# Patient Record
Sex: FEMALE | Race: BLACK OR AFRICAN AMERICAN | NOT HISPANIC OR LATINO | Employment: FULL TIME | URBAN - METROPOLITAN AREA
[De-identification: names, ages, dates, MRNs, and addresses within clinical notes are randomized per-mention and may not be internally consistent; named-entity substitution may affect disease eponyms.]

---

## 2018-10-05 ENCOUNTER — APPOINTMENT (EMERGENCY)
Dept: CT IMAGING | Facility: HOSPITAL | Age: 53
End: 2018-10-05
Payer: COMMERCIAL

## 2018-10-05 ENCOUNTER — HOSPITAL ENCOUNTER (EMERGENCY)
Facility: HOSPITAL | Age: 53
Discharge: HOME | End: 2018-10-05
Attending: EMERGENCY MEDICINE | Admitting: EMERGENCY MEDICINE
Payer: COMMERCIAL

## 2018-10-05 VITALS
RESPIRATION RATE: 16 BRPM | DIASTOLIC BLOOD PRESSURE: 84 MMHG | TEMPERATURE: 98 F | SYSTOLIC BLOOD PRESSURE: 184 MMHG | OXYGEN SATURATION: 99 % | HEART RATE: 64 BPM | WEIGHT: 215 LBS

## 2018-10-05 DIAGNOSIS — R51.9 HEADACHE: Primary | ICD-10-CM

## 2018-10-05 DIAGNOSIS — H60.90 OTITIS EXTERNA: ICD-10-CM

## 2018-10-05 PROCEDURE — 96374 THER/PROPH/DIAG INJ IV PUSH: CPT

## 2018-10-05 PROCEDURE — 99284 EMERGENCY DEPT VISIT MOD MDM: CPT

## 2018-10-05 PROCEDURE — 70450 CT HEAD/BRAIN W/O DYE: CPT

## 2018-10-05 PROCEDURE — 96375 TX/PRO/DX INJ NEW DRUG ADDON: CPT

## 2018-10-05 PROCEDURE — 96361 HYDRATE IV INFUSION ADD-ON: CPT

## 2018-10-05 RX ORDER — NEOMYCIN SULFATE, POLYMYXIN B SULFATE AND HYDROCORTISONE 10; 3.5; 1 MG/ML; MG/ML; [USP'U]/ML
4 SUSPENSION/ DROPS AURICULAR (OTIC) 3 TIMES DAILY
Qty: 10 ML | Refills: 0 | Status: SHIPPED | OUTPATIENT
Start: 2018-10-05

## 2018-10-05 RX ORDER — HYDROXYCHLOROQUINE SULFATE 200 MG/1
200 TABLET, FILM COATED ORAL DAILY
COMMUNITY
Start: 2017-06-22

## 2018-10-05 RX ORDER — BUTALBITAL, ACETAMINOPHEN AND CAFFEINE 50; 325; 40 MG/1; MG/1; MG/1
1 TABLET ORAL EVERY 4 HOURS PRN
Qty: 20 TABLET | Refills: 0 | Status: SHIPPED | OUTPATIENT
Start: 2018-10-05

## 2018-10-05 RX ORDER — DIPHENHYDRAMINE HYDROCHLORIDE 50 MG/ML
25 INJECTION INTRAMUSCULAR; INTRAVENOUS ONCE
Status: COMPLETED | OUTPATIENT
Start: 2018-10-05 | End: 2018-10-05

## 2018-10-05 RX ORDER — KETOROLAC TROMETHAMINE 30 MG/ML
30 INJECTION, SOLUTION INTRAMUSCULAR; INTRAVENOUS ONCE
Status: COMPLETED | OUTPATIENT
Start: 2018-10-05 | End: 2018-10-05

## 2018-10-05 RX ORDER — METOCLOPRAMIDE HYDROCHLORIDE 5 MG/ML
10 INJECTION INTRAMUSCULAR; INTRAVENOUS ONCE
Status: COMPLETED | OUTPATIENT
Start: 2018-10-05 | End: 2018-10-05

## 2018-10-05 RX ORDER — IBUPROFEN 600 MG/1
600 TABLET ORAL EVERY 6 HOURS PRN
Qty: 20 TABLET | Refills: 0 | Status: SHIPPED | OUTPATIENT
Start: 2018-10-05

## 2018-10-05 RX ADMIN — DIPHENHYDRAMINE HYDROCHLORIDE 25 MG: 50 INJECTION, SOLUTION INTRAMUSCULAR; INTRAVENOUS at 15:38

## 2018-10-05 RX ADMIN — SODIUM CHLORIDE 1000 ML: 0.9 INJECTION, SOLUTION INTRAVENOUS at 15:36

## 2018-10-05 RX ADMIN — KETOROLAC TROMETHAMINE 30 MG: 30 INJECTION, SOLUTION INTRAMUSCULAR at 16:43

## 2018-10-05 RX ADMIN — METOCLOPRAMIDE 10 MG: 5 INJECTION, SOLUTION INTRAMUSCULAR; INTRAVENOUS at 15:39

## 2018-10-05 NOTE — ED NOTES
Pt dozing, awakens and states pain remains a "6"  Accompanied by  in room 16        Randolph Mc RN  10/05/18 9729

## 2018-10-05 NOTE — DISCHARGE INSTRUCTIONS
Acute Headache, Ambulatory Care   GENERAL INFORMATION:   An acute headache  is pain or discomfort that starts suddenly and gets worse quickly  The cause of an acute headache may not be known  It may be triggered by stress, fatigue, hormones, food, or trauma  Common related symptoms include the following:   · Fever    · Sinus pressure    · Loss of memory    · Nausea or vomiting    · Problems with your vision, such as watery or red eyes, loss of vision, or pain in bright light    · Stiff neck    · Tenderness of the head and neck area    · Trouble staying awake, or being less alert than usual     · Weakness or less energy  Seek immediate care for the following symptoms:   · Severe pain    · A headache that occurs after a blow to the head, a fall, or other trauma     · Confusion or forgetfulness    · Numbness on one side of your face or body  Treatment for an acute headache  may include medicine to decrease pain  You may also need biofeedback or cognitive behavioral therapy  Ask your healthcare provider about these and other treatments for an acute headache  Manage my symptoms:   · Apply heat  on your head for 20 to 30 minutes every 2 hours for as many days as directed  Heat helps decrease pain and muscle spasms  You may alternate heat and ice  · Apply ice  on your head for 15 to 20 minutes every hour or as directed  Use an ice pack, or put crushed ice in a plastic bag  Cover it with a towel  Ice helps decrease pain  · Relax your muscles  Lie down in a comfortable position and close your eyes  Relax your muscles slowly  Start at your toes and work your way up your body  · Keep a record of your headaches  Write down when your headaches start and stop  Include your symptoms and what you were doing when the headache began  Record what you ate or drank for 24 hours before the headache started  Describe the pain and where it hurts  Keep track of what you did to treat your headache and whether it worked    Follow up with your healthcare provider as directed:  Bring your headache record with you when you see your healthcare provider  Write down your questions so you remember to ask them during your visits  CARE AGREEMENT:   You have the right to help plan your care  Learn about your health condition and how it may be treated  Discuss treatment options with your caregivers to decide what care you want to receive  You always have the right to refuse treatment  The above information is an  only  It is not intended as medical advice for individual conditions or treatments  Talk to your doctor, nurse or pharmacist before following any medical regimen to see if it is safe and effective for you  © 2014 [address] is for End User's use only and may not be sold, redistributed or otherwise used for commercial purposes  All illustrations and images included in CareNotes® are the copyrighted property of A D A M , Inc  or [employer]  Otitis Externa   WHAT YOU NEED TO KNOW:   Otitis externa, or swimmer's ear, is an infection in the outer ear canal  This canal goes from the outside of the ear to the eardrum  DISCHARGE INSTRUCTIONS:   Return to the emergency department if:   · You have severe ear pain  · You are suddenly unable to hear at all  · You have new swelling in your face, behind your ears, or in your neck  · You suddenly cannot move part of your face  · Your face suddenly feels numb  Contact your healthcare provider if:   · You have a fever  · Your signs and symptoms do not get better after 2 days of treatment  · Your signs and symptoms go away for a time, but then come back  · You have questions or concerns about your condition or care  Medicines:   · NSAIDs , such as ibuprofen, help decrease swelling, pain, and fever  This medicine is available with or without a doctor's order   NSAIDs can cause stomach bleeding or kidney problems in certain people  If you take blood thinner medicine, always ask if NSAIDs are safe for you  Always read the medicine label and follow directions  Do not give these medicines to children under 10months of age without direction from your child's healthcare provider  · Acetaminophen  decreases pain and fever  It is available without a doctor's order  Ask how much to take and how often to take it  Follow directions  Acetaminophen can cause liver damage if not taken correctly  · Ear drops  that contain an antibiotic may be given  The antibiotic helps treat a bacterial infection  You may also be given steroid medicine  The steroid helps decrease redness, swelling, and pain  · Take your medicine as directed  Contact your healthcare provider if you think your medicine is not helping or if you have side effects  Tell him or her if you are allergic to any medicine  Keep a list of the medicines, vitamins, and herbs you take  Include the amounts, and when and why you take them  Bring the list or the pill bottles to follow-up visits  Carry your medicine list with you in case of an emergency  Follow up with your healthcare provider as directed:  Write down your questions so you remember to ask them during your visits  How to use eardrops:   · Lie down on your side with your infected ear facing up  · Carefully drip the correct number of eardrops into your ear  Have another person help you if possible  · Gently move the outside part of your ear back and forth to help the medicine reach your ear canal      · Stay lying down in the same position (with your ear facing up) for 3 to 5 minutes  Prevent otitis externa:   · Do not put cotton swabs or foreign objects in your ears  · Wrap a clean moist washcloth around your finger, and use it to clean your outer ear and remove extra ear wax  · Use ear plugs when you swim  Dry your outer ears completely after you swim or bathe    © 2017 [address] Information is for End User's use only and may not be sold, redistributed or otherwise used for commercial purposes  All illustrations and images included in CareNotes® are the copyrighted property of A D A M , Inc  or Harsh Simental  The above information is an  only  It is not intended as medical advice for individual conditions or treatments  Talk to your doctor, nurse or pharmacist before following any medical regimen to see if it is safe and effective for you

## 2018-10-05 NOTE — ED PROVIDER NOTES
History  Chief Complaint   Patient presents with    Headache     Patient arrives via EMS  Per patient began with posterior headache last night which has since worsed  Reports some dizziness and blurred vision  Also c/o left sided neck tightness  C/o posterior headache since this am   No h/o migraines, pt  Doesn't get headaches often  +nausea, no vomiting , no fevers  Headache is a 9/10  No head trauma              Prior to Admission Medications   Prescriptions Last Dose Informant Patient Reported? Taking?   hydroxychloroquine (PLAQUENIL) 200 mg tablet   Yes Yes   Sig: Take 200 mg by mouth daily      Facility-Administered Medications: None       Past Medical History:   Diagnosis Date    Lupus        No past surgical history on file  No family history on file  I have reviewed and agree with the history as documented  Social History   Substance Use Topics    Smoking status: Never Smoker    Smokeless tobacco: Never Used    Alcohol use Yes      Comment: occasional        Review of Systems   Constitutional: Negative for appetite change, fatigue and fever  HENT: Positive for ear pain  Negative for rhinorrhea and sore throat  Respiratory: Negative for cough, shortness of breath and wheezing  Cardiovascular: Negative for chest pain and leg swelling  Gastrointestinal: Positive for nausea  Negative for abdominal pain, diarrhea and vomiting  Genitourinary: Negative for dysuria and flank pain  Musculoskeletal: Negative for back pain and neck pain  Skin: Negative for rash  Neurological: Positive for headaches  Negative for syncope  Psychiatric/Behavioral:        Mood normal       Physical Exam  Physical Exam   Constitutional: She is oriented to person, place, and time  She appears well-developed and well-nourished  HENT:   Head: Normocephalic and atraumatic     Mouth/Throat: Oropharynx is clear and moist    TM's clear b/l, L ear canal is tender - no redness or drainage   Eyes: Pupils are equal, round, and reactive to light  Neck: Normal range of motion  Neck supple  Cardiovascular: Normal rate and regular rhythm  Pulmonary/Chest: Effort normal and breath sounds normal    Abdominal: Soft  There is no tenderness  Musculoskeletal: Normal range of motion  Neurological: She is alert and oriented to person, place, and time  No cranial nerve deficit  Skin: Skin is warm and dry  Nursing note and vitals reviewed  Vital Signs  ED Triage Vitals   Temperature Pulse Respirations Blood Pressure SpO2   10/05/18 1448 10/05/18 1446 10/05/18 1446 10/05/18 1446 10/05/18 1446   98 °F (36 7 °C) 78 18 (!) 187/87 100 %      Temp Source Heart Rate Source Patient Position - Orthostatic VS BP Location FiO2 (%)   10/05/18 1448 10/05/18 1446 10/05/18 1446 10/05/18 1446 --   Oral Monitor Lying Right arm       Pain Score       10/05/18 1446       9           Vitals:    10/05/18 1446 10/05/18 1733   BP: (!) 187/87 (!) 184/84   Pulse: 78 64   Patient Position - Orthostatic VS: Lying Sitting       Visual Acuity  Visual Acuity      Most Recent Value   L Pupil Size (mm)  3   R Pupil Size (mm)  3          ED Medications  Medications   diphenhydrAMINE (BENADRYL) injection 25 mg (25 mg Intravenous Given 10/5/18 1538)   metoclopramide (REGLAN) injection 10 mg (10 mg Intravenous Given 10/5/18 1539)   sodium chloride 0 9 % bolus 1,000 mL (0 mL Intravenous Stopped 10/5/18 1730)   ketorolac (TORADOL) injection 30 mg (30 mg Intravenous Given 10/5/18 1643)       Diagnostic Studies  Results Reviewed     None                 CT head without contrast   Final Result by Benjamin Gaxiola DO (10/05 1628)      No acute intracranial abnormality                    Workstation performed: [id number]                    Procedures  Procedures       Phone Contacts  ED Phone Contact    ED Course                               MDM  Number of Diagnoses or Management Options  Headache:   Otitis externa:   Risk of Complications, Morbidity, and/or Mortality  Presenting problems: moderate  General comments: Pt  Felt better after meds and iv fluids      CritCare Time    Disposition  Final diagnoses:   Headache   Otitis externa     Time reflects when diagnosis was documented in both MDM as applicable and the Disposition within this note     Time User Action Codes Description Comment    10/5/2018  4:40 PM Mike Thompson Add [R51] Headache     10/5/2018  4:41 PM Criselda Landen Devan R Add [H60 90] Otitis externa       ED Disposition     ED Disposition Condition Comment    Discharge  Felipemoody Valiente discharge to home/self care  Condition at discharge: Stable        Follow-up Information     Follow up With Specialties Details Why [address] [facility] [place], MD Internal Medicine   [address]  [address] [address]  [phone]            Discharge Medication List as of 10/5/2018  4:42 PM      START taking these medications    Details   butalbital-acetaminophen-caffeine (FIORICET,ESGIC) -40 mg per tablet Take 1 tablet by mouth every 4 (four) hours as needed for headaches, Starting Fri 10/5/2018, Print      ibuprofen (MOTRIN) 600 mg tablet Take 1 tablet (600 mg total) by mouth every 6 (six) hours as needed for moderate pain, Starting Fri 10/5/2018, Print      neomycin-polymyxin-hydrocortisone (CORTISPORIN) 0 35%-10,000 units/mL-1% otic suspension Administer 4 drops into the left ear 3 (three) times a day, Starting Fri 10/5/2018, Print         CONTINUE these medications which have NOT CHANGED    Details   hydroxychloroquine (PLAQUENIL) 200 mg tablet Take 200 mg by mouth daily, Starting u 6/22/2017, Historical Med           No discharge procedures on file      ED Provider  Electronically Signed by           Nathaly Roman MD  10/05/18 9188

## 2019-11-07 ENCOUNTER — HOSPITAL ENCOUNTER (EMERGENCY)
Age: 54
Discharge: HOME | End: 2019-11-07
Attending: STUDENT IN AN ORGANIZED HEALTH CARE EDUCATION/TRAINING PROGRAM | Admitting: STUDENT IN AN ORGANIZED HEALTH CARE EDUCATION/TRAINING PROGRAM
Payer: OTHER MISCELLANEOUS

## 2019-11-07 ENCOUNTER — APPOINTMENT (OUTPATIENT)
Dept: CT IMAGING | Age: 54
End: 2019-11-07
Attending: PHYSICIAN ASSISTANT
Payer: OTHER MISCELLANEOUS

## 2019-11-07 VITALS
HEART RATE: 75 BPM | OXYGEN SATURATION: 100 % | RESPIRATION RATE: 16 BRPM | TEMPERATURE: 97.9 F | DIASTOLIC BLOOD PRESSURE: 81 MMHG | SYSTOLIC BLOOD PRESSURE: 188 MMHG

## 2019-11-07 DIAGNOSIS — S09.90XA CLOSED HEAD INJURY, INITIAL ENCOUNTER: Primary | ICD-10-CM

## 2019-11-07 PROCEDURE — 96372 THER/PROPH/DIAG INJ SC/IM: CPT

## 2019-11-07 PROCEDURE — 72125 CT NECK SPINE W/O DYE: CPT

## 2019-11-07 PROCEDURE — 70450 CT HEAD/BRAIN W/O DYE: CPT

## 2019-11-07 PROCEDURE — 99282 EMERGENCY DEPT VISIT SF MDM: CPT

## 2019-11-07 PROCEDURE — 74011250636 HC RX REV CODE- 250/636: Performed by: PHYSICIAN ASSISTANT

## 2019-11-07 RX ORDER — KETOROLAC TROMETHAMINE 30 MG/ML
30 INJECTION, SOLUTION INTRAMUSCULAR; INTRAVENOUS
Status: COMPLETED | OUTPATIENT
Start: 2019-11-07 | End: 2019-11-07

## 2019-11-07 RX ADMIN — KETOROLAC TROMETHAMINE 30 MG: 30 INJECTION, SOLUTION INTRAMUSCULAR at 12:17

## 2019-11-07 NOTE — LETTER
[address] 
[address] [facility] [address] [id number] 
[phone] Work/School Note Date: 11/7/2019 To Whom It May concern: 
 
Carmen Garcia was seen and treated today in the emergency room by the following provider(s): 
Physician Assistant: GINA Riddle. Carmen Garcia may return to work on 11/9/19. Sincerely, GINA Redd
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
## 2019-11-07 NOTE — DISCHARGE INSTRUCTIONS
Patient Education        Learning About a Closed Head Injury  What is a closed head injury? A closed head injury happens when your head gets hit hard. The strong force of the blow causes your brain to shake in your skull. This movement can cause the brain to bruise, swell, or tear. Sometimes nerves or blood vessels also get damaged. This can cause bleeding in or around the brain. A concussion is a type of closed head injury. What are the symptoms? If you have a mild concussion, you may have a mild headache or feel \"not quite right. \" These symptoms are common. They usually go away over a few days to 4 weeks. But sometimes after a concussion, you feel like you can't function as well as before the injury. And you have new symptoms. This is called postconcussive syndrome. You may:  · Find it harder to solve problems, think, concentrate, or remember. · Have headaches. · Have changes in your sleep patterns, such as not being able to sleep or sleeping all the time. · Have changes in your personality. · Not be interested in your usual activities. · Feel angry or anxious without a clear reason. · Lose your sense of taste or smell. · Be dizzy, lightheaded, or unsteady. It may be hard to stand or walk. How is a closed head injury treated? Any person who may have a concussion needs to see a doctor. Some people have to stay in the hospital to be watched. Others can go home safely. If you go home, follow your doctor's instructions. He or she will tell you if you need someone to watch you closely for the next 24 hours or longer. Rest is the best treatment. Get plenty of sleep at night. And try to rest during the day. · Avoid activities that are physically or mentally demanding. These include housework, exercise, and schoolwork. And don't play video games, send text messages, or use the computer. You may need to change your school or work schedule to be able to avoid these activities.   · Ask your doctor when it's okay to drive, ride a bike, or operate machinery. · Take an over-the-counter pain medicine, such as acetaminophen (Tylenol), ibuprofen (Advil, Motrin), or naproxen (Aleve). Be safe with medicines. Read and follow all instructions on the label. · Check with your doctor before you use any other medicines for pain. · Do not drink alcohol or use illegal drugs. They can slow recovery. They can also increase your risk of getting a second head injury. Follow-up care is a key part of your treatment and safety. Be sure to make and go to all appointments, and call your doctor if you are having problems. It's also a good idea to know your test results and keep a list of the medicines you take. Where can you learn more? Go to http://manuel-roopa.info/. Enter E235 in the search box to learn more about \"Learning About a Closed Head Injury. \"  Current as of: March 28, 2019  Content Version: 12.2  © 0171-6377 [employer]. Care instructions adapted under license by [employer] (which disclaims liability or warranty for this information). If you have questions about a medical condition or this instruction, always ask your healthcare professional. [address] any warranty or liability for your use of this information.

## 2020-02-19 ENCOUNTER — HOSPITAL ENCOUNTER (EMERGENCY)
Age: 55
Discharge: HOME | End: 2020-02-19
Attending: EMERGENCY MEDICINE | Admitting: EMERGENCY MEDICINE
Payer: OTHER MISCELLANEOUS

## 2020-02-19 VITALS
HEIGHT: 64 IN | HEART RATE: 75 BPM | BODY MASS INDEX: 39.61 KG/M2 | OXYGEN SATURATION: 99 % | TEMPERATURE: 98.2 F | DIASTOLIC BLOOD PRESSURE: 89 MMHG | RESPIRATION RATE: 16 BRPM | SYSTOLIC BLOOD PRESSURE: 116 MMHG | WEIGHT: 232 LBS

## 2020-02-19 DIAGNOSIS — G43.809 OTHER MIGRAINE WITHOUT STATUS MIGRAINOSUS, NOT INTRACTABLE: Primary | ICD-10-CM

## 2020-02-19 LAB
ANION GAP SERPL CALC-SCNC: 4 MMOL/L (ref 5–15)
BASOPHILS # BLD: 0 K/UL (ref 0–0.1)
BASOPHILS NFR BLD: 1 % (ref 0–1)
BUN SERPL-MCNC: 10 MG/DL (ref 6–20)
BUN/CREAT SERPL: 13 (ref 12–20)
CALCIUM SERPL-MCNC: 8.9 MG/DL (ref 8.5–10.1)
CHLORIDE SERPL-SCNC: 109 MMOL/L (ref 97–108)
CO2 SERPL-SCNC: 27 MMOL/L (ref 21–32)
COMMENT, HOLDF: NORMAL
CREAT SERPL-MCNC: 0.78 MG/DL (ref 0.55–1.02)
DIFFERENTIAL METHOD BLD: ABNORMAL
EOSINOPHIL # BLD: 0.1 K/UL (ref 0–0.4)
EOSINOPHIL NFR BLD: 2 % (ref 0–7)
ERYTHROCYTE [DISTWIDTH] IN BLOOD BY AUTOMATED COUNT: 13.5 % (ref 11.5–14.5)
GLUCOSE SERPL-MCNC: 96 MG/DL (ref 65–100)
HCT VFR BLD AUTO: 43.9 % (ref 35–47)
HGB BLD-MCNC: 13.1 G/DL (ref 11.5–16)
IMM GRANULOCYTES # BLD AUTO: 0 K/UL (ref 0–0.04)
IMM GRANULOCYTES NFR BLD AUTO: 0 % (ref 0–0.5)
LYMPHOCYTES # BLD: 2 K/UL (ref 0.8–3.5)
LYMPHOCYTES NFR BLD: 47 % (ref 12–49)
MCH RBC QN AUTO: 25.2 PG (ref 26–34)
MCHC RBC AUTO-ENTMCNC: 29.8 G/DL (ref 30–36.5)
MCV RBC AUTO: 84.6 FL (ref 80–99)
MONOCYTES # BLD: 0.3 K/UL (ref 0–1)
MONOCYTES NFR BLD: 6 % (ref 5–13)
NEUTS SEG # BLD: 2 K/UL (ref 1.8–8)
NEUTS SEG NFR BLD: 44 % (ref 32–75)
NRBC # BLD: 0 K/UL (ref 0–0.01)
NRBC BLD-RTO: 0 PER 100 WBC
PLATELET # BLD AUTO: 279 K/UL (ref 150–400)
PMV BLD AUTO: 10.9 FL (ref 8.9–12.9)
POTASSIUM SERPL-SCNC: 3.7 MMOL/L (ref 3.5–5.1)
RBC # BLD AUTO: 5.19 M/UL (ref 3.8–5.2)
SAMPLES BEING HELD,HOLD: NORMAL
SODIUM SERPL-SCNC: 140 MMOL/L (ref 136–145)
WBC # BLD AUTO: 4.4 K/UL (ref 3.6–11)

## 2020-02-19 PROCEDURE — 99282 EMERGENCY DEPT VISIT SF MDM: CPT

## 2020-02-19 PROCEDURE — 36415 COLL VENOUS BLD VENIPUNCTURE: CPT

## 2020-02-19 PROCEDURE — 85025 COMPLETE CBC W/AUTO DIFF WBC: CPT

## 2020-02-19 PROCEDURE — 96374 THER/PROPH/DIAG INJ IV PUSH: CPT

## 2020-02-19 PROCEDURE — 74011250636 HC RX REV CODE- 250/636: Performed by: NURSE PRACTITIONER

## 2020-02-19 PROCEDURE — 80048 BASIC METABOLIC PNL TOTAL CA: CPT

## 2020-02-19 PROCEDURE — 96375 TX/PRO/DX INJ NEW DRUG ADDON: CPT

## 2020-02-19 RX ORDER — HYDROXYCHLOROQUINE SULFATE 200 MG/1
200 TABLET, FILM COATED ORAL DAILY
COMMUNITY
End: 2020-08-28 | Stop reason: SDUPTHER

## 2020-02-19 RX ORDER — MONTELUKAST SODIUM 10 MG/1
10 TABLET ORAL EVERY EVENING
COMMUNITY
End: 2020-08-28 | Stop reason: SDUPTHER

## 2020-02-19 RX ORDER — PROCHLORPERAZINE MALEATE 10 MG
10 TABLET ORAL
Qty: 12 TAB | Refills: 0 | Status: SHIPPED | OUTPATIENT
Start: 2020-02-19 | End: 2020-02-26

## 2020-02-19 RX ORDER — PROCHLORPERAZINE EDISYLATE 5 MG/ML
10 INJECTION INTRAMUSCULAR; INTRAVENOUS
Status: COMPLETED | OUTPATIENT
Start: 2020-02-19 | End: 2020-02-19

## 2020-02-19 RX ORDER — DIPHENHYDRAMINE HYDROCHLORIDE 50 MG/ML
12.5 INJECTION, SOLUTION INTRAMUSCULAR; INTRAVENOUS
Status: COMPLETED | OUTPATIENT
Start: 2020-02-19 | End: 2020-02-19

## 2020-02-19 RX ORDER — DICLOFENAC SODIUM 10 MG/G
GEL TOPICAL 4 TIMES DAILY
COMMUNITY
End: 2021-07-27 | Stop reason: ALTCHOICE

## 2020-02-19 RX ORDER — KETOROLAC TROMETHAMINE 30 MG/ML
15 INJECTION, SOLUTION INTRAMUSCULAR; INTRAVENOUS
Status: COMPLETED | OUTPATIENT
Start: 2020-02-19 | End: 2020-02-19

## 2020-02-19 RX ADMIN — KETOROLAC TROMETHAMINE 15 MG: 30 INJECTION, SOLUTION INTRAMUSCULAR at 13:00

## 2020-02-19 RX ADMIN — SODIUM CHLORIDE 1000 ML: 900 INJECTION, SOLUTION INTRAVENOUS at 13:00

## 2020-02-19 RX ADMIN — PROCHLORPERAZINE EDISYLATE 10 MG: 5 INJECTION INTRAMUSCULAR; INTRAVENOUS at 13:00

## 2020-02-19 RX ADMIN — DIPHENHYDRAMINE HYDROCHLORIDE 12.5 MG: 50 INJECTION, SOLUTION INTRAMUSCULAR; INTRAVENOUS at 13:00
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
## 2020-02-19 NOTE — LETTER
[address] 
[facility] EMERGENCY DEPT 
[address] [facility] [place] [address] [phone] Work/School Note Date: 2/19/2020 To Whom It May concern: 
 
Yonathan Cook was seen and treated today in the emergency room by the following provider(s): 
Attending Provider: Avani Mario MD 
Nurse Practitioner: Param Persaud NP. Yonathan Cook may return to work on February 20, 2020 if headache has improved. Sincerely, Emil Cormier NP

## 2020-02-19 NOTE — DISCHARGE INSTRUCTIONS
Thank you for allowing us to care for you today. Please follow-up with your Primary Care provider in the next 2-3 days if your symptoms do not improve. Plan for home: You were seen in the ER for a migraine headache. Continue medications prescribed to you by your primary care or neurologist. Come back to the ER if you have any worsening symptoms, headache, nausea, vomiting that prevents you from staying hydrated. Zofran every 6-8 hours as needed for nausea/vomiting    Come back to the ER if you have worsening symptoms, fevers over 100.9, shaking chills, nausea or vomiting. Patient Education        Deciding About Taking Medicine to Prevent Migraines  How can you decide about taking medicine to prevent migraine headaches? What are migraines? Migraines are painful, throbbing headaches. They can last from 4 to 72 hours. They often occur on only one side of your head. But you may feel them on both sides. The pain may keep you from doing your daily activities. You may take a daily medicine if you get bad migraines often. This can help prevent them. What are key points about this decision? · Medicines to prevent migraines may not stop them every time. But if you take them daily, you can reduce how many migraines you get by more than half. They can also reduce how long migraines last. And your symptoms may not be as bad. · Medicines that prevent migraines may cause side effects. You may have sleep and memory problems, upset stomach, dry mouth, or constipation. Some of these side effects may last for as long as you take the medicine. Or they may go away within a few weeks. Why might you choose to take medicine to prevent migraines? · You are willing to take medicine daily if it will help your symptoms. · You don't think the side effects of the medicine could be as bad as your migraine symptoms. · Your migraines get in the way of your work.  Or they harm your relationships with friends and family. · Benefits of medicine include fewer or no migraines. And your migraines may not last as long or feel as bad. Why might you choose not to take medicine to prevent migraines? · You want to avoid the side effects of the medicine. · You don't want to take medicine every day. · Your migraines are not affecting your work and relationships. · If your symptoms don't improve with home treatment and other medicines, you can decide later to take medicine every day to help prevent migraines. Your decision  Thinking about the facts and your feelings can help you make a decision that is right for you. Be sure you understand the benefits and risks of your options, and think about what else you need to do before you make the decision. Where can you learn more? Go to http://manuel-roopa.info/. Enter Y321 in the search box to learn more about \"Deciding About Taking Medicine to Prevent Migraines. \"  Current as of: March 28, 2019  Content Version: 12.2  © 4444-0644 [employer]. Care instructions adapted under license by [employer] (which disclaims liability or warranty for this information). If you have questions about a medical condition or this instruction, always ask your healthcare professional. [place] [address] any warranty or liability for your use of this information.

## 2020-03-02 ENCOUNTER — HOSPITAL ENCOUNTER (EMERGENCY)
Age: 55
Discharge: HOME | End: 2020-03-02
Attending: EMERGENCY MEDICINE | Admitting: EMERGENCY MEDICINE
Payer: OTHER MISCELLANEOUS

## 2020-03-02 VITALS
SYSTOLIC BLOOD PRESSURE: 187 MMHG | RESPIRATION RATE: 16 BRPM | DIASTOLIC BLOOD PRESSURE: 86 MMHG | HEART RATE: 83 BPM | OXYGEN SATURATION: 97 % | TEMPERATURE: 98.5 F

## 2020-03-02 DIAGNOSIS — I10 ESSENTIAL HYPERTENSION: Primary | ICD-10-CM

## 2020-03-02 LAB
ALBUMIN SERPL-MCNC: 3.7 G/DL (ref 3.5–5)
ALBUMIN/GLOB SERPL: 0.8 {RATIO} (ref 1.1–2.2)
ALP SERPL-CCNC: 148 U/L (ref 45–117)
ALT SERPL-CCNC: 32 U/L (ref 12–78)
ANION GAP SERPL CALC-SCNC: 7 MMOL/L (ref 5–15)
AST SERPL-CCNC: 18 U/L (ref 15–37)
BASOPHILS # BLD: 0 K/UL (ref 0–0.1)
BASOPHILS NFR BLD: 0 % (ref 0–1)
BILIRUB SERPL-MCNC: 0.2 MG/DL (ref 0.2–1)
BUN SERPL-MCNC: 10 MG/DL (ref 6–20)
BUN/CREAT SERPL: 12 (ref 12–20)
CALCIUM SERPL-MCNC: 9.4 MG/DL (ref 8.5–10.1)
CHLORIDE SERPL-SCNC: 106 MMOL/L (ref 97–108)
CO2 SERPL-SCNC: 26 MMOL/L (ref 21–32)
CREAT SERPL-MCNC: 0.84 MG/DL (ref 0.55–1.02)
DIFFERENTIAL METHOD BLD: ABNORMAL
EOSINOPHIL # BLD: 0.1 K/UL (ref 0–0.4)
EOSINOPHIL NFR BLD: 2 % (ref 0–7)
ERYTHROCYTE [DISTWIDTH] IN BLOOD BY AUTOMATED COUNT: 13.8 % (ref 11.5–14.5)
GLOBULIN SER CALC-MCNC: 4.7 G/DL (ref 2–4)
GLUCOSE SERPL-MCNC: 89 MG/DL (ref 65–100)
HCT VFR BLD AUTO: 40.1 % (ref 35–47)
HGB BLD-MCNC: 12.2 G/DL (ref 11.5–16)
IMM GRANULOCYTES # BLD AUTO: 0 K/UL (ref 0–0.04)
IMM GRANULOCYTES NFR BLD AUTO: 0 % (ref 0–0.5)
LYMPHOCYTES # BLD: 2.2 K/UL (ref 0.8–3.5)
LYMPHOCYTES NFR BLD: 48 % (ref 12–49)
MCH RBC QN AUTO: 25.1 PG (ref 26–34)
MCHC RBC AUTO-ENTMCNC: 30.4 G/DL (ref 30–36.5)
MCV RBC AUTO: 82.5 FL (ref 80–99)
MONOCYTES # BLD: 0.3 K/UL (ref 0–1)
MONOCYTES NFR BLD: 7 % (ref 5–13)
NEUTS SEG # BLD: 2 K/UL (ref 1.8–8)
NEUTS SEG NFR BLD: 43 % (ref 32–75)
NRBC # BLD: 0 K/UL (ref 0–0.01)
NRBC BLD-RTO: 0 PER 100 WBC
PLATELET # BLD AUTO: 297 K/UL (ref 150–400)
PMV BLD AUTO: 10.9 FL (ref 8.9–12.9)
POTASSIUM SERPL-SCNC: 3.4 MMOL/L (ref 3.5–5.1)
PROT SERPL-MCNC: 8.4 G/DL (ref 6.4–8.2)
RBC # BLD AUTO: 4.86 M/UL (ref 3.8–5.2)
SODIUM SERPL-SCNC: 139 MMOL/L (ref 136–145)
TROPONIN I SERPL-MCNC: <0.05 NG/ML
WBC # BLD AUTO: 4.6 K/UL (ref 3.6–11)

## 2020-03-02 PROCEDURE — 93005 ELECTROCARDIOGRAM TRACING: CPT

## 2020-03-02 PROCEDURE — 85025 COMPLETE CBC W/AUTO DIFF WBC: CPT

## 2020-03-02 PROCEDURE — 99283 EMERGENCY DEPT VISIT LOW MDM: CPT

## 2020-03-02 PROCEDURE — 84484 ASSAY OF TROPONIN QUANT: CPT

## 2020-03-02 PROCEDURE — 80053 COMPREHEN METABOLIC PANEL: CPT

## 2020-03-02 PROCEDURE — 36415 COLL VENOUS BLD VENIPUNCTURE: CPT

## 2020-03-02 RX ORDER — AMLODIPINE BESYLATE 5 MG/1
5 TABLET ORAL DAILY
Qty: 30 TAB | Refills: 0 | Status: SHIPPED | OUTPATIENT
Start: 2020-03-02 | End: 2020-03-10

## 2020-03-02 NOTE — LETTER
Bhav[address] 54 
[address] [facility] [id number] [phone] Work/School Note Date: 3/2/2020 To Whom It May concern: 
 
Jamison Puente was seen and treated today in the emergency room by the following provider(s): 
Attending Provider: Sylvain Suarez MD 
Physician Assistant: GINA Zafar. Jamison Puente may return to work on 3/6/2020.  
 
Sincerely, 
 
 
 
 
GINA Juárez
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
## 2020-03-02 NOTE — ED TRIAGE NOTES
Patient presents from home with complaints of high blood pressure. Patient reports in 2012 she did take medications to decrease her BP.  Patient then had an allergic reaction to the Lisinopril and was not put back on a blood pressure medication

## 2020-03-03 LAB
ATRIAL RATE: 76 BPM
CALCULATED P AXIS, ECG09: 58 DEGREES
CALCULATED R AXIS, ECG10: 46 DEGREES
CALCULATED T AXIS, ECG11: 42 DEGREES
DIAGNOSIS, 93000: NORMAL
P-R INTERVAL, ECG05: 168 MS
Q-T INTERVAL, ECG07: 402 MS
QRS DURATION, ECG06: 72 MS
QTC CALCULATION (BEZET), ECG08: 452 MS
VENTRICULAR RATE, ECG03: 76 BPM

## 2020-03-09 ENCOUNTER — APPOINTMENT (OUTPATIENT)
Dept: CT IMAGING | Age: 55
End: 2020-03-09
Attending: PHYSICIAN ASSISTANT
Payer: OTHER MISCELLANEOUS

## 2020-03-09 ENCOUNTER — HOSPITAL ENCOUNTER (EMERGENCY)
Age: 55
Discharge: HOME | End: 2020-03-09
Attending: EMERGENCY MEDICINE | Admitting: EMERGENCY MEDICINE
Payer: OTHER MISCELLANEOUS

## 2020-03-09 VITALS
BODY MASS INDEX: 36.32 KG/M2 | OXYGEN SATURATION: 99 % | RESPIRATION RATE: 20 BRPM | HEIGHT: 65 IN | HEART RATE: 74 BPM | TEMPERATURE: 98 F | WEIGHT: 218 LBS | DIASTOLIC BLOOD PRESSURE: 81 MMHG | SYSTOLIC BLOOD PRESSURE: 156 MMHG

## 2020-03-09 DIAGNOSIS — I10 HYPERTENSION, UNSPECIFIED TYPE: Primary | ICD-10-CM

## 2020-03-09 PROCEDURE — 99283 EMERGENCY DEPT VISIT LOW MDM: CPT

## 2020-03-09 PROCEDURE — 70450 CT HEAD/BRAIN W/O DYE: CPT

## 2020-03-09 NOTE — ED TRIAGE NOTES
Triage Note: Patient is coming in with elevated blood pressure and was started on Clonidine 0.1 mg three times a day and was instructed to come to ER when blood pressure is above 716 systolic. Patient is coming in with headache. Patient has been diagnosed with concussion in November and has been going [facility] twice a week for the concussion therapy.

## 2020-03-09 NOTE — ED NOTES
Patient discharged home by Jyoti Boles [facility]. Patient verbalized understanding of discharge instructions.

## 2020-03-09 NOTE — LETTER
Bhavana [address] 54 
[address] [facility] [address] [id number] [phone] Work/School Note Date: 3/9/2020 To Whom It May concern: 
 
Elpidio Packer was seen and treated today in the emergency room by the following provider(s): 
Attending Provider: Malvin Langston MD 
Physician Assistant: GINA Hagen. Elpidio Packer may return to work on 3/10/20. Sincerely, GINA Pan
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
## 2020-03-09 NOTE — DISCHARGE INSTRUCTIONS
Patient Education        DASH Diet: Care Instructions  Your Care Instructions    The DASH diet is an eating plan that can help lower your blood pressure. DASH stands for Dietary Approaches to Stop Hypertension. Hypertension is high blood pressure. The DASH diet focuses on eating foods that are high in calcium, potassium, and magnesium. These nutrients can lower blood pressure. The foods that are highest in these nutrients are fruits, vegetables, low-fat dairy products, nuts, seeds, and legumes. But taking calcium, potassium, and magnesium supplements instead of eating foods that are high in those nutrients does not have the same effect. The DASH diet also includes whole grains, fish, and poultry. The DASH diet is one of several lifestyle changes your doctor may recommend to lower your high blood pressure. Your doctor may also want you to decrease the amount of sodium in your diet. Lowering sodium while following the DASH diet can lower blood pressure even further than just the DASH diet alone. Follow-up care is a key part of your treatment and safety. Be sure to make and go to all appointments, and call your doctor if you are having problems. It's also a good idea to know your test results and keep a list of the medicines you take. How can you care for yourself at home? Following the DASH diet  · Eat 4 to 5 servings of fruit each day. A serving is 1 medium-sized piece of fruit, ½ cup chopped or canned fruit, 1/4 cup dried fruit, or 4 ounces (½ cup) of fruit juice. Choose fruit more often than fruit juice. · Eat 4 to 5 servings of vegetables each day. A serving is 1 cup of lettuce or raw leafy vegetables, ½ cup of chopped or cooked vegetables, or 4 ounces (½ cup) of vegetable juice. Choose vegetables more often than vegetable juice. · Get 2 to 3 servings of low-fat and fat-free dairy each day. A serving is 8 ounces of milk, 1 cup of yogurt, or 1 ½ ounces of cheese. · Eat 6 to 8 servings of grains each day.  A serving is 1 slice of bread, 1 ounce of dry cereal, or ½ cup of cooked rice, pasta, or cooked cereal. Try to choose whole-grain products as much as possible. · Limit lean meat, poultry, and fish to 2 servings each day. A serving is 3 ounces, about the size of a deck of cards. · Eat 4 to 5 servings of nuts, seeds, and legumes (cooked dried beans, lentils, and split peas) each week. A serving is 1/3 cup of nuts, 2 tablespoons of seeds, or ½ cup of cooked beans or peas. · Limit fats and oils to 2 to 3 servings each day. A serving is 1 teaspoon of vegetable oil or 2 tablespoons of salad dressing. · Limit sweets and added sugars to 5 servings or less a week. A serving is 1 tablespoon jelly or jam, ½ cup sorbet, or 1 cup of lemonade. · Eat less than 2,300 milligrams (mg) of sodium a day. If you limit your sodium to 1,500 mg a day, you can lower your blood pressure even more. Tips for success  · Start small. Do not try to make dramatic changes to your diet all at once. You might feel that you are missing out on your favorite foods and then be more likely to not follow the plan. Make small changes, and stick with them. Once those changes become habit, add a few more changes. · Try some of the following:  ? Make it a goal to eat a fruit or vegetable at every meal and at snacks. This will make it easy to get the recommended amount of fruits and vegetables each day. ? Try yogurt topped with fruit and nuts for a snack or healthy dessert. ? Add lettuce, tomato, cucumber, and onion to sandwiches. ? Combine a ready-made pizza crust with low-fat mozzarella cheese and lots of vegetable toppings. Try using tomatoes, squash, spinach, broccoli, carrots, cauliflower, and onions. ? Have a variety of cut-up vegetables with a low-fat dip as an appetizer instead of chips and dip. ? Sprinkle sunflower seeds or chopped almonds over salads. Or try adding chopped walnuts or almonds to cooked vegetables.   ? Try some vegetarian meals using beans and peas. Add garbanzo or kidney beans to salads. Make burritos and tacos with mashed fisher beans or black beans. Where can you learn more? Go to http://manuel-roopa.info/. Enter S024 in the search box to learn more about \"DASH Diet: Care Instructions. \"  Current as of: April 9, 2019  Content Version: 12.2  © 4174-4421 [employer]. Care instructions adapted under license by [employer] (which disclaims liability or warranty for this information). If you have questions about a medical condition or this instruction, always ask your healthcare professional. [place] [address] any warranty or liability for your use of this information. Patient Education        Learning About High Blood Pressure  What is high blood pressure? Blood pressure is a measure of how hard the blood pushes against the walls of your arteries. It's normal for blood pressure to go up and down throughout the day, but if it stays up, you have high blood pressure. Another name for high blood pressure is hypertension. Two numbers tell you your blood pressure. The first number is the systolic pressure. It shows how hard the blood pushes when your heart is pumping. The second number is the diastolic pressure. It shows how hard the blood pushes between heartbeats, when your heart is relaxed and filling with blood. Your doctor will give you a goal for your blood pressure. Your goal will be based on your health and your age. High blood pressure (hypertension) means that the top number stays high, or the bottom number stays high, or both. High blood pressure increases the risk of stroke, heart attack, and other problems. You and your doctor will talk about your risks of these problems based on your blood pressure. What happens when you have high blood pressure? · Blood flows through your arteries with too much force. Over time, this damages the walls of your arteries.  But you can't feel it. High blood pressure usually doesn't cause symptoms. · Fat and calcium start to build up in your arteries. This buildup is called plaque. Plaque makes your arteries narrower and stiffer. Blood can't flow through them as easily. · This lack of good blood flow starts to damage some of the organs in your body. This can lead to problems such as coronary artery disease and heart attack, heart failure, stroke, kidney failure, and eye damage. How can you prevent high blood pressure? · Stay at a healthy weight. · Try to limit how much sodium you eat to less than 2,300 milligrams (mg) a day. If you limit your sodium to 1,500 mg a day, you can lower your blood pressure even more. ? Buy foods that are labeled \"unsalted,\" \"sodium-free,\" or \"low-sodium. \" Foods labeled \"reduced-sodium\" and \"light sodium\" may still have too much sodium. ? Flavor your food with garlic, lemon juice, onion, vinegar, herbs, and spices instead of salt. Do not use soy sauce, steak sauce, onion salt, garlic salt, mustard, or ketchup on your food. ? Use less salt (or none) when recipes call for it. You can often use half the salt a recipe calls for without losing flavor. · Be physically active. Get at least 30 minutes of exercise on most days of the week. Walking is a good choice. You also may want to do other activities, such as running, swimming, cycling, or playing tennis or team sports. · Limit alcohol to 2 drinks a day for men and 1 drink a day for women. · Eat plenty of fruits, vegetables, and low-fat dairy products. Eat less saturated and total fats. How is high blood pressure treated? · Your doctor will suggest making lifestyle changes to help your heart. For example, your doctor may ask you to eat healthy foods, quit smoking, lose extra weight, and be more active. · If lifestyle changes don't help enough, your doctor may recommend that you take medicine.   · When blood pressure is very high, medicines are needed to lower it. Follow-up care is a key part of your treatment and safety. Be sure to make and go to all appointments, and call your doctor if you are having problems. It's also a good idea to know your test results and keep a list of the medicines you take. Where can you learn more? Go to http://manuel-roopa.info/. Enter P501 in the search box to learn more about \"Learning About High Blood Pressure. \"  Current as of: April 9, 2019  Content Version: 12.2  © 3226-0055 [employer]. Care instructions adapted under license by [employer] (which disclaims liability or warranty for this information). If you have questions about a medical condition or this instruction, always ask your healthcare professional. [address] any warranty or liability for your use of this information.

## 2020-03-10 ENCOUNTER — HOSPITAL ENCOUNTER (EMERGENCY)
Age: 55
Discharge: HOME | End: 2020-03-10
Attending: EMERGENCY MEDICINE | Admitting: EMERGENCY MEDICINE
Payer: OTHER MISCELLANEOUS

## 2020-03-10 ENCOUNTER — APPOINTMENT (OUTPATIENT)
Dept: MRI IMAGING | Age: 55
End: 2020-03-10
Attending: NURSE PRACTITIONER
Payer: OTHER MISCELLANEOUS

## 2020-03-10 VITALS
TEMPERATURE: 98.2 F | WEIGHT: 218 LBS | RESPIRATION RATE: 17 BRPM | OXYGEN SATURATION: 98 % | SYSTOLIC BLOOD PRESSURE: 110 MMHG | DIASTOLIC BLOOD PRESSURE: 75 MMHG | HEIGHT: 64 IN | HEART RATE: 64 BPM | BODY MASS INDEX: 37.22 KG/M2

## 2020-03-10 DIAGNOSIS — I10 HYPERTENSION, UNSPECIFIED TYPE: Primary | ICD-10-CM

## 2020-03-10 PROBLEM — R51.9 HEADACHE: Status: ACTIVE | Noted: 2020-03-10

## 2020-03-10 PROBLEM — Z87.820 HISTORY OF CONCUSSION: Status: ACTIVE | Noted: 2020-03-10

## 2020-03-10 LAB
ALBUMIN SERPL-MCNC: 3.6 G/DL (ref 3.5–5)
ALBUMIN/GLOB SERPL: 0.8 {RATIO} (ref 1.1–2.2)
ALP SERPL-CCNC: 150 U/L (ref 45–117)
ALT SERPL-CCNC: 34 U/L (ref 12–78)
ANION GAP SERPL CALC-SCNC: 9 MMOL/L (ref 5–15)
AST SERPL-CCNC: 22 U/L (ref 15–37)
BASOPHILS # BLD: 0 K/UL (ref 0–0.1)
BASOPHILS NFR BLD: 0 % (ref 0–1)
BILIRUB SERPL-MCNC: 0.3 MG/DL (ref 0.2–1)
BUN SERPL-MCNC: 10 MG/DL (ref 6–20)
BUN/CREAT SERPL: 12 (ref 12–20)
CALCIUM SERPL-MCNC: 9.1 MG/DL (ref 8.5–10.1)
CHLORIDE SERPL-SCNC: 103 MMOL/L (ref 97–108)
CO2 SERPL-SCNC: 24 MMOL/L (ref 21–32)
COMMENT, HOLDF: NORMAL
CREAT SERPL-MCNC: 0.85 MG/DL (ref 0.55–1.02)
DIFFERENTIAL METHOD BLD: ABNORMAL
EOSINOPHIL # BLD: 0.1 K/UL (ref 0–0.4)
EOSINOPHIL NFR BLD: 1 % (ref 0–7)
ERYTHROCYTE [DISTWIDTH] IN BLOOD BY AUTOMATED COUNT: 13.9 % (ref 11.5–14.5)
GLOBULIN SER CALC-MCNC: 4.4 G/DL (ref 2–4)
GLUCOSE SERPL-MCNC: 103 MG/DL (ref 65–100)
HCT VFR BLD AUTO: 40.2 % (ref 35–47)
HGB BLD-MCNC: 12.3 G/DL (ref 11.5–16)
IMM GRANULOCYTES # BLD AUTO: 0 K/UL (ref 0–0.04)
IMM GRANULOCYTES NFR BLD AUTO: 0 % (ref 0–0.5)
LYMPHOCYTES # BLD: 1.9 K/UL (ref 0.8–3.5)
LYMPHOCYTES NFR BLD: 40 % (ref 12–49)
MAGNESIUM SERPL-MCNC: 1.8 MG/DL (ref 1.6–2.4)
MCH RBC QN AUTO: 25.2 PG (ref 26–34)
MCHC RBC AUTO-ENTMCNC: 30.6 G/DL (ref 30–36.5)
MCV RBC AUTO: 82.4 FL (ref 80–99)
MONOCYTES # BLD: 0.4 K/UL (ref 0–1)
MONOCYTES NFR BLD: 7 % (ref 5–13)
NEUTS SEG # BLD: 2.4 K/UL (ref 1.8–8)
NEUTS SEG NFR BLD: 52 % (ref 32–75)
NRBC # BLD: 0 K/UL (ref 0–0.01)
NRBC BLD-RTO: 0 PER 100 WBC
PLATELET # BLD AUTO: 294 K/UL (ref 150–400)
PMV BLD AUTO: 10.8 FL (ref 8.9–12.9)
POTASSIUM SERPL-SCNC: 3.6 MMOL/L (ref 3.5–5.1)
PROT SERPL-MCNC: 8 G/DL (ref 6.4–8.2)
RBC # BLD AUTO: 4.88 M/UL (ref 3.8–5.2)
SAMPLES BEING HELD,HOLD: NORMAL
SODIUM SERPL-SCNC: 136 MMOL/L (ref 136–145)
TROPONIN I SERPL-MCNC: <0.05 NG/ML
WBC # BLD AUTO: 4.7 K/UL (ref 3.6–11)

## 2020-03-10 PROCEDURE — 84484 ASSAY OF TROPONIN QUANT: CPT

## 2020-03-10 PROCEDURE — 83735 ASSAY OF MAGNESIUM: CPT

## 2020-03-10 PROCEDURE — 99218 HC RM OBSERVATION: CPT

## 2020-03-10 PROCEDURE — 70551 MRI BRAIN STEM W/O DYE: CPT

## 2020-03-10 PROCEDURE — 85025 COMPLETE CBC W/AUTO DIFF WBC: CPT

## 2020-03-10 PROCEDURE — 99285 EMERGENCY DEPT VISIT HI MDM: CPT

## 2020-03-10 PROCEDURE — 93005 ELECTROCARDIOGRAM TRACING: CPT

## 2020-03-10 PROCEDURE — 36415 COLL VENOUS BLD VENIPUNCTURE: CPT

## 2020-03-10 PROCEDURE — 74011250637 HC RX REV CODE- 250/637: Performed by: NURSE PRACTITIONER

## 2020-03-10 PROCEDURE — 80053 COMPREHEN METABOLIC PANEL: CPT

## 2020-03-10 RX ORDER — CLONIDINE HYDROCHLORIDE 0.1 MG/1
0.1 TABLET ORAL 3 TIMES DAILY
COMMUNITY
End: 2020-03-12

## 2020-03-10 RX ORDER — BUTALBITAL, ACETAMINOPHEN AND CAFFEINE 50; 325; 40 MG/1; MG/1; MG/1
1 TABLET ORAL
Status: COMPLETED | OUTPATIENT
Start: 2020-03-10 | End: 2020-03-10

## 2020-03-10 RX ADMIN — BUTALBITAL, ACETAMINOPHEN, AND CAFFEINE 1 TABLET: 50; 325; 40 TABLET ORAL at 16:00
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
## 2020-03-10 NOTE — DISCHARGE INSTRUCTIONS
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
## 2020-03-10 NOTE — PROGRESS NOTES
Admission Medication Reconciliation:    Information obtained from:  Patient  RxQuery data available¹:  YES    Comments/Recommendations: Updated PTA meds/reviewed patient's allergies. 1)  Patient is a good historian.    - Hypertension:  Patient was placed on Clonidine 0.1 mg TID by her PCP (on 3/06) which replaced amlodipine that had been prescribed per neurology (on 3/02). Patient was also given trazodone 50 mg for sleep (which she has not taken). 2)  Medication changes (since last review): Added  - Clonidine     Adjusted  - Singulair to PM dosing (vs. AM)    Removed  - Amlodipine 5 mg    3)  Confirmed Angioedema reaction to lisinopril     ¹RxQuery pharmacy benefit data reflects medications filled and processed through the patient's insurance, however   this data does NOT capture whether the medication was picked up or is currently being taken by the patient. Allergies:  Lisinopril    Significant PMH/Disease States:   Past Medical History:   Diagnosis Date    Asthma     Lupus [facility])      Chief Complaint for this Admission:  No chief complaint on file. Prior to Admission Medications:   Prior to Admission Medications   Prescriptions Last Dose Informant Taking? butalb/acetaminophen/caffeine (FIORICET PO) 3/10/2020 at Unknown time  Yes   Sig: Take 1 Tab by mouth every four (4) hours as needed (Headache/Migraine). cloNIDine HCL (CATAPRES) 0.1 mg tablet 3/10/2020 at Unknown time  Yes   Sig: Take 0.1 mg by mouth three (3) times daily. diclofenac (VOLTAREN) 1 % gel 3/10/2020 at Unknown time  Yes   Sig: Apply  to affected area (neck and back) four (4) times daily. hydroxychloroquine (PLAQUENIL) 200 mg tablet 3/10/2020 at Unknown time  Yes   Sig: Take 200 mg by mouth daily. montelukast (SINGULAIR) 10 mg tablet 3/9/2020 at Unknown time  Yes   Sig: Take 10 mg by mouth every evening.       Facility-Administered Medications: None     Please contact the main inpatient pharmacy with any questions or concerns at [phone] and we will direct you to the clinical pharmacist covering this patient's care while in-house.    JAIR CamachoD

## 2020-03-10 NOTE — ED NOTES
[facility] hospitalist program has seen the patient and arranged follow-up. Hospitalist service does not think the patient needs to be in the hospital and will not admit her. Evelyn Mccoy MD  7:19 PM      Please note, CM arranged for follow up. Hospitalist service was consulted to evaluate patient for admission. After speaking with the patient, Dr. Shadia Mobley and Dr. Holly Curry as well as assessing the patient, it was determined that nephrology did not need to be involved and that it was reasonable to obtain an MRI and let the patient follow up as an outpatient with neurology if MRI was normal (which it was). Blood pressure normalized in ED. Further, patient did not want to be admitted. Please see hospitalist consult note for further details.

## 2020-03-10 NOTE — PROGRESS NOTES
CM met with patient, and she said that if she needed to schedule an earlier PCP appointment that she would go to [facility] with [facility]. Patient confirmed she has lupus, so wants to make sure if she goes to [facility] instead of [facility] that they can manage her needs. CM will assist her tomorrow with any needs. Patient also confirmed she was going to OP therapy with [facility] and  Dr. Wan Lawson is following her at the clinic. Observation notice provided in writing to patient and/or caregiver as well as verbal explanation of the policy. Patients who are in outpatient status also receive the Observation notice.     [facility]

## 2020-03-10 NOTE — PROGRESS NOTES
[facility] March 10, 2020       RE: Duy Agarwal      To Whom It May Concern,    This is to certify that Duy Agarwal may not return to work until cleared to do so by Neurology. Please feel free to contact my office if you have any questions or concerns. Thank you for your assistance in this matter.       Sincerely,        Elly Brooke NP   [phone]

## 2020-03-10 NOTE — ED TRIAGE NOTES
Pt was at work and noted that her BP was 218/97 and went to her PCP and was given 2 clonidine and BP was 221/112 per EMS. Pt was recently started on clonidine 0.1 mg on Friday. Pt states that earlier she had double vision and currently has a headache.

## 2020-03-11 ENCOUNTER — DOCUMENTATION ONLY (OUTPATIENT)
Dept: CASE MANAGEMENT | Age: 55
End: 2020-03-11

## 2020-03-11 LAB
ATRIAL RATE: 65 BPM
CALCULATED P AXIS, ECG09: 53 DEGREES
CALCULATED R AXIS, ECG10: 78 DEGREES
CALCULATED T AXIS, ECG11: 56 DEGREES
DIAGNOSIS, 93000: NORMAL
P-R INTERVAL, ECG05: 184 MS
Q-T INTERVAL, ECG07: 442 MS
QRS DURATION, ECG06: 82 MS
QTC CALCULATION (BEZET), ECG08: 459 MS
VENTRICULAR RATE, ECG03: 65 BPM

## 2020-03-11 NOTE — PROGRESS NOTES
CM arranged Neurology follow-up appointment and schedule a new PCP appointment with. Patient is aware and agreeable of dates.     [facility]

## 2020-03-11 NOTE — PROGRESS NOTES
Updates provided to [facility]. This writer to follow up obtaining Neurology appointment w/ Dr. Amy Gomez. Call placed to Neuro Clinic / [address], [address]/ [address] crys Camron Souza does not have appointments available this week. Markel Spencer has new patient appointment for 3/12/20 @ 10:30A. Appointment obtained. VM left for CORY Sanderson.

## 2020-03-12 ENCOUNTER — OFFICE VISIT (OUTPATIENT)
Dept: NEUROLOGY | Age: 55
End: 2020-03-12

## 2020-03-12 ENCOUNTER — TELEPHONE (OUTPATIENT)
Dept: NEUROLOGY | Age: 55
End: 2020-03-12

## 2020-03-12 VITALS
RESPIRATION RATE: 18 BRPM | WEIGHT: 228 LBS | DIASTOLIC BLOOD PRESSURE: 88 MMHG | BODY MASS INDEX: 39.14 KG/M2 | SYSTOLIC BLOOD PRESSURE: 170 MMHG | HEART RATE: 75 BPM | OXYGEN SATURATION: 98 %

## 2020-03-12 DIAGNOSIS — G44.309 POST-CONCUSSION HEADACHE: Primary | ICD-10-CM

## 2020-03-12 DIAGNOSIS — I10 HYPERTENSION, UNSPECIFIED TYPE: ICD-10-CM

## 2020-03-12 DIAGNOSIS — F43.10 PTSD (POST-TRAUMATIC STRESS DISORDER): ICD-10-CM

## 2020-03-12 RX ORDER — AMLODIPINE BESYLATE 5 MG/1
5 TABLET ORAL DAILY
COMMUNITY
End: 2020-08-28 | Stop reason: SDUPTHER

## 2020-03-12 RX ORDER — DULOXETIN HYDROCHLORIDE 30 MG/1
30 CAPSULE, DELAYED RELEASE ORAL DAILY
Qty: 30 CAP | Refills: 2 | Status: SHIPPED | OUTPATIENT
Start: 2020-03-12 | End: 2022-08-11

## 2020-03-12 NOTE — TELEPHONE ENCOUNTER
Dr Andreia Carrera's office calling wanting office notes to be faxed over from todays appt.   Fax:  [phone]

## 2020-03-12 NOTE — PROGRESS NOTES
[facility]   NEW PATIENT EVALUATION/CONSULTATION       PATIENT NAME: Suri Valle    MRN: [id number]    REASON FOR CONSULTATION: Headaches    03/12/20      Previous records (physician notes, laboratory reports, and radiology reports) and imaging studies were reviewed and summarized. My recommendations will be communicated back to the patient's physician(s) via electronic medical record and/or by [address] mail. HISTORY OF PRESENT ILLNESS:  Suri Valle is a 63 y.o. left handed female presenting for evaluation of headaches. Patient reports a h/o headaches since her concussion 11/7/2019 (head injury from a child a work). She has noted a worsening of her headaches over the past month, associated with uncontrolled HTN. HTN episodes appear to occur in isolation when she is at work. There is associated anxiety, a sensation of SOB \"as if I am having a panic attack\" during these episodes. Headaches are located over the vertex as well as down the posterior cervical region. Headaches are worse with activity/movement and elevated pressures. She also notes blurred vision into the R eye with HTN. She suffers from anxiety attacks since her prior concussion at work. She has noted that her blood pressure elevates significantly while at work. No focal weakness, vision loss during headaches. She has noted some b/l facial numbness during headaches. She underwent recent MRI Brain which did not reveal any acute abnormalities. She was started on Fioricet by her concussion specialist.  She is taking these 3x weekly. She was previously on amitriptyline which was transitioned to clonidine by her concussion specialist.  Clonidine was discontinued by the ED recently and transitioned to amlodipine per pt. Of note, she is out of work presently after her recent ED visit for above symptoms.       PAST MEDICAL HISTORY:  Past Medical History:   Diagnosis Date    Asthma     Headache 3/10/2020    History of concussion 3/10/2020    Lupus ([facility] [address]        PAST SURGICAL HISTORY:  Knee surgery b/l    FAMILY HISTORY:   DM, HTN-mother  Father-healthy    SOCIAL HISTORY:  Social History     Socioeconomic History    Marital status: SINGLE     Spouse name: Not on file    Number of children: Not on file    Years of education: Not on file    Highest education level: Not on file   Tobacco Use    Smoking status: Never Smoker    Smokeless tobacco: Never Used   Substance and Sexual Activity    Alcohol use: Not Currently    Drug use: Never         MEDICATIONS:   Current Outpatient Medications   Medication Sig Dispense Refill    amLODIPine (NORVASC) 5 mg tablet Take 5 mg by mouth daily.  butalb/acetaminophen/caffeine (FIORICET PO) Take 1 Tab by mouth every four (4) hours as needed (Headache/Migraine).  diclofenac (VOLTAREN) 1 % gel Apply  to affected area four (4) times daily.  montelukast (SINGULAIR) 10 mg tablet Take 10 mg by mouth every evening.  hydroxychloroquine (PLAQUENIL) 200 mg tablet Take 200 mg by mouth daily. ALLERGIES:  Allergies   Allergen Reactions    Lisinopril Angioedema         REVIEW OF SYSTEMS:  10 point ROS reviewed with patient. Please see scanned document under media. PHYSICAL EXAM:  Vital Signs:   Visit Vitals  /88   Pulse 75   Resp 18   Wt 103.4 kg (228 lb)   SpO2 98%   BMI 39.14 kg/m²        General Medical Exam:  General:  Well appearing, comfortable, in no apparent distress. Eyes/ENT: see cranial nerve examination. Neck: No masses appreciated. Full range of motion without tenderness. Respiratory:  Clear to auscultation, good air entry bilaterally. Cardiac:  Regular rate and rhythm, no murmur. GI:  Soft, non-tender, non-distended abdomen. Bowel sounds normal. No masses, organomegaly. Extremities:  No deformities, edema, or skin discoloration. Skin:  No rashes or lesions.     Neurological:  · Mental Status:  Alert and oriented to person, place, and time with fluent speech. · Cranial Nerves:   CNII/III/IV/VI: visual fields full to confrontation, EOMI, PERRL, no ptosis or nystagmus. CN V: Facial sensation intact bilaterally, masseter 5/5   CN VII: Facial muscles symmetric and strong   CN VIII: Hears finger rub well bilaterally, intact vestibular function   CN IX/X: Normal palatal movement   CN XI: Full strength shoulder shrug bilaterally   CN XII: Tongue protrusion full and midline without fasciculation or atrophy  · Motor: Normal tone and muscle bulk with no pronator drift. Individual muscle group testing:  Shoulder abduction:   Left:5/5   Right : 5/5    Shoulder adduction:   Left:5/5   Right : 5/5    Elbow flexion:      Left:5/5   Right : 5/5  Elbow extension:    Left:5/5   Right : 5/5   Wrist flexion:    Left:5/5   Right : 5/5  Wrist extension:    Left:5/5   Right : 5/5  Arm pronation:   Left:5/5   Right : 5/5  Arm supination:   Left:5/5   Right : 5/5    Finger flexion:    Left:5/5   Right : 5/5    Finger extension:   Left:5/5   Right : 5/5   Finger abduction:  Left:5/5   Right : 5/5   Finger adduction:   Left:5/5   Right : 5/5  Hip flexion:     Left:5/5   Right : 5/5         Hip extension:   Left:5/5   Right : 5/5    Knee flexion:    Left:5/5   Right : 5/5    Knee extension:   Left:5/5   Right : 5/5    Dorsiflexion:     Left:5/5   Right : 5/5  Plantar flexion:    Left:5/5   Right : 5/5      · MSRs: No crossed adductors or clonus. RIGHT  LEFT   Brachioradialis 2+ 2+   Biceps 2+ 2+   Triceps 2+ 2+   Knee 2+ 2+   Achilles 2+ 2+        Plantar response Downward Downward          · Sensation: Normal and symmetric perception of pinprick, temperature, light touch, proprioception, and vibration; (-) Romberg. · Coordination: No dysmetria. Normal rapid alternating movements; finger-to-nose and heel-to- shin testing are within normal limits. · Gait: Normal native and stress (tandem/heel/toe walking).     PERTINENT DATA:  INTERNAL RECORDS:  The patient's electronic medical record was reviewed. The relevant details include:     CT Results (maximum last 3): Results from [place] encounter on 03/09/20   CT HEAD WO CONT    Narrative EXAM: CT HEAD WO CONT  Clinical history: Headache and hypertension  INDICATION: HTN HA    COMPARISON: None. CONTRAST: None. TECHNIQUE: Unenhanced CT of the head was performed using 5 mm images. Brain and  bone windows were generated. CT dose reduction was achieved through use of a  standardized protocol tailored for this examination and automatic exposure  control for dose modulation. FINDINGS:  The ventricles and sulci are normal in size, shape and configuration and  midline. There is no significant white matter disease. There is no intracranial  hemorrhage, extra-axial collection, mass, mass effect or midline shift. The  basilar cisterns are open. No acute infarct is identified. The bone windows  demonstrate no abnormalities. The visualized portions of the paranasal sinuses  and mastoid air cells are clear. Impression IMPRESSION: No acute process. Results from [place] encounter on 11/07/19   CT SPINE CERV WO CONT    Narrative EXAM:  CT CERVICAL SPINE WITHOUT CONTRAST    INDICATION: trauma neck pain. COMPARISON: None. CONTRAST:  None. TECHNIQUE: Multislice helical CT of the cervical spine was performed without  intravenous contrast administration. Sagittal and coronal reconstructions were  generated. CT dose reduction was achieved through use of a standardized  protocol tailored for this examination and automatic exposure control for dose  modulation. FINDINGS:    The alignment is remarkable for straightening. There is no fracture or  subluxation. There are spondylitic changes at the C5-6 and C6-7. The odontoid  process is intact. The craniocervical junction is within normal limits. The  prevertebral soft tissues are within normal limits.     C2-C3: There is no spinal canal or neural foraminal stenosis. C3-C4: There is no spinal canal or neural foraminal stenosis. C4-C5: There is no spinal canal or neural foraminal stenosis. C5-C6: There is a disc osteophyte complex which narrows the central canal to  about 5.6 mm anterior to posterior. There is mild right and moderate left neural  foraminal stenosis (series 3, image 48). .    C6-C7: There is a disc osteophyte complex which narrows the central canal to  about 8 mm. (Series 3, image 53). A mild/moderate amount of streak artifact is  related to shoulder artifact. .    C7-T1: There is no spinal canal or neural foraminal stenosis. Impression IMPRESSION:    No acute fracture  Central canal stenosis C5-6 and C6-7  Moderate left C6-7 neural foraminal stenosis. CT HEAD WO CONT    Narrative EXAM: CT HEAD WO CONT    INDICATION: Impact injury with right bipolar division    COMPARISON: None. CONTRAST: None. TECHNIQUE: Unenhanced CT of the head was performed using 5 mm images. Brain and  bone windows were generated. CT dose reduction was achieved through use of a  standardized protocol tailored for this examination and automatic exposure  control for dose modulation. FINDINGS:  The ventricles and sulci are normal in size, shape and configuration and  midline. There is no significant white matter disease. There is no intracranial  hemorrhage, extra-axial collection, mass, mass effect or midline shift. The  basilar cisterns are open. No acute infarct is identified. The bone windows  demonstrate no abnormalities. The visualized portions of the paranasal sinuses  and mastoid air cells are clear. Impression IMPRESSION: No acute abnormality. MRI Results (maximum last 3):   Results from [place] encounter on 03/10/20   MRI BRAIN WO CONT    Narrative EXAM: MRI BRAIN WO CONT    TECHNIQUE: Brain images including sagittal and axial T1-weighted, axial FLAIR,  T2-weighted, diffusion weighted, gradient echo,  coronal T2    IV CONTRAST:  None    INDICATION:  Recent concussion, headaches and blurred vision    COMPARISON:  CT head of 3/9/2020    FINDINGS:  BRAIN PARENCHYMA:  No acute infarct or other acute abnormality. Minimal white  matter signal abnormality considered normal for age. No mass lesions. INTRACRANIAL HEMORRHAGE: None. CSF SPACES:  Normal in size and morphology for the patient's age. BASAL CISTERNS:  Patent. MIDLINE SHIFT: None. VASCULAR SYSTEM:  Normal flow voids. PARANASAL SINUSES AND MASTOID AIR CELLS:  No significant opacification. VISUALIZED ORBITS:  No significant abnormalities. VISUALIZED UPPER CERVICAL SPINE:  No significant abnormalities. SELLA:  No enlargement or  focal abnormality. SKULL BASE:  No significant abnormalities. Cerebellar tonsils in normal  position. CALVARIUM:  Intact. Impression IMPRESSION:  No significant abnormalities. ASSESSMENT:      ICD-10-CM ICD-9-CM    1. Post-concussion headache G44.309 339.20    2. PTSD (post-traumatic stress disorder) F43.10 309.81    3. Hypertension, unspecified type I10 18.6    63year old female with a h/o concussion associated with work incident on 11/7/19 with subsequent post concussion headaches as well as what appears to be post traumatic stress and panic attacks while at work manifested by blood pressure elevation, SOB, anxiety. Episodes have resulted in recent ED visits with normal intracranial imaging. Neurological examination is non-focal today. Discussed above findings with the patient and she is in agreement that episodes appear provoked by an acute stress reaction/panic attacks when entering her place of employment. I have advised that she start Cymbalta to assist with her headaches as well as anxiety/PTSD. She should follow up closely with her psychiatrist regarding the above symptoms as well as her PCP.       PLAN:  · Trial of Cymbalta for headaches and PTSD  · F/U Psychiatry as scheduled  · F/U PCP regarding HTN  · Out of work until PCP visit    Follow-up and Dispositions    · Return in about 8 weeks (around 5/7/2020). I have discussed the diagnosis with the patient and the intended plan as seen in the above orders. Patient is in agreement. The patient has received an after-visit summary and questions were answered concerning future plans. I have discussed medication side effects and warnings with the patient as well. Myah Davis, DO  Staff Neurologist  Diplomate, [facility] Board of Psychiatry & Neurology

## 2020-03-12 NOTE — LETTER
3/12/2020 11:34 AM 
 
Ms. Price Arvizu 
[address] [place] [address] To Whom It May Concern, 
 
Ms. Caballero is a patient at the [facility]. Please have her out of work until she follows up with her PCP. If you have any questions, please have the patient contact our office.  
 
 
 
Sincerely, 
 
 
Gume Donovan, DO

## 2020-07-30 ENCOUNTER — HOSPITAL ENCOUNTER (EMERGENCY)
Age: 55
Discharge: HOME | End: 2020-07-30
Attending: EMERGENCY MEDICINE | Admitting: EMERGENCY MEDICINE
Payer: OTHER MISCELLANEOUS

## 2020-07-30 VITALS
OXYGEN SATURATION: 98 % | HEART RATE: 69 BPM | TEMPERATURE: 98.4 F | RESPIRATION RATE: 15 BRPM | BODY MASS INDEX: 38.65 KG/M2 | DIASTOLIC BLOOD PRESSURE: 87 MMHG | HEIGHT: 65 IN | SYSTOLIC BLOOD PRESSURE: 169 MMHG | WEIGHT: 232 LBS

## 2020-07-30 DIAGNOSIS — R03.0 BLOOD PRESSURE ELEVATED WITHOUT HISTORY OF HTN: Primary | ICD-10-CM

## 2020-07-30 LAB
APPEARANCE UR: CLEAR
BILIRUB UR QL: NEGATIVE
COLOR UR: NORMAL
GLUCOSE UR STRIP.AUTO-MCNC: NEGATIVE MG/DL
HGB UR QL STRIP: NEGATIVE
KETONES UR QL STRIP.AUTO: NEGATIVE MG/DL
LEUKOCYTE ESTERASE UR QL STRIP.AUTO: NEGATIVE
NITRITE UR QL STRIP.AUTO: NEGATIVE
PH UR STRIP: 5 [PH] (ref 5–8)
PROT UR STRIP-MCNC: NEGATIVE MG/DL
SP GR UR REFRACTOMETRY: 1.02 (ref 1–1.03)
UR CULT HOLD, URHOLD: NORMAL
UROBILINOGEN UR QL STRIP.AUTO: 0.2 EU/DL (ref 0.2–1)

## 2020-07-30 PROCEDURE — 81003 URINALYSIS AUTO W/O SCOPE: CPT

## 2020-07-30 PROCEDURE — 99283 EMERGENCY DEPT VISIT LOW MDM: CPT

## 2020-07-30 NOTE — DISCHARGE INSTRUCTIONS
Patient Education        Elevated Blood Pressure: Care Instructions  Your Care Instructions    Blood pressure is a measure of how hard the blood pushes against the walls of your arteries. It's normal for blood pressure to go up and down throughout the day. But if it stays up over time, you have high blood pressure. Two numbers tell you your blood pressure. The first number is the systolic pressure. It shows how hard the blood pushes when your heart is pumping. The second number is the diastolic pressure. It shows how hard the blood pushes between heartbeats, when your heart is relaxed and filling with blood. An ideal blood pressure in adults is less than 120/80 (say \"120 over 80\"). High blood pressure is 140/90 or higher. You have high blood pressure if your top number is 140 or higher or your bottom number is 90 or higher, or both. The main test for high blood pressure is simple, fast, and painless. To diagnose high blood pressure, your doctor will test your blood pressure at different times. After testing your blood pressure, your doctor may ask you to test it again when you are home. If you are diagnosed with high blood pressure, you can work with your doctor to make a long-term plan to manage it. Follow-up care is a key part of your treatment and safety. Be sure to make and go to all appointments, and call your doctor if you are having problems. It's also a good idea to know your test results and keep a list of the medicines you take. How can you care for yourself at home? · Do not smoke. Smoking increases your risk for heart attack and stroke. If you need help quitting, talk to your doctor about stop-smoking programs and medicines. These can increase your chances of quitting for good. · Stay at a healthy weight. · Try to limit how much sodium you eat to less than 2,300 milligrams (mg) a day. Your doctor may ask you to try to eat less than 1,500 mg a day. · Be physically active.  Get at least 30 minutes of exercise on most days of the week. Walking is a good choice. You also may want to do other activities, such as running, swimming, cycling, or playing tennis or team sports. · Avoid or limit alcohol. Talk to your doctor about whether you can drink any alcohol. · Eat plenty of fruits, vegetables, and low-fat dairy products. Eat less saturated and total fats. · Learn how to check your blood pressure at home. When should you call for help? Call your doctor now or seek immediate medical care if:  ? · Your blood pressure is much higher than normal (such as 180/110 or higher). ? · You think high blood pressure is causing symptoms such as:  ¨ Severe headache. ¨ Blurry vision. ? Watch closely for changes in your health, and be sure to contact your doctor if:  ? · You do not get better as expected. Where can you learn more? Go to http://manuel-roopa.info/. Enter V397 in the search box to learn more about \"Elevated Blood Pressure: Care Instructions. \"  Current as of: September 21, 2016  Content Version: 11.4  © 3874-3960 [email or web address]. Care instructions adapted under license by [employer] (which disclaims liability or warranty for this information). If you have questions about a medical condition or this instruction, always ask your healthcare professional. [address] any warranty or liability for your use of this information.

## 2020-07-30 NOTE — ED PROVIDER NOTES
HPI   Patient is a 78-year-old female with past medical history significant for asthma, headache, concussion and lupus who presents the ED after attending her physical therapy appointment this morning and was told that her blood pressure was elevated. She is presently in physical therapy for a closed head injury that she sustained back in January she was put on amlodipine for elevated pressures after her head injury but was taken off in March when her pressures were within normal range. Denies fever, cold symptoms, headache, neck pain, visual changes, focal weakness or rash. Denies any difficulty breathing, difficulty swallowing,dizziness, SOB or chest pain. Denies any nausea, vomiting or diarrhea. Pt. Reports that she has not had any medications today prior to arrival.      Past Medical History:   Diagnosis Date    Asthma     Headache 3/10/2020    History of concussion 3/10/2020    Lupus [facility])        Past Surgical History:   Procedure Laterality Date    HX ORTHOPAEDIC Bilateral 1996 & 2015    HX TONSILLECTOMY           History reviewed. No pertinent family history.     Social History     Socioeconomic History    Marital status: SINGLE     Spouse name: Not on file    Number of children: Not on file    Years of education: Not on file    Highest education level: Not on file   Occupational History    Not on file   Social Needs    Financial resource strain: Not on file    Food insecurity     Worry: Not on file     Inability: Not on file    Transportation needs     Medical: Not on file     Non-medical: Not on file   Tobacco Use    Smoking status: Never Smoker    Smokeless tobacco: Never Used   Substance and Sexual Activity    Alcohol use: Not Currently    Drug use: Never    Sexual activity: Not on file   Lifestyle    Physical activity     Days per week: Not on file     Minutes per session: Not on file    Stress: Not on file   Relationships    Social connections     Talks on phone: Not on file Gets together: Not on file     Attends [employer] service: Not on file     Active member of club or organization: Not on file     Attends meetings of clubs or organizations: Not on file     Relationship status: Not on file    Intimate partner violence     Fear of current or ex partner: Not on file     Emotionally abused: Not on file     Physically abused: Not on file     Forced sexual activity: Not on file   Other Topics Concern    Not on file   Social History Narrative    Not on file         ALLERGIES: Lisinopril    Review of Systems   Constitutional: Negative for activity change, appetite change, fever and unexpected weight change. HENT: Negative for congestion, sore throat and trouble swallowing. Respiratory: Negative for cough and shortness of breath. Cardiovascular: Negative for chest pain, palpitations and leg swelling. Gastrointestinal: Negative for abdominal pain, diarrhea and nausea. Genitourinary: Negative for dysuria. Musculoskeletal: Negative for arthralgias and myalgias. Skin: Negative for rash. Neurological: Negative for dizziness, light-headedness and headaches. All other systems reviewed and are negative. Vitals:    07/30/20 0930   BP: 185/89   Pulse: 73   Resp: 16   Temp: 98.4 °F (36.9 °C)   SpO2: 96%   Weight: 105.2 kg (232 lb)   Height: 5' 5\" (1.651 m)            Physical Exam  Vitals signs and nursing note reviewed. Constitutional:       General: She is not in acute distress. Appearance: Normal appearance. She is obese. She is not ill-appearing, toxic-appearing or diaphoretic. Comments: Black female, non-smoker, works at the [employer]:      Head: Normocephalic. Nose: Nose normal. No rhinorrhea. Mouth/Throat:      Mouth: Mucous membranes are moist.      Pharynx: No posterior oropharyngeal erythema. Neck:      Musculoskeletal: Normal range of motion and neck supple. Cardiovascular:      Rate and Rhythm: Normal rate and regular rhythm. Pulmonary:      Effort: Pulmonary effort is normal.      Breath sounds: Normal breath sounds. Abdominal:      General: Bowel sounds are normal.      Palpations: Abdomen is soft. Tenderness: There is no abdominal tenderness. There is no guarding or rebound. Musculoskeletal: Normal range of motion. Lymphadenopathy:      Cervical: No cervical adenopathy. Skin:     General: Skin is warm and dry. Findings: No rash. Neurological:      General: No focal deficit present. Mental Status: She is alert and oriented to person, place, and time. Psychiatric:         Mood and Affect: Mood normal.         Behavior: Behavior normal.          MDM       Procedures      Labs Reviewed   URINE CULTURE HOLD SAMPLE   URINALYSIS W/ RFLX MICROSCOPIC     Reviewed blood pressure recommendations and guidelines with the patient. Encourage close follow-up with PCP if symptoms persist.    Patient's results and plan of care have been reviewed with her. Patient and/or family have verbally conveyed their understanding and agreement of the patient's signs, symptoms, diagnosis, treatment and prognosis and additionally agree to follow up as recommended or return to the Emergency Room should her condition change prior to follow-up. Discharge instructions have also been provided to the patient with some educational information regarding her diagnosis as well a list of reasons why she would want to return to the ER prior to her follow-up appointment should her condition change. King Hare NP

## 2020-07-30 NOTE — ED NOTES
Sagrario Fields NP reviewed discharge instructions with the patient. The patient verbalized understanding.

## 2020-07-30 NOTE — ED TRIAGE NOTES
Arrives ambulatory from home for c/o ASYMPTOMATIC elevated BP readings. Pt had physical therapy appt this morning and when they checked her BP they saw 189/103, 220/118 and 200/120. Pt denies HA or vision changes. Denies numbness or tingling. Denies hx of HTN. Pt seen twice in march for HTN.

## 2020-07-30 NOTE — LETTER
Jose De Jesus Alfredo was seen and treated in our emergency department on 7/30/2020. She may return to work on Monday, August 3rd, 2020. If you have any questions or concerns, please don't hesitate to call. Thank you, Damián Mercado RN

## 2020-08-28 ENCOUNTER — VIRTUAL VISIT (OUTPATIENT)
Dept: INTERNAL MEDICINE CLINIC | Age: 55
End: 2020-08-28
Payer: COMMERCIAL

## 2020-08-28 DIAGNOSIS — Z11.59 ENCOUNTER FOR HEPATITIS C SCREENING TEST FOR LOW RISK PATIENT: ICD-10-CM

## 2020-08-28 DIAGNOSIS — M32.9 LUPUS (HCC): ICD-10-CM

## 2020-08-28 DIAGNOSIS — F43.10 PTSD (POST-TRAUMATIC STRESS DISORDER): ICD-10-CM

## 2020-08-28 DIAGNOSIS — I10 ESSENTIAL HYPERTENSION: Primary | ICD-10-CM

## 2020-08-28 DIAGNOSIS — Z87.820 HISTORY OF CONCUSSION: ICD-10-CM

## 2020-08-28 DIAGNOSIS — Z13.220 SCREENING CHOLESTEROL LEVEL: ICD-10-CM

## 2020-08-28 DIAGNOSIS — J45.40 MODERATE PERSISTENT ASTHMA, UNSPECIFIED WHETHER COMPLICATED: ICD-10-CM

## 2020-08-28 DIAGNOSIS — F41.9 ANXIETY: ICD-10-CM

## 2020-08-28 DIAGNOSIS — F33.0 MILD EPISODE OF RECURRENT MAJOR DEPRESSIVE DISORDER (HCC): ICD-10-CM

## 2020-08-28 PROCEDURE — 99203 OFFICE O/P NEW LOW 30 MIN: CPT | Performed by: PHYSICIAN ASSISTANT

## 2020-08-28 RX ORDER — ALBUTEROL SULFATE 1.25 MG/3ML
1.25 SOLUTION RESPIRATORY (INHALATION)
Qty: 100 EACH | Refills: 1 | Status: SHIPPED | OUTPATIENT
Start: 2020-08-28

## 2020-08-28 RX ORDER — ALBUTEROL SULFATE 90 UG/1
1 AEROSOL, METERED RESPIRATORY (INHALATION)
Qty: 1 INHALER | Refills: 2 | Status: ON HOLD | OUTPATIENT
Start: 2020-08-28 | End: 2022-08-12 | Stop reason: SDUPTHER

## 2020-08-28 RX ORDER — ALBUTEROL SULFATE 1.25 MG/3ML
1.25 SOLUTION RESPIRATORY (INHALATION)
COMMUNITY
End: 2020-08-28 | Stop reason: SDUPTHER

## 2020-08-28 RX ORDER — HYDROXYCHLOROQUINE SULFATE 200 MG/1
200 TABLET, FILM COATED ORAL DAILY
Qty: 90 TAB | Refills: 0 | Status: SHIPPED | OUTPATIENT
Start: 2020-08-28 | End: 2020-11-03 | Stop reason: SDUPTHER

## 2020-08-28 RX ORDER — TRAZODONE HYDROCHLORIDE 50 MG/1
TABLET ORAL
COMMUNITY
Start: 2020-06-29 | End: 2020-10-26

## 2020-08-28 RX ORDER — MONTELUKAST SODIUM 10 MG/1
10 TABLET ORAL EVERY EVENING
Qty: 90 TAB | Refills: 1 | Status: SHIPPED | OUTPATIENT
Start: 2020-08-28

## 2020-08-28 RX ORDER — BUDESONIDE AND FORMOTEROL FUMARATE DIHYDRATE 160; 4.5 UG/1; UG/1
2 AEROSOL RESPIRATORY (INHALATION) 2 TIMES DAILY
COMMUNITY
End: 2020-08-28 | Stop reason: SDUPTHER

## 2020-08-28 RX ORDER — MELOXICAM 15 MG/1
TABLET ORAL
COMMUNITY
Start: 2020-08-24 | End: 2021-09-10

## 2020-08-28 RX ORDER — AMLODIPINE BESYLATE 5 MG/1
5 TABLET ORAL DAILY
Qty: 90 TAB | Refills: 1 | Status: SHIPPED | OUTPATIENT
Start: 2020-08-28 | End: 2020-09-14 | Stop reason: DRUGHIGH

## 2020-08-28 RX ORDER — BUDESONIDE AND FORMOTEROL FUMARATE DIHYDRATE 160; 4.5 UG/1; UG/1
2 AEROSOL RESPIRATORY (INHALATION) 2 TIMES DAILY
Qty: 3 INHALER | Refills: 3 | Status: SHIPPED | OUTPATIENT
Start: 2020-08-28 | End: 2022-08-11

## 2020-08-28 NOTE — PROGRESS NOTES
Jose Nguyen is a 63 y.o. female who was seen by synchronous (real-time) audio-video technology on 8/28/2020    Consent: Jose Nguyen, who was seen by synchronous (real-time) audio-video technology, and/or her healthcare decision maker, is aware that this patient-initiated, Telehealth encounter on 8/28/2020 is a billable service, with coverage as determined by her insurance carrier. She is aware that she may receive a bill and has provided verbal consent to proceed: YES    Assessment & Plan:   Diagnoses and all orders for this visit:    1. Essential hypertension  -     amLODIPine (NORVASC) 5 mg tablet; Take 1 Tab by mouth daily. Continue monitoring 2-3 x/day, keep a record, and follow up in 2-3 weeks. Discussed labile nature of her HTN. 2. History of concussion  Continue routine follow ups with neurology     3. PTSD (post-traumatic stress disorder)  Continue counseling   4. Anxiety  Continue counseling   5. Mild episode of recurrent major depressive disorder ([facility] [address]  Continue counseling     6. Lupus (HCC)  -     hydrOXYchloroQUINE (Plaquenil) 200 mg tablet; Take 1 Tab by mouth daily.  -     METABOLIC PANEL, COMPREHENSIVE; Future  -     CBC WITH AUTOMATED DIFF; Future  -     HEMOGLOBIN A1C WITH EAG; Future  -     TSH 3RD GENERATION; Future  -     NIRAV BY MULTIPLEX FLOW IA, QL; Future  -     RHEUMASSURE; Future  -     SED RATE (ESR); Future  -     CRP, HIGH SENSITIVITY; Future  -     REFERRAL TO RHEUMATOLOGY  Advised pt that this will need to be managed by rheumatology, but I will give her one 90 day refill of Plaquenil so that she can get in with rheumatology. 7. Moderate persistent asthma, unspecified whether complicated  -     montelukast (SINGULAIR) 10 mg tablet; Take 1 Tab by mouth every evening.  -     budesonide-formoteroL (Symbicort) 160-4.5 mcg/actuation HFAA; Take 2 Puffs by inhalation two (2) times a day.  Indications: controller medication for asthma  -     albuterol (ACCUNEB) 1.25 mg/3 mL nebu; 3 mL by Nebulization route every six (6) hours as needed for Shortness of Breath. Indications: asthma attack  -     albuterol (PROVENTIL HFA, VENTOLIN HFA, PROAIR HFA) 90 mcg/actuation inhaler; Take 1 Puff by inhalation every four (4) hours as needed for Shortness of Breath. 8. Screening cholesterol level  -     LIPID PANEL; Future    9. Encounter for hepatitis C screening test for low risk patient  -     HEPATITIS C AB; Future      Subjective:   July Ba is a 63 y.o. female who was seen for Establish Care    Asthma history - taking Symbicort 160/4.5 2 puffs BID  Albuterol INH PRN (rarely - 8 times since Jan)   Albuterol Nebulizer solution PRN (rarely - 2x since Jan)     Dx with Lupus in 2012 by PCP. Requested notes Aug 4th. Former PCP was managing Lupus with Plaquenil. Pt will run out soon. Needs to establish with Rheum. 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 Pt reports PTSD. Sees Psychology for counseling. Seeing Dr. Adriana Kaufman with [address] [place] [address] specialist. Had recent Cortisol test done - normal. Neurology is currently prescribing antidepressants. HTN Follow up - BP labile: Pt reports that her BP is very high at work, but very low at home (80/60s) when she is not stressed. She reports that [facility] instructed her to start taking Amlodipine PRN recently. Patient-Reported Vitals 8/28/2020   Patient-Reported Weight 227lb   Patient-Reported Temperature 97.6   Patient-Reported Systolic  849   Patient-Reported Diastolic 86   Checking BP 3-4 times/day. BP Readings from Last 3 Encounters:   07/30/20 169/87   03/12/20 170/88   03/10/20 110/75     Currently taking:   Key CAD CHF Meds             amLODIPine (NORVASC) 5 mg tablet (Taking) Take 5 mg by mouth daily.         Health Maintenance Due   Topic Date Due    Hepatitis C Screening  1965    DTaP/Tdap/Td series (1 - Tdap) 03/05/1985    PAP AKA CERVICAL CYTOLOGY  03/05/1985    Lipid Screen  03/05/2004  Shingrix Vaccine Age 50> (1 of 2) 03/05/2014    Breast Cancer Screen Mammogram  03/05/2014    FOBT Q1Y Age 50-75  03/05/2014       Review of Systems   Constitutional: Negative for fever. Respiratory: Negative for shortness of breath. Cardiovascular: Negative for chest pain and palpitations. Neurological: Negative for dizziness, loss of consciousness and weakness. Psychiatric/Behavioral: Negative for depression. The patient is nervous/anxious. Objective:     Patient-Reported Vitals 8/28/2020   Patient-Reported Weight 227lb   Patient-Reported Temperature 97.6   Patient-Reported Systolic  150   Patient-Reported Diastolic 86      General: alert, cooperative, no distress   Mental  status: normal mood, behavior, speech, dress, motor activity, and thought processes, able to follow commands   HENT: NCAT   Neck: no visualized mass   Resp: no respiratory distress   Neuro: no gross deficits   Skin: no discoloration or lesions of concern on visible areas   Psychiatric: normal affect, consistent with stated mood, no evidence of hallucinations     We discussed the expected course, resolution and complications of the diagnosis(es) in detail. Medication risks, benefits, costs, interactions, and alternatives were discussed as indicated. I advised her to contact the office if her condition worsens, changes or fails to improve as anticipated. She expressed understanding with the diagnosis(es) and plan. Justin Jeong is a 63 y.o. female who was evaluated by a video visit encounter for concerns as above. Patient identification was verified prior to start of the visit. A caregiver was present when appropriate. Due to this being a TeleHealth encounter (During [place]FC[place]-40 public health emergency), evaluation of the following organ systems was limited: Vitals/Constitutional/EENT/Resp/CV/GI//MS/Neuro/Skin/Heme-Lymph-Imm.   Pursuant to the emergency declaration under the [facility] waiver authority and the [employer] and [employer] Act, this Virtual  Visit was conducted, with patient's (and/or legal guardian's) consent, to reduce the patient's risk of exposure to COVID-19 and provide necessary medical care. Services were provided through a video synchronous discussion virtually to substitute for in-person clinic visit. Patient and provider were located at their individual homes.       Sylvie Hunter PA-C

## 2020-08-28 NOTE — PROGRESS NOTES
1. Have you been to the ER, urgent care clinic since your last visit? Hospitalized since your last visit? No    2. Have you seen or consulted any other health care providers outside of the [address] [place] [address] since your last visit? Include any pap smears or colon screening.  Yes Dr Juana Cui   Patient presents with   [facility].

## 2020-08-29 DIAGNOSIS — Z11.59 ENCOUNTER FOR HEPATITIS C SCREENING TEST FOR LOW RISK PATIENT: ICD-10-CM

## 2020-08-29 DIAGNOSIS — Z13.220 SCREENING CHOLESTEROL LEVEL: ICD-10-CM

## 2020-08-29 DIAGNOSIS — M32.9 LUPUS (HCC): ICD-10-CM

## 2020-09-03 PROBLEM — R73.09 ELEVATED HEMOGLOBIN A1C: Status: ACTIVE | Noted: 2020-09-03

## 2020-09-03 PROBLEM — M76.62 ACHILLES TENDINITIS, LEFT LEG: Status: ACTIVE | Noted: 2020-08-24

## 2020-09-03 PROBLEM — E78.00 HYPERCHOLESTEREMIA: Status: ACTIVE | Noted: 2020-09-03

## 2020-09-03 LAB
ALBUMIN SERPL-MCNC: 4 G/DL (ref 3.8–4.9)
ALBUMIN/GLOB SERPL: 1.3 {RATIO} (ref 1.2–2.2)
ALP SERPL-CCNC: 139 IU/L (ref 39–117)
ALT SERPL-CCNC: 18 IU/L (ref 0–32)
AST SERPL-CCNC: 18 IU/L (ref 0–40)
BASOPHILS # BLD AUTO: 0 X10E3/UL (ref 0–0.2)
BASOPHILS NFR BLD AUTO: 1 %
BILIRUB SERPL-MCNC: 0.2 MG/DL (ref 0–1.2)
BUN SERPL-MCNC: 13 MG/DL (ref 6–24)
BUN/CREAT SERPL: 15 (ref 9–23)
CALCIUM SERPL-MCNC: 9.5 MG/DL (ref 8.7–10.2)
CHLORIDE SERPL-SCNC: 101 MMOL/L (ref 96–106)
CHOLEST SERPL-MCNC: 190 MG/DL (ref 100–199)
CO2 SERPL-SCNC: 25 MMOL/L (ref 20–29)
CREAT SERPL-MCNC: 0.89 MG/DL (ref 0.57–1)
EOSINOPHIL # BLD AUTO: 0.2 X10E3/UL (ref 0–0.4)
EOSINOPHIL NFR BLD AUTO: 3 %
ERYTHROCYTE [DISTWIDTH] IN BLOOD BY AUTOMATED COUNT: 13.1 % (ref 11.7–15.4)
EST. AVERAGE GLUCOSE BLD GHB EST-MCNC: 137 MG/DL
GLOBULIN SER CALC-MCNC: 3.2 G/DL (ref 1.5–4.5)
GLUCOSE SERPL-MCNC: 138 MG/DL (ref 65–99)
HBA1C MFR BLD: 6.4 % (ref 4.8–5.6)
HCT VFR BLD AUTO: 37.8 % (ref 34–46.6)
HCV AB S/CO SERPL IA: 0.1 S/CO RATIO (ref 0–0.9)
HDLC SERPL-MCNC: 42 MG/DL
HGB BLD-MCNC: 11.9 G/DL (ref 11.1–15.9)
IMM GRANULOCYTES # BLD AUTO: 0 X10E3/UL (ref 0–0.1)
IMM GRANULOCYTES NFR BLD AUTO: 0 %
INTERPRETATION, 910389: NORMAL
LDLC SERPL CALC-MCNC: 129 MG/DL (ref 0–99)
LYMPHOCYTES # BLD AUTO: 2.1 X10E3/UL (ref 0.7–3.1)
LYMPHOCYTES NFR BLD AUTO: 42 %
MCH RBC QN AUTO: 25.2 PG (ref 26.6–33)
MCHC RBC AUTO-ENTMCNC: 31.5 G/DL (ref 31.5–35.7)
MCV RBC AUTO: 80 FL (ref 79–97)
MONOCYTES # BLD AUTO: 0.4 X10E3/UL (ref 0.1–0.9)
MONOCYTES NFR BLD AUTO: 7 %
NEUTROPHILS # BLD AUTO: 2.4 X10E3/UL (ref 1.4–7)
NEUTROPHILS NFR BLD AUTO: 47 %
PLATELET # BLD AUTO: 286 X10E3/UL (ref 150–450)
POTASSIUM SERPL-SCNC: 4.4 MMOL/L (ref 3.5–5.2)
PROT SERPL-MCNC: 7.2 G/DL (ref 6–8.5)
RBC # BLD AUTO: 4.73 X10E6/UL (ref 3.77–5.28)
SODIUM SERPL-SCNC: 139 MMOL/L (ref 134–144)
TRIGL SERPL-MCNC: 102 MG/DL (ref 0–149)
TSH SERPL DL<=0.005 MIU/L-ACNC: 1.3 UIU/ML (ref 0.45–4.5)
VLDLC SERPL CALC-MCNC: 19 MG/DL (ref 5–40)
WBC # BLD AUTO: 5.1 X10E3/UL (ref 3.4–10.8)

## 2020-09-13 LAB
14.3.3 ETA, RHEUM. ARTHRITIS: <0.2 NG/ML
ANA SER QL: POSITIVE
CCP IGA+IGG SERPL IA-ACNC: <20 UNITS
CRP SERPL HS-MCNC: 10.48 MG/L (ref 0–3)
ERYTHROCYTE [SEDIMENTATION RATE] IN BLOOD BY WESTERGREN METHOD: 45 MM/HR (ref 0–40)
RHEUMATOID FACT SERPL-ACNC: <14 UNITS/ML

## 2020-09-14 ENCOUNTER — VIRTUAL VISIT (OUTPATIENT)
Dept: INTERNAL MEDICINE CLINIC | Age: 55
End: 2020-09-14
Payer: COMMERCIAL

## 2020-09-14 DIAGNOSIS — R73.09 ELEVATED HEMOGLOBIN A1C: Primary | ICD-10-CM

## 2020-09-14 DIAGNOSIS — I10 ESSENTIAL HYPERTENSION: ICD-10-CM

## 2020-09-14 DIAGNOSIS — M32.9 LUPUS (HCC): ICD-10-CM

## 2020-09-14 DIAGNOSIS — Z12.39 SCREENING FOR BREAST CANCER: ICD-10-CM

## 2020-09-14 PROCEDURE — 99214 OFFICE O/P EST MOD 30 MIN: CPT | Performed by: PHYSICIAN ASSISTANT

## 2020-09-14 RX ORDER — AMLODIPINE BESYLATE 2.5 MG/1
2.5 TABLET ORAL DAILY
Qty: 90 TAB | Refills: 1 | Status: SHIPPED | OUTPATIENT
Start: 2020-09-14 | End: 2020-10-26 | Stop reason: DRUGHIGH

## 2020-09-14 NOTE — LETTER
9/14/2020 Ms. [address] [address] [address] 1400 [facility]: 
 
Please find your most recent results below. Resulted Orders NIRAV BY MULTIPLEX FLOW IA, QL (Collected: 9/2/2020 10:27 AM) Result Value Ref Range Antinuclear Antibodies Direct Positive (A) Negative Narrative Performed at:  [address]  [id number] : Poppy Ramon MD, Phone:  [phone] RHEUMASSURE (Collected: 9/2/2020 10:27 AM) Result Value Ref Range Rheumatoid Arthritis Factor <14.0 Units/mL Comment:  
   Reference Range: 
<14.0          Negative 
> or = 14.0    Positive CCP Antibodies IgG/IgA <20 Units Comment:  
   Reference Range: 
< 20        Negative 20 - 39     Weak Positive 40 - 59     Moderate Positive 
> or = 60   Strong Positive 14. 3.3 ETA, Rheum. Arthritis <0.20 ng/mL Comment:  
   Reference Range:  < 0.2 ng/mL COMMENTS: 
- This RheumAssure panel is comprised of three tests: RA Factor, CCP Antibodies, and 14-3-3 eta protein. - Used together, these three markers are able to diagnose 
established RA with a sensitivity of 88-96% and early RA 
with a sensitivity of 78-92%(1,2). 
- RA factor (also called RF) is elevated in established RA 
(sensitivity 72-85%)(3,2) and in early RA (sensitivity 44-63%)(4,1). Its specificity for RA is 80%(3). - CCP Antibodies have similar sensitivities for 
established RA (66-79%) and early RA (59%)(3,2) but 
higher specificity (90-96%)(3,5). - Serum 14-3-3 eta protein is elevated in both established RA (sensitivity 77%) and early RA (sensitivity 59-64%) 
(1,2). It may provide a 15-20% incremental benefit in 
identifying early RA (4,1). - Therefore, elevation of one or more markers of 
RheumAssure is consistent with a diagnosis of rheumatoid 
arthritis. When all three markers are negative, an RA 
diagnosis is less likely. Refere 
nces: 1. Rubi SEYMOUR et al.J Rheumatol 2015;42(9):0554-8417. 
2. Rubi SEYMOUR et al. Anitra Luis Rheumatol 1968;13(01):4-88. 3. Patricia HUNTLEY et al. Yonatan Bull Rheum Dis 2003;62:870-874. 
4. Jey N, et al. Arthritis Res Ther 2016;18:37. 
5. John S et al. [facility] Rheum Dis 9183;84:380-720. This test was developed and its performance characteristics 
determined by [employer]. It has not been cleared or approved 
by the Food and Drug Administration. Narrative Performed at:  [address]  [de-identified] : Kenia Her MD, Phone:  [phone] SED RATE (ESR) (Collected: 9/2/2020 10:27 AM) Result Value Ref Range Sed rate (ESR) 45 (H) 0 - 40 mm/hr Narrative Performed at:  [address]  [id number] : Kristen Perez MD, Phone:  [phone] CRP, HIGH SENSITIVITY (Collected: 9/2/2020 10:27 AM) Result Value Ref Range C-Reactive Protein, Cardiac 10.48 (H) 0.00 - 3.00 mg/L Comment:  
            Relative Risk for Future Cardiovascular Event Low                 <1.00 Average       1.00 - 3.00 High                >3.00 Narrative Performed at:  [address]  [id number] : Kristen Perez MD, Phone:  [phone] RECOMMENDATIONS: 
Rheumatology results - follow up with rheumatology as planned. Please call me if you have any questions: [phone] Sincerely, Adeline Abarca PA-C

## 2020-09-14 NOTE — PROGRESS NOTES
1. Have you been to the ER, urgent care clinic since your last visit? Hospitalized since your last visit? No    2. Have you seen or consulted any other health care providers outside of the [address] [place] [address] since your last visit? Include any pap smears or colon screening. Yes    Chief Complaint   Patient presents with    Labs     follow up     Please send link to [email or web address]

## 2020-09-14 NOTE — PROGRESS NOTES
Carolyn Mandujano is a 63 y.o. female who was seen by synchronous (real-time) audio-video technology on 9/14/2020    Consent: Carolyn Mandujano, who was seen by synchronous (real-time) audio-video technology, and/or her healthcare decision maker, is aware that this patient-initiated, Telehealth encounter on 9/14/2020 is a billable service, with coverage as determined by her insurance carrier. She is aware that she may receive a bill and has provided verbal consent to proceed: YES    Assessment & Plan:   Diagnoses and all orders for this visit:    1. Elevated hemoglobin A1c  -     HEMOGLOBIN A1C WITH EAG; Future  -     METABOLIC PANEL, COMPREHENSIVE; Future  -     LIPID PANEL; Future  Discussed diet/exercise and options for/importance of losing weight. 2. Screening for breast cancer  -     FELICITY MAMMO BI SCREENING INCL CAD; Future    3. Essential hypertension  -   Decrease dose to amLODIPine (NORVASC) 2.5 mg tablet; Take 1 Tab by mouth daily. 4. Lupus ([address]  Follow up with Rheum as planned. Labs reviewed with pt today. F/u 3 months A1c and 6 weeks HTN   Subjective:   Carolyn Mandujano is a 63 y.o. female who was seen for Labs (follow up)    Elevated A1c:   Pre-DM - nearly in diabetes range. Lab Results   Component Value Date/Time    Hemoglobin A1c 6.4 (H) 09/02/2020 10:26 AM     Lab Results   Component Value Date/Time    Cholesterol, total 190 09/02/2020 10:26 AM    HDL Cholesterol 42 09/02/2020 10:26 AM    LDL Chol Calc ([facility]) 129 (H) 09/02/2020 10:26 AM    VLDL Cholesterol Arsen 19 09/02/2020 10:26 AM    Triglyceride 102 09/02/2020 10:26 AM     Currently taking:   Key Antihyperglycemic Medications     Patient is on no antihyperglycemic meds. Pt has gained weight - eating for comfort. Denies depression. Wt Readings from Last 3 Encounters:   07/30/20 232 lb (105.2 kg)   03/12/20 228 lb (103.4 kg)   03/10/20 218 lb (98.9 kg)     Hx Lupus - seeing rheumatology soon. Needs new placard for DMV.  Ok to [employer] to establish with GYN. Gave info for BS GYN groups. [address] [address]  Phone: [phone]    Or     [facility], [address], [address]  Phone: [phone]    HTN - Tried taking Amlodipine 5 mg daily - had episode of low BP with dizziness - bp down to 97/60. Health Maintenance Due   Topic Date Due    Pneumococcal 0-64 years (1 of 1 - PPSV23) 03/05/1970    DTaP/Tdap/Td series (1 - Tdap) 03/05/1985    PAP AKA CERVICAL CYTOLOGY  03/05/1985    Shingrix Vaccine Age 50> (1 of 2) 03/05/2014    Breast Cancer Screen Mammogram  03/05/2014    FOBT Q1Y Age 50-75  03/05/2014    Flu Vaccine (1) 09/01/2020       Review of Systems   Respiratory: Negative for shortness of breath. Cardiovascular: Negative for chest pain and palpitations. Neurological: Negative for dizziness, loss of consciousness and weakness. Objective:     Patient-Reported Vitals 9/14/2020   Patient-Reported Weight 227.0lb   Patient-Reported Pulse 98   Patient-Reported Temperature 97.6   Patient-Reported Systolic  957   Patient-Reported Diastolic 86        General: alert, cooperative, no distress   Mental  status: normal mood, behavior, speech, dress, motor activity, and thought processes, able to follow commands   HENT: NCAT   Neck: no visualized mass   Resp: no respiratory distress   Neuro: no gross deficits   Skin: no discoloration or lesions of concern on visible areas   Psychiatric: normal affect, consistent with stated mood, no evidence of hallucinations     We discussed the expected course, resolution and complications of the diagnosis(es) in detail. Medication risks, benefits, costs, interactions, and alternatives were discussed as indicated. I advised her to contact the office if her condition worsens, changes or fails to improve as anticipated. She expressed understanding with the diagnosis(es) and plan. Carmen Garcia is a 63 y.o. female who was evaluated by a video visit encounter for concerns as above. Patient identification was verified prior to start of the visit. A caregiver was present when appropriate. Due to this being a TeleHealth encounter (During TDIBT-29 public health emergency), evaluation of the following organ systems was limited: Vitals/Constitutional/EENT/Resp/CV/GI//MS/Neuro/Skin/Heme-Lymph-Imm. Pursuant to the emergency declaration under the [facility] waiver authority and the [employer] and [employer] Act, this Virtual  Visit was conducted, with patient's (and/or legal guardian's) consent, to reduce the patient's risk of exposure to COVID-19 and provide necessary medical care. Services were provided through a video synchronous discussion virtually to substitute for in-person clinic visit. Patient and provider were located at their individual homes.       Maria Luisa Brown PA-C

## 2020-09-23 ENCOUNTER — TELEPHONE (OUTPATIENT)
Dept: INTERNAL MEDICINE CLINIC | Age: 55
End: 2020-09-23

## 2020-09-23 NOTE — TELEPHONE ENCOUNTER
Pt. has questions regarding new medication (\"Propranolol 20 mg\") interaction w/ current BP medications. Would like immediate assistance and call back.    Callback required yes/no and why: yes   Best contact number(s): [phone]       ADRIANA

## 2020-09-24 ENCOUNTER — HOSPITAL ENCOUNTER (OUTPATIENT)
Dept: MAMMOGRAPHY | Age: 55
Discharge: HOME | End: 2020-09-24
Attending: PHYSICIAN ASSISTANT
Payer: COMMERCIAL

## 2020-09-24 DIAGNOSIS — Z12.39 SCREENING FOR BREAST CANCER: ICD-10-CM

## 2020-09-24 PROCEDURE — 77067 SCR MAMMO BI INCL CAD: CPT

## 2020-10-01 ENCOUNTER — HOSPITAL ENCOUNTER (EMERGENCY)
Age: 55
Discharge: HOME | End: 2020-10-01
Attending: EMERGENCY MEDICINE
Payer: COMMERCIAL

## 2020-10-01 ENCOUNTER — APPOINTMENT (OUTPATIENT)
Dept: GENERAL RADIOLOGY | Age: 55
End: 2020-10-01
Attending: EMERGENCY MEDICINE
Payer: COMMERCIAL

## 2020-10-01 VITALS
SYSTOLIC BLOOD PRESSURE: 194 MMHG | HEART RATE: 71 BPM | TEMPERATURE: 98.4 F | RESPIRATION RATE: 18 BRPM | OXYGEN SATURATION: 100 % | DIASTOLIC BLOOD PRESSURE: 72 MMHG

## 2020-10-01 DIAGNOSIS — R07.9 CHEST PAIN, UNSPECIFIED TYPE: Primary | ICD-10-CM

## 2020-10-01 LAB
ALBUMIN SERPL-MCNC: 3.5 G/DL (ref 3.5–5)
ALBUMIN/GLOB SERPL: 0.7 {RATIO} (ref 1.1–2.2)
ALP SERPL-CCNC: 137 U/L (ref 45–117)
ALT SERPL-CCNC: 26 U/L (ref 12–78)
ANION GAP SERPL CALC-SCNC: 5 MMOL/L (ref 5–15)
AST SERPL-CCNC: 17 U/L (ref 15–37)
ATRIAL RATE: 69 BPM
BASOPHILS # BLD: 0 K/UL (ref 0–0.1)
BASOPHILS NFR BLD: 1 % (ref 0–1)
BILIRUB SERPL-MCNC: 0.2 MG/DL (ref 0.2–1)
BUN SERPL-MCNC: 12 MG/DL (ref 6–20)
BUN/CREAT SERPL: 13 (ref 12–20)
CALCIUM SERPL-MCNC: 9.3 MG/DL (ref 8.5–10.1)
CALCULATED P AXIS, ECG09: 18 DEGREES
CALCULATED R AXIS, ECG10: 15 DEGREES
CALCULATED T AXIS, ECG11: 28 DEGREES
CHLORIDE SERPL-SCNC: 106 MMOL/L (ref 97–108)
CO2 SERPL-SCNC: 27 MMOL/L (ref 21–32)
COMMENT, HOLDF: NORMAL
CREAT SERPL-MCNC: 0.92 MG/DL (ref 0.55–1.02)
DIAGNOSIS, 93000: NORMAL
DIFFERENTIAL METHOD BLD: ABNORMAL
EOSINOPHIL # BLD: 0.1 K/UL (ref 0–0.4)
EOSINOPHIL NFR BLD: 3 % (ref 0–7)
ERYTHROCYTE [DISTWIDTH] IN BLOOD BY AUTOMATED COUNT: 14.1 % (ref 11.5–14.5)
GLOBULIN SER CALC-MCNC: 4.7 G/DL (ref 2–4)
GLUCOSE SERPL-MCNC: 104 MG/DL (ref 65–100)
HCT VFR BLD AUTO: 37.9 % (ref 35–47)
HGB BLD-MCNC: 11.8 G/DL (ref 11.5–16)
IMM GRANULOCYTES # BLD AUTO: 0 K/UL (ref 0–0.04)
IMM GRANULOCYTES NFR BLD AUTO: 0 % (ref 0–0.5)
LYMPHOCYTES # BLD: 1.9 K/UL (ref 0.8–3.5)
LYMPHOCYTES NFR BLD: 44 % (ref 12–49)
MCH RBC QN AUTO: 25.5 PG (ref 26–34)
MCHC RBC AUTO-ENTMCNC: 31.1 G/DL (ref 30–36.5)
MCV RBC AUTO: 82 FL (ref 80–99)
MONOCYTES # BLD: 0.4 K/UL (ref 0–1)
MONOCYTES NFR BLD: 8 % (ref 5–13)
NEUTS SEG # BLD: 2 K/UL (ref 1.8–8)
NEUTS SEG NFR BLD: 44 % (ref 32–75)
NRBC # BLD: 0 K/UL (ref 0–0.01)
NRBC BLD-RTO: 0 PER 100 WBC
P-R INTERVAL, ECG05: 196 MS
PLATELET # BLD AUTO: 273 K/UL (ref 150–400)
PMV BLD AUTO: 11.3 FL (ref 8.9–12.9)
POTASSIUM SERPL-SCNC: 3.8 MMOL/L (ref 3.5–5.1)
PROT SERPL-MCNC: 8.2 G/DL (ref 6.4–8.2)
Q-T INTERVAL, ECG07: 404 MS
QRS DURATION, ECG06: 80 MS
QTC CALCULATION (BEZET), ECG08: 432 MS
RBC # BLD AUTO: 4.62 M/UL (ref 3.8–5.2)
SAMPLES BEING HELD,HOLD: NORMAL
SODIUM SERPL-SCNC: 138 MMOL/L (ref 136–145)
TROPONIN I SERPL-MCNC: <0.05 NG/ML
TROPONIN I SERPL-MCNC: <0.05 NG/ML
VENTRICULAR RATE, ECG03: 69 BPM
WBC # BLD AUTO: 4.4 K/UL (ref 3.6–11)

## 2020-10-01 PROCEDURE — 71045 X-RAY EXAM CHEST 1 VIEW: CPT

## 2020-10-01 PROCEDURE — 80053 COMPREHEN METABOLIC PANEL: CPT

## 2020-10-01 PROCEDURE — 36415 COLL VENOUS BLD VENIPUNCTURE: CPT

## 2020-10-01 PROCEDURE — 99285 EMERGENCY DEPT VISIT HI MDM: CPT

## 2020-10-01 PROCEDURE — 93005 ELECTROCARDIOGRAM TRACING: CPT

## 2020-10-01 PROCEDURE — 85025 COMPLETE CBC W/AUTO DIFF WBC: CPT

## 2020-10-01 PROCEDURE — 84484 ASSAY OF TROPONIN QUANT: CPT

## 2020-10-01 NOTE — ED NOTES
Pt provided with discharge instructions and verbalized understanding. IV Removed. Pt ambulatory out of ED with VSS and no signs of distress.

## 2020-10-01 NOTE — ED PROVIDER NOTES
HPI the patient had an episode of chest pain that started approximately 1 hour ago while she was working out at facility. The pain lasted approximately 30 minutes and was associated with nausea and dizziness. The patient thinks that this was due to propranolol which she is taking recently for her blood pressure. She says propranolol makes her dizzy and nauseated. She denies having a previous history of exertional chest pain and she denies any history of heart disease. Past Medical History:   Diagnosis Date    Asthma     Headache 3/10/2020    History of concussion 3/10/2020    Lupus [facility])        Past Surgical History:   Procedure Laterality Date    HX ORTHOPAEDIC Bilateral 1996 & 2015    HX TONSILLECTOMY           History reviewed. No pertinent family history.     Social History     Socioeconomic History    Marital status: LIFE PARTNER     Spouse name: Not on file    Number of children: Not on file    Years of education: Not on file    Highest education level: Not on file   Occupational History    Not on file   Social Needs    Financial resource strain: Not on file    Food insecurity     Worry: Not on file     Inability: Not on file    Transportation needs     Medical: Not on file     Non-medical: Not on file   Tobacco Use    Smoking status: Never Smoker    Smokeless tobacco: Never Used   Substance and Sexual Activity    Alcohol use: Not Currently    Drug use: Never    Sexual activity: Not on file   Lifestyle    Physical activity     Days per week: Not on file     Minutes per session: Not on file    Stress: Not on file   Relationships    Social connections     Talks on phone: Not on file     Gets together: Not on file     Attends [employer] service: Not on file     Active member of club or organization: Not on file     Attends meetings of clubs or organizations: Not on file     Relationship status: Not on file    Intimate partner violence     Fear of current or ex partner: Not on file Emotionally abused: Not on file     Physically abused: Not on file     Forced sexual activity: Not on file   Other Topics Concern    Not on file   Social History Narrative    Not on file         ALLERGIES: Lisinopril    Review of Systems   Constitutional: Negative for fever. HENT: Negative for voice change. Eyes: Negative for pain. Respiratory: Negative for cough and shortness of breath. Cardiovascular: Positive for chest pain. Gastrointestinal: Positive for nausea. Negative for abdominal pain and vomiting. Genitourinary: Negative for flank pain. Musculoskeletal: Negative for back pain. Skin: Negative for rash. Neurological: Positive for dizziness. Negative for headaches. Psychiatric/Behavioral: Negative for confusion. Vitals:    10/01/20 1114   BP: (!) 166/91   Pulse: 64   Resp: 16   Temp: 98.7 °F (37.1 °C)   SpO2: 99%            Physical Exam  Constitutional:       General: She is not in acute distress. Appearance: She is well-developed. HENT:      Head: Normocephalic. Neck:      Musculoskeletal: Normal range of motion. Cardiovascular:      Rate and Rhythm: Normal rate. Heart sounds: No murmur. Pulmonary:      Effort: Pulmonary effort is normal.      Breath sounds: Normal breath sounds. Abdominal:      Palpations: Abdomen is soft. Tenderness: There is no abdominal tenderness. Musculoskeletal: Normal range of motion. Skin:     General: Skin is warm and dry. Capillary Refill: Capillary refill takes less than 2 seconds. Neurological:      Mental Status: She is alert. Psychiatric:         Behavior: Behavior normal.          MDM       Procedures ED EKG interpretation:  Rhythm: NSR, rate 69. No acute ST changes. This EKG was interpreted by Iris Galarza MD,ED Provider.

## 2020-10-01 NOTE — DISCHARGE INSTRUCTIONS

## 2020-10-01 NOTE — ED TRIAGE NOTES
Pt arrives via EMS from [address] [facility] with c/o chest pain. Chest pain began when pt was performing rehab exercises and resolved by the time pt arrived. Pt hx of Lupus and HTN.

## 2020-10-26 ENCOUNTER — OFFICE VISIT (OUTPATIENT)
Dept: INTERNAL MEDICINE CLINIC | Age: 55
End: 2020-10-26

## 2020-10-26 VITALS
TEMPERATURE: 98.3 F | BODY MASS INDEX: 37.77 KG/M2 | RESPIRATION RATE: 16 BRPM | WEIGHT: 227 LBS | OXYGEN SATURATION: 99 % | DIASTOLIC BLOOD PRESSURE: 91 MMHG | SYSTOLIC BLOOD PRESSURE: 172 MMHG | HEART RATE: 70 BPM

## 2020-10-26 DIAGNOSIS — I10 ESSENTIAL HYPERTENSION: Primary | ICD-10-CM

## 2020-10-26 DIAGNOSIS — Z82.49 FAMILY HISTORY OF ABDOMINAL AORTIC ANEURYSM: ICD-10-CM

## 2020-10-26 DIAGNOSIS — E66.01 SEVERE OBESITY (HCC): ICD-10-CM

## 2020-10-26 DIAGNOSIS — Z87.891 HISTORY OF SMOKING 10-25 PACK YEARS: ICD-10-CM

## 2020-10-26 PROCEDURE — 99214 OFFICE O/P EST MOD 30 MIN: CPT | Performed by: PHYSICIAN ASSISTANT

## 2020-10-26 RX ORDER — AMLODIPINE BESYLATE 5 MG/1
5 TABLET ORAL DAILY
Qty: 30 TAB | Refills: 2 | Status: ON HOLD | OUTPATIENT
Start: 2020-10-26 | End: 2022-08-12 | Stop reason: SDUPTHER

## 2020-10-26 NOTE — PROGRESS NOTES
Ezio Best is a 63 y.o. female  Chief Complaint   Patient presents with    Hypertension     follow up       Visit Vitals  BP (!) 172/91   Pulse 70   Temp 98.3 °F (36.8 °C) (Oral)   Resp 16   Wt 227 lb (103 kg)   SpO2 99%   BMI 37.77 kg/m²      Health Maintenance Due   Topic Date Due    Pneumococcal 0-64 years (1 of 1 - PPSV23) 03/05/1970    DTaP/Tdap/Td series (1 - Tdap) 03/05/1985    Shingrix Vaccine Age 50> (1 of 2) 03/05/2014    Colorectal Cancer Screening Combo  03/05/2014    Flu Vaccine (1) 09/01/2020       HPI  HTN Follow up - BP uncontrolled:   Patient-Reported Vitals 9/14/2020   Patient-Reported Weight 227.0lb   Patient-Reported Pulse 98   Patient-Reported Temperature 97.6   Patient-Reported Systolic  392   Patient-Reported Diastolic 86      BP Readings from Last 3 Encounters:   10/26/20 (!) 172/91   10/01/20 (!) 194/72   07/30/20 169/87     Currently not on any blood pressure medication. Wt Readings from Last 3 Encounters:   10/26/20 227 lb (103 kg)   07/30/20 232 lb (105.2 kg)   03/12/20 228 lb (103.4 kg)     Pain in B lower stomach off and on radiating to back (\"like menstrual cramps\") Stopped having menses April 2018. Stool is soft and easy to pass. Worse with days of heavy exertion. No urinary sx. FH aortic aneurysm - Uncles x2 on Mom's side  Stopped smoking 15 years ago. Smoked x 25 years 1/2 ppd at most.     Seeing Worker's Comp for Anxiety. ROS  Review of Systems   Constitutional: Negative for fever. Respiratory: Negative for shortness of breath. Cardiovascular: Negative for chest pain and palpitations. Gastrointestinal: Negative for constipation and diarrhea. Genitourinary: Negative for dysuria, flank pain, frequency, hematuria and urgency. Neurological: Negative for dizziness, loss of consciousness and weakness. Psychiatric/Behavioral: The patient is nervous/anxious. EXAM  Physical Exam  Vitals signs and nursing note reviewed.    Constitutional: General: She is not in acute distress. Appearance: She is well-developed. HENT:      Head: Normocephalic and atraumatic. Neck:      Musculoskeletal: Neck supple. Vascular: No JVD. Cardiovascular:      Rate and Rhythm: Normal rate and regular rhythm. Heart sounds: Normal heart sounds. Pulmonary:      Effort: Pulmonary effort is normal. No respiratory distress. Breath sounds: Normal breath sounds. Skin:     General: Skin is warm and dry. Neurological:      Mental Status: She is alert and oriented to person, place, and time. Psychiatric:         Mood and Affect: Mood normal.         Behavior: Behavior normal.         Thought Content: Thought content normal.         Judgment: Judgment normal.         ASSESSMENT/PLAN  1. Severe obesity ([address]  Congratulated pt on weight loss success and encouraged continued efforts. 2. Essential hypertension  Restart amLODIPine (NORVASC) 5 mg tablet; Take 1 Tab by mouth daily. Dispense: 30 Tab; Refill: 2    3. Family history of abdominal aortic aneurysm  - US EXAM SCREENING AAA; Future    4. History of smoking 10-25 pack years  - [address] [facility];  Future

## 2020-10-26 NOTE — PROGRESS NOTES
1. Have you been to the ER, urgent care clinic since your last visit? yes [facility] for headache. Hospitalized since your last visit?no    2. Have you seen or consulted any other health care providers outside of the [address] [place] [address] since your last visit? Include any pap smears or colon screening.  No    Chief Complaint   Patient presents with    Hypertension     follow up

## 2020-11-03 ENCOUNTER — OFFICE VISIT (OUTPATIENT)
Dept: RHEUMATOLOGY | Age: 55
End: 2020-11-03
Payer: COMMERCIAL

## 2020-11-03 VITALS
WEIGHT: 223.8 LBS | SYSTOLIC BLOOD PRESSURE: 153 MMHG | BODY MASS INDEX: 37.29 KG/M2 | TEMPERATURE: 97.3 F | HEIGHT: 65 IN | DIASTOLIC BLOOD PRESSURE: 80 MMHG | OXYGEN SATURATION: 98 % | HEART RATE: 72 BPM | RESPIRATION RATE: 20 BRPM

## 2020-11-03 DIAGNOSIS — M32.19 OTHER SYSTEMIC LUPUS ERYTHEMATOSUS WITH OTHER ORGAN INVOLVEMENT (HCC): Primary | ICD-10-CM

## 2020-11-03 DIAGNOSIS — M32.9 LUPUS (HCC): ICD-10-CM

## 2020-11-03 PROCEDURE — 99215 OFFICE O/P EST HI 40 MIN: CPT | Performed by: INTERNAL MEDICINE

## 2020-11-03 RX ORDER — HYDROXYCHLOROQUINE SULFATE 200 MG/1
200 TABLET, FILM COATED ORAL DAILY
Qty: 90 TAB | Refills: 2 | Status: SHIPPED | OUTPATIENT
Start: 2020-11-03 | End: 2021-09-22 | Stop reason: SDUPTHER

## 2020-11-03 NOTE — PATIENT INSTRUCTIONS
1. Prescribing Plaquenil (hydroxychloroquine) to resume one pill daily. Keep following with the eye doctor at least once a year. 2. Labs from [facility] ER, and then again before your next visit. 3. Call with new rashes, joint problems, or other concerns. 4. Return in 5-6 months.
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
## 2020-11-03 NOTE — PROGRESS NOTES
Chief Complaint   Patient presents with    Lupus     1. Have you been to the ER, urgent care clinic since your last visit? Hospitalized since your last visit? No    2. Have you seen or consulted any other health care providers outside of the [address] [place] [address] since your last visit? Include any pap smears or colon screening.  No

## 2020-11-05 ENCOUNTER — HOSPITAL ENCOUNTER (OUTPATIENT)
Dept: ULTRASOUND IMAGING | Age: 55
Discharge: HOME | End: 2020-11-05
Attending: PHYSICIAN ASSISTANT
Payer: COMMERCIAL

## 2020-11-05 DIAGNOSIS — Z87.891 HISTORY OF SMOKING 10-25 PACK YEARS: ICD-10-CM

## 2020-11-05 DIAGNOSIS — Z82.49 FAMILY HISTORY OF ABDOMINAL AORTIC ANEURYSM: ICD-10-CM

## 2020-11-05 PROCEDURE — 76706 US ABDL AORTA SCREEN AAA: CPT

## 2020-11-05 NOTE — LETTER
11/11/2020 10:39 AM 
 
Ms. Jeffry Gonzalez 
[address] [facility] [address] [id number] Normal ultrasound. Good! Sincerely, Florentino Willis PA-C

## 2020-11-12 ENCOUNTER — TELEPHONE (OUTPATIENT)
Dept: INTERNAL MEDICINE CLINIC | Age: 55
End: 2020-11-12

## 2020-11-12 PROBLEM — G57.62 MORTON'S NEUROMA, LEFT: Status: ACTIVE | Noted: 2020-10-19

## 2020-11-20 ENCOUNTER — OFFICE VISIT (OUTPATIENT)
Dept: INTERNAL MEDICINE CLINIC | Age: 55
End: 2020-11-20
Payer: COMMERCIAL

## 2020-11-20 VITALS
WEIGHT: 225 LBS | DIASTOLIC BLOOD PRESSURE: 82 MMHG | TEMPERATURE: 95.3 F | HEART RATE: 76 BPM | BODY MASS INDEX: 37.49 KG/M2 | OXYGEN SATURATION: 100 % | HEIGHT: 65 IN | SYSTOLIC BLOOD PRESSURE: 155 MMHG | RESPIRATION RATE: 20 BRPM

## 2020-11-20 DIAGNOSIS — I10 ESSENTIAL HYPERTENSION: Primary | ICD-10-CM

## 2020-11-20 DIAGNOSIS — E66.01 SEVERE OBESITY (HCC): ICD-10-CM

## 2020-11-20 DIAGNOSIS — R73.09 ELEVATED HEMOGLOBIN A1C: ICD-10-CM

## 2020-11-20 PROCEDURE — 99214 OFFICE O/P EST MOD 30 MIN: CPT | Performed by: PHYSICIAN ASSISTANT

## 2020-11-20 RX ORDER — HYDROCHLOROTHIAZIDE 25 MG/1
25 TABLET ORAL DAILY
Qty: 30 TAB | Refills: 2 | Status: SHIPPED | OUTPATIENT
Start: 2020-11-20 | End: 2021-02-15

## 2020-11-20 NOTE — PROGRESS NOTES
Jeffry Gonzalez is a 63 y.o. female  Chief Complaint   Patient presents with    Hypertension     Visit Vitals  BP (!) 155/82   Pulse 76   Temp (!) 95.3 °F (35.2 °C) (Temporal)   Resp 20   Ht 5' 5\" (1.651 m)   Wt 225 lb (102.1 kg)   SpO2 100%   BMI 37.44 kg/m²      Health Maintenance Due   Topic Date Due    Pneumococcal 0-64 years (1 of 1 - PPSV23) 03/05/1970    DTaP/Tdap/Td series (1 - Tdap) 03/05/1985    Shingrix Vaccine Age 50> (1 of 2) 03/05/2014    Colorectal Cancer Screening Combo  03/05/2014    Flu Vaccine (1) 09/01/2020       HPI  HTN Follow up - BP improved but not yet controlled:  Patient-Reported Vitals 11/12/2020   Patient-Reported Weight -   Patient-Reported Pulse 82   Patient-Reported Temperature -   Patient-Reported Systolic  021   Patient-Reported Diastolic 97      BP Readings from Last 3 Encounters:   11/20/20 (!) 155/82   11/03/20 (!) 153/80   10/26/20 (!) 172/91     Currently taking:   Key CAD CHF Meds             amLODIPine (NORVASC) 5 mg tablet Take 1 Tab by mouth daily. Prediabetes - has been working on soda consumption. Lab Results   Component Value Date/Time    Hemoglobin A1c 6.4 (H) 09/02/2020 10:26 AM     Lab Results   Component Value Date/Time    Cholesterol, total 190 09/02/2020 10:26 AM    HDL Cholesterol 42 09/02/2020 10:26 AM    LDL Chol Calc (NIH) 129 (H) 09/02/2020 10:26 AM    VLDL Cholesterol Arsen 19 09/02/2020 10:26 AM    Triglyceride 102 09/02/2020 10:26 AM     Currently taking:   Key Antihyperglycemic Medications     Patient is on no antihyperglycemic meds.         Has been losing weight with diet change  Wt Readings from Last 9 Encounters:   11/20/20 225 lb (102.1 kg)   11/03/20 223 lb 12.8 oz (101.5 kg)   10/26/20 227 lb (103 kg)   07/30/20 232 lb (105.2 kg)   03/12/20 228 lb (103.4 kg)   03/10/20 218 lb (98.9 kg)   03/10/20 218 lb (98.9 kg)   03/09/20 218 lb (98.9 kg)   02/19/20 232 lb (105.2 kg)       ROS  Review of Systems   Respiratory: Negative for shortness of breath. Cardiovascular: Negative for chest pain and palpitations. Neurological: Negative for dizziness, loss of consciousness and weakness. EXAM  Physical Exam  Vitals signs and nursing note reviewed. Constitutional:       General: She is not in acute distress. Appearance: She is well-developed. HENT:      Head: Normocephalic and atraumatic. Neck:      Musculoskeletal: Neck supple. Vascular: No JVD. Cardiovascular:      Rate and Rhythm: Normal rate and regular rhythm. Heart sounds: Normal heart sounds. Pulmonary:      Effort: Pulmonary effort is normal. No respiratory distress. Breath sounds: Normal breath sounds. Skin:     General: Skin is warm and dry. Neurological:      Mental Status: She is alert and oriented to person, place, and time. Psychiatric:         Mood and Affect: Mood normal.         Behavior: Behavior normal.         Thought Content: Thought content normal.         Judgment: Judgment normal.         ASSESSMENT/PLAN  1. Essential hypertension  Not yet controlled. Add hydroCHLOROthiazide (HYDRODIURIL) 25 mg tablet; Take 1 Tab by mouth daily. Dispense: 30 Tab; Refill: 2    2. Severe obesity ([address]  Congratulated pt on weight loss success and encouraged continued efforts. 3. Elevated hemoglobin A1c  Congratulated pt on diet changes.

## 2020-11-28 ENCOUNTER — PATIENT MESSAGE (OUTPATIENT)
Dept: INTERNAL MEDICINE CLINIC | Age: 55
End: 2020-11-28

## 2021-01-13 ENCOUNTER — TELEPHONE (OUTPATIENT)
Dept: INTERNAL MEDICINE CLINIC | Age: 56
End: 2021-01-13

## 2021-01-13 DIAGNOSIS — J45.40 MODERATE PERSISTENT ASTHMA, UNSPECIFIED WHETHER COMPLICATED: Primary | ICD-10-CM

## 2021-01-13 NOTE — TELEPHONE ENCOUNTER
Pt had asthma attack 12/31/20 and has paperwork to determine if she should work from home. If pt is taking her asthma medications regularly, but still had an asthma attack, she needs to see Pulm.

## 2021-02-13 DIAGNOSIS — I10 ESSENTIAL HYPERTENSION: ICD-10-CM

## 2021-02-15 RX ORDER — HYDROCHLOROTHIAZIDE 25 MG/1
TABLET ORAL
Qty: 90 TAB | Refills: 1 | Status: SHIPPED | OUTPATIENT
Start: 2021-02-15 | End: 2022-08-11

## 2021-03-22 ENCOUNTER — TELEPHONE (OUTPATIENT)
Dept: INTERNAL MEDICINE CLINIC | Age: 56
End: 2021-03-22

## 2021-03-22 NOTE — TELEPHONE ENCOUNTER
Please inform pt: As you're eligible to receive the COVID-19 vaccine, as determined by your state's department of health, you will be able to schedule an appointment through [address] or by calling [phone]. Appointments are required to receive the COVID-19 vaccine.  As vaccine supply continues to be limited, we anticipate open appointments to fill up quickly and appreciate your patience as we work through the process of providing vaccines to those in our communities

## 2021-03-22 NOTE — TELEPHONE ENCOUNTER
PT wants to know if she should get the Covid-19 vaccine? And will the doctor make the appt? Please advise.        Callback required yes/no and why:yes       Best contact number(s):[phone]       susana

## 2021-04-12 ENCOUNTER — IMMUNIZATION (OUTPATIENT)
Dept: INTERNAL MEDICINE CLINIC | Age: 56
End: 2021-04-12
Payer: COMMERCIAL

## 2021-04-12 DIAGNOSIS — Z23 ENCOUNTER FOR IMMUNIZATION: Primary | ICD-10-CM

## 2021-04-12 PROCEDURE — 91300 COVID-19, MRNA, LNP-S, PF, 30MCG/0.3ML DOSE(PFIZER): CPT | Performed by: FAMILY MEDICINE

## 2021-04-12 PROCEDURE — 0001A COVID-19, MRNA, LNP-S, PF, 30MCG/0.3ML DOSE(PFIZER): CPT | Performed by: FAMILY MEDICINE

## 2021-05-03 ENCOUNTER — IMMUNIZATION (OUTPATIENT)
Dept: INTERNAL MEDICINE CLINIC | Age: 56
End: 2021-05-03
Payer: COMMERCIAL

## 2021-05-03 DIAGNOSIS — Z23 ENCOUNTER FOR IMMUNIZATION: Primary | ICD-10-CM

## 2021-05-03 PROCEDURE — 0002A COVID-19, MRNA, LNP-S, PF, 30MCG/0.3ML DOSE(PFIZER): CPT | Performed by: FAMILY MEDICINE

## 2021-05-03 PROCEDURE — 91300 COVID-19, MRNA, LNP-S, PF, 30MCG/0.3ML DOSE(PFIZER): CPT | Performed by: FAMILY MEDICINE

## 2021-06-04 ENCOUNTER — OFFICE VISIT (OUTPATIENT)
Dept: OBGYN CLINIC | Age: 56
End: 2021-06-04
Payer: MEDICAID

## 2021-06-04 VITALS
HEIGHT: 65 IN | DIASTOLIC BLOOD PRESSURE: 88 MMHG | SYSTOLIC BLOOD PRESSURE: 138 MMHG | BODY MASS INDEX: 37.32 KG/M2 | WEIGHT: 224 LBS

## 2021-06-04 DIAGNOSIS — Z01.419 ENCOUNTER FOR GYNECOLOGICAL EXAMINATION WITHOUT ABNORMAL FINDING: Primary | ICD-10-CM

## 2021-06-04 PROCEDURE — 99386 PREV VISIT NEW AGE 40-64: CPT | Performed by: OBSTETRICS & GYNECOLOGY

## 2021-06-04 NOTE — PROGRESS NOTES
Annual exam    Mariann Armstrong is a 61 y.o. presenting for annual exam.   Her main concerns today include none. Ob/Gyn Hx:  No obstetric history on file. No LMP recorded. (Menstrual status: Menopause). Menopause- age  ? VMS-none  ? Vag dryness-none  ? HRT-none  STI- declined  ? SA-yes    Health maintenance:  [id number] normal per patient  ENAWO-45/2464- normal  Colonoscopy- patient has never had one. Past Medical History:   Diagnosis Date    Asthma     Headache 3/10/2020    History of concussion 3/10/2020    Lupus [facility])        Past Surgical History:   Procedure Laterality Date    HX ORTHOPAEDIC Bilateral 1996 & 2015    HX TONSILLECTOMY         No family history on file. Social History     Socioeconomic History    Marital status: LIFE PARTNER     Spouse name: Not on file    Number of children: Not on file    Years of education: Not on file    Highest education level: Not on file   Occupational History    Not on file   Tobacco Use    Smoking status: Never Smoker    Smokeless tobacco: Never Used   Substance and Sexual Activity    Alcohol use: Not Currently    Drug use: Never    Sexual activity: Not on file   Other Topics Concern    Not on file   Social History Narrative    Not on file     Social Determinants of Health     Financial Resource Strain:     Difficulty of Paying Living Expenses:    Food Insecurity:     Worried About Running Out of Food in the Last Year:     [address] in the Last Year:    Transportation Needs:     Lack of Transportation (Medical):      Lack of Transportation (Non-Medical):    Physical Activity:     Days of Exercise per Week:     Minutes of Exercise per Session:    Stress:     Feeling of Stress :    Social Connections:     Frequency of Communication with Friends and Family:     Frequency of Social Gatherings with Friends and Family:     Attends [employer] Services:     Active Member of Clubs or Organizations:     Attends Club or Organization Meetings:    [address] [place] [address] Marital Status:    Intimate Partner Violence:     Fear of Current or Ex-Partner:     Emotionally Abused:     Physically Abused:     Sexually Abused:        Current Outpatient Medications   Medication Sig Dispense Refill    hydroCHLOROthiazide (HYDRODIURIL) 25 mg tablet TAKE 1 TABLET BY MOUTH EVERY DAY 90 Tab 1    hydrOXYchloroQUINE (Plaquenil) 200 mg tablet Take 1 Tab by mouth daily. 90 Tab 2    amLODIPine (NORVASC) 5 mg tablet Take 1 Tab by mouth daily. 30 Tab 2    meloxicam (MOBIC) 15 mg tablet Once daily as needed for pain      montelukast (SINGULAIR) 10 mg tablet Take 1 Tab by mouth every evening. 90 Tab 1    budesonide-formoteroL (Symbicort) 160-4.5 mcg/actuation HFAA Take 2 Puffs by inhalation two (2) times a day. Indications: controller medication for asthma 3 Inhaler 3    albuterol (ACCUNEB) 1.25 mg/3 mL nebu 3 mL by Nebulization route every six (6) hours as needed for Shortness of Breath. Indications: asthma attack 100 Each 1    albuterol (PROVENTIL HFA, VENTOLIN HFA, PROAIR HFA) 90 mcg/actuation inhaler Take 1 Puff by inhalation every four (4) hours as needed for Shortness of Breath. 1 Inhaler 2    DULoxetine (CYMBALTA) 30 mg capsule Take 1 Cap by mouth daily. 30 Cap 2    butalb/acetaminophen/caffeine (FIORICET PO) Take 1 Tab by mouth every four (4) hours as needed (Headache/Migraine).  diclofenac (VOLTAREN) 1 % gel Apply  to affected area four (4) times daily. Allergies   Allergen Reactions    Lisinopril Angioedema       Physical Exam    There were no vitals taken for this visit.     Constitutional  · Appearance: well-nourished, well developed, alert, in no acute distress    HENT  · Head and Face: appears normal    Neck  · Inspection/Palpation: normal appearance, no masses or tenderness  · Lymph Nodes: no lymphadenopathy present  · Thyroid: gland size normal, nontender, no nodules or masses present on palpation    Chest  · Respiratory Effort: non-labored breathing  · Auscultation: CTAB, normal breath sounds    Cardiovascular  · Heart:  · Auscultation: regular rate and rhythm without murmur  · Extremities: no peripheral edema    Breasts  · Inspection of Breasts: breasts symmetrical, no skin changes, no discharge present, nipple appearance normal, no skin retraction present  · Palpation of Breasts and Axillae: no masses present on palpation, no breast tenderness  · Axillary Lymph Nodes: no lymphadenopathy present    Gastrointestinal  · Abdominal Examination: abdomen non-tender to palpation, normal bowel sounds, no masses present  · Liver and spleen: no hepatomegaly present, spleen not palpable  · Hernias: no hernias identified    Genitourinary  · External Genitalia: normal appearance for age, no discharge present, no tenderness present, no inflammatory lesions present, no masses present, +atrophy of UG mucosa  · Vagina: normal vaginal vault without central or paravaginal defects, no discharge present, no inflammatory lesions present, no masses present  · Bladder: non-tender to palpation  · Urethra: appears normal  · Cervix: normal   · Perineum: perineum within normal limits, no evidence of trauma, no rashes or skin lesions present    Skin  · General Inspection: no rash, no lesions identified    Neurologic/Psychiatric  · Mental Status:  · Orientation: grossly oriented to person, place and time  · Mood and Affect: mood normal, affect appropriate      Assessment/Plan:  61 y.o. postmenopausal female overall doing well.      Health Maintenance:  -counseled re: diet, exercise, healthy lifestyle  -pap/HPV  -refer for mammo  -refer for colonoscopy    RTC: 1 year for annual wellness assessment, or sooner prn for problems or concerns  -handouts and instructions provided    [facility]  6/4/2021  7:37 AM     Signed By: Lukas Orr MD     June 4, 2021

## 2021-06-04 NOTE — PATIENT INSTRUCTIONS
Pelvic Exam: Care Instructions Overview When your doctor examines your pelvic organs, it's called a pelvic exam. This exam is done to evaluate symptoms, such as pelvic pain or abnormal vaginal bleeding and discharge. It may also be done to collect samples of cells for cervical cancer screening. Before your exam, it's important to share some information with your doctor. You can talk about any concerns you may have. Your doctor will also want to know if you are pregnant or use birth control. And your doctor will want to hear about any problems, surgeries, or procedures you have had in your pelvic area. You will also need to tell your doctor when your last period was. Follow-up care is a key part of your treatment and safety. Be sure to make and go to all appointments, and call your doctor if you are having problems. It's also a good idea to know your test results and keep a list of the medicines you take. How is a pelvic exam done? · During a pelvic exam, you will: ? Take off your clothes below the waist. You will get a paper or cloth cover to put over the lower half of your body. ? Lie on your back on an exam table with your feet and legs supported by footrests. · The doctor may: ? Ask you to relax your knees. Your knees need to lean out, toward the walls. ? Check the opening of your vagina for sores or swelling. ? Gently put a tool called a speculum into your vagina. It opens the vagina a little bit. You may feel some pressure. The speculum lets your doctor see inside the vagina. ? Use a small brush, spatula, or swab to get a sample for testing. The doctor then removes the speculum. ? Put on gloves and put one or two fingers of one hand into your vagina. The other hand goes on your lower belly. This lets your doctor feel your pelvic organs. You will probably feel some pressure. ? Put one gloved finger into your rectum and one into your vagina, if needed.  This can also help check your pelvic organs. You may have a small amount of vaginal discharge or bleeding after the exam. 
Why is a pelvic exam done? A pelvic exam may be done: · To collect samples of cells for cervical cancer screening. · To check for vaginal infection. · To check for sexually transmitted infections, such as chlamydia or herpes. · To help find the cause of abnormal uterine bleeding. · To look for problems like uterine fibroids, ovarian cysts, or uterine prolapse. · To help find the cause of pelvic or belly pain. · Before inserting an intrauterine device (IUD). · To collect evidence if you've been sexually assaulted. What are the risks of a pelvic exam? 
There is a small chance that the doctor will find something on a pelvic exam that would not have caused a problem. This is called overdiagnosis. It could lead to tests or treatment you don't need. When should you call for help? Watch closely for changes in your health, and be sure to contact your doctor if you have any problems. Where can you learn more? Go to http://www.gray.com/ Enter R162 in the search box to learn more about \"Pelvic Exam: Care Instructions. \" Current as of: July 17, 2020               Content Version: 12.8 © 8285-0122 [employer]. Care instructions adapted under license by [employer] (which disclaims liability or warranty for this information). If you have questions about a medical condition or this instruction, always ask your healthcare professional. [place] [address] any warranty or liability for your use of this information.

## 2021-06-09 LAB
CYTOLOGIST CVX/VAG CYTO: NORMAL
CYTOLOGY CVX/VAG DOC CYTO: NORMAL
CYTOLOGY CVX/VAG DOC THIN PREP: NORMAL
DX ICD CODE: NORMAL
HPV I/H RISK 4 DNA CVX QL PROBE+SIG AMP: NEGATIVE
Lab: NORMAL
Lab: NORMAL
OTHER STN SPEC: NORMAL
STAT OF ADQ CVX/VAG CYTO-IMP: NORMAL

## 2021-07-27 ENCOUNTER — OFFICE VISIT (OUTPATIENT)
Dept: INTERNAL MEDICINE CLINIC | Age: 56
End: 2021-07-27
Payer: MEDICAID

## 2021-07-27 DIAGNOSIS — H92.02 LEFT EAR PAIN: Primary | ICD-10-CM

## 2021-07-27 DIAGNOSIS — I10 ESSENTIAL HYPERTENSION: ICD-10-CM

## 2021-07-27 PROCEDURE — 99213 OFFICE O/P EST LOW 20 MIN: CPT | Performed by: PHYSICIAN ASSISTANT

## 2021-07-27 RX ORDER — AMOXICILLIN 500 MG/1
500 CAPSULE ORAL 2 TIMES DAILY
Qty: 14 CAPSULE | Refills: 0 | Status: SHIPPED | OUTPATIENT
Start: 2021-07-27 | End: 2021-09-10 | Stop reason: ALTCHOICE

## 2021-07-28 ENCOUNTER — TELEPHONE (OUTPATIENT)
Dept: INTERNAL MEDICINE CLINIC | Age: 56
End: 2021-07-28

## 2021-07-28 VITALS
HEIGHT: 65 IN | BODY MASS INDEX: 36.65 KG/M2 | SYSTOLIC BLOOD PRESSURE: 190 MMHG | OXYGEN SATURATION: 98 % | RESPIRATION RATE: 20 BRPM | TEMPERATURE: 98.1 F | DIASTOLIC BLOOD PRESSURE: 102 MMHG | HEART RATE: 63 BPM | WEIGHT: 220 LBS

## 2021-07-28 NOTE — PROGRESS NOTES
Chief Complaint   Patient presents with    Ear Pain     she is a 61y.o. year old female who presents for evaluation. Patient presents the office today for evaluation of left ear pain. She reports that she has been having left ear pain now for approximately 2 weeks. Patient reports that she feels as though she has fluid behind her ear. She denies having fever chills sore throat or cough. The patient states that she does have headache at times as well that can be severe. Reviewed PmHx, RxHx, FmHx, SocHx, AllgHx and updated and dated in the chart. Review of Systems - negative except as listed above    Objective:     Vitals:    07/27/21 1106 07/27/21 1121   BP: (!) 191/107 (!) 190/102   Pulse: 63    Resp: 20    Temp: 98.1 °F (36.7 °C)    TempSrc: Temporal    SpO2: 98%    Weight: 220 lb (99.8 kg)    Height: 5' 5\" (1.651 m)      Physical Examination: General appearance - alert, well appearing, and in no distress  Mental status - normal mood, behavior, speech, dress, motor activity, and thought processes  Ears - right ear- TM appears normal. No tenderness with palpation   Left ear- mild erythema when compared to the right. Patient is mildly tender along the left cervical chain, not able to appreciate swollen lymph nodes however  Chest - clear to auscultation, no wheezes, rales or rhonchi, symmetric air entry  Heart - normal rate and regular rhythm    Assessment/ Plan:   Diagnoses and all orders for this visit:    1. Left ear pain  -     amoxicillin (AMOXIL) 500 mg capsule; Take 1 Capsule by mouth two (2) times a day. 2. Essential hypertension    I explained to the patient that her left TM was only mildly red when compared to the right one. I will start her on antibiotics for possible infection. I advised the patient that her blood pressure was very high today. The patient states that when she is not feeling well and in pain her blood pressure usually is very elevated.   We talked about having her to go to the emergency room to bring her blood pressure down but the patient declined. The patient states that this is not uncommon for her blood pressure to be high when she is not feeling well. I have asked the patient to double up on her amlodipine and take some Tylenol for the pain. I will have the nurse to contact her tomorrow in reference to her blood pressure. I have discussed the diagnosis with the patient and the intended plan as seen in the above orders. The patient has received an after-visit summary and questions were answered concerning future plans.      Medication Side Effects and Warnings were discussed with patient: yes  Patient Labs were reviewed and or requested: yes  Patient Past Records were reviewed and or requested  yes    Windy Muñiz PA-C

## 2021-07-28 NOTE — TELEPHONE ENCOUNTER
Please call this patient this am and see how her blood pressure is doing. Her blood pressure was elevated yesterday in the office.  Thank you

## 2021-08-04 NOTE — TELEPHONE ENCOUNTER
Spoke with patient . States that her blood pressure has been ok and that she is feeling better.  Also  States that her antibiotic has made her feel better

## 2021-09-10 ENCOUNTER — OFFICE VISIT (OUTPATIENT)
Dept: RHEUMATOLOGY | Age: 56
End: 2021-09-10
Payer: MEDICAID

## 2021-09-10 VITALS
SYSTOLIC BLOOD PRESSURE: 160 MMHG | DIASTOLIC BLOOD PRESSURE: 85 MMHG | WEIGHT: 209 LBS | HEIGHT: 65 IN | RESPIRATION RATE: 18 BRPM | OXYGEN SATURATION: 98 % | BODY MASS INDEX: 34.82 KG/M2 | TEMPERATURE: 98.3 F | HEART RATE: 78 BPM

## 2021-09-10 DIAGNOSIS — Z79.899 ENCOUNTER FOR MONITORING OF HYDROXYCHLOROQUINE THERAPY: ICD-10-CM

## 2021-09-10 DIAGNOSIS — Z51.81 ENCOUNTER FOR MONITORING OF HYDROXYCHLOROQUINE THERAPY: ICD-10-CM

## 2021-09-10 DIAGNOSIS — M15.9 PRIMARY OSTEOARTHRITIS INVOLVING MULTIPLE JOINTS: ICD-10-CM

## 2021-09-10 DIAGNOSIS — M32.19 OTHER SYSTEMIC LUPUS ERYTHEMATOSUS WITH OTHER ORGAN INVOLVEMENT (HCC): Primary | ICD-10-CM

## 2021-09-10 PROCEDURE — 99215 OFFICE O/P EST HI 40 MIN: CPT | Performed by: INTERNAL MEDICINE

## 2021-09-10 RX ORDER — MELOXICAM 15 MG/1
15 TABLET ORAL
Qty: 30 TABLET | Refills: 6 | Status: SHIPPED | OUTPATIENT
Start: 2021-09-10 | End: 2021-09-22 | Stop reason: SDUPTHER

## 2021-09-10 NOTE — PROGRESS NOTES
REASON FOR VISIT:    is a 61 y.o. female with history of hypertension and obesity who is being referred to [facility] Rheumatology at the request of Praneeth Arechiga regarding a diagnosis of possible lupus. HISTORY OF PRESENT ILLNESS    In 2012 developed increased joint pain and stiffness, marked pain sensitivity, couldn't walk or stand to be touched, get in and out of a car. Thought at first possibly shingles, pain didn't resolve. Able to get expedited evaluation with Rheumatology (Dr. Alyse Del Rosario with [facility]), diagnosed with lupus. Prednisone and Plaquenil (hydroxychloroquine) were started, and within about a week was feeling better. Around the same time, had developed significant frontotemporal and vertex hair loss. Now prolonged cold exposure is her major trigger of joint pain flares. Initially was having pain flares 2-3x/month while in [place]. Since moved to [place] in 2019 to help her mother with foster children, has had less frequent flares maybe 2x/every 3 months. Flares respond to prednisone tapers. Between flares, she denies consistent joint pain. Some aching in hands or shoulders with activity. Has been able to lose weight from 236 to 209 with use of exercise bike over the last 6 months. Oldest cousin passed 2/2 lupus, youngest cousin passed 2/2 COVID but had lupus. Gets perioral papular rashes, not more typical malar rash. With sun exposure in the past, has developed painful annular silver-dollar sized lesions lasting a week or two, not in the last year. Good about sun avoidance. 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 Joint pain now is worse with prolonged sitting. Breathing is fair, no chronic cough or pleuritic chest pain.  Rides exercise bike for usually half an hour at a time. Usually goes to [facility] for eye checks, recently seen. No current ophthalmologist for Plaquenil (hydroxychloroquine) screening. REVIEW OF SYSTEMS  A comprehensive review of systems was negative except for that written in the HPI. A 10-point review of systems is per the new patient questionnaire, which has been reviewed extensively and scanned into the electronic medical record for future reference. Review of systems is as above and is otherwise negative. ALLERGIES  Lisinopril    MEDICATIONS  Current Outpatient Medications   Medication Sig    amoxicillin (AMOXIL) 500 mg capsule Take 1 Capsule by mouth two (2) times a day.  hydroCHLOROthiazide (HYDRODIURIL) 25 mg tablet TAKE 1 TABLET BY MOUTH EVERY DAY    hydrOXYchloroQUINE (Plaquenil) 200 mg tablet Take 1 Tab by mouth daily.  amLODIPine (NORVASC) 5 mg tablet Take 1 Tab by mouth daily.  meloxicam (MOBIC) 15 mg tablet Once daily as needed for pain    montelukast (SINGULAIR) 10 mg tablet Take 1 Tab by mouth every evening.  budesonide-formoteroL (Symbicort) 160-4.5 mcg/actuation HFAA Take 2 Puffs by inhalation two (2) times a day. Indications: controller medication for asthma    albuterol (ACCUNEB) 1.25 mg/3 mL nebu 3 mL by Nebulization route every six (6) hours as needed for Shortness of Breath. Indications: asthma attack    albuterol (PROVENTIL HFA, VENTOLIN HFA, PROAIR HFA) 90 mcg/actuation inhaler Take 1 Puff by inhalation every four (4) hours as needed for Shortness of Breath.  DULoxetine (CYMBALTA) 30 mg capsule Take 1 Cap by mouth daily. No current facility-administered medications for this visit. PAST MEDICAL HISTORY  Past Medical History:   Diagnosis Date    Asthma     Fibroid     Headache 03/10/2020    Only due to the concussion.  History of concussion 3/10/2020    Lupus ([facility] [address]        FAMILY HISTORY  2 cousins with lupus.  Mother is alive    SOCIAL HISTORY  She  reports that she has never smoked. She has never used smokeless tobacco. She reports current alcohol use. She reports that she does not use drugs. 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 No flowsheet data found. General:  The patient is pleasant, obese, alert, and in no apparent distress. Eyes: Sclera are anicteric. No conjunctival injection. HEENT:  Oropharynx is clear. No oral ulcers. Adequate salivary pooling. No cervical or supraclavicular lymphadenopathy. Lungs:  Clear to auscultation bilaterally, without wheeze or stridor. Normal respiratory effort. Cor:  Regular rate and rhythm. No murmur rub or gallop. Abdomen: Soft, non-tender, without hepatomegaly or masses. Extremities: No calf tenderness or edema. Warm and well perfused. Skin: Bitemporal and frontal alopecia. Mild perioral papular rash. No purpura or plaques. Neuro: Nonfocal, no foot or wrist drop. Musculoskeletal:    A comprehensive musculoskeletal exam was performed for all joints of each upper and lower extremity and assessed for swelling, tenderness and range of motion. Results are documented as below:  No current FMTP. Mild bilateral +empty can. No evidence of synovitis in the small joints of the hands, wrists, shoulders, elbows, hips, knees or ankles.      Labs:  9/2/20: RF neg, CCP neg, 14.3.3 eta neg; CRP 10.48 mg/L; NIRAV direct positive (no reflex), ESR 45; WBC 5.1 [ANC 2400, ALC 2100], Hgb 11.9, Plt 286; Cr 0.89, Tbili 0.2, AST 18, ALT 18, AlkP 139, Tprot 7.2, Alb 4.0; HDL 42, ; A1c 6.4, TSH 1.3, Hep C Ab neg;     Imaging:  10/1/20 AP CXR:  Portable exam of the chest obtained at 1118 demonstrates normal heart size. There is no acute process in the lung fields. The osseous structures are  Unremarkable. 3/10/20 MR brain without:  IMPRESSION:  No significant abnormalities. 11/7/19 CT Cspine:  No acute fracture  Central canal stenosis C5-6 and C6-7 [min 5.6mm anterior to posterior]  Moderate left C6-7 neural foraminal stenosis. ASSESSMENT AND PLAN  Ms. Reyna العراقي is a 61 y.o. female who presents for evaluation of likely lupus characterized by arthritis, alopecia, subacute cutaneous lupus rash, and +NIRAV. Sending full baseline lupus workup, continuing low-dose Plaquenil (hydroxychloroquine) and referring to ophthalmology for longitudinal toxicity monitoring. No synovitis currently, but given her good prior responses reasonable to treat intermittent flares with brief prednisone tapers; if she has these more frequently over the winter, can consider addition of further steroid-sparing medication such as belimumab. 1. Other systemic lupus erythematosus with other organ involvement ([facility] [address]  - ANTI-NUCLEAR AB BY IFA (RDL); Future  - DIRECT JOLLY; Future  - COMPLEMENT, C3 & C4; Future  - C REACTIVE PROTEIN, QT; Future  - PROT+CREATU (RANDOM); Future  - URINALYSIS W/ REFLEX CULTURE; Future  - METABOLIC PANEL, COMPREHENSIVE; Future  - CBC WITH AUTOMATED DIFF; Future  - SED RATE (ESR); Future  - NIRAV COMPREHENSIVE PLUS PANEL; Future  - REFERRAL TO OPHTHALMOLOGY; Future    2. Primary osteoarthritis involving multiple joints  - meloxicam (MOBIC) 15 mg tablet; Take 1 Tablet by mouth daily as needed for Pain for up to 30 days. Dispense: 30 Tablet; Refill: 6    3.  Encounter for monitoring of hydroxychloroquine therapy  - REFERRAL TO OPHTHALMOLOGY; Future    Orders Placed This Encounter    ANTI-NUCLEAR AB BY IFA (RDL)    COMPLEMENT, C3 & C4    C REACTIVE PROTEIN, QT    PROT+CREATU (RANDOM)    URINALYSIS W/ REFLEX CULTURE    METABOLIC PANEL, COMPREHENSIVE    CBC WITH AUTOMATED DIFF    SED RATE (ESR)    NIRAV COMPREHENSIVE PLUS PANEL    REFERRAL TO OPHTHALMOLOGY    DIRECT JOLLY    meloxicam (MOBIC) 15 mg tablet       Medications: I have discontinued Rissa Caballero's meloxicam and amoxicillin. I am also having her start on meloxicam. Additionally, I am having her maintain her DULoxetine, montelukast, budesonide-formoteroL, albuterol, albuterol, amLODIPine, hydrOXYchloroQUINE, and hydroCHLOROthiazide. Follow up: Return in about 3 months (around 12/10/2021).     Face to face time: 50 minutes  Note preparation and records review day of service: 20 minutes  Total provider time day of service: 70 minutes    Becca Gregg MD    Adult Rheumatology   [address], [address] [place] [address]   Phone [phone]  Fax [phone]

## 2021-09-10 NOTE — PATIENT INSTRUCTIONS
1. You do have a good story for lupus, though at the moment I'm happy that it seems well-controlled on the low-dose Plaquenil (hydroxychloroquine). 2. Continue meloxicam daily as needed, take this on more active days to help reduce the soreness afterwards. 3. Keep your hair as loose as possible to minimize hair loss. Keep avoiding direct sunlight, and if you have to be out then wear at least SPF70 sunblock. 4. Labs from [facility] or [facility]    5. Call to schedule with ophthalmology for your routine Plaquenil (hydroxychloroquine) screening. 6. Return in 3 months. If there is any evidence of organ damage on your labs then I may notify you before then.

## 2021-09-22 ENCOUNTER — TELEPHONE (OUTPATIENT)
Dept: RHEUMATOLOGY | Age: 56
End: 2021-09-22

## 2021-09-22 DIAGNOSIS — M15.9 PRIMARY OSTEOARTHRITIS INVOLVING MULTIPLE JOINTS: ICD-10-CM

## 2021-09-22 DIAGNOSIS — M32.19 OTHER SYSTEMIC LUPUS ERYTHEMATOSUS WITH OTHER ORGAN INVOLVEMENT (HCC): ICD-10-CM

## 2021-09-22 RX ORDER — MELOXICAM 15 MG/1
15 TABLET ORAL
Qty: 30 TABLET | Refills: 6 | Status: SHIPPED | OUTPATIENT
Start: 2021-09-22 | End: 2022-09-23

## 2021-09-22 RX ORDER — HYDROXYCHLOROQUINE SULFATE 200 MG/1
400 TABLET, FILM COATED ORAL DAILY
Qty: 60 TABLET | Refills: 5 | Status: SHIPPED | OUTPATIENT
Start: 2021-09-22 | End: 2022-08-11

## 2021-09-22 RX ORDER — PREDNISONE 5 MG/1
TABLET ORAL
Qty: 42 TABLET | Refills: 0 | Status: SHIPPED | OUTPATIENT
Start: 2021-09-22 | End: 2021-10-20

## 2021-09-22 RX ORDER — MELOXICAM 15 MG/1
15 TABLET ORAL
Qty: 30 TABLET | Refills: 6 | Status: CANCELLED | OUTPATIENT
Start: 2021-09-22 | End: 2021-10-22

## 2021-09-22 NOTE — TELEPHONE ENCOUNTER
----- Message from Darwin Dumont sent at 9/22/2021  9:40 AM EDT -----  Regarding: /telephone  General Message/Vendor Calls    Caller's first and last name:self      Reason for call: pt called to speak with Tierra castro yes/no and why: yes to speak with       Best contact number(s):[phone]      Details to clarify the request:Pt called to speak with Dr. Carmen Vincent, regarding on the pt having symptoms of Lupus flare up.       Darwin Dumont

## 2021-09-22 NOTE — TELEPHONE ENCOUNTER
----- Message from Frannie Campbell sent at 9/22/2021  9:35 AM EDT -----  Regarding: /refill  Medication Refill    Caller (if not patient): Melquiades Wiggins      Relationship of caller (if not patient): self       Best contact number(s):[phone]      Name of medication and dosage if known: \"meloxicam (MOBIC) 15 mg tablet\"          Is patient out of this medication (yes/no):yes      Pharmacy name:[facility]/pharmacy [address] [place] [address] listed in chart? (yes/no):yes  Pharmacy phone [phone]       Details to clarify the request: Pt called to refill her medication of \"meloxicam (MOBIC) 15 mg tablet\"        Frannie Campbell

## 2021-09-22 NOTE — TELEPHONE ENCOUNTER
Received message from call center stating patient wanted to speak with the doc call patient left voice message to have patient to call back into the office.  sdh

## 2021-09-22 NOTE — TELEPHONE ENCOUNTER
Spoke with patient states she has pain in her shoulders and legs as well as skin burning. States in started on yesterday. Rates pain 7/10.

## 2021-09-22 NOTE — TELEPHONE ENCOUNTER
----- Message from Osmar Little sent at 9/22/2021  9:40 AM EDT -----  Regarding: /telephone  General Message/Vendor Calls    Caller's first and last name:self      Reason for call: pt called to speak with Maria M Taylor required yes/no and why: yes to speak with       Best contact number(s):[phone]      Details to clarify the request:Pt called to speak with Dr. Chula Jarrett, regarding on the pt having symptoms of Lupus flare up.       Osmar Little

## 2021-09-22 NOTE — TELEPHONE ENCOUNTER
Diffuse aching, shoulder worse  Rash emerged on face as well. Labs still pending but s/f elevated alkp, esr of 66, +dsDNA antibody, normal complements.

## 2021-09-24 LAB
ALBUMIN SERPL-MCNC: 4.4 G/DL (ref 3.8–4.9)
ALBUMIN/GLOB SERPL: 1.5 {RATIO} (ref 1.2–2.2)
ALP SERPL-CCNC: 132 IU/L (ref 44–121)
ALT SERPL-CCNC: 16 IU/L (ref 0–32)
ANA HOMOGEN TITR SER: ABNORMAL {TITER}
ANA SER QL IF: POSITIVE
APPEARANCE UR: CLEAR
AST SERPL-CCNC: 15 IU/L (ref 0–40)
BACTERIA #/AREA URNS HPF: NORMAL /[HPF]
BACTERIA UR CULT: NORMAL
BASOPHILS # BLD AUTO: 0 X10E3/UL (ref 0–0.2)
BASOPHILS NFR BLD AUTO: 1 %
BILIRUB SERPL-MCNC: <0.2 MG/DL (ref 0–1.2)
BILIRUB UR QL STRIP: NEGATIVE
BUN SERPL-MCNC: 15 MG/DL (ref 6–24)
BUN/CREAT SERPL: 15 (ref 9–23)
C3 SERPL-MCNC: 141 MG/DL (ref 82–167)
C4 SERPL-MCNC: 22 MG/DL (ref 12–38)
CALCIUM SERPL-MCNC: 9.5 MG/DL (ref 8.7–10.2)
CASTS URNS QL MICRO: NORMAL /LPF
CENTROMERE B AB SER-ACNC: <0.2 AI (ref 0–0.9)
CHLORIDE SERPL-SCNC: 102 MMOL/L (ref 96–106)
CHROMATIN AB SERPL-ACNC: 0.8 AI (ref 0–0.9)
CO2 SERPL-SCNC: 25 MMOL/L (ref 20–29)
COLOR UR: YELLOW
CREAT SERPL-MCNC: 1.03 MG/DL (ref 0.57–1)
CREAT UR-MCNC: 181.3 MG/DL
CRP SERPL-MCNC: 11 MG/L (ref 0–10)
DAT POLY-SP REAG RBC QL: NEGATIVE
DSDNA AB SER-ACNC: 12 IU/ML (ref 0–9)
ENA JO1 AB SER-ACNC: <0.2 AI (ref 0–0.9)
ENA RNP AB SER-ACNC: 0.2 AI (ref 0–0.9)
ENA SCL70 AB SER-ACNC: <0.2 AI (ref 0–0.9)
ENA SM AB SER-ACNC: <0.2 AI (ref 0–0.9)
ENA SM+RNP AB SER-ACNC: <0.2 AI (ref 0–0.9)
ENA SS-A AB SER-ACNC: <0.2 AI (ref 0–0.9)
ENA SS-B AB SER-ACNC: <0.2 AI (ref 0–0.9)
EOSINOPHIL # BLD AUTO: 0.1 X10E3/UL (ref 0–0.4)
EOSINOPHIL NFR BLD AUTO: 2 %
EPI CELLS #/AREA URNS HPF: NORMAL /HPF (ref 0–10)
ERYTHROCYTE [DISTWIDTH] IN BLOOD BY AUTOMATED COUNT: 13.4 % (ref 11.7–15.4)
ERYTHROCYTE [SEDIMENTATION RATE] IN BLOOD BY WESTERGREN METHOD: 66 MM/HR (ref 0–40)
GLOBULIN SER CALC-MCNC: 3 G/DL (ref 1.5–4.5)
GLUCOSE SERPL-MCNC: 108 MG/DL (ref 65–99)
GLUCOSE UR QL: NEGATIVE
HCT VFR BLD AUTO: 36.9 % (ref 34–46.6)
HGB BLD-MCNC: 11.8 G/DL (ref 11.1–15.9)
HGB UR QL STRIP: NEGATIVE
IMM GRANULOCYTES # BLD AUTO: 0 X10E3/UL (ref 0–0.1)
IMM GRANULOCYTES NFR BLD AUTO: 0 %
KETONES UR QL STRIP: NEGATIVE
LEUKOCYTE ESTERASE UR QL STRIP: ABNORMAL
LYMPHOCYTES # BLD AUTO: 2 X10E3/UL (ref 0.7–3.1)
LYMPHOCYTES NFR BLD AUTO: 40 %
MCH RBC QN AUTO: 25.7 PG (ref 26.6–33)
MCHC RBC AUTO-ENTMCNC: 32 G/DL (ref 31.5–35.7)
MCV RBC AUTO: 80 FL (ref 79–97)
MICRO URNS: ABNORMAL
MONOCYTES # BLD AUTO: 0.4 X10E3/UL (ref 0.1–0.9)
MONOCYTES NFR BLD AUTO: 8 %
NEUTROPHILS # BLD AUTO: 2.5 X10E3/UL (ref 1.4–7)
NEUTROPHILS NFR BLD AUTO: 49 %
NITRITE UR QL STRIP: NEGATIVE
NOTE, 018286: ABNORMAL
PH UR STRIP: 5.5 [PH] (ref 5–7.5)
PLATELET # BLD AUTO: 330 X10E3/UL (ref 150–450)
POTASSIUM SERPL-SCNC: 4.4 MMOL/L (ref 3.5–5.2)
PROT SERPL-MCNC: 7.4 G/DL (ref 6–8.5)
PROT UR QL STRIP: NEGATIVE
PROT UR-MCNC: 10.6 MG/DL
PROT/CREAT UR: 58 MG/G CREAT (ref 0–200)
RBC # BLD AUTO: 4.59 X10E6/UL (ref 3.77–5.28)
RBC #/AREA URNS HPF: NORMAL /HPF (ref 0–2)
RIBOSOMAL P AB SER-ACNC: <0.2 AI (ref 0–0.9)
SEE BELOW:, 164879: ABNORMAL
SODIUM SERPL-SCNC: 139 MMOL/L (ref 134–144)
SP GR UR: 1.02 (ref 1–1.03)
URINALYSIS REFLEX, 377202: ABNORMAL
UROBILINOGEN UR STRIP-MCNC: 0.2 MG/DL (ref 0.2–1)
WBC # BLD AUTO: 5 X10E3/UL (ref 3.4–10.8)
WBC #/AREA URNS HPF: NORMAL /HPF (ref 0–5)

## 2021-11-02 ENCOUNTER — OFFICE VISIT (OUTPATIENT)
Dept: RHEUMATOLOGY | Age: 56
End: 2021-11-02
Payer: MEDICAID

## 2021-11-02 VITALS
DIASTOLIC BLOOD PRESSURE: 91 MMHG | SYSTOLIC BLOOD PRESSURE: 187 MMHG | RESPIRATION RATE: 20 BRPM | BODY MASS INDEX: 35.29 KG/M2 | HEART RATE: 65 BPM | WEIGHT: 211.8 LBS | TEMPERATURE: 98.2 F | OXYGEN SATURATION: 98 % | HEIGHT: 65 IN

## 2021-11-02 DIAGNOSIS — Z79.899 ONGOING USE OF POSSIBLY TOXIC MEDICATION: ICD-10-CM

## 2021-11-02 DIAGNOSIS — R76.8 POSITIVE DOUBLE STRANDED DNA ANTIBODY TEST: Primary | ICD-10-CM

## 2021-11-02 DIAGNOSIS — Z11.59 ENCOUNTER FOR SCREENING FOR VIRAL DISEASE: ICD-10-CM

## 2021-11-02 DIAGNOSIS — M32.19 OTHER SYSTEMIC LUPUS ERYTHEMATOSUS WITH OTHER ORGAN INVOLVEMENT (HCC): ICD-10-CM

## 2021-11-02 PROCEDURE — 99215 OFFICE O/P EST HI 40 MIN: CPT | Performed by: INTERNAL MEDICINE

## 2021-11-02 RX ORDER — TRIAMCINOLONE ACETONIDE 40 MG/ML
100 INJECTION, SUSPENSION INTRA-ARTICULAR; INTRAMUSCULAR ONCE
Qty: 2.5 ML | Refills: 0
Start: 2021-11-02 | End: 2021-11-02

## 2021-11-02 NOTE — PROGRESS NOTES
REASON FOR VISIT:    is a 61 y.o. female with history of hypertension and obesity who is returning for initial followup of systemic lupus characterized by arthritis, alopecia, subacute cutaneous lupus rash, and +NIRAV 1:640 homogenous with +dsDNA. HISTORY OF PRESENT ILLNESS    Thursday developed widespread pain, couldn't bear to work. Tried again this morning but couldn't keep moving. Woke with pain in the hands Thursday. Attributes the flare to the colder/damp weather. Some perioral bumps. Started prednisone from an earlier prescription on Friday and feels this is starting to work. Usually takes 4-5 days before she starts getting relief. Doesn't feel the meloxicam has made a major difference, though some help. Hasn't upset stomach. With her flares has been allowed to work from home which helps. Breathing has been OK. Blood pressure spiking. Disease History: In 2012 developed increased joint pain and stiffness, marked pain sensitivity, couldn't walk or stand to be touched, get in and out of a car. Thought at first possibly shingles, pain didn't resolve. Able to get expedited evaluation with Rheumatology (Dr. Usha Cordoba with [facility]), diagnosed with lupus. Prednisone and Plaquenil (hydroxychloroquine) were started, and within about a week was feeling better. Around the same time, had developed significant frontotemporal and vertex hair loss. Now prolonged cold exposure is her major trigger of joint pain flares. Initially was having pain flares 2-3x/month while in [place]. Since moved to [address] [facility] in 2019 to help her mother with foster children, has had less frequent flares maybe 2x/every 3 months. Flares respond to prednisone tapers. Between flares, she denies consistent joint pain. Some aching in hands or shoulders with activity. Has been able to lose weight from 236 to 209 with use of exercise bike over the last 6 months.   Gets perioral papular rashes, not more typical malar rash. With sun exposure in the past, has developed painful annular silver-dollar sized lesions lasting a week or two, not in the last year. Good about sun avoidance. Usually goes to [facility] for eye checks, recently seen. No current ophthalmologist for Plaquenil screening. REVIEW OF SYSTEMS  A comprehensive review of systems was negative except for that written in the HPI. A 10-point review of systems is per the new patient questionnaire, which has been reviewed extensively and scanned into the electronic medical record for future reference. Review of systems is as above and is otherwise negative. ALLERGIES  Lisinopril    MEDICATIONS  Current Outpatient Medications   Medication Sig    hydrOXYchloroQUINE (PlaqueniL) 200 mg tablet Take 2 Tablets by mouth daily.  hydroCHLOROthiazide (HYDRODIURIL) 25 mg tablet TAKE 1 TABLET BY MOUTH EVERY DAY    amLODIPine (NORVASC) 5 mg tablet Take 1 Tab by mouth daily.  montelukast (SINGULAIR) 10 mg tablet Take 1 Tab by mouth every evening.  budesonide-formoteroL (Symbicort) 160-4.5 mcg/actuation HFAA Take 2 Puffs by inhalation two (2) times a day. Indications: controller medication for asthma    albuterol (ACCUNEB) 1.25 mg/3 mL nebu 3 mL by Nebulization route every six (6) hours as needed for Shortness of Breath. Indications: asthma attack    albuterol (PROVENTIL HFA, VENTOLIN HFA, PROAIR HFA) 90 mcg/actuation inhaler Take 1 Puff by inhalation every four (4) hours as needed for Shortness of Breath.  DULoxetine (CYMBALTA) 30 mg capsule Take 1 Cap by mouth daily. No current facility-administered medications for this visit. PAST MEDICAL HISTORY  Past Medical History:   Diagnosis Date    Asthma     Fibroid     Headache 03/10/2020    Only due to the concussion.  History of concussion 3/10/2020    Lupus ([facility] [address]        FAMILY HISTORY  2 cousins had lupus. Oldest cousin passed 2/2 lupus, youngest cousin passed 2/2 COVID but had lupus. Mother is alive. SOCIAL HISTORY  She  reports that she has never smoked. She has never used smokeless tobacco. She reports current alcohol use. She reports that she does not use drugs. 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 No flowsheet data found. General:  The patient is pleasant, obese, alert, and in no apparent distress. Eyes: Sclera are anicteric. No conjunctival injection. HEENT:  Oropharynx is clear. No oral ulcers. Adequate salivary pooling. No cervical or supraclavicular lymphadenopathy. Lungs:  Clear to auscultation bilaterally, without wheeze or stridor. Normal respiratory effort. Cor:  Regular rate and rhythm. No murmur rub or gallop. Abdomen: Soft, non-tender, without hepatomegaly or masses. Extremities: No calf tenderness or edema. Warm and well perfused. Skin: Bitemporal and frontal alopecia. Stable mild perioral papular rash. No purpura or plaques. Neuro: Nonfocal, no foot or wrist drop. Musculoskeletal:    A comprehensive musculoskeletal exam was performed for all joints of each upper and lower extremity and assessed for swelling, tenderness and range of motion. Results are documented as below:  No current FMTP. Mild bilateral +empty can. Interval development of tenderness in bilateral MCPs and PIPs. No evidence of synovitis in the small joints of the hands, wrists, shoulders, elbows, hips, knees or ankles.      Labs:  9/18/21: WBC 5.0 (ANC 2500, ALC 2000), Hgb 11.8, Pl t 330; ESR 66, UA neg for protein or blood; Cr 1.03, Tbili <0.2, AST 15, ALT 16, AlkP 132, urine pr/cr 0.058, mariah neg, NIRAV 1:640 homogenous, dsDNA 12; SSA, SSB, RNP, chromatin, Scl70, centromere B, ribosomal P, Jo1 negative; CRP 11mg/L, C3 and C4 WNL  9/2/20: RF neg, CCP neg, 14.3.3 eta neg; CRP 10.48 mg/L; NIRAV direct positive (no reflex), ESR 45; WBC 5.1 [ANC 2400, ALC 2100], Hgb 11.9, Plt 286; Cr 0.89, Tbili 0.2, AST 18, ALT 18, AlkP 139, Tprot 7.2, Alb 4.0; HDL 42, ; A1c 6.4, TSH 1.3, Hep C Ab neg;     Imaging:  10/1/20 AP CXR:  Portable exam of the chest obtained at 1118 demonstrates normal heart size. There is no acute process in the lung fields. The osseous structures are  Unremarkable. 3/10/20 MR brain without:  IMPRESSION:  No significant abnormalities. 11/7/19 CT Cspine:  No acute fracture  Central canal stenosis C5-6 and C6-7 [min 5.6mm anterior to posterior]  Moderate left C6-7 neural foraminal stenosis. ASSESSMENT AND PLAN  Ms. Andreina Lawrence is a 61 y.o. female who presents for evaluation of likely lupus characterized by arthritis, alopecia, subacute cutaneous lupus rash, and +NIRAV 1:640 with +dsDNA. CDAI today is 10. With chronic alkphos elevation, would avoid methotrexate. Ordering belimumab infusions for management of arthritis, rash, and alopecia; reviewed risks of infection, rare associations with PML, and depression. Administered 100mg triamcinolone IM today to bridge her to start of belimumab. 1. Positive double stranded DNA antibody test  - COMPLEMENT, C3 & C4; Future  - DSDNA ANTIBODY BY IFA, DARLENE LUCILIALIBRADO, WITH REFLEX TO TITER; Future    2. Other systemic lupus erythematosus with other organ involvement ([facility] [address]  - QUANTIFERON-TB GOLD PLUS; Future  - HEP B SURFACE AG; Future  - HEPATITIS BE AG; Future  - HEPATITIS B CORE AB, IGM; Future  - HEPATITIS B CORE AB, TOTAL;  Future  - HEPATITIS BE AB; Future  - HEP B SURFACE AB; Future  - COMPLEMENT, C3 & C4; Future  - DSDNA ANTIBODY BY IFA, DARLENE LUCILIAE, WITH REFLEX TO TITER; Future  - QUANTIFERON-TB GOLD PLUS  - triamcinolone acetonide (KENALOG-40) 40 mg/mL injection; 2.5 mL by IntraMUSCular route once for 1 dose. Dispense: 2.5 mL; Refill: 0    3. Ongoing use of possibly toxic medication  - HEP B SURFACE AG; Future  - HEPATITIS BE AG; Future  - HEPATITIS B CORE AB, IGM; Future  - HEPATITIS B CORE AB, TOTAL; Future  - HEPATITIS BE AB; Future  - HEP B SURFACE AB; Future    4. Encounter for screening for viral disease  - HEP B SURFACE AG; Future  - HEPATITIS BE AG; Future  - HEPATITIS B CORE AB, IGM; Future  - HEPATITIS B CORE AB, TOTAL; Future  - HEPATITIS BE AB; Future  - HEP B SURFACE AB; Future      Patient Instructions   1. I'm giving you 100mg triamcinolone intramuscularly today. This will self-taper over the next month, once you're feeling better you can stop the pill prednisone. 2. I'm applying for Benlysta infusions once a month to help settle your lupus and prevent further flares over the winter. 3. Labs ASAP so we can get insurance approval for the Benlysta    4. Keep your mid-December followup. Orders Placed This Encounter    QUANTIFERON-TB GOLD PLUS    HEP B SURFACE AG    HEPATITIS BE AG    HEPATITIS B CORE AB, IGM    HEPATITIS B CORE AB, TOTAL    HEPATITIS BE AB    HEP B SURFACE AB    COMPLEMENT, C3 & C4    DSDNA ANTIBODY BY IFA, GINATHISTONE LUCILIAE, WITH REFLEX TO TITER    triamcinolone acetonide (KENALOG-40) 40 mg/mL injection       Medications: I am having Early Number Best start on triamcinolone acetonide. I am also having her maintain her DULoxetine, montelukast, budesonide-formoteroL, albuterol, albuterol, amLODIPine, hydroCHLOROthiazide, and hydrOXYchloroQUINE. Follow up: Return in about 6 weeks (around 12/14/2021).     Face to face time: 30 minutes  Note preparation and records review day of service: 20 minutes  Total provider time day of service: 50 minutes    Chung Rosas MD    Adult Rheumatology   [facility]  A Part of [facility], [address] [place] [address] Road   Phone [phone]  Fax [phone]

## 2021-11-02 NOTE — PATIENT INSTRUCTIONS
1. I'm giving you 100mg triamcinolone intramuscularly today. This will self-taper over the next month, once you're feeling better you can stop the pill prednisone. 2. I'm applying for Benlysta infusions once a month to help settle your lupus and prevent further flares over the winter. 3. Labs ASAP so we can get insurance approval for the Benlysta    4. Keep your mid-December followup.

## 2021-11-02 NOTE — LETTER
NOTIFICATION OF RETURN TO WORK / SCHOOL 
 
11/2/2021 Ms. Lizzy Altamirano 
[address] [place] [id number] To Whom It May Concern: 
 
[facility]. She was last seen in-office on 11/2/21. She has been unable to work from 10/28/21, and I have advised her she can medically return to work on 11/4/21 without restrictions. Thank you for your understanding in accomodating her health needs. If there are questions or concerns please have the patient contact our office.  
 
 
 
Sincerely, 
 
 
Bev Bustillos MD

## 2021-11-02 NOTE — PROGRESS NOTES
Chief Complaint   Patient presents with    Lupus     1. Have you been to the ER, urgent care clinic since your last visit? Hospitalized since your last visit? No    2. Have you seen or consulted any other health care providers outside of the [address] [place] [address] since your last visit? Include any pap smears or colon screening.  No

## 2021-11-12 PROBLEM — R76.8 POSITIVE DOUBLE STRANDED DNA ANTIBODY TEST: Status: ACTIVE | Noted: 2021-11-12

## 2021-11-12 PROBLEM — M32.9 SYSTEMIC LUPUS ERYTHEMATOSUS (HCC): Status: ACTIVE | Noted: 2021-11-12

## 2021-11-16 LAB
C3 SERPL-MCNC: 147 MG/DL (ref 82–167)
C4 SERPL-MCNC: 24 MG/DL (ref 12–38)
DSDNA AB SER QL CLIF: POSITIVE
DSDNA AB TITR SER CLIF: ABNORMAL {TITER}
GAMMA INTERFERON BACKGROUND BLD IA-ACNC: 0.84 IU/ML
HBV E AB SERPL QL IA: NEGATIVE
M TB IFN-G BLD-IMP: NEGATIVE
M TB IFN-G CD4+ BCKGRND COR BLD-ACNC: 0.69 IU/ML
MITOGEN IGNF BLD-ACNC: 6.89 IU/ML
QUANTIFERON INCUBATION, QF1T: NORMAL
QUANTIFERON TB2 AG: 0.41 IU/ML
SERVICE CMNT-IMP: NORMAL

## 2021-11-19 RX ORDER — ONDANSETRON 2 MG/ML
8 INJECTION INTRAMUSCULAR; INTRAVENOUS AS NEEDED
Status: CANCELLED | OUTPATIENT
Start: 2021-11-30

## 2021-11-19 RX ORDER — HYDROCORTISONE SODIUM SUCCINATE 100 MG/2ML
100 INJECTION, POWDER, FOR SOLUTION INTRAMUSCULAR; INTRAVENOUS AS NEEDED
Status: CANCELLED | OUTPATIENT
Start: 2021-11-30

## 2021-11-19 RX ORDER — SODIUM CHLORIDE 9 MG/ML
25 INJECTION, SOLUTION INTRAVENOUS CONTINUOUS
Status: CANCELLED | OUTPATIENT
Start: 2021-11-30

## 2021-11-19 RX ORDER — SODIUM CHLORIDE 9 MG/ML
10 INJECTION INTRAMUSCULAR; INTRAVENOUS; SUBCUTANEOUS AS NEEDED
Status: CANCELLED | OUTPATIENT
Start: 2021-11-30

## 2021-11-19 RX ORDER — ALBUTEROL SULFATE 0.83 MG/ML
2.5 SOLUTION RESPIRATORY (INHALATION) AS NEEDED
Status: CANCELLED
Start: 2021-11-30

## 2021-11-19 RX ORDER — EPINEPHRINE 1 MG/ML
0.3 INJECTION, SOLUTION, CONCENTRATE INTRAVENOUS AS NEEDED
Status: CANCELLED | OUTPATIENT
Start: 2021-11-30

## 2021-11-19 RX ORDER — DIPHENHYDRAMINE HYDROCHLORIDE 50 MG/ML
25 INJECTION, SOLUTION INTRAMUSCULAR; INTRAVENOUS AS NEEDED
Status: CANCELLED
Start: 2021-11-30

## 2021-11-19 RX ORDER — SODIUM CHLORIDE 0.9 % (FLUSH) 0.9 %
10 SYRINGE (ML) INJECTION AS NEEDED
Status: CANCELLED | OUTPATIENT
Start: 2021-11-30

## 2021-11-19 RX ORDER — HEPARIN 100 UNIT/ML
300-500 SYRINGE INTRAVENOUS AS NEEDED
Status: CANCELLED
Start: 2021-11-30

## 2021-11-19 RX ORDER — ACETAMINOPHEN 325 MG/1
650 TABLET ORAL AS NEEDED
Status: CANCELLED
Start: 2021-11-30

## 2021-11-19 RX ORDER — DIPHENHYDRAMINE HYDROCHLORIDE 50 MG/ML
50 INJECTION, SOLUTION INTRAMUSCULAR; INTRAVENOUS AS NEEDED
Status: CANCELLED
Start: 2021-11-30

## 2021-11-22 NOTE — PROGRESS NOTES
[place] [facility] Rheumatology  OBIC Note    Date: 2021    Rheumatology OBIC COVID-19 SCREENING  The patient was asked the following questions and answered as documented below:    1. Do you have any symptoms of COVID-19? SOB, coughing, fever, or generally not feeling well? NO  2. Have you been exposed to COVID-19 recently? NO  3. Have you had any recent contact with family/friend that has a pending COVID test? NO    Name: Stefani Gurrola  MRN: [id number]       : 1965  Diagnosis: Systemic Lupus Erythematosus  Treatment: Benlysta 960mg Week 0 Induction  Referring Provider: Dr. Gerda Al   Supervising Provider: Dr. Gerda Al    Patient arrived to [facility] at 0800. Ms. Deedee Palmer allergies reviewed and she agreed to receiving today's treatment. A physical assessment was performed initially and post-treatment. Pt. denies new complaints today. Ms. Patricia Belcher vitals were monitored before, during and after medication administration.    Vitals:    21 0809 21 0910 21 0940   BP: (!) 201/87 (!) 189/91 (!) 182/108   Pulse: 76 66 66   Resp: 16 16 16   Temp: 98 °F (36.7 °C)     SpO2: 98% 98% 98%   Weight: 208 lb 6.4 oz (94.5 kg)       CBC, CMP, CRP, ESR drawn and walked to lab    Medications given per providers orders:     Administrations This Visit     0.9% sodium chloride infusion     Admin Date  2021 Action  New Bag Dose  25 mL/hr Rate  25 mL/hr Route  IntraVENous Administered By  Dmitry Martinez RN          belimumab (BENLYSTA) 960 mg in 0.9% sodium chloride 250 mL, overfill volume 25 mL infusion     Admin Date  2021 Action  New Bag Dose  960 mg Rate  287 mL/hr Route  IntraVENous Administered By  Dmitry Martinez RN            [address] 960mg   [facility] [address]-139-36  Lot #  [id number]  Exp 2026  ---mixed in---  250ml 0.9% Normal Saline  [address] [address] [id number]  Lot # [id number]  Exp 2023     START: 5778  STOP: 4716     0.9% NS 250ml @ 25ml/hr   [facility] [id number]  Lot # [id number]  Exp 2023     0.9% Normal Saline Krystal Decker  [id number]  Lot # [id number]  Exp 01/2022    Lines: 24G Right upper forearm PIV. Blood return noted and IV site assessed before, during, and after treatment. Line flushed with 10-30 ml's 0.9% Normal Saline solution per protocol. Access was removed from Ms. Caballero after completion of infusion/injection. Ms. Juliana Ledesma tolerated the treatment without complaints and no medication reactions were seen while in presence of this RN. Patient provided with AVS, which included future appointments and written educational material regarding Benlysta. All of the patients questions were answered and then discharged ambulatory from Rheumatology OBIC in stable condition at 0945. Future Appointments   Date Time Provider Giana Carranza   12/14/2021  8:00 AM INFUSIONINJ_RC [facility]   12/21/2021  8:20 AM Jenelle Leigh MD [facility]   12/28/2021  8:00 AM IN[facility]J_RC [facility]     Peter Lawson RN  November 30, 2021     ---  ATTENDING ATTESTATION    I, Adri Cunha, hereby attest that the medical record entry for 11/30/21 accurately reflects signatures/notations that I made in my capacity as treating physician when I treated/diagnosed the above listed patient. I do hereby attest that this information is true, accurate and complete to the best of my knowledge and I understand that any falsification, omission, or concealment of material fact may subject me to administrative, civil, or criminal liability.   Adri Cunha MD S  Adult Rheumatology  Signed 02/16/22 6:50 AM    10:00a

## 2021-11-29 DIAGNOSIS — M32.19 OTHER SYSTEMIC LUPUS ERYTHEMATOSUS WITH OTHER ORGAN INVOLVEMENT (HCC): Primary | ICD-10-CM

## 2021-11-30 ENCOUNTER — OFFICE VISIT (OUTPATIENT)
Dept: RHEUMATOLOGY | Age: 56
End: 2021-11-30
Payer: MEDICAID

## 2021-11-30 VITALS
SYSTOLIC BLOOD PRESSURE: 182 MMHG | TEMPERATURE: 98 F | HEART RATE: 66 BPM | WEIGHT: 208.4 LBS | BODY MASS INDEX: 34.68 KG/M2 | OXYGEN SATURATION: 98 % | RESPIRATION RATE: 16 BRPM | DIASTOLIC BLOOD PRESSURE: 108 MMHG

## 2021-11-30 DIAGNOSIS — R76.8 POSITIVE DOUBLE STRANDED DNA ANTIBODY TEST: Primary | ICD-10-CM

## 2021-11-30 DIAGNOSIS — M32.19 OTHER SYSTEMIC LUPUS ERYTHEMATOSUS WITH OTHER ORGAN INVOLVEMENT (HCC): ICD-10-CM

## 2021-11-30 PROCEDURE — 96365 THER/PROPH/DIAG IV INF INIT: CPT

## 2021-11-30 RX ORDER — SODIUM CHLORIDE 9 MG/ML
10 INJECTION INTRAMUSCULAR; INTRAVENOUS; SUBCUTANEOUS AS NEEDED
Status: CANCELLED | OUTPATIENT
Start: 2021-12-14

## 2021-11-30 RX ORDER — HEPARIN 100 UNIT/ML
300-500 SYRINGE INTRAVENOUS AS NEEDED
Status: CANCELLED
Start: 2021-12-14

## 2021-11-30 RX ORDER — SODIUM CHLORIDE 9 MG/ML
25 INJECTION, SOLUTION INTRAVENOUS CONTINUOUS
Status: DISCONTINUED | OUTPATIENT
Start: 2021-11-30 | End: 2021-11-30 | Stop reason: HOSPADM

## 2021-11-30 RX ORDER — SODIUM CHLORIDE 0.9 % (FLUSH) 0.9 %
10 SYRINGE (ML) INJECTION AS NEEDED
Status: CANCELLED | OUTPATIENT
Start: 2021-12-14

## 2021-11-30 RX ORDER — DIPHENHYDRAMINE HYDROCHLORIDE 50 MG/ML
25 INJECTION, SOLUTION INTRAMUSCULAR; INTRAVENOUS AS NEEDED
Status: CANCELLED
Start: 2021-12-14

## 2021-11-30 RX ORDER — SODIUM CHLORIDE 0.9 % (FLUSH) 0.9 %
10 SYRINGE (ML) INJECTION AS NEEDED
Status: DISCONTINUED | OUTPATIENT
Start: 2021-11-30 | End: 2021-11-30 | Stop reason: HOSPADM

## 2021-11-30 RX ORDER — DIPHENHYDRAMINE HYDROCHLORIDE 50 MG/ML
50 INJECTION, SOLUTION INTRAMUSCULAR; INTRAVENOUS AS NEEDED
Status: CANCELLED
Start: 2021-12-14

## 2021-11-30 RX ORDER — ACETAMINOPHEN 325 MG/1
650 TABLET ORAL AS NEEDED
Status: CANCELLED
Start: 2021-12-14

## 2021-11-30 RX ORDER — EPINEPHRINE 1 MG/ML
0.3 INJECTION, SOLUTION, CONCENTRATE INTRAVENOUS AS NEEDED
Status: CANCELLED | OUTPATIENT
Start: 2021-12-14

## 2021-11-30 RX ORDER — HYDROCORTISONE SODIUM SUCCINATE 100 MG/2ML
100 INJECTION, POWDER, FOR SOLUTION INTRAMUSCULAR; INTRAVENOUS AS NEEDED
Status: CANCELLED | OUTPATIENT
Start: 2021-12-14

## 2021-11-30 RX ORDER — ALBUTEROL SULFATE 0.83 MG/ML
2.5 SOLUTION RESPIRATORY (INHALATION) AS NEEDED
Status: CANCELLED
Start: 2021-12-14

## 2021-11-30 RX ORDER — SODIUM CHLORIDE 9 MG/ML
25 INJECTION, SOLUTION INTRAVENOUS CONTINUOUS
Status: CANCELLED | OUTPATIENT
Start: 2021-12-14

## 2021-11-30 RX ORDER — ONDANSETRON 2 MG/ML
8 INJECTION INTRAMUSCULAR; INTRAVENOUS AS NEEDED
Status: CANCELLED | OUTPATIENT
Start: 2021-12-14

## 2021-11-30 RX ADMIN — SODIUM CHLORIDE 25 ML/HR: 9 INJECTION, SOLUTION INTRAVENOUS at 08:21

## 2021-12-01 LAB
ALBUMIN SERPL-MCNC: 3.6 G/DL (ref 3.5–5)
ALBUMIN/GLOB SERPL: 0.8 {RATIO} (ref 1.1–2.2)
ALP SERPL-CCNC: 131 U/L (ref 45–117)
ALT SERPL-CCNC: 28 U/L (ref 12–78)
ANION GAP SERPL CALC-SCNC: 7 MMOL/L (ref 5–15)
AST SERPL-CCNC: 16 U/L (ref 15–37)
BASOPHILS # BLD: 0 K/UL (ref 0–0.1)
BASOPHILS NFR BLD: 1 % (ref 0–1)
BILIRUB SERPL-MCNC: 0.3 MG/DL (ref 0.2–1)
BUN SERPL-MCNC: 14 MG/DL (ref 6–20)
BUN/CREAT SERPL: 15 (ref 12–20)
CALCIUM SERPL-MCNC: 9.1 MG/DL (ref 8.5–10.1)
CHLORIDE SERPL-SCNC: 105 MMOL/L (ref 97–108)
CO2 SERPL-SCNC: 27 MMOL/L (ref 21–32)
CREAT SERPL-MCNC: 0.92 MG/DL (ref 0.55–1.02)
CRP SERPL-MCNC: 0.67 MG/DL (ref 0–0.6)
DIFFERENTIAL METHOD BLD: ABNORMAL
EOSINOPHIL # BLD: 0 K/UL (ref 0–0.4)
EOSINOPHIL NFR BLD: 1 % (ref 0–7)
ERYTHROCYTE [DISTWIDTH] IN BLOOD BY AUTOMATED COUNT: 14.6 % (ref 11.5–14.5)
ERYTHROCYTE [SEDIMENTATION RATE] IN BLOOD: 47 MM/HR (ref 0–30)
GLOBULIN SER CALC-MCNC: 4.3 G/DL (ref 2–4)
GLUCOSE SERPL-MCNC: 105 MG/DL (ref 65–100)
HCT VFR BLD AUTO: 42.1 % (ref 35–47)
HGB BLD-MCNC: 12.9 G/DL (ref 11.5–16)
IMM GRANULOCYTES # BLD AUTO: 0 K/UL (ref 0–0.04)
IMM GRANULOCYTES NFR BLD AUTO: 0 % (ref 0–0.5)
LYMPHOCYTES # BLD: 1.9 K/UL (ref 0.8–3.5)
LYMPHOCYTES NFR BLD: 41 % (ref 12–49)
MCH RBC QN AUTO: 25.7 PG (ref 26–34)
MCHC RBC AUTO-ENTMCNC: 30.6 G/DL (ref 30–36.5)
MCV RBC AUTO: 84 FL (ref 80–99)
MONOCYTES # BLD: 0.3 K/UL (ref 0–1)
MONOCYTES NFR BLD: 7 % (ref 5–13)
NEUTS SEG # BLD: 2.3 K/UL (ref 1.8–8)
NEUTS SEG NFR BLD: 50 % (ref 32–75)
NRBC # BLD: 0 K/UL (ref 0–0.01)
NRBC BLD-RTO: 0 PER 100 WBC
PLATELET # BLD AUTO: 290 K/UL (ref 150–400)
PMV BLD AUTO: 11.5 FL (ref 8.9–12.9)
POTASSIUM SERPL-SCNC: 4.2 MMOL/L (ref 3.5–5.1)
PROT SERPL-MCNC: 7.9 G/DL (ref 6.4–8.2)
RBC # BLD AUTO: 5.01 M/UL (ref 3.8–5.2)
SODIUM SERPL-SCNC: 139 MMOL/L (ref 136–145)
WBC # BLD AUTO: 4.6 K/UL (ref 3.6–11)

## 2021-12-03 ENCOUNTER — TELEPHONE (OUTPATIENT)
Dept: RHEUMATOLOGY | Age: 56
End: 2021-12-03

## 2021-12-03 NOTE — TELEPHONE ENCOUNTER
[address]    Has [place], but no prescription or other info for:    Benlysta    Please call at:  [phone]

## 2021-12-13 NOTE — PROGRESS NOTES
[facility] Rheumatology  OBIC Note    Date: 2021    Rheumatology OBIC COVID-19 SCREENING  The patient was asked the following questions and answered as documented below:    1. Do you have any symptoms of COVID-19? SOB, coughing, fever, or generally not feeling well? NO  2. Have you been exposed to COVID-19 recently? NO  3. Have you had any recent contact with family/friend that has a pending COVID test? NO    Name: Mauricio Ramos  MRN: [id number]       : 1965  Diagnosis: Systemic Lupus Erythematosus  Treatment: Benlysta 960mg Week 2 induction  Referring Provider: Dr. Marilin Acosta   Supervising Provider: Dr. Marilin Acosta    Patient arrived to [facility] at 0800. Ms. Roman Rocha allergies reviewed and she agreed to receiving today's treatment. A physical assessment was performed initially and post-treatment. Pt. denies new complaints today. Ms. Donaldo Good vitals were monitored before, during and after medication administration.    Vitals:    21 0801 21 0945   BP: (!) 190/105 (!) 188/104   Pulse: 81 60   Resp: 18 16   Temp: 97.9 °F (36.6 °C) 98 °F (36.7 °C)   SpO2: 98% 98%     Medications given per providers orders:  Administrations This Visit     0.9% sodium chloride infusion     Admin Date  2021 Action  New Bag Dose  25 mL/hr Rate  25 mL/hr Route  IntraVENous Administered By  Rena Govea RN          belimumab (BENLYSTA) 960 mg in 0.9% sodium chloride 250 mL, overfill volume 25 mL infusion     Admin Date  2021 Action  New Bag Dose  960 mg Rate  287 mL/hr Route  IntraVENous Administered By  Rena Govea RN          sodium chloride (NS) flush 10 mL     Admin Date  2021 Action  Given Dose  10 mL Route  IntraVENous Administered By  Rena Govea RN            Benlysta 960mg   [address] [id number]  Lot #  [id number]  Exp 2026  ---mixed in---  250ml 0.9% Normal Saline  [facility] [id number]  Lot # [id number]  Exp 2023     QTMLK: 2842  YAKO: 7585     0.9% NS 250ml @ 25ml/hr   [facility] [id number]  Lot # [id number]  Exp 06/2023     0.9% Normal Saline Flush 10ml x 1  O [address]-15  Lot # [id number]  Exp 01/2022    Lines: 24G Right upper FA. Blood return noted and IV site assessed before, during, and after treatment. Line flushed with 10-30 ml's 0.9% Normal Saline solution per protocol. Access was removed from Ms. Caballero after completion of infusion/injection. Ms. Mickey Raymond tolerated the treatment without complaints and no medication reactions were seen while in presence of this RN. Patient provided with AVS, which included future appointments and written educational material regarding Benlysta. All of the patients questions were answered and then discharged ambulatory from Rheumatology OBIC in stable condition at 0955. Future Appointments   Date Time Provider Giana Carranza   12/21/2021  8:20 AM Renetta Packer MD [facility]   12/28/2021  8:00 AM INFUSIONINJ_RC AO BS [facility]B     Tyrone Masters RN  December 14, 2021  10:00am    ---  ATTENDING ATTESTATION    I, Ghada Mandujano, hereby attest that the medical record entry for 12/14/21 accurately reflects signatures/notations that I made in my capacity as treating physician when I treated/diagnosed the above listed patient. I do hereby attest that this information is true, accurate and complete to the best of my knowledge and I understand that any falsification, omission, or concealment of material fact may subject me to administrative, civil, or criminal liability.   Ghada Mandujano MD S  Adult Rheumatology  Signed 02/16/22 6:49 AM

## 2021-12-14 ENCOUNTER — OFFICE VISIT (OUTPATIENT)
Dept: RHEUMATOLOGY | Age: 56
End: 2021-12-14
Payer: MEDICAID

## 2021-12-14 VITALS
RESPIRATION RATE: 16 BRPM | TEMPERATURE: 98 F | SYSTOLIC BLOOD PRESSURE: 188 MMHG | HEART RATE: 60 BPM | OXYGEN SATURATION: 98 % | DIASTOLIC BLOOD PRESSURE: 104 MMHG

## 2021-12-14 DIAGNOSIS — M32.19 OTHER SYSTEMIC LUPUS ERYTHEMATOSUS WITH OTHER ORGAN INVOLVEMENT (HCC): ICD-10-CM

## 2021-12-14 DIAGNOSIS — R76.8 POSITIVE DOUBLE STRANDED DNA ANTIBODY TEST: Primary | ICD-10-CM

## 2021-12-14 PROCEDURE — 96365 THER/PROPH/DIAG IV INF INIT: CPT | Performed by: INTERNAL MEDICINE

## 2021-12-14 RX ORDER — SODIUM CHLORIDE 0.9 % (FLUSH) 0.9 %
10 SYRINGE (ML) INJECTION AS NEEDED
Status: CANCELLED | OUTPATIENT
Start: 2021-12-28

## 2021-12-14 RX ORDER — DIPHENHYDRAMINE HYDROCHLORIDE 50 MG/ML
50 INJECTION, SOLUTION INTRAMUSCULAR; INTRAVENOUS AS NEEDED
Status: CANCELLED
Start: 2021-12-28

## 2021-12-14 RX ORDER — HYDROCORTISONE SODIUM SUCCINATE 100 MG/2ML
100 INJECTION, POWDER, FOR SOLUTION INTRAMUSCULAR; INTRAVENOUS AS NEEDED
Status: CANCELLED | OUTPATIENT
Start: 2021-12-28

## 2021-12-14 RX ORDER — SODIUM CHLORIDE 9 MG/ML
10 INJECTION INTRAMUSCULAR; INTRAVENOUS; SUBCUTANEOUS AS NEEDED
Status: CANCELLED | OUTPATIENT
Start: 2021-12-28

## 2021-12-14 RX ORDER — DIPHENHYDRAMINE HYDROCHLORIDE 50 MG/ML
25 INJECTION, SOLUTION INTRAMUSCULAR; INTRAVENOUS AS NEEDED
Status: CANCELLED
Start: 2021-12-28

## 2021-12-14 RX ORDER — SODIUM CHLORIDE 9 MG/ML
25 INJECTION, SOLUTION INTRAVENOUS CONTINUOUS
Status: CANCELLED | OUTPATIENT
Start: 2021-12-28

## 2021-12-14 RX ORDER — ALBUTEROL SULFATE 0.83 MG/ML
2.5 SOLUTION RESPIRATORY (INHALATION) AS NEEDED
Status: CANCELLED
Start: 2021-12-28

## 2021-12-14 RX ORDER — SODIUM CHLORIDE 0.9 % (FLUSH) 0.9 %
10 SYRINGE (ML) INJECTION AS NEEDED
Status: DISCONTINUED | OUTPATIENT
Start: 2021-12-14 | End: 2021-12-14 | Stop reason: HOSPADM

## 2021-12-14 RX ORDER — HEPARIN 100 UNIT/ML
300-500 SYRINGE INTRAVENOUS AS NEEDED
Status: CANCELLED
Start: 2021-12-28

## 2021-12-14 RX ORDER — ACETAMINOPHEN 325 MG/1
650 TABLET ORAL AS NEEDED
Status: CANCELLED
Start: 2021-12-28

## 2021-12-14 RX ORDER — ONDANSETRON 2 MG/ML
8 INJECTION INTRAMUSCULAR; INTRAVENOUS AS NEEDED
Status: CANCELLED | OUTPATIENT
Start: 2021-12-28

## 2021-12-14 RX ORDER — EPINEPHRINE 1 MG/ML
0.3 INJECTION, SOLUTION, CONCENTRATE INTRAVENOUS AS NEEDED
Status: CANCELLED | OUTPATIENT
Start: 2021-12-28

## 2021-12-14 RX ORDER — SODIUM CHLORIDE 9 MG/ML
25 INJECTION, SOLUTION INTRAVENOUS CONTINUOUS
Status: DISCONTINUED | OUTPATIENT
Start: 2021-12-14 | End: 2021-12-14 | Stop reason: HOSPADM

## 2021-12-14 RX ADMIN — Medication 10 ML: at 08:12

## 2021-12-14 RX ADMIN — SODIUM CHLORIDE 25 ML/HR: 9 INJECTION, SOLUTION INTRAVENOUS at 08:12

## 2021-12-21 ENCOUNTER — OFFICE VISIT (OUTPATIENT)
Dept: RHEUMATOLOGY | Age: 56
End: 2021-12-21
Payer: MEDICAID

## 2021-12-21 VITALS
HEART RATE: 71 BPM | RESPIRATION RATE: 20 BRPM | DIASTOLIC BLOOD PRESSURE: 90 MMHG | HEIGHT: 65 IN | WEIGHT: 214.6 LBS | SYSTOLIC BLOOD PRESSURE: 185 MMHG | BODY MASS INDEX: 35.75 KG/M2 | OXYGEN SATURATION: 97 % | TEMPERATURE: 98 F

## 2021-12-21 DIAGNOSIS — R74.8 ALKALINE PHOSPHATASE ELEVATION: ICD-10-CM

## 2021-12-21 DIAGNOSIS — M32.19 OTHER SYSTEMIC LUPUS ERYTHEMATOSUS WITH OTHER ORGAN INVOLVEMENT (HCC): ICD-10-CM

## 2021-12-21 DIAGNOSIS — Z11.59 ENCOUNTER FOR SCREENING FOR VIRAL DISEASE: ICD-10-CM

## 2021-12-21 DIAGNOSIS — Z79.899 ONGOING USE OF POSSIBLY TOXIC MEDICATION: Primary | ICD-10-CM

## 2021-12-21 PROCEDURE — 99215 OFFICE O/P EST HI 40 MIN: CPT | Performed by: INTERNAL MEDICINE

## 2021-12-21 NOTE — PROGRESS NOTES
Chief Complaint   Patient presents with    Lupus    Joint Pain     shoulder, arm     1. Have you been to the ER, urgent care clinic since your last visit? Hospitalized since your last visit? No    2. Have you seen or consulted any other health care providers outside of the [address] [place] [address] since your last visit? Include any pap smears or colon screening.  No

## 2021-12-21 NOTE — PROGRESS NOTES
REASON FOR VISIT:    is a 61 y.o. female with history of hypertension and obesity who is returning for in-office followup of systemic lupus characterized by arthritis, alopecia, subacute cutaneous lupus rash, and +NIRAV 1:640 homogenous with +dsDNA. HISTORY OF PRESENT ILLNESS    1-2 days ago developed increased left (dominant) shoulder pain, no clear injury but has had similar previously diagnosed as tendinitis. Takes meloxicam at night for this since it tends to make her drowsy. Has taken last day off of work. Feels markedly improved since the Benlysta infusions. Does repeated squats to demonstrate     No problems with mood. No rashes. Disease History: In 2012 developed increased joint pain and stiffness, marked pain sensitivity, couldn't walk or stand to be touched, get in and out of a car. Thought at first possibly shingles, pain didn't resolve. Able to get expedited evaluation with Rheumatology (Dr. Mary Marroquin with [facility]), diagnosed with lupus. Prednisone and Plaquenil (hydroxychloroquine) were started, and within about a week was feeling better. Around the same time, had developed significant frontotemporal and vertex hair loss. Now prolonged cold exposure is her major trigger of joint pain flares. Initially was having pain flares 2-3x/month while in [place]. Since moved to [place] in 2019 to help her mother with foster children, has had less frequent flares maybe 2x/every 3 months. Flares respond to prednisone tapers. Between flares, she denies consistent joint pain. Some aching in hands or shoulders with activity. Has been able to lose weight from 236 to 209 with use of exercise bike over the last 6 months. Gets perioral papular rashes, not more typical malar rash. With sun exposure in the past, has developed painful annular silver-dollar sized lesions lasting a week or two, not in the last year. Good about sun avoidance.   Usually goes to [facility] for eye checks, recently seen. No current ophthalmologist for Plaquenil screening. REVIEW OF SYSTEMS  A comprehensive review of systems was negative except for that written in the HPI. A 10-point review of systems is per the new patient questionnaire, which has been reviewed extensively and scanned into the electronic medical record for future reference. Review of systems is as above and is otherwise negative. ALLERGIES  Lisinopril    MEDICATIONS  Current Outpatient Medications   Medication Sig    hydrOXYchloroQUINE (PlaqueniL) 200 mg tablet Take 2 Tablets by mouth daily.  hydroCHLOROthiazide (HYDRODIURIL) 25 mg tablet TAKE 1 TABLET BY MOUTH EVERY DAY    amLODIPine (NORVASC) 5 mg tablet Take 1 Tab by mouth daily.  montelukast (SINGULAIR) 10 mg tablet Take 1 Tab by mouth every evening.  budesonide-formoteroL (Symbicort) 160-4.5 mcg/actuation HFAA Take 2 Puffs by inhalation two (2) times a day. Indications: controller medication for asthma    albuterol (ACCUNEB) 1.25 mg/3 mL nebu 3 mL by Nebulization route every six (6) hours as needed for Shortness of Breath. Indications: asthma attack    albuterol (PROVENTIL HFA, VENTOLIN HFA, PROAIR HFA) 90 mcg/actuation inhaler Take 1 Puff by inhalation every four (4) hours as needed for Shortness of Breath.  DULoxetine (CYMBALTA) 30 mg capsule Take 1 Cap by mouth daily. No current facility-administered medications for this visit. PAST MEDICAL HISTORY  Past Medical History:   Diagnosis Date    Asthma     Fibroid     Headache 03/10/2020    Only due to the concussion.  History of concussion 3/10/2020    Lupus ([facility] [address]        FAMILY HISTORY  2 cousins had lupus. Oldest cousin passed 2/2 lupus, youngest cousin passed 2/2 COVID but had lupus. Mother is alive. SOCIAL HISTORY  She  reports that she has never smoked. She has never used smokeless tobacco. She reports current alcohol use. She reports that she does not use drugs.   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 No flowsheet data found. General:  The patient is pleasant, obese, alert, and in no apparent distress. Eyes: Sclera are anicteric. No conjunctival injection. HEENT:  Oropharynx is clear. No oral ulcers. Adequate salivary pooling. No cervical or supraclavicular lymphadenopathy. Lungs:  Clear to auscultation bilaterally, without wheeze or stridor. Normal respiratory effort. Cor:  Regular rate and rhythm. No murmur rub or gallop. Abdomen: Soft, non-tender, without hepatomegaly or masses. Extremities: No calf tenderness or edema. Warm and well perfused. Skin: Bitemporal and frontal alopecia. Interval resolved perioral rash. No purpura or plaques. Neuro: Nonfocal, no foot or wrist drop. Musculoskeletal:    A comprehensive musculoskeletal exam was performed for all joints of each upper and lower extremity and assessed for swelling, tenderness and range of motion. Results are documented as below:  No current FMTP. Left-sided shoulder pain throughout passive ROM, +empty can and Neer, +crepitus without warmth or effusion. Interval resolution of tenderness in bilateral MCPs and PIPs. No evidence of synovitis in the small joints of the hands, wrists, shoulders, elbows, hips, knees or ankles.      Labs:  11/30/21: WBC 4.6, Hgb 12.9, Plt 290; ESR 47, Cr 0.92, Tbili 0.3, AST 16, ALT 28, AlkP 131, CRP 0.67mg/dL  11/11/21: dsDNA by Crithidia 1:80, ISHMAELN gold negative,   9/18/21: WBC 5.0 (ANC 2500, ALC 2000), Hgb 11.8, Pl t 330; ESR 66, UA neg for protein or blood; Cr 1.03, Tbili <0.2, AST 15, ALT 16, AlkP 132, urine pr/cr 0.058, mariah neg, NIRAV 1:640 homogenous, dsDNA 12; SSA, SSB, RNP, chromatin, Scl70, centromere B, ribosomal P, Jo1 negative; CRP 11mg/L, C3 and C4 WNL  9/2/20: RF neg, CCP neg, 14.3.3 eta neg; CRP 10.48 mg/L; NIRAV direct positive (no reflex), ESR 45; WBC 5.1 [ANC 2400, ALC 2100], Hgb 11.9, Plt 286; Cr 0.89, Tbili 0.2, AST 18, ALT 18, AlkP 139, Tprot 7.2, Alb 4.0; HDL 42, ; A1c 6.4, TSH 1.3, Hep C Ab neg;     Imaging:  10/1/20 AP CXR:  Portable exam of the chest obtained at 1118 demonstrates normal heart size. There is no acute process in the lung fields. The osseous structures are  Unremarkable. 3/10/20 MR brain without:  IMPRESSION:  No significant abnormalities. 11/7/19 CT Cspine:  No acute fracture  Central canal stenosis C5-6 and C6-7 [min 5.6mm anterior to posterior]  Moderate left C6-7 neural foraminal stenosis. ASSESSMENT AND PLAN  Ms. Janell Johns is a 61 y.o. female who presents for evaluation of systemic lupus characterized by arthritis, alopecia, subacute cutaneous lupus rash, and +NIRAV 1:640 with +dsDNA. Much improved after steroid IM last visit and start of belimumab infusions. 1. Ongoing use of possibly toxic medication  - HEP B SURFACE AG; Future  - HEPATITIS B CORE AB, IGM; Future  - HEPATITIS B CORE AB, TOTAL; Future  - HEP B SURFACE AB; Future  - HCV AB W/RFLX TO TAVARES; Future    2. Other systemic lupus erythematosus with other organ involvement ([facility] [address]  - HEP B SURFACE AG; Future  - HEPATITIS B CORE AB, IGM; Future  - HEPATITIS B CORE AB, TOTAL; Future  - HEP B SURFACE AB; Future  - HCV AB W/RFLX TO TAVARES; Future  - GGT; Future  - HEP B SURFACE AG  - HEPATITIS B CORE AB, IGM  - HEPATITIS B CORE AB, TOTAL  - HEP B SURFACE AB  - HCV AB W/RFLX TO TAVARES  - GGT    3.  Encounter for screening for viral disease  - HEP B SURFACE AG; Future  - HEPATITIS B CORE AB, IGM; Future  - HEPATITIS B CORE AB, TOTAL; Future  - HEP B SURFACE AB; Future  - HCV AB W/RFLX TO TAVARES; Future  - GGT; Future    4. Alkaline phosphatase elevation  - GGT; Future          Patient Instructions   1. Lupus seems to be well-controlled at the moment. Continue the Benlysta infusions and Plaquenil. 2. Reach back out to Dr. Bobbi Blood to schedule your overdue eye exam. Phone is [phone]. If you're still having a hard time by mid-next month, let me know and we'll look into finding another office for you. 3. We'll update the missing labs with your next infusion, and we'll monitor monthly labs with your subsequent infusions. 4. Return in 3 months. Orders Placed This Encounter    HEP B SURFACE AG    HEPATITIS B CORE AB, IGM    HEPATITIS B CORE AB, TOTAL    HEP B SURFACE AB    HCV AB W/RFLX TO TAVARES    GGT       Medications: I am having Dennis Rivero maintain her DULoxetine, montelukast, budesonide-formoteroL, albuterol, albuterol, amLODIPine, hydroCHLOROthiazide, and hydrOXYchloroQUINE. Follow up: No follow-ups on file.     Face to face time: 25 minutes  Note preparation and records review day of service: 20 minutes  Total provider time day of service: 45 minutes    Joey Pizano MD    Adult Rheumatology   [address] [place] [address]   Phone [phone]  Fax [phone]

## 2021-12-21 NOTE — LETTER
NOTIFICATION RETURN TO WORK     12/21/2021 8:49 AM    Ms. Vishnu Hernandez  [address] [address]      To Whom It May Concern:    [facility] She was unable to work on 12/20/21 due to a flare of her medical condition. She will return to work/school on: 12/22/21    If there are questions or concerns please have the patient contact our office.         Sincerely,      Robert Galvin MD

## 2021-12-21 NOTE — PATIENT INSTRUCTIONS
1. Lupus seems to be well-controlled at the moment. Continue the Benlysta infusions and Plaquenil. 2. Reach back out to Dr. Mi Silver to schedule your overdue eye exam. Phone is [phone]. If you're still having a hard time by mid-next month, let me know and we'll look into finding another office for you. 3. We'll update the missing labs with your next infusion, and we'll monitor monthly labs with your subsequent infusions. 4. Return in 3 months.

## 2021-12-22 ENCOUNTER — TELEPHONE (OUTPATIENT)
Dept: RHEUMATOLOGY | Age: 56
End: 2021-12-22

## 2021-12-22 NOTE — TELEPHONE ENCOUNTER
Patient called and shared that she had seen Dr. Oliver Rasheed yesterday and he \"put her out of work for one day\" and told her that if she is not feeling better, to give his office a call. She wanted to let Dr. Oliver Rasheed know that she is still not feeling better.     #: [phone]

## 2021-12-23 ENCOUNTER — TELEPHONE (OUTPATIENT)
Dept: RHEUMATOLOGY | Age: 56
End: 2021-12-23

## 2021-12-23 NOTE — TELEPHONE ENCOUNTER
Pt called stating Lupillo Hinds wrote a dr note for her. Would like for us to email it to her. Informed pt we can't email but we can send out in the mail. Pt stated she will stop by sometime on Monday to pick it up. Also sent one out in the mail just in case.

## 2021-12-27 NOTE — PROGRESS NOTES
[facility] Rheumatology  OBIC Note    Date: 2021    Rheumatology OBIC COVID-19 SCREENING  The patient was asked the following questions and answered as documented below:    1. Do you have any symptoms of COVID-19? SOB, coughing, fever, or generally not feeling well? NO  2. Have you been exposed to COVID-19 recently? NO  3. Have you had any recent contact with family/friend that has a pending COVID test? NO    Name: Mckayla Galeano  MRN: [id number]       : 1965  Diagnosis: Systemic Lupus Erythematosus  Treatment: Benlysta 540mg Week 4 induction  Referring Provider: Dr. Kasper   Supervising Provider: Dr. Kasper     Patient [facility] at [address] [facility]. Ms. Yarely Rudolph allergies reviewed and she agreed to receiving today's treatment. A physical assessment was performed initially and post-treatment. Pt. denies new complaints today. Ms. Ramone Watkins vitals were monitored before, during and after medication administration. Visit Vitals  BP (!) 151/89   Pulse 76   Temp 97.9 °F (36.6 °C)   SpO2 98%      Blood and urine collected and walked to lab. Dose Verify: 10mg/kg x 97.3kg=  973mg    Medications given per providers orders:  Administrations This Visit     0.9% sodium chloride infusion     Admin Date  2021 Action  New Bag Dose  25 mL/hr Rate  25 mL/hr Route  IntraVENous Administered By  Joey Cline RN          sodium chloride (NS) flush 10 mL     Admin Date  2021 Action  Given Dose  10 mL Route  IntraVENous Administered By  Joey Cline RN            Benlysta 960mg   [address] [id number]  Lot #  [id number]  Exp 2026  ---mixed in---  250ml 0.9% Normal Saline  DEBBIE 6787-1185-04  Lot # [id number]  Exp 2023     KLPTU: 9128  RANF: 7212     0.9% NS 250ml @ 25ml/hr   MQY 3942-8960-30  Lot # [id number]  Exp 10/2023     0.9% Normal Saline Flush 10ml x 1  OLN [address]-35  Lot # [id number]  Exp 2024    Lines: 24G right AC. Blood return noted and IV site assessed before, during, and after treatment.   Line flushed with 10-30 ml's 0.9% Normal Saline solution per protocol. Access was removed from Ms. Caballero after completion of infusion/injection. Ms. Mickey Raymond tolerated the treatment without complaints and no medication reactions were seen while in presence of this RN. Patient provided with AVS, which included future appointments and written educational material regarding Benlysta. All of the patients questions were answered and then discharged ambulatory from Rheumatology OBIC in stable condition at 0945. Future Appointments   Date Time Provider Giana Carranza   1/26/2022  8:00 AM INFUSIONINJ_RC AOCR BS AMB   2/22/2022  8:00 AM INFUSIONINJ_RC AOCR BS AMB   3/21/2022  8:00 AM Renetta Packer MD AOCR BS AMB   3/22/2022  8:00 AM INFUSIONINJ_RC AOCR BS AMB     Tyrone Masters RN  December 28, 2021  1:58 PM    ---  ATTENDING ATTESTATION    Kathy Larson, hereby attest that the medical record entry for 12/28/21 accurately reflects signatures/notations that I made in my capacity as treating physician when I treated/diagnosed the above listed patient. I do hereby attest that this information is true, accurate and complete to the best of my knowledge and I understand that any falsification, omission, or concealment of material fact may subject me to administrative, civil, or criminal liability.   Ghada Mandujano MD S  Adult Rheumatology  Signed 02/01/22 4:38 PM

## 2021-12-28 ENCOUNTER — OFFICE VISIT (OUTPATIENT)
Dept: RHEUMATOLOGY | Age: 56
End: 2021-12-28
Payer: MEDICAID

## 2021-12-28 VITALS
RESPIRATION RATE: 16 BRPM | DIASTOLIC BLOOD PRESSURE: 84 MMHG | HEART RATE: 65 BPM | SYSTOLIC BLOOD PRESSURE: 135 MMHG | TEMPERATURE: 97.9 F | OXYGEN SATURATION: 98 %

## 2021-12-28 DIAGNOSIS — Z79.899 ONGOING USE OF POSSIBLY TOXIC MEDICATION: ICD-10-CM

## 2021-12-28 DIAGNOSIS — R76.8 POSITIVE DOUBLE STRANDED DNA ANTIBODY TEST: Primary | ICD-10-CM

## 2021-12-28 DIAGNOSIS — M32.19 OTHER SYSTEMIC LUPUS ERYTHEMATOSUS WITH OTHER ORGAN INVOLVEMENT (HCC): ICD-10-CM

## 2021-12-28 PROCEDURE — 96365 THER/PROPH/DIAG IV INF INIT: CPT | Performed by: INTERNAL MEDICINE

## 2021-12-28 RX ORDER — HEPARIN 100 UNIT/ML
300-500 SYRINGE INTRAVENOUS AS NEEDED
Status: CANCELLED
Start: 2022-01-25

## 2021-12-28 RX ORDER — SODIUM CHLORIDE 9 MG/ML
10 INJECTION INTRAMUSCULAR; INTRAVENOUS; SUBCUTANEOUS AS NEEDED
Status: CANCELLED | OUTPATIENT
Start: 2022-01-25

## 2021-12-28 RX ORDER — HYDROCORTISONE SODIUM SUCCINATE 100 MG/2ML
100 INJECTION, POWDER, FOR SOLUTION INTRAMUSCULAR; INTRAVENOUS AS NEEDED
Status: CANCELLED | OUTPATIENT
Start: 2022-01-25

## 2021-12-28 RX ORDER — SODIUM CHLORIDE 9 MG/ML
25 INJECTION, SOLUTION INTRAVENOUS CONTINUOUS
Status: CANCELLED | OUTPATIENT
Start: 2022-01-25

## 2021-12-28 RX ORDER — ONDANSETRON 2 MG/ML
8 INJECTION INTRAMUSCULAR; INTRAVENOUS AS NEEDED
Status: CANCELLED | OUTPATIENT
Start: 2022-01-25

## 2021-12-28 RX ORDER — SODIUM CHLORIDE 9 MG/ML
25 INJECTION, SOLUTION INTRAVENOUS CONTINUOUS
Status: DISCONTINUED | OUTPATIENT
Start: 2021-12-28 | End: 2021-12-28 | Stop reason: HOSPADM

## 2021-12-28 RX ORDER — ALBUTEROL SULFATE 0.83 MG/ML
2.5 SOLUTION RESPIRATORY (INHALATION) AS NEEDED
Status: CANCELLED
Start: 2022-01-25

## 2021-12-28 RX ORDER — EPINEPHRINE 1 MG/ML
0.3 INJECTION, SOLUTION, CONCENTRATE INTRAVENOUS AS NEEDED
Status: CANCELLED | OUTPATIENT
Start: 2022-01-25

## 2021-12-28 RX ORDER — SODIUM CHLORIDE 0.9 % (FLUSH) 0.9 %
10 SYRINGE (ML) INJECTION AS NEEDED
Status: CANCELLED | OUTPATIENT
Start: 2022-01-25

## 2021-12-28 RX ORDER — DIPHENHYDRAMINE HYDROCHLORIDE 50 MG/ML
50 INJECTION, SOLUTION INTRAMUSCULAR; INTRAVENOUS AS NEEDED
Status: CANCELLED
Start: 2022-01-25

## 2021-12-28 RX ORDER — DIPHENHYDRAMINE HYDROCHLORIDE 50 MG/ML
25 INJECTION, SOLUTION INTRAMUSCULAR; INTRAVENOUS AS NEEDED
Status: CANCELLED
Start: 2022-01-25

## 2021-12-28 RX ORDER — ACETAMINOPHEN 325 MG/1
650 TABLET ORAL AS NEEDED
Status: CANCELLED
Start: 2022-01-25

## 2021-12-28 RX ORDER — SODIUM CHLORIDE 0.9 % (FLUSH) 0.9 %
10 SYRINGE (ML) INJECTION AS NEEDED
Status: DISCONTINUED | OUTPATIENT
Start: 2021-12-28 | End: 2021-12-28 | Stop reason: HOSPADM

## 2021-12-28 RX ADMIN — SODIUM CHLORIDE 25 ML/HR: 9 INJECTION, SOLUTION INTRAVENOUS at 08:21

## 2021-12-28 RX ADMIN — Medication 10 ML: at 08:21

## 2021-12-28 NOTE — PROGRESS NOTES
[facility] Rheumatology  OBIC Note    Date: 2022    Rheumatology OBIC COVID-19 SCREENING  The patient was asked the following questions and answered as documented below:    1. Do you have any symptoms of COVID-19? SOB, coughing, fever, or generally not feeling well? NO  2. Have you been exposed to COVID-19 recently? NO  3. Have you had any recent contact with family/friend that has a pending COVID test? NO    Name: Chase Parra  MRN: [id number]       : 1965  Diagnosis: Lupus  Treatment: Benlysta 960mg q4 weeks  Referring Provider: Dr. Claude Braswell  Supervising Provider: Dr. Claude Braswell    Patient arrived to [facility] at 0800. Ms. Stormy Peralta allergies reviewed and she agreed to receiving today's treatment. A physical assessment was performed initially and post-treatment. Pt. denies new complaints today. Ms. Fela Lerma vitals were monitored before and after medication administration. Vitals:    22 0809 22 0846   BP: (!) 171/92 (!) 163/72   Pulse: 69    Resp: 16    Temp: 97 °F (36.1 °C)    SpO2: 98%      Labs every 3 months. Due in March.     Medications given per providers orders:  Administrations This Visit     0.9% sodium chloride infusion     Admin Date  2022 Action  New Bag Dose  25 mL/hr Rate  25 mL/hr Route  IntraVENous Administered By  Myron Boas, RN          belimumab (BENLYSTA) 960 mg in 0.9% sodium chloride 250 mL, overfill volume 25 mL infusion     Admin Date  2022 Action  New Bag Dose  960 mg Rate  287 mL/hr Route  IntraVENous Administered By  Myron Boas, RN          sodium chloride (NS) flush 10 mL     Admin Date  2022 Action  Given Dose  10 mL Route  IntraVENous Administered By  Myron Boas, RN            Benlysta 960mg   [employer] 91397-024-24  Lot #  [id number]  Exp 2026  ---mixed in---  250ml 0.9% Normal Saline  NDC 1620-7574-71  Lot # [id number]  Exp 2023     GGPQE: 3533  SKZX: 9224     0.9% NS 250ml @ 25ml/hr   QYY 1894-9109-62  Lot # [id number]  Exp 10/2023     0.9% Normal Saline Flush 10ml x 1  [facility] [address]-82  Lot # [id number]  Exp 04/2024     Lines: 24G left inner FA. Blood return noted and IV site assessed before, during, and after treatment. Line flushed with 10-30 ml's 0.9% Normal Saline solution per protocol. Access was removed from Ms. Caballero after completion of infusion/injection. Ms. Vikash Hays tolerated the treatment without complaints and no medication reactions were seen while in presence of this RN. Patient provided with AVS, which included future appointments and written educational material regarding Benlysta. All of the patients questions were answered and then discharged ambulatory from Rheumatology OBIC in stable condition at 0950. Future Appointments   Date Time Provider Giana Carranza   2/22/2022  8:00 AM INFUSIONINJ_RC AOCR BS AMB   3/21/2022  8:00 AM Codey Alcazar MD AOCR BS AMB   3/22/2022  8:00 AM INFUSIONINJ_RC AOCR BS AMB   4/19/2022  8:15 AM INFUSIONINJ_RC AOCR BS AMB   5/16/2022  8:00 AM INFUSIONINJ_RC AOCR BS AMB     Ivan Hernandez RN  January 26, 2022     ---  ATTENDING ATTESTATION    I, Abe Mckeon, hereby attest that the medical record entry for 1/26/22 accurately reflects signatures/notations that I made in my capacity as treating physician when I treated/diagnosed the above listed patient. I do hereby attest that this information is true, accurate and complete to the best of my knowledge and I understand that any falsification, omission, or concealment of material fact may subject me to administrative, civil, or criminal liability.   Abe Mckeon MD S  Adult Rheumatology  Signed 02/01/22 4:35 PM   11:50 AM

## 2021-12-29 LAB
ALBUMIN SERPL-MCNC: 4.2 G/DL (ref 3.8–4.9)
ALBUMIN/GLOB SERPL: 1.2 {RATIO} (ref 1.2–2.2)
ALP SERPL-CCNC: 115 IU/L (ref 44–121)
ALT SERPL-CCNC: 23 IU/L (ref 0–32)
APPEARANCE UR: CLEAR
AST SERPL-CCNC: 26 IU/L (ref 0–40)
BACTERIA #/AREA URNS HPF: NORMAL /[HPF]
BASOPHILS # BLD AUTO: 0 X10E3/UL (ref 0–0.2)
BASOPHILS NFR BLD AUTO: 0 %
BILIRUB SERPL-MCNC: 0.2 MG/DL (ref 0–1.2)
BILIRUB UR QL STRIP: NEGATIVE
BUN SERPL-MCNC: 20 MG/DL (ref 6–24)
BUN/CREAT SERPL: 22 (ref 9–23)
C3 SERPL-MCNC: 150 MG/DL (ref 82–167)
C4 SERPL-MCNC: 28 MG/DL (ref 12–38)
CALCIUM SERPL-MCNC: 9 MG/DL (ref 8.7–10.2)
CASTS URNS QL MICRO: NORMAL /LPF
CHLORIDE SERPL-SCNC: 98 MMOL/L (ref 96–106)
CO2 SERPL-SCNC: 23 MMOL/L (ref 20–29)
COLOR UR: YELLOW
CREAT SERPL-MCNC: 0.93 MG/DL (ref 0.57–1)
CREAT UR-MCNC: 78.6 MG/DL
CRP SERPL-MCNC: 7 MG/L (ref 0–10)
DSDNA AB SER QL CLIF: POSITIVE
DSDNA AB TITR SER CLIF: ABNORMAL {TITER}
EOSINOPHIL # BLD AUTO: 0.1 X10E3/UL (ref 0–0.4)
EOSINOPHIL NFR BLD AUTO: 1 %
EPI CELLS #/AREA URNS HPF: NORMAL /HPF (ref 0–10)
ERYTHROCYTE [DISTWIDTH] IN BLOOD BY AUTOMATED COUNT: 13.6 % (ref 11.7–15.4)
ERYTHROCYTE [SEDIMENTATION RATE] IN BLOOD BY WESTERGREN METHOD: 82 MM/HR (ref 0–40)
GGT SERPL-CCNC: 7 IU/L (ref 0–60)
GLOBULIN SER CALC-MCNC: 3.6 G/DL (ref 1.5–4.5)
GLUCOSE SERPL-MCNC: NORMAL MG/DL
GLUCOSE UR QL: NEGATIVE
HBV CORE AB SERPL QL IA: NEGATIVE
HBV CORE IGM SERPL QL IA: NEGATIVE
HBV SURFACE AB SER QL: REACTIVE
HBV SURFACE AG SERPL QL IA: NEGATIVE
HCT VFR BLD AUTO: 42 % (ref 34–46.6)
HCV AB S/CO SERPL IA: <0.1 S/CO RATIO (ref 0–0.9)
HCV AB SERPL QL IA: NORMAL
HGB BLD-MCNC: 13.1 G/DL (ref 11.1–15.9)
HGB UR QL STRIP: NEGATIVE
IMM GRANULOCYTES # BLD AUTO: 0 X10E3/UL (ref 0–0.1)
IMM GRANULOCYTES NFR BLD AUTO: 0 %
KETONES UR QL STRIP: NEGATIVE
LEUKOCYTE ESTERASE UR QL STRIP: NEGATIVE
LYMPHOCYTES # BLD AUTO: 1.9 X10E3/UL (ref 0.7–3.1)
LYMPHOCYTES NFR BLD AUTO: 43 %
MCH RBC QN AUTO: 25.3 PG (ref 26.6–33)
MCHC RBC AUTO-ENTMCNC: 31.2 G/DL (ref 31.5–35.7)
MCV RBC AUTO: 81 FL (ref 79–97)
MICRO URNS: NORMAL
MICRO URNS: NORMAL
MONOCYTES # BLD AUTO: 0.4 X10E3/UL (ref 0.1–0.9)
MONOCYTES NFR BLD AUTO: 8 %
NEUTROPHILS # BLD AUTO: 2.2 X10E3/UL (ref 1.4–7)
NEUTROPHILS NFR BLD AUTO: 48 %
NITRITE UR QL STRIP: NEGATIVE
PH UR STRIP: 5.5 [PH] (ref 5–7.5)
PLATELET # BLD AUTO: 286 X10E3/UL (ref 150–450)
POTASSIUM SERPL-SCNC: NORMAL MMOL/L
PROT SERPL-MCNC: 7.8 G/DL (ref 6–8.5)
PROT UR QL STRIP: NEGATIVE
PROT UR-MCNC: 5.8 MG/DL
PROT/CREAT UR: 74 MG/G CREAT (ref 0–200)
RBC # BLD AUTO: 5.18 X10E6/UL (ref 3.77–5.28)
RBC #/AREA URNS HPF: NORMAL /HPF (ref 0–2)
SODIUM SERPL-SCNC: 137 MMOL/L (ref 134–144)
SP GR UR: 1.01 (ref 1–1.03)
URINALYSIS REFLEX, 377202: NORMAL
UROBILINOGEN UR STRIP-MCNC: 0.2 MG/DL (ref 0.2–1)
WBC # BLD AUTO: 4.5 X10E3/UL (ref 3.4–10.8)
WBC #/AREA URNS HPF: NORMAL /HPF (ref 0–5)

## 2022-01-25 ENCOUNTER — TELEPHONE (OUTPATIENT)
Dept: RHEUMATOLOGY | Age: 57
End: 2022-01-25

## 2022-01-25 NOTE — TELEPHONE ENCOUNTER
Pt called and stated she needs the note that  wrote her to work at home faxed to her job- [phone], Attention Clarisse Host- needs to include the medical diagnosis as well as the infusion she is receiving the case# is [id number]

## 2022-01-26 ENCOUNTER — OFFICE VISIT (OUTPATIENT)
Dept: RHEUMATOLOGY | Age: 57
End: 2022-01-26
Payer: MEDICAID

## 2022-01-26 VITALS
HEART RATE: 66 BPM | TEMPERATURE: 97 F | SYSTOLIC BLOOD PRESSURE: 197 MMHG | DIASTOLIC BLOOD PRESSURE: 94 MMHG | OXYGEN SATURATION: 97 % | RESPIRATION RATE: 18 BRPM

## 2022-01-26 DIAGNOSIS — R76.8 POSITIVE DOUBLE STRANDED DNA ANTIBODY TEST: Primary | ICD-10-CM

## 2022-01-26 DIAGNOSIS — M32.19 OTHER SYSTEMIC LUPUS ERYTHEMATOSUS WITH OTHER ORGAN INVOLVEMENT (HCC): ICD-10-CM

## 2022-01-26 PROCEDURE — 96365 THER/PROPH/DIAG IV INF INIT: CPT

## 2022-01-26 RX ORDER — EPINEPHRINE 1 MG/ML
0.3 INJECTION, SOLUTION, CONCENTRATE INTRAVENOUS AS NEEDED
Status: CANCELLED | OUTPATIENT
Start: 2022-02-23

## 2022-01-26 RX ORDER — DIPHENHYDRAMINE HYDROCHLORIDE 50 MG/ML
50 INJECTION, SOLUTION INTRAMUSCULAR; INTRAVENOUS AS NEEDED
Status: CANCELLED
Start: 2022-02-23

## 2022-01-26 RX ORDER — HYDROCORTISONE SODIUM SUCCINATE 100 MG/2ML
100 INJECTION, POWDER, FOR SOLUTION INTRAMUSCULAR; INTRAVENOUS AS NEEDED
Status: CANCELLED | OUTPATIENT
Start: 2022-02-23

## 2022-01-26 RX ORDER — SODIUM CHLORIDE 9 MG/ML
25 INJECTION, SOLUTION INTRAVENOUS CONTINUOUS
Status: CANCELLED | OUTPATIENT
Start: 2022-02-23

## 2022-01-26 RX ORDER — ONDANSETRON 2 MG/ML
8 INJECTION INTRAMUSCULAR; INTRAVENOUS AS NEEDED
Status: CANCELLED | OUTPATIENT
Start: 2022-02-23

## 2022-01-26 RX ORDER — DIPHENHYDRAMINE HYDROCHLORIDE 50 MG/ML
25 INJECTION, SOLUTION INTRAMUSCULAR; INTRAVENOUS AS NEEDED
Status: CANCELLED
Start: 2022-02-23

## 2022-01-26 RX ORDER — ACETAMINOPHEN 325 MG/1
650 TABLET ORAL AS NEEDED
Status: CANCELLED
Start: 2022-02-23

## 2022-01-26 RX ORDER — SODIUM CHLORIDE 0.9 % (FLUSH) 0.9 %
10 SYRINGE (ML) INJECTION AS NEEDED
Status: CANCELLED | OUTPATIENT
Start: 2022-02-23

## 2022-01-26 RX ORDER — SODIUM CHLORIDE 9 MG/ML
25 INJECTION, SOLUTION INTRAVENOUS CONTINUOUS
Status: DISCONTINUED | OUTPATIENT
Start: 2022-01-26 | End: 2022-01-26 | Stop reason: HOSPADM

## 2022-01-26 RX ORDER — HEPARIN 100 UNIT/ML
300-500 SYRINGE INTRAVENOUS AS NEEDED
Status: CANCELLED
Start: 2022-02-23

## 2022-01-26 RX ORDER — SODIUM CHLORIDE 0.9 % (FLUSH) 0.9 %
10 SYRINGE (ML) INJECTION AS NEEDED
Status: DISCONTINUED | OUTPATIENT
Start: 2022-01-26 | End: 2022-01-26 | Stop reason: HOSPADM

## 2022-01-26 RX ORDER — SODIUM CHLORIDE 9 MG/ML
10 INJECTION INTRAMUSCULAR; INTRAVENOUS; SUBCUTANEOUS AS NEEDED
Status: CANCELLED | OUTPATIENT
Start: 2022-02-23

## 2022-01-26 RX ORDER — ALBUTEROL SULFATE 0.83 MG/ML
2.5 SOLUTION RESPIRATORY (INHALATION) AS NEEDED
Status: CANCELLED
Start: 2022-02-23

## 2022-01-26 RX ADMIN — SODIUM CHLORIDE 25 ML/HR: 9 INJECTION, SOLUTION INTRAVENOUS at 08:15

## 2022-01-26 RX ADMIN — Medication 10 ML: at 08:15

## 2022-01-26 NOTE — PATIENT INSTRUCTIONS
Belimumab (By injection)   Belimumab ([id number]-[id number]-[id number])  Treats lupus. Brand Name(s): Benlysta   There may be other brand names for this medicine. When This Medicine Should Not Be Used: This medicine is not right for everyone. Do not use it if you had an allergic reaction to belimumab. How to Use This Medicine:   Injectable  · A nurse or other health provider will give you this medicine. This medicine is given through a needle placed into one of your veins or as a shot under your skin. If the medicine is given through a needle placed into one of your veins, it must be given slowly, so the needle will have to remain in place for at least 1 hour. · You may be taught how to give your medicine at home. Make sure you understand all instructions before giving yourself an injection. Do not use more medicine or use it more often than your doctor tells you to. · This medicine is available in 3 forms: a vial (glass container), a prefilled syringe, or an autoinjector. The prefilled syringe and autoinjector are the dosage forms you can use at home. · You will be shown the body areas where this shot can be given. Use a different body area each time you give yourself a shot. Keep track of where you give each shot to make sure you rotate body areas. Do not inject into skin areas that are tender, red, bruised, or hard. · Use a new needle and syringe each time you inject your medicine. · Allow the medicine to warm to room temperature for 30 minutes before you use it. Do not warm it in any other way. · Do not remove the needle cap from the prefilled syringe or the ring cap from the autoinjector until you are ready to use it. · Check the liquid in the prefilled syringe or autoinjector. It should be colorless or slightly yellow. Do not use the medicine if the liquid is cloudy, discolored, or has particles in it. Do not shake. · This medicine should come with a Medication Guide.  Ask your pharmacist for a copy if you do not have one. · Missed dose: Call your doctor or pharmacist for instructions. · If you store this medicine at home, keep it in the refrigerator. Do not freeze. Store this medicine in its original container until you are ready to use it. Keep it refrigerated until 30 minutes before use. Do not use and do not place this medicine back in the refrigerator if it has been left out for more than 12 hours. · Throw away used needles in a hard, closed container that the needles cannot poke through. Keep this container away from children and pets. Drugs and Foods to Avoid:   Ask your doctor or pharmacist before using any other medicine, including over-the-counter medicines, vitamins, and herbal products. · Some medicines can affect how belimumab works. Tell your doctor if you are also using cyclophosphamide. · This medicine may interfere with vaccines. Ask your doctor before you get a flu shot or any other vaccines. Warnings While Using This Medicine:   · Tell your doctor if you are pregnant or breastfeeding, or if you are allergic to any medicine or have any kind of infection, a weak immune system, or a history of cancer, depression, or mental illness. Also tell your doctor if you have ever had an infection that would not go away or kept coming back. · Women must use an effective form of birth control during treatment with this medicine and for at least 4 months after the last dose. Tell your doctor right away if you have become pregnant while using this medicine. · This medicine may cause the following problems:   ¨ Increased risk for infection, including progressive multifocal leukoencephalopathy  ¨ Infusion reactions  ¨ Increased risk for certain types of cancer  · This medicine may make it easier for you to get an infection. Take precautions to prevent illness. Avoid people who are ill. Wash your hands often. · Your doctor will check your progress and the effects of this medicine at regular visits.  Keep all appointments. · Keep all medicine out of the reach of children. Never share your medicine with anyone. Possible Side Effects While Using This Medicine:   Call your doctor right away if you notice any of these side effects:  · Allergic reaction: Itching or hives, swelling in your face or hands, swelling or tingling in your mouth or throat, chest tightness, trouble breathing  · Anxiety, depression, thoughts of hurting yourself  · Change in how much or how often you urinate, bloody or cloudy urine, pain or burning feeling when you urinate  · Chest pain, trouble breathing  · Confusion, memory loss, problems with vision, speech, or walking  · Fever, chills, cough, runny or stuffy nose, sore throat, body aches  · Lightheadedness, dizziness, or fainting, severe headache  · Pain, itching, burning, swelling, bleeding, or a lump under your skin where the needle is placed  If you notice these less serious side effects, talk with your doctor:   · Diarrhea, nausea  · Trouble sleeping  If you notice other side effects that you think are caused by this medicine, tell your doctor. Call your doctor for medical advice about side effects. You may report side effects to FDA at 0-079-FDA-6332  © 2017 [facility] Information is for End User's use only and may not be sold, redistributed or otherwise used for commercial purposes. The above information is an  only. It is not intended as medical advice for individual conditions or treatments. Talk to your doctor, nurse or pharmacist before following any medical regimen to see if it is safe and effective for you.

## 2022-01-26 NOTE — TELEPHONE ENCOUNTER
Spoke with patient, informed her in order for us to fax letter to her job she must sign a release giving us permission to do so or have her job fax over a request along with her permission to do so.

## 2022-01-28 ENCOUNTER — VIRTUAL VISIT (OUTPATIENT)
Dept: INTERNAL MEDICINE CLINIC | Age: 57
End: 2022-01-28
Payer: MEDICAID

## 2022-01-28 ENCOUNTER — HOSPITAL ENCOUNTER (EMERGENCY)
Age: 57
Discharge: HOME | End: 2022-01-28
Attending: EMERGENCY MEDICINE | Admitting: EMERGENCY MEDICINE
Payer: MEDICAID

## 2022-01-28 ENCOUNTER — APPOINTMENT (OUTPATIENT)
Dept: CT IMAGING | Age: 57
End: 2022-01-28
Attending: EMERGENCY MEDICINE
Payer: MEDICAID

## 2022-01-28 VITALS
HEART RATE: 60 BPM | SYSTOLIC BLOOD PRESSURE: 167 MMHG | HEIGHT: 65 IN | DIASTOLIC BLOOD PRESSURE: 92 MMHG | TEMPERATURE: 97.9 F | OXYGEN SATURATION: 100 % | RESPIRATION RATE: 18 BRPM | WEIGHT: 229 LBS | BODY MASS INDEX: 38.15 KG/M2

## 2022-01-28 DIAGNOSIS — D25.9 UTERINE LEIOMYOMA, UNSPECIFIED LOCATION: ICD-10-CM

## 2022-01-28 DIAGNOSIS — N93.9 ABNORMAL VAGINAL BLEEDING: ICD-10-CM

## 2022-01-28 DIAGNOSIS — R10.31 BILATERAL LOWER ABDOMINAL CRAMPING: Primary | ICD-10-CM

## 2022-01-28 DIAGNOSIS — N93.9 VAGINAL BLEEDING: ICD-10-CM

## 2022-01-28 DIAGNOSIS — R10.32 BILATERAL LOWER ABDOMINAL CRAMPING: Primary | ICD-10-CM

## 2022-01-28 DIAGNOSIS — Z12.31 SCREENING MAMMOGRAM FOR BREAST CANCER: ICD-10-CM

## 2022-01-28 DIAGNOSIS — N30.00 ACUTE CYSTITIS WITHOUT HEMATURIA: Primary | ICD-10-CM

## 2022-01-28 LAB
ALBUMIN SERPL-MCNC: 3.8 G/DL (ref 3.5–5)
ALBUMIN/GLOB SERPL: 0.8 {RATIO} (ref 1.1–2.2)
ALP SERPL-CCNC: 114 U/L (ref 45–117)
ALT SERPL-CCNC: 27 U/L (ref 12–78)
ANION GAP SERPL CALC-SCNC: 5 MMOL/L (ref 5–15)
APPEARANCE UR: CLEAR
AST SERPL-CCNC: 16 U/L (ref 15–37)
BACTERIA URNS QL MICRO: ABNORMAL /HPF
BASOPHILS # BLD: 0 K/UL (ref 0–0.1)
BASOPHILS NFR BLD: 0 % (ref 0–1)
BILIRUB SERPL-MCNC: 0.4 MG/DL (ref 0.2–1)
BILIRUB UR QL: NEGATIVE
BUN SERPL-MCNC: 15 MG/DL (ref 6–20)
BUN/CREAT SERPL: 16 (ref 12–20)
CALCIUM SERPL-MCNC: 9.5 MG/DL (ref 8.5–10.1)
CHLORIDE SERPL-SCNC: 104 MMOL/L (ref 97–108)
CO2 SERPL-SCNC: 26 MMOL/L (ref 21–32)
COLOR UR: ABNORMAL
COMMENT, HOLDF: NORMAL
CREAT SERPL-MCNC: 0.95 MG/DL (ref 0.55–1.02)
DIFFERENTIAL METHOD BLD: ABNORMAL
EOSINOPHIL # BLD: 0 K/UL (ref 0–0.4)
EOSINOPHIL NFR BLD: 1 % (ref 0–7)
EPITH CASTS URNS QL MICRO: ABNORMAL /LPF
ERYTHROCYTE [DISTWIDTH] IN BLOOD BY AUTOMATED COUNT: 14.1 % (ref 11.5–14.5)
GLOBULIN SER CALC-MCNC: 4.6 G/DL (ref 2–4)
GLUCOSE SERPL-MCNC: 101 MG/DL (ref 65–100)
GLUCOSE UR STRIP.AUTO-MCNC: NEGATIVE MG/DL
HCT VFR BLD AUTO: 40.7 % (ref 35–47)
HEMOCCULT STL QL: NEGATIVE
HGB BLD-MCNC: 12.6 G/DL (ref 11.5–16)
HGB UR QL STRIP: NEGATIVE
HYALINE CASTS URNS QL MICRO: ABNORMAL /LPF (ref 0–5)
IMM GRANULOCYTES # BLD AUTO: 0 K/UL (ref 0–0.04)
IMM GRANULOCYTES NFR BLD AUTO: 0 % (ref 0–0.5)
KETONES UR QL STRIP.AUTO: NEGATIVE MG/DL
LEUKOCYTE ESTERASE UR QL STRIP.AUTO: ABNORMAL
LYMPHOCYTES # BLD: 1.9 K/UL (ref 0.8–3.5)
LYMPHOCYTES NFR BLD: 38 % (ref 12–49)
MCH RBC QN AUTO: 25.7 PG (ref 26–34)
MCHC RBC AUTO-ENTMCNC: 31 G/DL (ref 30–36.5)
MCV RBC AUTO: 83.1 FL (ref 80–99)
MONOCYTES # BLD: 0.4 K/UL (ref 0–1)
MONOCYTES NFR BLD: 7 % (ref 5–13)
NEUTS SEG # BLD: 2.6 K/UL (ref 1.8–8)
NEUTS SEG NFR BLD: 54 % (ref 32–75)
NITRITE UR QL STRIP.AUTO: NEGATIVE
NRBC # BLD: 0 K/UL (ref 0–0.01)
NRBC BLD-RTO: 0 PER 100 WBC
PH UR STRIP: 5.5 [PH] (ref 5–8)
PLATELET # BLD AUTO: 267 K/UL (ref 150–400)
PMV BLD AUTO: 11.6 FL (ref 8.9–12.9)
POTASSIUM SERPL-SCNC: 4 MMOL/L (ref 3.5–5.1)
PROT SERPL-MCNC: 8.4 G/DL (ref 6.4–8.2)
PROT UR STRIP-MCNC: NEGATIVE MG/DL
RBC # BLD AUTO: 4.9 M/UL (ref 3.8–5.2)
RBC #/AREA URNS HPF: ABNORMAL /HPF (ref 0–5)
SAMPLES BEING HELD,HOLD: NORMAL
SODIUM SERPL-SCNC: 135 MMOL/L (ref 136–145)
SP GR UR REFRACTOMETRY: 1.01 (ref 1–1.03)
UR CULT HOLD, URHOLD: NORMAL
UROBILINOGEN UR QL STRIP.AUTO: 0.2 EU/DL (ref 0.2–1)
WBC # BLD AUTO: 4.9 K/UL (ref 3.6–11)
WBC URNS QL MICRO: ABNORMAL /HPF (ref 0–4)

## 2022-01-28 PROCEDURE — 74177 CT ABD & PELVIS W/CONTRAST: CPT

## 2022-01-28 PROCEDURE — 36415 COLL VENOUS BLD VENIPUNCTURE: CPT

## 2022-01-28 PROCEDURE — 99213 OFFICE O/P EST LOW 20 MIN: CPT | Performed by: PHYSICIAN ASSISTANT

## 2022-01-28 PROCEDURE — 87086 URINE CULTURE/COLONY COUNT: CPT

## 2022-01-28 PROCEDURE — 82272 OCCULT BLD FECES 1-3 TESTS: CPT

## 2022-01-28 PROCEDURE — 99283 EMERGENCY DEPT VISIT LOW MDM: CPT

## 2022-01-28 PROCEDURE — 81001 URINALYSIS AUTO W/SCOPE: CPT

## 2022-01-28 PROCEDURE — 80053 COMPREHEN METABOLIC PANEL: CPT

## 2022-01-28 PROCEDURE — 74011000636 HC RX REV CODE- 636: Performed by: RADIOLOGY

## 2022-01-28 PROCEDURE — 85025 COMPLETE CBC W/AUTO DIFF WBC: CPT

## 2022-01-28 RX ORDER — CEPHALEXIN 500 MG/1
500 CAPSULE ORAL 2 TIMES DAILY
Qty: 14 CAPSULE | Refills: 0 | Status: SHIPPED | OUTPATIENT
Start: 2022-01-28 | End: 2022-02-04

## 2022-01-28 RX ADMIN — IOPAMIDOL 100 ML: 755 INJECTION, SOLUTION INTRAVENOUS at 14:05

## 2022-01-28 NOTE — ED PROVIDER NOTES
Please note that this dictation was completed with [employer], the computer voice recognition software.  Quite often unanticipated grammatical, syntax, homophones, and other interpretive errors are inadvertently transcribed by the computer software.  Please disregard these errors.  Please excuse any errors that have escaped final proofreading. Patient is a 27-year-old female with history of lupus, asthma, fibroids, presenting to ED for evaluation abdominal cramping and bleeding with onset yesterday. She states that when she was on the toilet yesterday and today she noticed bright red blood on the toilet both when she was urinating and having a bowel movement, she is not sure where the bleeding is coming from. Has not noticed any bleeding on her underwear. Denies any blood within her stool itself or any dark black tarry stools. She had a virtual visit with her PCP this morning who recommended coming to the ER for further evaluation. She is postmenopausal and has not had a menstrual cycle for 7 years, but feels like the cramping feels similar to prior menstrual cramps. Has never had a colonoscopy. Denies fever, chills, chest pain, shortness of breath, nausea, vomiting, diarrhea, urinary changes, or any other medical complaints at this time. Past Medical History:   Diagnosis Date    Asthma     Fibroid     Headache 03/10/2020    Only due to the concussion.  History of concussion 3/10/2020    Lupus [facility])        Past Surgical History:   Procedure Laterality Date    HX ORTHOPAEDIC Bilateral 1996 & 2015    HX TONSILLECTOMY           History reviewed. No pertinent family history.     Social History     Socioeconomic History    Marital status: LIFE PARTNER     Spouse name: Not on file    Number of children: Not on file    Years of education: Not on file    Highest education level: Not on file   Occupational History    Not on file   Tobacco Use    Smoking status: Never Smoker    Smokeless tobacco: Never Used   Vaping Use    Vaping Use: Never used   Substance and Sexual Activity    Alcohol use: Yes     Comment: socially    Drug use: Never    Sexual activity: Yes     Partners: Male   Other Topics Concern    Not on file   Social History Narrative    Not on file     Social Determinants of Health     Financial Resource Strain:     Difficulty of Paying Living Expenses: Not on file   Food Insecurity:     Worried About Running Out of Food in the Last Year: Not on file    Flynn of Food in the Last Year: Not on file   Transportation Needs:     Lack of Transportation (Medical): Not on file    Lack of Transportation (Non-Medical): Not on file   Physical Activity:     Days of Exercise per Week: Not on file    Minutes of Exercise per Session: Not on file   Stress:     Feeling of Stress : Not on file   Social Connections:     Frequency of Communication with Friends and Family: Not on file    Frequency of Social Gatherings with Friends and Family: Not on file    Attends Catholic Services: Not on file    Active Member of [address] [place] [address] or Organizations: Not on file    Attends Club or Organization Meetings: Not on file    Marital Status: Not on file   Intimate Partner Violence:     Fear of Current or Ex-Partner: Not on file    Emotionally Abused: Not on file    Physically Abused: Not on file    Sexually Abused: Not on file   Housing Stability:     Unable to Pay for Housing in the Last Year: Not on file    Number of [place] in the Last Year: Not on file    Unstable Housing in the Last Year: Not on file         ALLERGIES: Lisinopril    Review of Systems   Constitutional: Negative for chills and fever. HENT: Negative for ear pain and sore throat. Eyes: Negative for visual disturbance. Respiratory: Negative for cough and shortness of breath. Cardiovascular: Negative for chest pain. Gastrointestinal: Positive for abdominal pain (cramping) and anal bleeding (vs vaginal bleeding, pt unsure). Negative for diarrhea, nausea and vomiting. Genitourinary: Negative for flank pain. Musculoskeletal: Negative for back pain. Skin: Negative for color change. Neurological: Negative for dizziness and headaches. Psychiatric/Behavioral: Negative for confusion. Vitals:    01/28/22 1123   BP: (!) 218/103   Pulse: 67   Resp: 18   Temp: 97.8 °F (36.6 °C)   SpO2: 99%   Weight: 103.9 kg (229 lb)   Height: 5' 5\" (1.651 m)            Physical Exam  Vitals and nursing note reviewed. Exam conducted with a chaperone present (Ashley NEGRON). Constitutional:       General: She is not in acute distress. Appearance: Normal appearance. She is not ill-appearing. HENT:      Head: Normocephalic and atraumatic. Jaw: There is normal jaw occlusion. Eyes:      General: Vision grossly intact. Extraocular Movements: Extraocular movements intact. Conjunctiva/sclera: Conjunctivae normal.   Neck:      Trachea: Phonation normal.   Cardiovascular:      Rate and Rhythm: Normal rate and regular rhythm. Heart sounds: Normal heart sounds. Pulmonary:      Effort: Pulmonary effort is normal.      Breath sounds: Normal breath sounds and air entry. Abdominal:      Palpations: Abdomen is soft. Tenderness: There is abdominal tenderness in the suprapubic area. Negative signs include Broussard's sign and McBurney's sign. Genitourinary:     Vagina: Normal. No bleeding. Rectum: Normal. Guaiac result negative. No tenderness. Musculoskeletal:         General: Normal range of motion. Cervical back: Normal range of motion. Skin:     General: Skin is warm and dry. Neurological:      General: No focal deficit present. Mental Status: She is alert and oriented to person, place, and time.    Psychiatric:         Attention and Perception: Attention normal.         Mood and Affect: Mood normal.         Speech: Speech normal.          MDM  Number of Diagnoses or Management Options  Acute cystitis without hematuria  Uterine leiomyoma, unspecified location  Vaginal bleeding  Diagnosis management comments: Patient is alert, afebrile, blood pressure elevated, remainder of vitals are stable. Pt notes she took her BP this morning and it was normal, but that she got very nervous when her PCP told her to come to the ER, repeat BP improved. Presents with several days of abdominal cramping and rectal versus vaginal bleeding, pt unsure where bleeding is coming from. Suprapubic tenderness on exam. Pelvic exam without any visible active cervical or vaginal bleeding. Rectal exam also without active bleeding, Hemoccult negative. Labs unremarkable, stable hemoglobin. UA with leukocyte Estrace and 1+ bacteria, culture ordered. Abdominal CT shows a uterine fibroid without additional intra-abdominal pathology. Suspect this is likely the cause of her intermittent bleeding. Will treat UTI with antibiotics and refer her to GYN for further evaluation. Return precautions outlined. All questions answered at this time. Amount and/or Complexity of Data Reviewed  Discuss the patient with other providers: yes (Discussed patient with ED attending Fede Feliz MD who agrees with current management plan. )      ED Course as of 01/28/22 1327   Fri Jan 28, 2022   1327 BP(!): 167/92 [EP]   1327 Occult blood, stool: Negative [EP]      ED Course User Index  [EP] Mike Zamarripa PA     4:17 PM  Pt has been reevaluated. There are no new complaints, changes, or physical findings at this time. All results have been reviewed with patient and/or family. Medications have been reviewed w/ pt and/or family. Pt and/or family's questions have been answered. Pt and/or family expressed good understanding of the dx/tx/rx and is in agreement with plan of care. Pt instructed and agreed to f/u w/ GYN and to return to ED upon further deterioration. Pt is ready for discharge. IMPRESSION:  1. Acute cystitis without hematuria    2.  Uterine leiomyoma, unspecified location    3. Vaginal bleeding        PLAN:  1. Discharge Medication List as of 1/28/2022  2:52 PM      START taking these medications    Details   cephALEXin (Keflex) 500 mg capsule Take 1 Capsule by mouth two (2) times a day for 7 days. , Normal, Disp-14 Capsule, R-0         CONTINUE these medications which have NOT CHANGED    Details   hydrOXYchloroQUINE (PlaqueniL) 200 mg tablet Take 2 Tablets by mouth daily. , Normal, Disp-60 Tablet, R-5      hydroCHLOROthiazide (HYDRODIURIL) 25 mg tablet TAKE 1 TABLET BY MOUTH EVERY DAY, Normal, Disp-90 Tab, R-1      amLODIPine (NORVASC) 5 mg tablet Take 1 Tab by mouth daily. , Normal, Disp-30 Tab,R-2      montelukast (SINGULAIR) 10 mg tablet Take 1 Tab by mouth every evening., Normal, Disp-90 Tab,R-1      budesonide-formoteroL (Symbicort) 160-4.5 mcg/actuation HFAA Take 2 Puffs by inhalation two (2) times a day. Indications: controller medication for asthma, Normal, Disp-3 Inhaler,R-3      albuterol (ACCUNEB) 1.25 mg/3 mL nebu 3 mL by Nebulization route every six (6) hours as needed for Shortness of Breath. Indications: asthma attack, Normal, Disp-100 Each,R-1      albuterol (PROVENTIL HFA, VENTOLIN HFA, PROAIR HFA) 90 mcg/actuation inhaler Take 1 Puff by inhalation every four (4) hours as needed for Shortness of Breath., Normal, Disp-1 Inhaler,R-2      DULoxetine (CYMBALTA) 30 mg capsule Take 1 Cap by mouth daily. , Normal, Disp-30 Cap, R-2           2.    Follow-up Information     Follow up With Specialties Details Why Contact Info    Soniya Chawla PA-C Physician Assistant Schedule an appointment as soon as possible for a visit   [address] [facility]      Oneal Gar MD Gynecology, Obstetrics, Obstetrics & Gynecology Schedule an appointment as soon as possible for a visit   [address]  [facility]  [phone]              Return to ED if worse     Procedures

## 2022-01-28 NOTE — PROGRESS NOTES
1. Have you been to the ER, urgent care clinic since your last visit? Hospitalized since your last visit? No    2. Have you seen or consulted any other health care providers outside of the [address] [place] [address] since your last visit? Include any pap smears or colon screening. No    Health Maintenance Due   Topic Date Due    DTaP/Tdap/Td series (1 - Tdap) Never done    Colorectal Cancer Screening Combo  Never done    Shingrix Vaccine Age 50> (1 of 2) Never done    Flu Vaccine (1) Never done    A1C test (Diabetic or Prediabetic)  09/02/2021    COVID-19 Vaccine (3 - Booster for Pfizer series) 10/03/2021     Patient c/o of bleeding, patient states when she voids there is bright red blood the size 1/2 dollar intermittently, patient also experiencing severe cramping and a lot of pressure, patient feels like she has to have a BM, but can't. x3 days.

## 2022-01-28 NOTE — ED NOTES
TRIAGE: Pt arrives with c/o mid abdominal & lower back pressure that started Tuesday. Pt reports seeing blood in toilet but can't determine what it is coming from. Pt has not had a menstrual cycle for 7 years. Denies N/V/fever. Hx lupus.

## 2022-01-28 NOTE — PROGRESS NOTES
Mary Jo Curiel is a 61 y.o. female who was seen by synchronous (real-time) audio-video technology on 1/28/2022 for Irregular Menses        Assessment & Plan:   Diagnoses and all orders for this visit:    1. Bilateral lower abdominal cramping    2. Abnormal vaginal bleeding    3. Screening mammogram for breast cancer  -     [facility] M[facility]MO BI SCREENING INCL CAD; Future    Advised the patient to go to the emergency room for further evaluation. I explained to her that she will need to have some imaging done to find out where the bleeding is coming from. Patient states that she would go to [facility] for further evaluation. The patient asked that a referral be put in for mammogram and she would like to get a Pap smear done. I advised her that I'll put the order in for the mammogram but I'll have the front staff to call her to set up an appointment to see Juliana Jose for her Pap smear. I spent at least 20 minutes on this visit with this established patient. 712  Subjective:   Patient presents today for abdominal pain and intermittent bleeding. She states she started with lower abdominal cramping on Wednesday. The patient reports that the cramping felt as though she was having a menstrual cycle. The patient reports that she has not had a menstrual cycle since the age of 45 or so. The patient reports that she has been urinating okay. Thursday morning when she went to the bathroom she saw some blood. Patient reports when she wiped however she could not figure out where the blood came from. She states that she has had a regular bowel movement. She states that the pain level is about a 6 with a lot of pressure and cramping. Also reports that she just don't feel well. Prior to Admission medications    Medication Sig Start Date End Date Taking? Authorizing Provider   hydrOXYchloroQUINE (PlaqueniL) 200 mg tablet Take 2 Tablets by mouth daily.  9/22/21  Yes Ji Manzo MD   hydroCHLOROthiazide (HYDRODIURIL) 25 mg tablet TAKE 1 TABLET BY MOUTH EVERY DAY 2/15/21  Yes Bianka Preciado PA-C   amLODIPine (NORVASC) 5 mg tablet Take 1 Tab by mouth daily. 10/26/20  Yes Bianka Preciado PA-C   montelukast (SINGULAIR) 10 mg tablet Take 1 Tab by mouth every evening. 8/28/20  Yes Bianka Preciado PA-C   budesonide-formoteroL (Symbicort) 160-4.5 mcg/actuation HFAA Take 2 Puffs by inhalation two (2) times a day. Indications: controller medication for asthma 8/28/20  Yes Bianka Preciado PA-C   albuterol (ACCUNEB) 1.25 mg/3 mL nebu 3 mL by Nebulization route every six (6) hours as needed for Shortness of Breath. Indications: asthma attack 8/28/20  Yes Bianka Preciado PA-C   albuterol (PROVENTIL HFA, VENTOLIN HFA, PROAIR HFA) 90 mcg/actuation inhaler Take 1 Puff by inhalation every four (4) hours as needed for Shortness of Breath. 8/28/20  Yes Bianka Preciado PA-C   DULoxetine (CYMBALTA) 30 mg capsule Take 1 Cap by mouth daily. 3/12/20  Yes Meme Rodrigez, DO     Allergies   Allergen Reactions    Lisinopril Angioedema       ROS    Objective:     Patient-Reported Vitals 11/12/2020   Patient-Reported Weight -   Patient-Reported Pulse 82   Patient-Reported Temperature -   Patient-Reported Systolic  297   Patient-Reported Diastolic 97      General: alert, cooperative, no distress   Mental  status: normal mood, behavior, speech, dress, motor activity, and thought processes, able to follow commands   HENT: NCAT   Neck: no visualized mass   Resp: no respiratory distress   Neuro: no gross deficits   Skin: no discoloration or lesions of concern on visible areas   Psychiatric: normal affect, consistent with stated mood, no evidence of hallucinations     Additional exam findings: We discussed the expected course, resolution and complications of the diagnosis(es) in detail. Medication risks, benefits, costs, interactions, and alternatives were discussed as indicated.   I advised her to contact the office if her condition worsens, changes or fails to improve as anticipated. She expressed understanding with the diagnosis(es) and plan. Jass Singh, was evaluated through a synchronous (real-time) audio-video encounter. The patient (or guardian if applicable) is aware that this is a billable service, which includes applicable co-pays. Verbal consent to proceed has been obtained. The visit was conducted pursuant to the emergency declaration under the [address] [place] [address] and the [employer] and [employer] Act. Patient identification was verified, and a caregiver was present when appropriate. The patient was located at home in a state where the provider was licensed to provide care.     Demetrice Muñiz PA-C

## 2022-01-28 NOTE — ED NOTES
[address] January 28, 2022       RE: Tricia Ayala      To Whom It May Concern,    This is to certify that Tricia Ayala may may return to work on 1/29/2022. Please feel free to contact my office if you have any questions or concerns. Thank you for your assistance in this matter.       Sincerely,  Sandoval Yang RN

## 2022-01-29 LAB
BACTERIA SPEC CULT: NORMAL
SERVICE CMNT-IMP: NORMAL

## 2022-02-16 ENCOUNTER — TELEPHONE (OUTPATIENT)
Dept: RHEUMATOLOGY | Age: 57
End: 2022-02-16

## 2022-02-16 NOTE — TELEPHONE ENCOUNTER
Patient called in advising Dr. 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     Best contact number [phone]

## 2022-02-16 NOTE — LETTER
2/18/2022 5:37 PM    Ms. Josephine Haynes    Dear Ms. Caballero:  You may show this letter to employers at your discretion if you are comfortable sharing your medical information with them. You are a patient of mine at the [facility], last seen on 12/21/2021. You are being treated for systemic lupus erythematosus (diagnosis code M32.19) with immunosuppressant medication (specifically, Benlysta infusions monthly). You have concurrent lung disease with asthma being managed with inhalers by outside providers. Due to these conditions and medications, you are at increased risk for both acquiring COVID-19, and having more severe complications of [id number]. If work place accommodations are available, I advise you to minimize your daily exposure to close contact situations with the public. I appreciate your employers' understanding in making any accommodations possible to minimize subjecting you to high-risk exposures.         Sincerely,      Priyank Orona MD

## 2022-02-17 NOTE — PROGRESS NOTES
[facility] Rheumatology  OBIC Note    Date: 2022    Rheumatology OBIC COVID-19 SCREENING  The patient was asked the following questions and answered as documented below:    1. Do you have any symptoms of COVID-19? SOB, coughing, fever, or generally not feeling well? NO  2. Have you been exposed to COVID-19 recently? NO  3. Have you had any recent contact with family/friend that has a pending COVID test? NO    Name: Ilene Rojas  MRN: [id number]       : 1965  Diagnosis: Lupus  Treatment: Benlysta  960mg every 4 wks  Referring Provider: Dr. Yolanda Spurling  Supervising Provider: Dr. Yolanda Spurling    Patient arrived to [facility] at [address] [facility]. Ms. Gail Leung allergies reviewed and she agreed to receiving today's treatment. A physical assessment was performed initially and post-treatment. Pt. denies new complaints today. Ms. Kenia Rodriguez vitals were monitored before after medication administration.    Vitals:    22 0808 22 0810 22 0935   BP:  (!) 169/99 (!) 173/98   Pulse:  68 67   Resp:  16 18   Temp:  97 °F (36.1 °C)    SpO2:  98% 97%   Weight: 215 lb 2.7 oz (97.6 kg)       Dose verify: 10mgkg x 97.6kg = 976mg    Blood work drawn and walked to lab    Medications given per providers orders:  Administrations This Visit     0.9% sodium chloride infusion     Admin Date  2022 Action  New Bag Dose  25 mL/hr Rate  25 mL/hr Route  IntraVENous Administered By  Mike Michael RN          belimumab (BENLYSTA) 960 mg in 0.9% sodium chloride 250 mL, overfill volume 25 mL infusion     Admin Date  2022 Action  New Bag Dose  960 mg Rate  287 mL/hr Route  IntraVENous Administered By  Mike Michael RN          sodium chloride (NS) flush 10 mL     Admin Date  2022 Action  Given Dose  10 mL Route  IntraVENous Administered By  Mike Michael RN                Benlysta 960mg   [address] 33698-817-86  Lot #  [id number]  Exp 2026  ---mixed in---  250ml 0.9% Normal Saline  NDC 1710-5779-94  Lot # [id number]  Exp 1/30/2023     HGQWS: 0125  SOHU: 9639     500ml bag 0.9% Normal Saline @ 25ml/hr for formerly [facility] 2220-6994-37  Lot # [id number]  Exp 06/2024     0.9% Normal Saline Flush 10ml x 1  LBA [address]-04  Lot # [id number]  Exp 04/2024    Lines: 24G left uper FA. Blood return noted and IV site assessed before, during, and after treatment. Line flushed with 10-30 ml's 0.9% Normal Saline solution per protocol. Access was removed from Ms. Caballero after completion of infusion/injection. Ms. Giancarlo Lunsford tolerated the treatment without complaints and no medication reactions were seen while in presence of this RN. Patient provided with AVS, which included future appointments and written educational material regarding Benlysta. All of the patients questions were answered and then discharged ambulatory from Rheumatology OBIC in stable condition at 0945. Encounter routed to supervising provider for co-signing. Future Appointments   Date Time Provider Giana Carranza   3/21/2022  8:00 AM MD JESSENIA Cox BS AMB   3/22/2022  8:00 AM INFUSIONINJ_RC AOCR BS AMB   4/19/2022  8:15 AM INFUSIONINJ_RC AOCR BS AMB   5/16/2022  8:00 AM INFUSIONINJ_RC AOCR BS AMB     Trey Sharma RN  February 22, 2022  10:00AM    ---  ATTENDING ATTESTATION    Sarah Orona, hereby attest that the medical record entry for 2/22/22 accurately reflects signatures/notations that I made in my capacity as treating physician when I treated/diagnosed the above listed patient. I do hereby attest that this information is true, accurate and complete to the best of my knowledge and I understand that any falsification, omission, or concealment of material fact may subject me to administrative, civil, or criminal liability.   Priyank Orona MD S  Adult Rheumatology  Signed 03/11/22 5:23 PM

## 2022-02-22 ENCOUNTER — OFFICE VISIT (OUTPATIENT)
Dept: RHEUMATOLOGY | Age: 57
End: 2022-02-22
Payer: MEDICAID

## 2022-02-22 VITALS
TEMPERATURE: 97 F | SYSTOLIC BLOOD PRESSURE: 173 MMHG | RESPIRATION RATE: 18 BRPM | WEIGHT: 215.17 LBS | OXYGEN SATURATION: 97 % | BODY MASS INDEX: 35.81 KG/M2 | DIASTOLIC BLOOD PRESSURE: 98 MMHG | HEART RATE: 67 BPM

## 2022-02-22 DIAGNOSIS — M32.19 OTHER SYSTEMIC LUPUS ERYTHEMATOSUS WITH OTHER ORGAN INVOLVEMENT (HCC): ICD-10-CM

## 2022-02-22 DIAGNOSIS — R76.8 POSITIVE DOUBLE STRANDED DNA ANTIBODY TEST: Primary | ICD-10-CM

## 2022-02-22 LAB
ALBUMIN SERPL-MCNC: 3.6 G/DL (ref 3.5–5)
ALBUMIN/GLOB SERPL: 0.9 {RATIO} (ref 1.1–2.2)
ALP SERPL-CCNC: 117 U/L (ref 45–117)
ALT SERPL-CCNC: 30 U/L (ref 12–78)
ANION GAP SERPL CALC-SCNC: 5 MMOL/L (ref 5–15)
AST SERPL-CCNC: 24 U/L (ref 15–37)
BASOPHILS # BLD: 0 K/UL (ref 0–0.1)
BASOPHILS NFR BLD: 1 % (ref 0–1)
BILIRUB SERPL-MCNC: 0.2 MG/DL (ref 0.2–1)
BUN SERPL-MCNC: 20 MG/DL (ref 6–20)
BUN/CREAT SERPL: 24 (ref 12–20)
CALCIUM SERPL-MCNC: 9.1 MG/DL (ref 8.5–10.1)
CHLORIDE SERPL-SCNC: 108 MMOL/L (ref 97–108)
CO2 SERPL-SCNC: 26 MMOL/L (ref 21–32)
CREAT SERPL-MCNC: 0.84 MG/DL (ref 0.55–1.02)
CRP SERPL-MCNC: 0.82 MG/DL (ref 0–0.6)
DIFFERENTIAL METHOD BLD: ABNORMAL
EOSINOPHIL # BLD: 0.1 K/UL (ref 0–0.4)
EOSINOPHIL NFR BLD: 1 % (ref 0–7)
ERYTHROCYTE [DISTWIDTH] IN BLOOD BY AUTOMATED COUNT: 13.7 % (ref 11.5–14.5)
ERYTHROCYTE [SEDIMENTATION RATE] IN BLOOD: 31 MM/HR (ref 0–30)
GLOBULIN SER CALC-MCNC: 4 G/DL (ref 2–4)
GLUCOSE SERPL-MCNC: 113 MG/DL (ref 65–100)
HCT VFR BLD AUTO: 41.3 % (ref 35–47)
HGB BLD-MCNC: 12.5 G/DL (ref 11.5–16)
IMM GRANULOCYTES # BLD AUTO: 0 K/UL (ref 0–0.04)
IMM GRANULOCYTES NFR BLD AUTO: 0 % (ref 0–0.5)
LYMPHOCYTES # BLD: 1.9 K/UL (ref 0.8–3.5)
LYMPHOCYTES NFR BLD: 39 % (ref 12–49)
MCH RBC QN AUTO: 25.5 PG (ref 26–34)
MCHC RBC AUTO-ENTMCNC: 30.3 G/DL (ref 30–36.5)
MCV RBC AUTO: 84.1 FL (ref 80–99)
MONOCYTES # BLD: 0.5 K/UL (ref 0–1)
MONOCYTES NFR BLD: 10 % (ref 5–13)
NEUTS SEG # BLD: 2.4 K/UL (ref 1.8–8)
NEUTS SEG NFR BLD: 49 % (ref 32–75)
NRBC # BLD: 0 K/UL (ref 0–0.01)
NRBC BLD-RTO: 0 PER 100 WBC
PLATELET # BLD AUTO: 280 K/UL (ref 150–400)
PMV BLD AUTO: 11.8 FL (ref 8.9–12.9)
POTASSIUM SERPL-SCNC: 4.2 MMOL/L (ref 3.5–5.1)
PROT SERPL-MCNC: 7.6 G/DL (ref 6.4–8.2)
RBC # BLD AUTO: 4.91 M/UL (ref 3.8–5.2)
SODIUM SERPL-SCNC: 139 MMOL/L (ref 136–145)
WBC # BLD AUTO: 4.9 K/UL (ref 3.6–11)

## 2022-02-22 PROCEDURE — 96365 THER/PROPH/DIAG IV INF INIT: CPT

## 2022-02-22 RX ORDER — HYDROCORTISONE SODIUM SUCCINATE 100 MG/2ML
100 INJECTION, POWDER, FOR SOLUTION INTRAMUSCULAR; INTRAVENOUS AS NEEDED
Status: CANCELLED | OUTPATIENT
Start: 2022-03-22

## 2022-02-22 RX ORDER — SODIUM CHLORIDE 0.9 % (FLUSH) 0.9 %
10 SYRINGE (ML) INJECTION AS NEEDED
Status: DISCONTINUED | OUTPATIENT
Start: 2022-02-22 | End: 2022-02-22 | Stop reason: HOSPADM

## 2022-02-22 RX ORDER — SODIUM CHLORIDE 9 MG/ML
10 INJECTION INTRAMUSCULAR; INTRAVENOUS; SUBCUTANEOUS AS NEEDED
Status: CANCELLED | OUTPATIENT
Start: 2022-03-22

## 2022-02-22 RX ORDER — DIPHENHYDRAMINE HYDROCHLORIDE 50 MG/ML
25 INJECTION, SOLUTION INTRAMUSCULAR; INTRAVENOUS AS NEEDED
Status: CANCELLED
Start: 2022-03-22

## 2022-02-22 RX ORDER — DIPHENHYDRAMINE HYDROCHLORIDE 50 MG/ML
50 INJECTION, SOLUTION INTRAMUSCULAR; INTRAVENOUS AS NEEDED
Status: CANCELLED
Start: 2022-03-22

## 2022-02-22 RX ORDER — ALBUTEROL SULFATE 0.83 MG/ML
2.5 SOLUTION RESPIRATORY (INHALATION) AS NEEDED
Status: CANCELLED
Start: 2022-03-22

## 2022-02-22 RX ORDER — ACETAMINOPHEN 325 MG/1
650 TABLET ORAL AS NEEDED
Status: CANCELLED
Start: 2022-03-22

## 2022-02-22 RX ORDER — SODIUM CHLORIDE 9 MG/ML
25 INJECTION, SOLUTION INTRAVENOUS CONTINUOUS
Status: DISCONTINUED | OUTPATIENT
Start: 2022-02-22 | End: 2022-02-22 | Stop reason: HOSPADM

## 2022-02-22 RX ORDER — SODIUM CHLORIDE 9 MG/ML
25 INJECTION, SOLUTION INTRAVENOUS CONTINUOUS
Status: CANCELLED | OUTPATIENT
Start: 2022-03-22

## 2022-02-22 RX ORDER — ONDANSETRON 2 MG/ML
8 INJECTION INTRAMUSCULAR; INTRAVENOUS AS NEEDED
Status: CANCELLED | OUTPATIENT
Start: 2022-03-22

## 2022-02-22 RX ORDER — SODIUM CHLORIDE 0.9 % (FLUSH) 0.9 %
10 SYRINGE (ML) INJECTION AS NEEDED
Status: CANCELLED | OUTPATIENT
Start: 2022-03-22

## 2022-02-22 RX ORDER — EPINEPHRINE 1 MG/ML
0.3 INJECTION, SOLUTION, CONCENTRATE INTRAVENOUS AS NEEDED
Status: CANCELLED | OUTPATIENT
Start: 2022-03-22

## 2022-02-22 RX ORDER — HEPARIN 100 UNIT/ML
300-500 SYRINGE INTRAVENOUS AS NEEDED
Status: CANCELLED
Start: 2022-03-22

## 2022-02-22 RX ADMIN — Medication 10 ML: at 08:21

## 2022-02-22 RX ADMIN — SODIUM CHLORIDE 25 ML/HR: 9 INJECTION, SOLUTION INTRAVENOUS at 08:20

## 2022-03-03 ENCOUNTER — HOSPITAL ENCOUNTER (OUTPATIENT)
Dept: MAMMOGRAPHY | Age: 57
Discharge: HOME | End: 2022-03-03
Attending: PHYSICIAN ASSISTANT
Payer: MEDICAID

## 2022-03-03 DIAGNOSIS — Z12.31 SCREENING MAMMOGRAM FOR BREAST CANCER: ICD-10-CM

## 2022-03-03 PROCEDURE — 77067 SCR MAMMO BI INCL CAD: CPT

## 2022-03-18 PROBLEM — M32.9 SYSTEMIC LUPUS ERYTHEMATOSUS (HCC): Status: ACTIVE | Noted: 2021-11-12

## 2022-03-18 PROBLEM — M76.62 ACHILLES TENDINITIS, LEFT LEG: Status: ACTIVE | Noted: 2020-08-24

## 2022-03-19 PROBLEM — R76.8 POSITIVE DOUBLE STRANDED DNA ANTIBODY TEST: Status: ACTIVE | Noted: 2021-11-12

## 2022-03-19 PROBLEM — I10 ESSENTIAL HYPERTENSION: Status: ACTIVE | Noted: 2020-03-10

## 2022-03-19 PROBLEM — Z87.891 HISTORY OF SMOKING 10-25 PACK YEARS: Status: ACTIVE | Noted: 2020-10-26

## 2022-03-19 PROBLEM — R73.09 ELEVATED HEMOGLOBIN A1C: Status: ACTIVE | Noted: 2020-09-03

## 2022-03-19 PROBLEM — Z87.820 HISTORY OF CONCUSSION: Status: ACTIVE | Noted: 2020-03-10

## 2022-03-20 PROBLEM — E78.00 HYPERCHOLESTEREMIA: Status: ACTIVE | Noted: 2020-09-03

## 2022-03-20 PROBLEM — G57.62 MORTON'S NEUROMA, LEFT: Status: ACTIVE | Noted: 2020-10-19

## 2022-03-20 PROBLEM — E66.01 SEVERE OBESITY (HCC): Status: ACTIVE | Noted: 2020-10-26

## 2022-03-20 PROBLEM — R51.9 HEADACHE: Status: ACTIVE | Noted: 2020-03-10

## 2022-03-21 ENCOUNTER — OFFICE VISIT (OUTPATIENT)
Dept: RHEUMATOLOGY | Age: 57
End: 2022-03-21
Payer: MEDICAID

## 2022-03-21 VITALS
HEART RATE: 72 BPM | DIASTOLIC BLOOD PRESSURE: 84 MMHG | OXYGEN SATURATION: 99 % | HEIGHT: 65 IN | BODY MASS INDEX: 36.46 KG/M2 | SYSTOLIC BLOOD PRESSURE: 176 MMHG | TEMPERATURE: 98 F | WEIGHT: 218.8 LBS | RESPIRATION RATE: 18 BRPM

## 2022-03-21 DIAGNOSIS — M32.19 OTHER SYSTEMIC LUPUS ERYTHEMATOSUS WITH OTHER ORGAN INVOLVEMENT (HCC): Primary | ICD-10-CM

## 2022-03-21 DIAGNOSIS — Z79.899 ONGOING USE OF POSSIBLY TOXIC MEDICATION: ICD-10-CM

## 2022-03-21 PROCEDURE — 99214 OFFICE O/P EST MOD 30 MIN: CPT | Performed by: INTERNAL MEDICINE

## 2022-03-21 NOTE — PROGRESS NOTES
Chief Complaint   Patient presents with    Lupus     1. Have you been to the ER, urgent care clinic since your last visit? Hospitalized since your last visit? No    2. Have you seen or consulted any other health care providers outside of the [address] [place] [address] since your last visit? Include any pap smears or colon screening.  No

## 2022-03-21 NOTE — PROGRESS NOTES
[address] [place] [address] Rheumatology  OBIC Note    Date: 2022  Name: Norman Cueto  MRN: [id number]       : 1965  Diagnosis: Lupus  Treatment: Benlysta 960mg every 4 wks  Referring Provider: Dr. Reji Herrera  Supervising Provider: Dr. Reji Herrera    Patient arrived to [facility] at [address] [facility]. Ms. Giancarlo Lunsford allergies reviewed and she agreed to receiving today's treatment. A physical assessment was performed initially and post-treatment. Pt. denies new complaints today. Ms. Mitul Rider vitals were monitored before and after medication administration. Vitals:    22 0813 22 0851 22 0934   BP: (!) 188/110 (!) 156/73 (!) 168/88   Pulse: 84  65   Resp: 18  18   SpO2: 97%  98%     UA collected and walked to [facility]. Labs will be drawn next time     Medications given per providers orders:  Administrations This Visit     0.9% sodium chloride infusion     Admin Date  2022 Action  New Bag Dose  25 mL/hr Rate  25 mL/hr Route  IntraVENous Administered By  Flory Coats RN          belimumab (BENLYSTA) 960 mg in 0.9% sodium chloride 250 mL, overfill volume 25 mL infusion     Admin Date  2022 Action  New Bag Dose  960 mg Rate  287 mL/hr Route  IntraVENous Administered By  Flory Coats RN          sodium chloride (NS) flush 10 mL     Admin Date  2022 Action  Given Dose  10 mL Route  IntraVENous Administered By  Flory Coats RN              Benlysta 960mg   NDC 80111-088-48  Lot #  [id number]  Exp 2026  ---mixed in---  250ml 0.9% Normal Saline  ZSV 7415-2848-18  Lot # [id number]  Exp 2023     VGRZV: 6373  QUQP: 7276     1000ml bag 0.9% Normal Saline @ 25ml/hr for [facility] 5003-0423-66  Lot # [id number]  Exp 2024     0.9% Normal Saline Flush 10ml x 1  ELE [address]-71  Lot # [id number]  Exp 2024    Lines: 24G left upper FA. Blood return noted and IV site assessed before, during, and after treatment. Line flushed with 10-30 ml's 0.9% Normal Saline solution per protocol.      Access was removed from Ms. Best after completion of infusion/injection. Ms. Jana Aparicio tolerated the treatment without complaints and no medication reactions were seen while in presence of this RN. Patient provided with AVS, which included future appointments and written educational material regarding Benlysta. All of the patients questions were answered and then discharged ambulatory from Rheumatology OBIC in stable condition at 0945. Encounter routed to supervising provider for co-signing. Future Appointments   Date Time Provider Giana Tere   4/19/2022  8:15 AM INFUSIONINJ_RC AOCR BS AMB   5/16/2022  8:00 AM INFUSIONINJ_RC AOCR BS AMB   6/15/2022  8:00 AM INFUSIONINJ_RC AOCR BS AMB   6/15/2022  8:30 AM [facility]   7/13/2022  8:00 AM INFUSIONINJ_RC AOCR BS AMB   8/10/2022  8:00 AM INFUSIONINJ_RC AOCR BS AMB   9/7/2022  8:00 AM INFUSIONINJ_RC AOCR BS AMB   9/7/2022  8:30 AM [facility]     Arielle Spencer RN  March 22, 2022     ---  ATTENDING ATTESTATION    I, Lucila Norris, hereby attest that the medical record entry for 3/22/22 accurately reflects signatures/notations that I made in my capacity as treating physician when I treated/diagnosed the above listed patient. I do hereby attest that this information is true, accurate and complete to the best of my knowledge and I understand that any falsification, omission, or concealment of material fact may subject me to administrative, civil, or criminal liability.   Lucila Norris MD S  Adult Rheumatology  Signed 03/23/22 12:38 AM   1:40 PM

## 2022-03-21 NOTE — PROGRESS NOTES
REASON FOR VISIT:    is a 61 y.o. female with history of hypertension and obesity who is returning for in-office followup of systemic lupus characterized by arthritis, alopecia, subacute cutaneous lupus rash, and +NIRAV 1:640 homogenous with +dsDNA. HISTORY OF PRESENT ILLNESS    Currently taking Plaquenil (hydroxychloroquine) 200mg daily and monthly Benlysta infusions. Remains very happy with lupus control. Can \"walk, run, and skip\" without problems. No joint pain or swelling. Morning stiffness remains resolved. No problems with Benlysta infusions. Saw ophthalmology and given new glasses, no known eye complications of Plaquenil (hydroxychloroquine). Mood has been good. No infusion reactions. 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    No oral ulcers. No progressive hair loss. Disease History: In 2012 developed increased joint pain and stiffness, marked pain sensitivity, couldn't walk or stand to be touched, get in and out of a car. Thought at first possibly shingles, pain didn't resolve. Able to get expedited evaluation with Rheumatology (Dr. Lawrence with [facility]), diagnosed with lupus. Prednisone and Plaquenil (hydroxychloroquine) were started, and within about a week was feeling better. Around the same time, had developed significant frontotemporal and vertex hair loss. Now prolonged cold exposure is her major trigger of joint pain flares. Initially was having pain flares 2-3x/month while in [place]. Since moved to [address] [facility] in 2019 to help her mother with foster children, has had less frequent flares maybe 2x/every 3 months. Flares respond to prednisone tapers. Between flares, she denies consistent joint pain. Some aching in hands or shoulders with activity. Has been able to lose weight from 236 to 209 with use of exercise bike over the last 6 months.   Gets perioral papular rashes, not more typical malar rash. With sun exposure in the past, has developed painful annular silver-dollar sized lesions lasting a week or two, not in the last year. Good about sun avoidance. Usually goes to [facility] for eye checks, recently seen. No current ophthalmologist for Plaquenil screening. REVIEW OF SYSTEMS  A comprehensive review of systems was negative except for that written in the HPI. A 10-point review of systems is per the new patient questionnaire, which has been reviewed extensively and scanned into the electronic medical record for future reference. Review of systems is as above and is otherwise negative. ALLERGIES  Lisinopril    MEDICATIONS  Current Outpatient Medications   Medication Sig    hydrOXYchloroQUINE (PlaqueniL) 200 mg tablet Take 2 Tablets by mouth daily.  hydroCHLOROthiazide (HYDRODIURIL) 25 mg tablet TAKE 1 TABLET BY MOUTH EVERY DAY    amLODIPine (NORVASC) 5 mg tablet Take 1 Tab by mouth daily.  montelukast (SINGULAIR) 10 mg tablet Take 1 Tab by mouth every evening.  budesonide-formoteroL (Symbicort) 160-4.5 mcg/actuation HFAA Take 2 Puffs by inhalation two (2) times a day. Indications: controller medication for asthma    albuterol (ACCUNEB) 1.25 mg/3 mL nebu 3 mL by Nebulization route every six (6) hours as needed for Shortness of Breath. Indications: asthma attack    albuterol (PROVENTIL HFA, VENTOLIN HFA, PROAIR HFA) 90 mcg/actuation inhaler Take 1 Puff by inhalation every four (4) hours as needed for Shortness of Breath.  DULoxetine (CYMBALTA) 30 mg capsule Take 1 Cap by mouth daily. No current facility-administered medications for this visit. PAST MEDICAL HISTORY  Past Medical History:   Diagnosis Date    Asthma     Fibroid     Headache 03/10/2020    Only due to the concussion.  History of concussion 3/10/2020    Lupus ([facility] [address] [address]     Menopause        FAMILY HISTORY  2 cousins had lupus.  Oldest cousin passed 2/2 lupus, youngest cousin passed 2/2 COVID but had lupus. Mother is alive. SOCIAL HISTORY  She  reports that she has never smoked. She has never used smokeless tobacco. She reports current alcohol use. She reports that she does not use drugs. 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 DATA  Visit Vitals  BP (!) 176/84 (BP 1 Location: Right arm, BP Patient Position: Sitting) Comment: pt states she hasn;t taken her bp medds, dr Charles Crespo notified   Pulse 72   Temp 98 °F (36.7 °C) (Oral)   Resp 18   Ht 5' 5\" (1.651 m)   Wt 218 lb 12.8 oz (99.2 kg)   SpO2 99%   BMI 36.41 kg/m²    Body mass index is 36.41 kg/m². No flowsheet data found. General:  The patient is pleasant, obese, alert, and in no apparent distress. Eyes: Sclera are anicteric. No conjunctival injection. HEENT:  Oropharynx is clear. No oral ulcers. Adequate salivary pooling. No cervical or supraclavicular lymphadenopathy. Lungs:  Clear to auscultation bilaterally, without wheeze or stridor. Normal respiratory effort. Cor:  Regular rate and rhythm. No murmur rub or gallop. Abdomen: Soft, non-tender, without hepatomegaly or masses. Extremities: No calf tenderness or edema. Warm and well perfused. Skin: Stable bitemporal and frontal alopecia. Still resolved perioral rash. No purpura or plaques. Neuro: Nonfocal, no foot or wrist drop. Musculoskeletal:    A comprehensive musculoskeletal exam was performed for all joints of each upper and lower extremity and assessed for swelling, tenderness and range of motion. Results are documented as below:  No current FMTP. B shoulder crepitus without warmth or effusion. Still resolved tenderness in bilateral MCPs and PIPs.   No evidence of synovitis in the small joints of the hands, wrists, shoulders, elbows, hips, knees or ankles. Labs:  22: WBC 4.9, Hgb 12.5, Plt 31; Cr 0.84, LFTs WNL, CRP 0.82mg/dL, ESR 31  21: WBC 4.6, Hgb 12.9, Plt 290; ESR 47, Cr 0.92, Tbili 0.3, AST 16, ALT 28, AlkP 131, CRP 0.67mg/dL  21: dsDNA by Crithidia 1:80, QFN gold negative,   21: WBC 5.0 (ANC 2500, ALC 2000), Hgb 11.8, Pl t 330; ESR 66, UA neg for protein or blood; Cr 1.03, Tbili <0.2, AST 15, ALT 16, AlkP 132, urine pr/cr 0.058, mariah neg, NIRAV 1:640 homogenous, dsDNA 12; SSA, SSB, RNP, chromatin, Scl70, centromere B, ribosomal P, Jo1 negative; CRP 11mg/L, C3 and C4 WNL  20: RF neg, CCP neg, 14.3.3 eta neg; CRP 10.48 mg/L; NIRAV direct positive (no reflex), ESR 45; WBC 5.1 [ANC 2400, ALC 2100], Hgb 11.9, Plt 286; Cr 0.89, Tbili 0.2, AST 18, ALT 18, AlkP 139, Tprot 7.2, Alb 4.0; HDL 42, ; A1c 6.4, TSH 1.3, Hep C Ab neg;     Imagin22 CT abd/pelvis with:  IMPRESSION  Leiomyomatous uterus with no acute findings or other findings to  correlate with pelvic cramping or rectal bleeding. 10/1/20 AP CXR:  Portable exam of the chest obtained at 1118 demonstrates normal heart size. There is no acute process in the lung fields. The osseous structures are  Unremarkable. 3/10/20 MR brain without:  IMPRESSION:  No significant abnormalities. 19 CT Cspine:  No acute fracture  Central canal stenosis C5-6 and C6-7 [min 5.6mm anterior to posterior]  Moderate left C6-7 neural foraminal stenosis. ASSESSMENT AND PLAN  Ms. Juju Parson is a 61 y.o. female who presents for evaluation of systemic lupus characterized by arthritis, alopecia, subacute cutaneous lupus rash, and +NIRAV 1:640 with +dsDNA. She remains markedly improved on Benlysta and low-dose Plaquenil (hydroxychloroquine), in drug-induced remission.     Will continue labs with every other infusion, if still controlled next visit then can space out followup to q6mo.    1. Other systemic lupus erythematosus with other organ involvement (HCC)  -Cont belimumab infusions  -Cont Plaquenil (hydroxychloroquine) 200mg daily  - SED RATE (ESR); Future  - C REACTIVE PROTEIN, QT; Future  - COMPLEMENT, C3 & C4; Future  - METABOLIC PANEL, COMPREHENSIVE; Future  - CBC WITH AUTOMATED DIFF; Future  - URINALYSIS W/ REFLEX CULTURE; Future  - PROT+CREATU (RANDOM); Future  - DSDNA ANTIBODY BY IFA, CRITHIDIA LUCILIAE, WITH REFLEX TO TITER; Future    2. Ongoing use of possibly toxic medication  -Next Plaquenil eye exam early 2168  - METABOLIC PANEL, COMPREHENSIVE; Future  - CBC WITH AUTOMATED DIFF; Future                                                                                                                      There are no Patient Instructions on file for this visit. Orders Placed This Encounter    SED RATE (ESR)    C REACTIVE PROTEIN, QT    COMPLEMENT, C3 & C4    METABOLIC PANEL, COMPREHENSIVE    CBC WITH AUTOMATED DIFF    URINALYSIS W/ REFLEX CULTURE    PROT+CREATU (RANDOM)    DSDNA ANTIBODY BY IFA, CRITHIDIA LUCILIAE, WITH REFLEX TO TITER       Medications: I am having Joya Daigle Best maintain her DULoxetine, montelukast, budesonide-formoteroL, albuterol, albuterol, amLODIPine, hydroCHLOROthiazide, and hydrOXYchloroQUINE. Follow up: Return in about 3 months (around 6/21/2022).     Face to face time: 15 minutes  Note preparation and records review day of service: 20 minutes  Total provider time day of service: 35 minutes    Leslie Mejía MD    Adult Rheumatology   [address]   Phone [phone]  Fax [phone]

## 2022-03-22 ENCOUNTER — OFFICE VISIT (OUTPATIENT)
Dept: RHEUMATOLOGY | Age: 57
End: 2022-03-22
Payer: MEDICAID

## 2022-03-22 VITALS
RESPIRATION RATE: 18 BRPM | DIASTOLIC BLOOD PRESSURE: 88 MMHG | SYSTOLIC BLOOD PRESSURE: 168 MMHG | HEART RATE: 65 BPM | OXYGEN SATURATION: 98 %

## 2022-03-22 DIAGNOSIS — R76.8 POSITIVE DOUBLE STRANDED DNA ANTIBODY TEST: Primary | ICD-10-CM

## 2022-03-22 DIAGNOSIS — M32.19 OTHER SYSTEMIC LUPUS ERYTHEMATOSUS WITH OTHER ORGAN INVOLVEMENT (HCC): ICD-10-CM

## 2022-03-22 PROCEDURE — 96365 THER/PROPH/DIAG IV INF INIT: CPT

## 2022-03-22 RX ORDER — SODIUM CHLORIDE 0.9 % (FLUSH) 0.9 %
10 SYRINGE (ML) INJECTION AS NEEDED
Status: CANCELLED | OUTPATIENT
Start: 2022-04-19

## 2022-03-22 RX ORDER — DIPHENHYDRAMINE HYDROCHLORIDE 50 MG/ML
25 INJECTION, SOLUTION INTRAMUSCULAR; INTRAVENOUS AS NEEDED
Status: CANCELLED
Start: 2022-04-19

## 2022-03-22 RX ORDER — EPINEPHRINE 1 MG/ML
0.3 INJECTION, SOLUTION, CONCENTRATE INTRAVENOUS AS NEEDED
Status: CANCELLED | OUTPATIENT
Start: 2022-04-19

## 2022-03-22 RX ORDER — SODIUM CHLORIDE 9 MG/ML
25 INJECTION, SOLUTION INTRAVENOUS CONTINUOUS
Status: DISCONTINUED | OUTPATIENT
Start: 2022-03-22 | End: 2022-03-22 | Stop reason: HOSPADM

## 2022-03-22 RX ORDER — SODIUM CHLORIDE 0.9 % (FLUSH) 0.9 %
10 SYRINGE (ML) INJECTION AS NEEDED
Status: DISCONTINUED | OUTPATIENT
Start: 2022-03-22 | End: 2022-03-22 | Stop reason: HOSPADM

## 2022-03-22 RX ORDER — ALBUTEROL SULFATE 0.83 MG/ML
2.5 SOLUTION RESPIRATORY (INHALATION) AS NEEDED
Status: CANCELLED
Start: 2022-04-19

## 2022-03-22 RX ORDER — HEPARIN 100 UNIT/ML
300-500 SYRINGE INTRAVENOUS AS NEEDED
Status: CANCELLED
Start: 2022-04-19

## 2022-03-22 RX ORDER — SODIUM CHLORIDE 9 MG/ML
25 INJECTION, SOLUTION INTRAVENOUS CONTINUOUS
Status: CANCELLED | OUTPATIENT
Start: 2022-04-19

## 2022-03-22 RX ORDER — HYDROCORTISONE SODIUM SUCCINATE 100 MG/2ML
100 INJECTION, POWDER, FOR SOLUTION INTRAMUSCULAR; INTRAVENOUS AS NEEDED
Status: CANCELLED | OUTPATIENT
Start: 2022-04-19

## 2022-03-22 RX ORDER — SODIUM CHLORIDE 9 MG/ML
10 INJECTION INTRAMUSCULAR; INTRAVENOUS; SUBCUTANEOUS AS NEEDED
Status: CANCELLED | OUTPATIENT
Start: 2022-04-19

## 2022-03-22 RX ORDER — ACETAMINOPHEN 325 MG/1
650 TABLET ORAL AS NEEDED
Status: CANCELLED
Start: 2022-04-19

## 2022-03-22 RX ORDER — DIPHENHYDRAMINE HYDROCHLORIDE 50 MG/ML
50 INJECTION, SOLUTION INTRAMUSCULAR; INTRAVENOUS AS NEEDED
Status: CANCELLED
Start: 2022-04-19

## 2022-03-22 RX ORDER — ONDANSETRON 2 MG/ML
8 INJECTION INTRAMUSCULAR; INTRAVENOUS AS NEEDED
Status: CANCELLED | OUTPATIENT
Start: 2022-04-19

## 2022-03-22 RX ADMIN — Medication 10 ML: at 08:21

## 2022-03-22 RX ADMIN — SODIUM CHLORIDE 25 ML/HR: 9 INJECTION, SOLUTION INTRAVENOUS at 08:21

## 2022-03-25 LAB
APPEARANCE UR: CLEAR
BACTERIA #/AREA URNS HPF: ABNORMAL /[HPF]
BACTERIA UR CULT: ABNORMAL
BACTERIA UR CULT: ABNORMAL
BILIRUB UR QL STRIP: NEGATIVE
CASTS URNS QL MICRO: ABNORMAL /LPF
COLOR UR: YELLOW
CREAT UR-MCNC: 159 MG/DL
EPI CELLS #/AREA URNS HPF: ABNORMAL /HPF (ref 0–10)
GLUCOSE UR QL STRIP: NEGATIVE
HGB UR QL STRIP: NEGATIVE
KETONES UR QL STRIP: NEGATIVE
LEUKOCYTE ESTERASE UR QL STRIP: ABNORMAL
MICRO URNS: ABNORMAL
NITRITE UR QL STRIP: NEGATIVE
PH UR STRIP: 6 [PH] (ref 5–7.5)
PROT UR QL STRIP: NEGATIVE
PROT UR-MCNC: 10.7 MG/DL
PROT/CREAT UR: 67 MG/G CREAT (ref 0–200)
RBC #/AREA URNS HPF: ABNORMAL /HPF (ref 0–2)
SP GR UR STRIP: 1.02 (ref 1–1.03)
URINALYSIS REFLEX, 377202: ABNORMAL
UROBILINOGEN UR STRIP-MCNC: 0.2 MG/DL (ref 0.2–1)
WBC #/AREA URNS HPF: ABNORMAL /HPF (ref 0–5)

## 2022-04-18 NOTE — PROGRESS NOTES
[facility] Rheumatology  OBIC Note    Date: 2022  Name: Kala Guerrero  MRN: [id number]       : 1965  Diagnosis: SLE  Treatment: Benlysta 960mg every 4 weeks  Referring Provider: Dr. Piotr Cordova  Supervising Provider: Dr. Piotr Cordova    Patient arrived to [facility] at 0800. Ms. Betsy Cochran allergies reviewed and she agreed to receiving today's treatment. A physical assessment was performed initially and post-treatment. Pt. denies new complaints today. Ms. Brent Wilkinson vitals were monitored before and after medication administration. Vitals:    22 0802 22 0934   BP: (!) 169/107 (!) 172/93   Pulse: 70 63   Resp: 18 16   Temp: 97 °F (36.1 °C)    SpO2: 99% 99%     Labs drawn and walked to [facility]. Medications given per providers orders:  Administrations This Visit     0.9% sodium chloride infusion     Admin Date  2022 Action  New Bag Dose  25 mL/hr Rate  25 mL/hr Route  IntraVENous Administered By  Shimon Romano RN          belimumab (BENLYSTA) 960 mg in 0.9% sodium chloride 250 mL, overfill volume 25 mL infusion     Admin Date  2022 Action  New Bag Dose  960 mg Rate  287 mL/hr Route  IntraVENous Administered By  Shimon Romano RN          sodium chloride (NS) flush 10 mL     Admin Date  2022 Action  Given Dose  10 mL Route  IntraVENous Administered By  JAMIL [address] 17685-227-18  Lot #  [id number]  Exp 2026  ---mixed in---  250ml 0.9% Normal Saline  XJP 2512-6916-87  Lot # [id number]  Exp 2023     ATMPV: 7041  STOP: 933     250ml bag 0.9% Normal Saline @ 25ml/hr for Ree Dart 7875-7504-24  Lot #  [id number]  Exp 2024    0.9% Normal Saline Flush 10ml x 1  FOF [address]-64  Lot # [id number]  Exp 2024     Lines: 24G left FA. Blood return noted and IV site assessed before, during, and after treatment. Line flushed with 10-30 ml's 0.9% Normal Saline solution per protocol. Access was removed from Ms. Caballero after completion of infusion/injection.    Ms. Meyer Dimas tolerated the treatment without complaints and no medication reactions were seen while in presence of this RN. Patient provided with AVS, which included future appointments and written educational material regarding Benlysta. All of the patients questions were answered and then discharged ambulatory from Rheumatology OBIC in stable condition at 0945. Encounter routed to supervising provider for co-signing. Future Appointments   Date Time Provider Giana Carranza   5/16/2022  8:00 AM INFUSIONINJ_RC AOCR BS AMB   6/15/2022  8:00 AM INFUSIONINJ_RC AOCR BS AMB   6/15/2022  8:30 AM [facility]   7/13/2022  8:00 AM INFUSIONINJ_RC AOCR BS AMB   8/10/2022  8:00 AM INFUSIONINJ_RC AOCR BS AMB   9/7/2022  8:00 AM INFUSIONINJ_RC AOCR BS AMB   9/7/2022  8:30 AM Naeem Hardwick MD [facility] AMB     Bessie Betancur RN  April 19, 2022  10:00a     ---  ATTENDING ATTESTATION    Madai Hagen, hereby attest that the medical record entry for 4/18/22 accurately reflects signatures/notations that I made in my capacity as treating physician when I treated/diagnosed the above listed patient. I do hereby attest that this information is true, accurate and complete to the best of my knowledge and I understand that any falsification, omission, or concealment of material fact may subject me to administrative, civil, or criminal liability.   Alexis Balderas MD S  Adult Rheumatology  Signed 05/13/22 1:13 PM

## 2022-04-19 ENCOUNTER — OFFICE VISIT (OUTPATIENT)
Dept: RHEUMATOLOGY | Age: 57
End: 2022-04-19
Payer: MEDICAID

## 2022-04-19 VITALS
DIASTOLIC BLOOD PRESSURE: 93 MMHG | TEMPERATURE: 97 F | HEART RATE: 63 BPM | SYSTOLIC BLOOD PRESSURE: 172 MMHG | RESPIRATION RATE: 16 BRPM | OXYGEN SATURATION: 99 %

## 2022-04-19 DIAGNOSIS — R76.8 POSITIVE DOUBLE STRANDED DNA ANTIBODY TEST: Primary | ICD-10-CM

## 2022-04-19 DIAGNOSIS — M32.19 OTHER SYSTEMIC LUPUS ERYTHEMATOSUS WITH OTHER ORGAN INVOLVEMENT (HCC): ICD-10-CM

## 2022-04-19 PROCEDURE — 96365 THER/PROPH/DIAG IV INF INIT: CPT

## 2022-04-19 PROCEDURE — 96413 CHEMO IV INFUSION 1 HR: CPT

## 2022-04-19 RX ORDER — HEPARIN 100 UNIT/ML
300-500 SYRINGE INTRAVENOUS AS NEEDED
Status: CANCELLED
Start: 2022-05-17

## 2022-04-19 RX ORDER — ONDANSETRON 2 MG/ML
8 INJECTION INTRAMUSCULAR; INTRAVENOUS AS NEEDED
Status: CANCELLED | OUTPATIENT
Start: 2022-05-17

## 2022-04-19 RX ORDER — SODIUM CHLORIDE 9 MG/ML
25 INJECTION, SOLUTION INTRAVENOUS CONTINUOUS
Status: CANCELLED | OUTPATIENT
Start: 2022-05-17

## 2022-04-19 RX ORDER — DIPHENHYDRAMINE HYDROCHLORIDE 50 MG/ML
25 INJECTION, SOLUTION INTRAMUSCULAR; INTRAVENOUS AS NEEDED
Status: CANCELLED
Start: 2022-05-17

## 2022-04-19 RX ORDER — DIPHENHYDRAMINE HYDROCHLORIDE 50 MG/ML
50 INJECTION, SOLUTION INTRAMUSCULAR; INTRAVENOUS AS NEEDED
Status: CANCELLED
Start: 2022-05-17

## 2022-04-19 RX ORDER — SODIUM CHLORIDE 0.9 % (FLUSH) 0.9 %
10 SYRINGE (ML) INJECTION AS NEEDED
Status: DISCONTINUED | OUTPATIENT
Start: 2022-04-19 | End: 2022-04-19 | Stop reason: HOSPADM

## 2022-04-19 RX ORDER — SODIUM CHLORIDE 9 MG/ML
10 INJECTION INTRAMUSCULAR; INTRAVENOUS; SUBCUTANEOUS AS NEEDED
Status: CANCELLED | OUTPATIENT
Start: 2022-05-17

## 2022-04-19 RX ORDER — ACETAMINOPHEN 325 MG/1
650 TABLET ORAL AS NEEDED
Status: CANCELLED
Start: 2022-05-17

## 2022-04-19 RX ORDER — ALBUTEROL SULFATE 0.83 MG/ML
2.5 SOLUTION RESPIRATORY (INHALATION) AS NEEDED
Status: CANCELLED
Start: 2022-05-17

## 2022-04-19 RX ORDER — EPINEPHRINE 1 MG/ML
0.3 INJECTION, SOLUTION, CONCENTRATE INTRAVENOUS AS NEEDED
Status: CANCELLED | OUTPATIENT
Start: 2022-05-17

## 2022-04-19 RX ORDER — SODIUM CHLORIDE 9 MG/ML
25 INJECTION, SOLUTION INTRAVENOUS CONTINUOUS
Status: DISCONTINUED | OUTPATIENT
Start: 2022-04-19 | End: 2022-04-19 | Stop reason: HOSPADM

## 2022-04-19 RX ORDER — HYDROCORTISONE SODIUM SUCCINATE 100 MG/2ML
100 INJECTION, POWDER, FOR SOLUTION INTRAMUSCULAR; INTRAVENOUS AS NEEDED
Status: CANCELLED | OUTPATIENT
Start: 2022-05-17

## 2022-04-19 RX ORDER — SODIUM CHLORIDE 0.9 % (FLUSH) 0.9 %
10 SYRINGE (ML) INJECTION AS NEEDED
Status: CANCELLED | OUTPATIENT
Start: 2022-05-17

## 2022-04-19 RX ADMIN — SODIUM CHLORIDE 25 ML/HR: 9 INJECTION, SOLUTION INTRAVENOUS at 08:20

## 2022-04-19 RX ADMIN — Medication 10 ML: at 08:19

## 2022-04-21 LAB
ALBUMIN SERPL-MCNC: 4 G/DL (ref 3.8–4.9)
ALBUMIN/GLOB SERPL: 1.3 {RATIO} (ref 1.2–2.2)
ALP SERPL-CCNC: 108 IU/L (ref 44–121)
ALT SERPL-CCNC: 12 IU/L (ref 0–32)
AST SERPL-CCNC: 11 IU/L (ref 0–40)
BASOPHILS # BLD AUTO: 0 X10E3/UL (ref 0–0.2)
BASOPHILS NFR BLD AUTO: 1 %
BILIRUB SERPL-MCNC: <0.2 MG/DL (ref 0–1.2)
BUN SERPL-MCNC: 10 MG/DL (ref 6–24)
BUN/CREAT SERPL: 11 (ref 9–23)
C3 SERPL-MCNC: 137 MG/DL (ref 82–167)
C4 SERPL-MCNC: 29 MG/DL (ref 12–38)
CALCIUM SERPL-MCNC: 9.1 MG/DL (ref 8.7–10.2)
CHLORIDE SERPL-SCNC: 101 MMOL/L (ref 96–106)
CO2 SERPL-SCNC: 23 MMOL/L (ref 20–29)
CREAT SERPL-MCNC: 0.9 MG/DL (ref 0.57–1)
CRP SERPL-MCNC: 11 MG/L (ref 0–10)
DSDNA AB SER QL CLIF: POSITIVE
DSDNA AB TITR SER CLIF: ABNORMAL {TITER}
EGFR: 74 ML/MIN/1.73
EOSINOPHIL # BLD AUTO: 0.1 X10E3/UL (ref 0–0.4)
EOSINOPHIL NFR BLD AUTO: 2 %
ERYTHROCYTE [DISTWIDTH] IN BLOOD BY AUTOMATED COUNT: 13.1 % (ref 11.7–15.4)
ERYTHROCYTE [SEDIMENTATION RATE] IN BLOOD BY WESTERGREN METHOD: 44 MM/HR (ref 0–40)
GLOBULIN SER CALC-MCNC: 3.2 G/DL (ref 1.5–4.5)
GLUCOSE SERPL-MCNC: 118 MG/DL (ref 65–99)
HCT VFR BLD AUTO: 38.3 % (ref 34–46.6)
HGB BLD-MCNC: 12 G/DL (ref 11.1–15.9)
IMM GRANULOCYTES # BLD AUTO: 0 X10E3/UL (ref 0–0.1)
IMM GRANULOCYTES NFR BLD AUTO: 0 %
LYMPHOCYTES # BLD AUTO: 2 X10E3/UL (ref 0.7–3.1)
LYMPHOCYTES NFR BLD AUTO: 43 %
MCH RBC QN AUTO: 25.8 PG (ref 26.6–33)
MCHC RBC AUTO-ENTMCNC: 31.3 G/DL (ref 31.5–35.7)
MCV RBC AUTO: 82 FL (ref 79–97)
MONOCYTES # BLD AUTO: 0.3 X10E3/UL (ref 0.1–0.9)
MONOCYTES NFR BLD AUTO: 6 %
NEUTROPHILS # BLD AUTO: 2.2 X10E3/UL (ref 1.4–7)
NEUTROPHILS NFR BLD AUTO: 48 %
PLATELET # BLD AUTO: 271 X10E3/UL (ref 150–450)
POTASSIUM SERPL-SCNC: 3.9 MMOL/L (ref 3.5–5.2)
PROT SERPL-MCNC: 7.2 G/DL (ref 6–8.5)
RBC # BLD AUTO: 4.65 X10E6/UL (ref 3.77–5.28)
SODIUM SERPL-SCNC: 138 MMOL/L (ref 134–144)
WBC # BLD AUTO: 4.6 X10E3/UL (ref 3.4–10.8)

## 2022-05-09 NOTE — PROGRESS NOTES
[facility] Rheumatology  OBIC Note    Date: May 16, 2022  Name: Gavitoa Lauren  MRN: [id number]       : 1965  Diagnosis: SLE  Treatment: Nile Buchanan 960mg  Referring Provider: Dr. Roxana Daigle  Supervising Provider: Dr. Roxana Daigle    Patient arrived to [facility] at 0800. Ms. Fernando Hilton allergies reviewed and she agreed to receiving today's treatment. A physical assessment was performed initially and post-treatment. Pt. denies new complaints today. Ms. Hammad Reynaga vitals were monitored before and after medication administration. Vitals:    22 0807 22 0928   BP: (!) 182/82 (!) 187/107   Pulse: 76 66   Resp: 16 16   Temp: 98 °F (36.7 °C)    SpO2: 99% 97%     Medications given per providers orders:  Administrations This Visit     0.9% sodium chloride infusion     Admin Date  2022 Action  New Bag Dose  25 mL/hr Rate  25 mL/hr Route  IntraVENous Administered By  Javier Chung RN          belimumab (BENLYSTA) 960 mg in 0.9% sodium chloride 250 mL, overfill volume 25 mL infusion     Admin Date  2022 Action  New Bag Dose  960 mg Rate  287 mL/hr Route  IntraVENous Administered By  Javier Chung RN          sodium chloride (NS) flush 10 mL     Admin Date  2022 Action  Given Dose  10 mL Route  IntraVENous Administered By  Javier Chung RN               Benlysta 960mg   [address] 76930-509-56  Lot #  [id number]  Exp 2026  ---mixed in---  250ml 0.9% Normal Saline  SHALINI 9623-3328-14  Lot # [id number]  Exp 2023     SRIGY: 5926  STOP: 7550     250ml bag 0.9% Normal Saline @ 25ml/hr for Rhiannon Ann 9085-2373-85  Lot #  [id number]  Exp 2024     0.9% Normal Saline Flush 10ml x 1  GFW [address]-33  Lot # [id number]  Exp 2024    Lines: 24G left upper FA   Blood return noted and IV site assessed before, during, and after treatment. Line flushed with 10-30 ml's 0.9% Normal Saline solution per protocol. Access was removed from Ms. Caballero after completion of infusion/injection.    Ms. Fernando Hilton tolerated the treatment without complaints and no medication reactions were seen while in presence of this RN. All of the patients questions were answered and then discharged ambulatory from Rheumatology OBIC in stable condition at 0935. Encounter routed to supervising provider for co-signing. Future Appointments   Date Time Provider Giana Carranza   6/15/2022  8:00 AM INFUSIONINJ_RC AOCR BS [facility]   6/15/2022  8:30 [facility]   7/13/2022  8:00 AM INFUSIONINJ_RC AOCR BS [facility]   8/10/2022  8:00 AM INFUSIONINJ_RC AOCR BS [facility]   9/7/2022  8:00 AM INFUSIONINJ_RC AOCR BS [facility]   9/7/2022  8:30 AM Shaun Watters MD [facility]     Liz Sweeney RN  May 16, 2022  11:00a    ---  ATTENDING ATTESTATION    Artie Black, hereby attest that the medical record entry for 5/16/22 accurately reflects signatures/notations that I made in my capacity as treating physician when I treated/diagnosed the above listed patient. I do hereby attest that this information is true, accurate and complete to the best of my knowledge and I understand that any falsification, omission, or concealment of material fact may subject me to administrative, civil, or criminal liability.   Ariel Sena MD S  Adult Rheumatology  Signed 05/16/22 6:20 PM

## 2022-05-16 ENCOUNTER — OFFICE VISIT (OUTPATIENT)
Dept: RHEUMATOLOGY | Age: 57
End: 2022-05-16
Payer: MEDICAID

## 2022-05-16 VITALS
HEART RATE: 66 BPM | TEMPERATURE: 98 F | OXYGEN SATURATION: 97 % | RESPIRATION RATE: 16 BRPM | SYSTOLIC BLOOD PRESSURE: 187 MMHG | DIASTOLIC BLOOD PRESSURE: 107 MMHG

## 2022-05-16 DIAGNOSIS — R76.8 POSITIVE DOUBLE STRANDED DNA ANTIBODY TEST: Primary | ICD-10-CM

## 2022-05-16 DIAGNOSIS — M32.19 OTHER SYSTEMIC LUPUS ERYTHEMATOSUS WITH OTHER ORGAN INVOLVEMENT (HCC): ICD-10-CM

## 2022-05-16 PROCEDURE — 96413 CHEMO IV INFUSION 1 HR: CPT

## 2022-05-16 RX ORDER — ALBUTEROL SULFATE 0.83 MG/ML
2.5 SOLUTION RESPIRATORY (INHALATION) AS NEEDED
Status: CANCELLED
Start: 2022-06-13

## 2022-05-16 RX ORDER — HEPARIN 100 UNIT/ML
300-500 SYRINGE INTRAVENOUS AS NEEDED
Status: CANCELLED
Start: 2022-06-13

## 2022-05-16 RX ORDER — HYDROCORTISONE SODIUM SUCCINATE 100 MG/2ML
100 INJECTION, POWDER, FOR SOLUTION INTRAMUSCULAR; INTRAVENOUS AS NEEDED
Status: CANCELLED | OUTPATIENT
Start: 2022-06-13

## 2022-05-16 RX ORDER — SODIUM CHLORIDE 9 MG/ML
25 INJECTION, SOLUTION INTRAVENOUS CONTINUOUS
Status: DISCONTINUED | OUTPATIENT
Start: 2022-05-16 | End: 2022-05-16 | Stop reason: HOSPADM

## 2022-05-16 RX ORDER — SODIUM CHLORIDE 9 MG/ML
25 INJECTION, SOLUTION INTRAVENOUS CONTINUOUS
Status: CANCELLED | OUTPATIENT
Start: 2022-06-13

## 2022-05-16 RX ORDER — EPINEPHRINE 1 MG/ML
0.3 INJECTION, SOLUTION, CONCENTRATE INTRAVENOUS AS NEEDED
Status: CANCELLED | OUTPATIENT
Start: 2022-06-13

## 2022-05-16 RX ORDER — SODIUM CHLORIDE 9 MG/ML
10 INJECTION INTRAMUSCULAR; INTRAVENOUS; SUBCUTANEOUS AS NEEDED
Status: CANCELLED | OUTPATIENT
Start: 2022-06-13

## 2022-05-16 RX ORDER — SODIUM CHLORIDE 0.9 % (FLUSH) 0.9 %
10 SYRINGE (ML) INJECTION AS NEEDED
Status: CANCELLED | OUTPATIENT
Start: 2022-06-13

## 2022-05-16 RX ORDER — ACETAMINOPHEN 325 MG/1
650 TABLET ORAL AS NEEDED
Status: CANCELLED
Start: 2022-06-13

## 2022-05-16 RX ORDER — SODIUM CHLORIDE 0.9 % (FLUSH) 0.9 %
10 SYRINGE (ML) INJECTION AS NEEDED
Status: DISCONTINUED | OUTPATIENT
Start: 2022-05-16 | End: 2022-05-16 | Stop reason: HOSPADM

## 2022-05-16 RX ORDER — ONDANSETRON 2 MG/ML
8 INJECTION INTRAMUSCULAR; INTRAVENOUS AS NEEDED
Status: CANCELLED | OUTPATIENT
Start: 2022-06-13

## 2022-05-16 RX ORDER — DIPHENHYDRAMINE HYDROCHLORIDE 50 MG/ML
50 INJECTION, SOLUTION INTRAMUSCULAR; INTRAVENOUS AS NEEDED
Status: CANCELLED
Start: 2022-06-13

## 2022-05-16 RX ORDER — DIPHENHYDRAMINE HYDROCHLORIDE 50 MG/ML
25 INJECTION, SOLUTION INTRAMUSCULAR; INTRAVENOUS AS NEEDED
Status: CANCELLED
Start: 2022-06-13

## 2022-05-16 RX ADMIN — Medication 10 ML: at 08:12

## 2022-05-16 RX ADMIN — SODIUM CHLORIDE 25 ML/HR: 9 INJECTION, SOLUTION INTRAVENOUS at 08:12

## 2022-06-02 ENCOUNTER — OFFICE VISIT (OUTPATIENT)
Dept: INTERNAL MEDICINE CLINIC | Age: 57
End: 2022-06-02
Payer: MEDICAID

## 2022-06-02 VITALS
WEIGHT: 220 LBS | TEMPERATURE: 98.3 F | BODY MASS INDEX: 36.65 KG/M2 | RESPIRATION RATE: 16 BRPM | HEIGHT: 65 IN | DIASTOLIC BLOOD PRESSURE: 89 MMHG | HEART RATE: 71 BPM | OXYGEN SATURATION: 97 % | SYSTOLIC BLOOD PRESSURE: 169 MMHG

## 2022-06-02 DIAGNOSIS — H60.502 ACUTE OTITIS EXTERNA OF LEFT EAR, UNSPECIFIED TYPE: ICD-10-CM

## 2022-06-02 DIAGNOSIS — H66.90 ACUTE OTITIS MEDIA, UNSPECIFIED OTITIS MEDIA TYPE: Primary | ICD-10-CM

## 2022-06-02 PROCEDURE — 99213 OFFICE O/P EST LOW 20 MIN: CPT | Performed by: INTERNAL MEDICINE

## 2022-06-02 RX ORDER — NEOMYCIN SULFATE, POLYMYXIN B SULFATE AND HYDROCORTISONE 10; 3.5; 1 MG/ML; MG/ML; [USP'U]/ML
3 SUSPENSION/ DROPS AURICULAR (OTIC) 4 TIMES DAILY
Qty: 5 ML | Refills: 0 | Status: SHIPPED | OUTPATIENT
Start: 2022-06-02 | End: 2022-06-09

## 2022-06-02 RX ORDER — AMOXICILLIN AND CLAVULANATE POTASSIUM 875; 125 MG/1; MG/1
1 TABLET, FILM COATED ORAL EVERY 12 HOURS
Qty: 14 TABLET | Refills: 0 | Status: SHIPPED | OUTPATIENT
Start: 2022-06-02 | End: 2022-06-09

## 2022-06-02 NOTE — PROGRESS NOTES
Nicanor Cowden is a 61 y.o. female  Chief Complaint   Patient presents with    Ear Pain     left ear pain is 7 out of 10       Visit Vitals  BP (!) 169/89   Pulse 71   Temp 98.3 °F (36.8 °C) (Temporal)   Resp 16   Ht 5' 5\" (1.651 m)   Wt 220 lb (99.8 kg)   SpO2 97%   BMI 36.61 kg/m²          HPI  Ms. Caballero is a 62 yo female with hisitory of HTN Presents with ear pain on left for a week, feels warm inside, no discharge, denies hearing difficultly, tinnitus, nasal congestions, nausea or vomiting. .  Social History     Socioeconomic History    Marital status: LIFE PARTNER   Tobacco Use    Smoking status: Never Smoker    Smokeless tobacco: Never Used   Vaping Use    Vaping Use: Never used   Substance and Sexual Activity    Alcohol use: Yes     Comment: socially    Drug use: Never    Sexual activity: Yes     Partners: Male      . Past Medical History:   Diagnosis Date    Asthma     Fibroid     Headache 03/10/2020    Only due to the concussion.  History of concussion 3/10/2020    Lupus (HCC)     Menopause         Allergies   Allergen Reactions    Lisinopril Angioedema          ROS  Review of Systems   All other systems reviewed and are negative. EXAM  Physical Exam  Vitals reviewed. HENT:      Right Ear: Tympanic membrane and ear canal normal.      Ears:      Comments: buldging TM with fluid present, erythematous outer ear   Cardiovascular:      Rate and Rhythm: Normal rate and regular rhythm. Pulmonary:      Effort: Pulmonary effort is normal.      Breath sounds: Normal breath sounds. Neurological:      General: No focal deficit present. Mental Status: She is oriented to person, place, and time.          Health Maintenance Due   Topic Date Due    DTaP/Tdap/Td series (1 - Tdap) Never done    Shingrix Vaccine Age 50> (1 of 2) Never done    A1C test (Diabetic or Prediabetic)  09/02/2021    COVID-19 Vaccine (3 - Booster for Pfizer series) 10/03/2021       ASSESSMENT/PLAN  Diagnoses and all orders for this visit:    1. Acute otitis media, unspecified otitis media type  -     amoxicillin-clavulanate (AUGMENTIN) 875-125 mg per tablet; Take 1 Tablet by mouth every twelve (12) hours for 7 days. 2. Acute otitis externa of left ear, unspecified type  -     neomycin-polymyxin-hydrocortisone, buffered, (PEDIOTIC) 3.5-10,000-1 mg/mL-unit/mL-% otic suspension; Administer 3 Drops in left ear four (4) times daily for 7 days.         Colman Ganser, MD

## 2022-06-02 NOTE — PROGRESS NOTES
1. Have you been to the ER, urgent care clinic since your last visit? Hospitalized since your last visit? No    2. Have you seen or consulted any other health care providers outside of the [address] [place] [address] since your last visit? Include any pap smears or colon screening.  No   Chief Complaint   Patient presents with    Ear Pain     left ear pain is 7 out of 10

## 2022-06-15 ENCOUNTER — OFFICE VISIT (OUTPATIENT)
Dept: RHEUMATOLOGY | Age: 57
End: 2022-06-15

## 2022-06-15 ENCOUNTER — OFFICE VISIT (OUTPATIENT)
Dept: RHEUMATOLOGY | Age: 57
End: 2022-06-15
Payer: MEDICAID

## 2022-06-15 VITALS
RESPIRATION RATE: 16 BRPM | TEMPERATURE: 98 F | DIASTOLIC BLOOD PRESSURE: 95 MMHG | HEART RATE: 78 BPM | OXYGEN SATURATION: 98 % | SYSTOLIC BLOOD PRESSURE: 162 MMHG

## 2022-06-15 VITALS
OXYGEN SATURATION: 99 % | TEMPERATURE: 98 F | RESPIRATION RATE: 16 BRPM | DIASTOLIC BLOOD PRESSURE: 102 MMHG | HEART RATE: 74 BPM | SYSTOLIC BLOOD PRESSURE: 159 MMHG

## 2022-06-15 DIAGNOSIS — R76.8 POSITIVE DOUBLE STRANDED DNA ANTIBODY TEST: Primary | ICD-10-CM

## 2022-06-15 DIAGNOSIS — M32.19 OTHER SYSTEMIC LUPUS ERYTHEMATOSUS WITH OTHER ORGAN INVOLVEMENT (HCC): ICD-10-CM

## 2022-06-15 DIAGNOSIS — R76.8 POSITIVE DOUBLE STRANDED DNA ANTIBODY TEST: ICD-10-CM

## 2022-06-15 DIAGNOSIS — M62.838 TRAPEZIUS MUSCLE SPASM: ICD-10-CM

## 2022-06-15 DIAGNOSIS — M32.19 OTHER SYSTEMIC LUPUS ERYTHEMATOSUS WITH OTHER ORGAN INVOLVEMENT (HCC): Primary | ICD-10-CM

## 2022-06-15 DIAGNOSIS — Z79.899 ONGOING USE OF POSSIBLY TOXIC MEDICATION: ICD-10-CM

## 2022-06-15 PROCEDURE — 99215 OFFICE O/P EST HI 40 MIN: CPT | Performed by: INTERNAL MEDICINE

## 2022-06-15 PROCEDURE — 96413 CHEMO IV INFUSION 1 HR: CPT

## 2022-06-15 PROCEDURE — 96365 THER/PROPH/DIAG IV INF INIT: CPT

## 2022-06-15 RX ORDER — ACETAMINOPHEN 325 MG/1
650 TABLET ORAL AS NEEDED
Status: CANCELLED
Start: 2022-07-13

## 2022-06-15 RX ORDER — HYDROCORTISONE SODIUM SUCCINATE 100 MG/2ML
100 INJECTION, POWDER, FOR SOLUTION INTRAMUSCULAR; INTRAVENOUS AS NEEDED
Status: CANCELLED | OUTPATIENT
Start: 2022-07-13

## 2022-06-15 RX ORDER — HEPARIN 100 UNIT/ML
300-500 SYRINGE INTRAVENOUS AS NEEDED
Status: CANCELLED
Start: 2022-07-13

## 2022-06-15 RX ORDER — SODIUM CHLORIDE 0.9 % (FLUSH) 0.9 %
10 SYRINGE (ML) INJECTION AS NEEDED
Status: CANCELLED | OUTPATIENT
Start: 2022-07-13

## 2022-06-15 RX ORDER — SODIUM CHLORIDE 0.9 % (FLUSH) 0.9 %
10 SYRINGE (ML) INJECTION AS NEEDED
Status: DISCONTINUED | OUTPATIENT
Start: 2022-06-15 | End: 2022-06-15 | Stop reason: HOSPADM

## 2022-06-15 RX ORDER — ONDANSETRON 2 MG/ML
8 INJECTION INTRAMUSCULAR; INTRAVENOUS AS NEEDED
Status: CANCELLED | OUTPATIENT
Start: 2022-07-13

## 2022-06-15 RX ORDER — EPINEPHRINE 1 MG/ML
0.3 INJECTION, SOLUTION, CONCENTRATE INTRAVENOUS AS NEEDED
Status: CANCELLED | OUTPATIENT
Start: 2022-07-13

## 2022-06-15 RX ORDER — DIPHENHYDRAMINE HYDROCHLORIDE 50 MG/ML
50 INJECTION, SOLUTION INTRAMUSCULAR; INTRAVENOUS AS NEEDED
Status: CANCELLED
Start: 2022-07-13

## 2022-06-15 RX ORDER — ALBUTEROL SULFATE 0.83 MG/ML
2.5 SOLUTION RESPIRATORY (INHALATION) AS NEEDED
Status: CANCELLED
Start: 2022-07-13

## 2022-06-15 RX ORDER — DIPHENHYDRAMINE HYDROCHLORIDE 50 MG/ML
25 INJECTION, SOLUTION INTRAMUSCULAR; INTRAVENOUS AS NEEDED
Status: CANCELLED
Start: 2022-07-13

## 2022-06-15 RX ORDER — SODIUM CHLORIDE 9 MG/ML
25 INJECTION, SOLUTION INTRAVENOUS CONTINUOUS
Status: DISCONTINUED | OUTPATIENT
Start: 2022-06-15 | End: 2022-06-15 | Stop reason: HOSPADM

## 2022-06-15 RX ORDER — CYCLOBENZAPRINE HCL 5 MG
5 TABLET ORAL
Qty: 10 TABLET | Refills: 0 | Status: SHIPPED | OUTPATIENT
Start: 2022-06-15 | End: 2022-08-11

## 2022-06-15 RX ORDER — SODIUM CHLORIDE 9 MG/ML
10 INJECTION INTRAMUSCULAR; INTRAVENOUS; SUBCUTANEOUS AS NEEDED
Status: CANCELLED | OUTPATIENT
Start: 2022-07-13

## 2022-06-15 RX ORDER — SODIUM CHLORIDE 9 MG/ML
25 INJECTION, SOLUTION INTRAVENOUS CONTINUOUS
Status: CANCELLED | OUTPATIENT
Start: 2022-07-13

## 2022-06-15 RX ADMIN — SODIUM CHLORIDE 25 ML/HR: 9 INJECTION, SOLUTION INTRAVENOUS at 08:20

## 2022-06-15 RX ADMIN — Medication 10 ML: at 08:20

## 2022-06-15 NOTE — PROGRESS NOTES
[facility] Rheumatology  OBIC Note    Date: Sirisha 15, 2022  Name: Mirtha Tanner  MRN: [id number]       : 1965  Diagnosis: Lupus  Treatment: Benlysta 960mg every 4 weeks  Referring Provider: Dr. Live Wasserman  Supervising Provider: Dr. Live Wasserman    Patient arrived to [facility] at 0800. Ms. Cherelle Law allergies reviewed and she agreed to receiving today's treatment. A physical assessment was performed initially and post-treatment. Pt. denies new complaints today. Recently lost her uncle and mother is hospitalized. Reports increased stress. Ms. Marlene Hamptno vitals were monitored before, during and after medication administration. Vitals:    06/15/22 0812 06/15/22 0938   BP: (!) 162/95 (!) 159/102   Pulse: 78 74   Resp: 16 16   Temp: 98 °F (36.7 °C)    SpO2: 97% 99%     Labs drawn and curine collected then walked to [facility]. Medications given per providers orders:  Administrations This Visit     0.9% sodium chloride infusion     Admin Date  06/15/2022 Action  New Bag Dose  25 mL/hr Rate  25 mL/hr Route  IntraVENous Administered By  Arelis Taylor RN          belimumab (BENLYSTA) 960 mg in 0.9% sodium chloride 250 mL, overfill volume 25 mL infusion     Admin Date  06/15/2022 Action  New Bag Dose  960 mg Rate  287 mL/hr Route  IntraVENous Administered By  Arelis Taylor RN          sodium chloride (NS) flush 10 mL     Admin Date  06/15/2022 Action  Given Dose  10 mL Route  IntraVENous Administered By  Arelis Taylor RN                Benlysta 960mg   [address] 65553-064-67  Lot #  [id number]  Exp 2026  ---mixed in---  250ml 0.9% Normal Saline  SPF 2845-0174-63  Lot # [id number]  Exp 2023     START: 0830  OFAE: 4029     500ml bag 0.9% Normal Saline @ 25ml/hr  ETG 5909-6680-03  Lot # [id number]  Exp 10/2024    0.9% Normal Saline Flush 10ml x 1  PKK [address]-21  Lot # [id number]  Exp 2024    Lines: 24G left FA. Blood return noted and IV site assessed before, during, and after treatment.   Line flushed with 10-30 ml's 0.9% Normal Saline solution per protocol. Access was removed from Ms. Caballero after completion of infusion/injection. Ms. Colleen Thomas tolerated the treatment without complaints and no medication reactions were seen while in presence of this RN. Patient provided with AVS, which included future appointments and written educational material regarding Benlysta. All of the patients questions were answered and then discharged ambulatory from Rheumatology OBIC in stable condition at 0945. Encounter routed to supervising provider for co-signing. Future Appointments   Date Time Provider Giana Carranza   7/13/2022  8:00 AM INFUSIONINJ_RC AOCR BS AMB   8/10/2022  8:00 AM INFUSIONINJ_RC AOCR BS AMB   9/7/2022  8:00 AM INFUSIONINJ_RC AOCR BS [facility]   9/7/2022  8:30 AM Kirstin Soler MD [facility]     Ammy Salmeron RN  Sirisha 15, 2022  7:50 AM    ---  ATTENDING ATTESTATION    I, Dylan Leon, hereby attest that the medical record entry for 6/15/22 accurately reflects signatures/notations that I made in my capacity as treating physician when I treated/diagnosed the above listed patient. I do hereby attest that this information is true, accurate and complete to the best of my knowledge and I understand that any falsification, omission, or concealment of material fact may subject me to administrative, civil, or criminal liability.   Dylan Leon MD S  Adult Rheumatology  Signed 06/21/22 12:28 AM

## 2022-06-15 NOTE — LETTER
6/15/2022 9:13 AM    Ms. Carolina Cortez  [address] [address]    To Whom It May Concern:    Carolina Cortez ( 1965) is a patient under my care at the [facility], last seen 6/15/22. She is experiencing a flare of her medical condition, for which I have advised her to abstain from work from 6/15/22-22. She may return to work on 22. Please have MsNaz Caballero contact our office if any clarification is needed.       Sincerely,      Everardo Ingram MD

## 2022-06-15 NOTE — PROGRESS NOTES
REASON FOR VISIT:    is a 61 y.o. female with history of hypertension and obesity who is returning for in-office followup of systemic lupus characterized by arthritis, alopecia, subacute cutaneous lupus rash, and +NIRAV 1:640 homogenous with +dsDNA. HISTORY OF PRESENT ILLNESS    Pt returns for a follow up. She is receiving a benlysta infusion today. Pt notes that her uncle passed away and mother had a heart attack yesterday, and she is not feeling well today. Pt is still taking her plaquenil daily. She saw her ophthalmologist last in February or March. She said that she got a full dilated eye exam.     Pt reports that she is achy today. She has back pain and discomfort in her L arm. Her back pain started last week. Except for today she has been able to get up easily in the morning without having to take time to loosen up. She even has been able to jog lately, which she was unable to do for the last 5 years. Pt denies chest pain or more hair thinning. Pt reports that she has a new rash on her arm. Her rash is not itchy as long as it is moisturized, but it bothers her if she lets it get dry. Pt is cognizant about staying out of the sun. She notes that being in the sun makes her feel sick to her stomach. Pt had a headache for a long time yesterday and it has not went away yet. Pt is requesting she gets a few days of from work because of her uncle's passing. Disease History: In 2012 developed increased joint pain and stiffness, marked pain sensitivity, couldn't walk or stand to be touched, get in and out of a car. Thought at first possibly shingles, pain didn't resolve. Able to get expedited evaluation with Rheumatology (Dr. Marcia Pisano with [facility]), diagnosed with lupus. Prednisone and Plaquenil (hydroxychloroquine) were started, and within about a week was feeling better. Around the same time, had developed significant frontotemporal and vertex hair loss.   Now prolonged cold exposure is her major trigger of joint pain flares. Initially was having pain flares 2-3x/month while in [place]. Since moved to [place] in 2019 to help her mother with foster children, has had less frequent flares maybe 2x/every 3 months. Flares respond to prednisone tapers. Between flares, she denies consistent joint pain. Some aching in hands or shoulders with activity. Has been able to lose weight from 236 to 209 with use of exercise bike over the last 6 months. Gets perioral papular rashes, not more typical malar rash. With sun exposure in the past, has developed painful annular silver-dollar sized lesions lasting a week or two, not in the last year. Good about sun avoidance. Usually goes to [facility] for [facility] checks, recently seen. No current ophthalmologist for Plaquenil screening. REVIEW OF SYSTEMS  A comprehensive review of systems was negative except for that written in the HPI. A 10-point review of systems is per the new patient questionnaire, which has been reviewed extensively and scanned into the electronic medical record for future reference. Review of systems is as above and is otherwise negative. ALLERGIES  Lisinopril    MEDICATIONS  Current Outpatient Medications   Medication Sig    cyclobenzaprine (FLEXERIL) 5 mg tablet Take 1 Tablet by mouth three (3) times daily as needed for Muscle Spasm(s). For severe spasm, may take 2 tabs. Do not drive after taking, may cause sedation.  hydrOXYchloroQUINE (PlaqueniL) 200 mg tablet Take 2 Tablets by mouth daily.  hydroCHLOROthiazide (HYDRODIURIL) 25 mg tablet TAKE 1 TABLET BY MOUTH EVERY DAY    amLODIPine (NORVASC) 5 mg tablet Take 1 Tab by mouth daily.  montelukast (SINGULAIR) 10 mg tablet Take 1 Tab by mouth every evening.  budesonide-formoteroL (Symbicort) 160-4.5 mcg/actuation HFAA Take 2 Puffs by inhalation two (2) times a day.  Indications: controller medication for asthma    albuterol (ACCUNEB) 1.25 mg/3 mL nebu 3 mL by Nebulization route every six (6) hours as needed for Shortness of Breath. Indications: asthma attack    albuterol (PROVENTIL HFA, VENTOLIN HFA, PROAIR HFA) 90 mcg/actuation inhaler Take 1 Puff by inhalation every four (4) hours as needed for Shortness of Breath.  DULoxetine (CYMBALTA) 30 mg capsule Take 1 Cap by mouth daily. No current facility-administered medications for this visit. Facility-Administered Medications Ordered in Other Visits   Medication    0.9% sodium chloride infusion    belimumab (BENLYSTA) 960 mg in 0.9% sodium chloride 250 mL, overfill volume 25 mL infusion    sodium chloride (NS) flush 10 mL       PAST MEDICAL HISTORY  Past Medical History:   Diagnosis Date    Asthma     Fibroid     Headache 03/10/2020    Only due to the concussion.  History of concussion 3/10/2020    Lupus ([facility] [address]     Menopause        FAMILY HISTORY  2 cousins had lupus. Oldest cousin passed 2/2 lupus, youngest cousin passed 2/2 COVID but had lupus. Mother is alive. SOCIAL HISTORY  She  reports that she has never smoked. She has never used smokeless tobacco. She reports current alcohol use. She reports that she does not use drugs. 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 DATA  Visit Vitals  BP (!) 162/95   Pulse 78   Temp 98 °F (36.7 °C)   Resp 16   SpO2 98%    There is no height or weight on file to calculate BMI. No flowsheet data found. General:  The patient is well developed, well nourished, alert, and in no apparent distress. Eyes: Sclera are anicteric. No conjunctival injection. HEENT:  Oropharynx is clear. No oral ulcers. Adequate salivary pooling.  No cervical or supraclavicular lymphadenopathy. Lungs:  Clear to auscultation bilaterally, without wheeze or stridor. Normal respiratory effort. Cor:  Regular rate and rhythm. No murmur rub or gallop. Abdomen: Soft, non-tender, without hepatomegaly or masses. Extremities: No calf tenderness or edema. Warm and well perfused. Skin:  Stable bitemporal and frontal alopecia. Still resolved perioral rash. Neuro: Nonfocal  Musculoskeletal:    A comprehensive musculoskeletal exam was performed for all joints of each upper and lower extremity and assessed for swelling, tenderness and range of motion. Results are documented as below: R PIP 3 and 4 tender without synovitus. R paralumbar tenderness. Mild R lower quad tend without guarding or rebound. L shoulder crepitus and guarding with passive ROM though intact ROM. No evidence of synovitis in the small joints of the hands, wrists, shoulders, elbows, hips, knees or ankles.        Labs:  4/19/22: dsDNA Crithidia lucillae Titer 1:120, dsDNA Crithidia lucillae IFA positive, WBC 4.6, HGB 12, Plt 271, Cr 0.9, LFT WNL, C3 137, C4 29, CRP 11 mg/L, ESR 44  3/22/22: Urine culture-Escherichia coli greater than 100,000 colony forming units per mL, Micro examination-Moderate bacteria, Prot+Cr normal, Urine leukocyte esterase 1+  2/22/22: WBC 4.9, Hgb 12.5, Plt 31; Cr 0.84, LFTs WNL, CRP 0.82mg/dL, ESR 31  11/30/21: WBC 4.6, Hgb 12.9, Plt 290; ESR 47, Cr 0.92, Tbili 0.3, AST 16, ALT 28, AlkP 131, CRP 0.67mg/dL  11/11/21: dsDNA by Crithidia 1:80, QFN gold negative,   9/18/21: WBC 5.0 (ANC 2500, ALC 2000), Hgb 11.8, Pl t 330; ESR 66, UA neg for protein or blood; Cr 1.03, Tbili <0.2, AST 15, ALT 16, AlkP 132, urine pr/cr 0.058, mariah neg, NIRAV 1:640 homogenous, dsDNA 12; SSA, SSB, RNP, chromatin, Scl70, centromere B, ribosomal P, Jo1 negative; CRP 11mg/L, C3 and C4 WNL  9/2/20: RF neg, CCP neg, 14.3.3 eta neg; CRP 10.48 mg/L; NIRAV direct positive (no reflex), ESR 45; WBC 5.1 [ANC 2400, ALC 2100], Hgb 11.9, Plt 286; Cr 0.89, Tbili 0.2, AST 18, ALT 18, AlkP 139, Tprot 7.2, Alb 4.0; HDL 42, ; A1c 6.4, TSH 1.3, Hep C Ab neg;     Imagin22 CT abd/pelvis with:  IMPRESSION  Leiomyomatous uterus with no acute findings or other findings to  correlate with pelvic cramping or rectal bleeding. 10/1/20 AP CXR:  Portable exam of the chest obtained at 1118 demonstrates normal heart size. There is no acute process in the lung fields. The osseous structures are  Unremarkable. 3/10/20 MR brain without:  IMPRESSION:  No significant abnormalities. 19 CT Cspine:  No acute fracture  Central canal stenosis C5-6 and C6-7 [min 5.6mm anterior to posterior]  Moderate left C6-7 neural foraminal stenosis. ASSESSMENT AND PLAN  Ms. Tru Leung is a 61 y.o. female who presents for evaluation of systemic lupus characterized by arthritis, alopecia, subacute cutaneous lupus rash, and +NIRAV 1:640 with +dsDNA. She remains markedly improved on Benlysta and low-dose Plaquenil (hydroxychloroquine), in drug-induced remission. She is experiencing globally increased pain in the setting of bereavement with considerable stressors related to family health, but no e/o active inflammatory rashes or synovitis. Continuing current immunosuppressive regimen. 1. Other systemic lupus erythematosus with other organ involvement (HCC)  - Cont Plaquenil (hydroxychloroquine) 400mg/day  - Cont Benlysta infusions monthly  - URINALYSIS W/MICROSCOPIC; Future  - PROT+CREATU (RANDOM); Future  - cyclobenzaprine (FLEXERIL) 5 mg tablet; Take 1 Tablet by mouth three (3) times daily as needed for Muscle Spasm(s). For severe spasm, may take 2 tabs. Do not drive after taking, may cause sedation. Dispense: 10 Tablet; Refill: 0    2. Positive double stranded DNA antibody test  - URINALYSIS W/MICROSCOPIC; Future  - PROT+CREATU (RANDOM); Future    3. Ongoing use of possibly toxic medication  - URINALYSIS W/MICROSCOPIC; Future  - PROT+CREATU (RANDOM); Future    4. Trapezius muscle spasm  - cyclobenzaprine (FLEXERIL) 5 mg tablet; Take 1 Tablet by mouth three (3) times daily as needed for Muscle Spasm(s). For severe spasm, may take 2 tabs. Do not drive after taking, may cause sedation. Dispense: 10 Tablet; Refill: 0                                                                                                               Patient Instructions   1) Continue with your Benlysta infusions. 2) Keep taking your current dose of Plaquenil. Check in with your ophthalmologist at least once per year as long as you are on this medication. 3) You can take over the counter ibuprofen for your pain as needed. You can take 600 mg up to 3 times a day for the next few days. 4) I will prescribe you some flexeril to use for the next few days as needed. 5) I will write a letter to give you leave from work for the rest of the week. 6) Follow up in 4 months. Let me know if you have any questions or concerns in the meantime. Orders Placed This Encounter    URINALYSIS W/MICROSCOPIC    PROT+CREATU (RANDOM)    cyclobenzaprine (FLEXERIL) 5 mg tablet       Medications: I am having [facility] start on cyclobenzaprine. I am also having her maintain her DULoxetine, montelukast, budesonide-formoteroL, albuterol, albuterol, amLODIPine, hydroCHLOROthiazide, and hydrOXYchloroQUINE. Follow up: Return in about 4 months (around 10/15/2022).     Face to face time: 25 minutes  Note preparation and records review day of service: 20 minutes  Total provider time day of service: 45 Minutes    This was scribed by Graciela Marquez in the presence of Dr. Juventino Francois MD    Adult Rheumatology   [address] [facility], [address] [place] [address]   Phone [phone]  Fax [phone]

## 2022-06-15 NOTE — LETTER
7/3/2022    Patient: Saul Dallas   YOB: 1965   Date of Visit: 6/15/2022     Kathy Oneil PA-C  [address] [address]  [address] [facility] [address]    Dear Kathy Oneil PA-C,    We recently saw Ms. Saul Dallas in the [facility] for evaluation. My notes for this consultation are attached. If you have questions, please do not hesitate to call me. I look forward to following your patient along with you.     Sincerely,  Marcia Hardy MD S  Cell: [phone]

## 2022-06-15 NOTE — PROGRESS NOTES
Chief Complaint   Patient presents with    Lupus     1. Have you been to the ER, urgent care clinic since your last visit? Hospitalized since your last visit? No    2. Have you seen or consulted any other health care providers outside of the [address] [place] [address] since your last visit? Include any pap smears or colon screening. Yes When: 6/2/22 Reason for visit: Left ear infection. Prescribed antibiotics orally & ear drops.

## 2022-06-15 NOTE — PATIENT INSTRUCTIONS
1) Continue with your Benlysta infusions. 2) Keep taking your current dose of Plaquenil. Check in with your ophthalmologist at least once per year as long as you are on this medication. 3) You can take over the counter ibuprofen for your pain as needed. You can take 600 mg up to 3 times a day for the next few days. 4) I will prescribe you some flexeril to use for the next few days as needed. 5) I will write a letter to give you leave from work for the rest of the week. 6) Follow up in 4 months. Let me know if you have any questions or concerns in the meantime.

## 2022-06-16 LAB
ALBUMIN SERPL-MCNC: 3.6 G/DL (ref 3.5–5)
ALBUMIN/GLOB SERPL: 1 {RATIO} (ref 1.1–2.2)
ALP SERPL-CCNC: 114 U/L (ref 45–117)
ALT SERPL-CCNC: 25 U/L (ref 12–78)
ANION GAP SERPL CALC-SCNC: 5 MMOL/L (ref 5–15)
AST SERPL-CCNC: 15 U/L (ref 15–37)
BASOPHILS # BLD: 0 K/UL (ref 0–0.1)
BASOPHILS NFR BLD: 1 % (ref 0–1)
BILIRUB SERPL-MCNC: 0.3 MG/DL (ref 0.2–1)
BUN SERPL-MCNC: 10 MG/DL (ref 6–20)
BUN/CREAT SERPL: 12 (ref 12–20)
CALCIUM SERPL-MCNC: 9.2 MG/DL (ref 8.5–10.1)
CHLORIDE SERPL-SCNC: 106 MMOL/L (ref 97–108)
CO2 SERPL-SCNC: 27 MMOL/L (ref 21–32)
CREAT SERPL-MCNC: 0.86 MG/DL (ref 0.55–1.02)
CRP SERPL-MCNC: 1.2 MG/DL (ref 0–0.6)
DIFFERENTIAL METHOD BLD: ABNORMAL
EOSINOPHIL # BLD: 0.1 K/UL (ref 0–0.4)
EOSINOPHIL NFR BLD: 2 % (ref 0–7)
ERYTHROCYTE [DISTWIDTH] IN BLOOD BY AUTOMATED COUNT: 13.6 % (ref 11.5–14.5)
ERYTHROCYTE [SEDIMENTATION RATE] IN BLOOD: 26 MM/HR (ref 0–30)
GLOBULIN SER CALC-MCNC: 3.7 G/DL (ref 2–4)
GLUCOSE SERPL-MCNC: 115 MG/DL (ref 65–100)
HCT VFR BLD AUTO: 39.5 % (ref 35–47)
HGB BLD-MCNC: 11.8 G/DL (ref 11.5–16)
IMM GRANULOCYTES # BLD AUTO: 0 K/UL (ref 0–0.04)
IMM GRANULOCYTES NFR BLD AUTO: 0 % (ref 0–0.5)
LYMPHOCYTES # BLD: 2.1 K/UL (ref 0.8–3.5)
LYMPHOCYTES NFR BLD: 46 % (ref 12–49)
MCH RBC QN AUTO: 25.5 PG (ref 26–34)
MCHC RBC AUTO-ENTMCNC: 29.9 G/DL (ref 30–36.5)
MCV RBC AUTO: 85.3 FL (ref 80–99)
MONOCYTES # BLD: 0.4 K/UL (ref 0–1)
MONOCYTES NFR BLD: 9 % (ref 5–13)
NEUTS SEG # BLD: 1.9 K/UL (ref 1.8–8)
NEUTS SEG NFR BLD: 42 % (ref 32–75)
NRBC # BLD: 0 K/UL (ref 0–0.01)
NRBC BLD-RTO: 0 PER 100 WBC
PLATELET # BLD AUTO: 275 K/UL (ref 150–400)
PMV BLD AUTO: 11.6 FL (ref 8.9–12.9)
POTASSIUM SERPL-SCNC: 3.9 MMOL/L (ref 3.5–5.1)
PROT SERPL-MCNC: 7.3 G/DL (ref 6.4–8.2)
RBC # BLD AUTO: 4.63 M/UL (ref 3.8–5.2)
SODIUM SERPL-SCNC: 138 MMOL/L (ref 136–145)
WBC # BLD AUTO: 4.5 K/UL (ref 3.6–11)

## 2022-06-17 LAB
C3 SERPL-MCNC: 144 MG/DL (ref 82–167)
C4 SERPL-MCNC: 31 MG/DL (ref 12–38)
DSDNA CRITHIDIA LUCILIAE IFA: NEGATIVE

## 2022-06-20 LAB
APPEARANCE UR: ABNORMAL
BACTERIA #/AREA URNS HPF: ABNORMAL /[HPF]
BACTERIA UR CULT: NORMAL
BILIRUB UR QL STRIP: NEGATIVE
CASTS URNS QL MICRO: ABNORMAL /LPF
COLOR UR: YELLOW
CREAT UR-MCNC: 91.2 MG/DL
EPI CELLS #/AREA URNS HPF: ABNORMAL /HPF (ref 0–10)
GLUCOSE UR QL STRIP: NEGATIVE
HGB UR QL STRIP: NEGATIVE
KETONES UR QL STRIP: NEGATIVE
LEUKOCYTE ESTERASE UR QL STRIP: NEGATIVE
MICRO URNS: ABNORMAL
NITRITE UR QL STRIP: POSITIVE
PH UR STRIP: 6.5 [PH] (ref 5–7.5)
PROT UR QL STRIP: NEGATIVE
PROT UR-MCNC: 22.6 MG/DL
PROT/CREAT UR: 248 MG/G CREAT (ref 0–200)
RBC #/AREA URNS HPF: ABNORMAL /HPF (ref 0–2)
SP GR UR STRIP: 1.01 (ref 1–1.03)
URINALYSIS REFLEX, 377202: ABNORMAL
UROBILINOGEN UR STRIP-MCNC: 0.2 MG/DL (ref 0.2–1)
WBC #/AREA URNS HPF: ABNORMAL /HPF (ref 0–5)

## 2022-07-11 NOTE — PROGRESS NOTES
[facility] Rheumatology  OBIC Note    Date: 2022  Name: Josephine Cherry  MRN: [id number]       : 1965  Diagnosis: SLE  Treatment: Benlysta 960mg every 4 wks  Referring Provider: Dr. Rachel Lazaro  Supervising Provider: Dr. Rachel Lazaro    Patient arrived to [facility] at 0800. Ms. Jose Daniel Camejo allergies reviewed and she agreed to receiving today's treatment. A physical assessment was performed initially and post-treatment. Pt. denies new complaints today. Ms. Hortencia Lanza vitals were monitored before and after medication administration. Vitals:    22 0806 22 0844 22 0921 22 0946   BP: (!) 160/100 (!) 153/88 (!) 151/83 (!) 173/106   Pulse: 71  61 67   Resp: 16  16 16   Temp: 97 °F (36.1 °C)      SpO2: 99%  99% 99%     Labs due next infusion. Medications given per providers orders:  Administrations This Visit       0.9% sodium chloride infusion       Admin Date  2022 Action  New Bag Dose  25 mL/hr Rate  25 mL/hr Route  IntraVENous Administered By  Elvia Mccarty RN              belimumab (BENLYSTA) 960 mg in 0.9% sodium chloride 250 mL, overfill volume 25 mL infusion       Admin Date  2022 Action  New Bag Dose  960 mg Rate  287 mL/hr Route  IntraVENous Administered By  Elvia Mccarty RN              sodium chloride (NS) flush 10 mL       Admin Date  2022 Action  Given Dose  10 mL Route  IntraVENous Administered By  Elvia Mccarty RN                    Vijay Parent 960mg   [address] [address] 92010-318-71  Lot #  [id number]  Exp 2026  ---mixed in---  250ml 0.9% Normal Saline  [address] [address] 2590-8211-36  Lot # [id number]  Exp 2023     START: 0840  STOP: 0945     1000ml bag 0.9% Normal Saline @ 25ml/hr  NDC 2511-1509-96  Lot # [id number]  Exp 2023      0.9% Normal Saline Flush 10ml x 1  NDC [address]-00  Lot # [id number]  Exp 2024    Lines: 24F Left FA  Blood return noted and IV site assessed before, during, and after treatment. Line flushed with 10-30 ml's 0.9% Normal Saline solution per protocol.      Access was removed from MsNaz Caballero after completion of infusion/injection. Ms. Thu Mcginnis tolerated the treatment without complaints and no medication reactions were seen while in presence of this RN. Patient provided with AVS, which included future appointments and written educational material regarding Benlysta. All of the patients questions were answered and then discharged ambulatory from Rheumatology OBIC in stable condition at 0950. Encounter routed to supervising provider for co-signing. Future Appointments   Date Time Provider Giana Tere   8/10/2022  8:00 AM INFUSIONINJ_RC AOCR BS AMB   9/7/2022  8:00 AM INFUSIONINJ_RC AOCR BS AMB   9/7/2022  8:30 AM MD JESSENIA [facility]   10/6/2022  8:00 AM INFUSIONINJ_RC AOCR BS AMB   11/2/2022  8:00 AM INFUSIONINJ_RC AOCR BS AMB   11/30/2022  8:00 AM INFUSIONINJ_RC AOCR BS AMB   11/30/2022  8:30 AM [facility]     Anila Fabian RN  July 13, 2022    ---  ATTENDING ATTESTATION    I, Marcia Hardy, hereby attest that the above medical record entry accurately reflects notations that I made in my capacity as treating physician when I treated the above listed patient. I do hereby attest that this information is true, accurate and complete to the best of my knowledge.   Marcia Hardy MD S  Adult Rheumatology  Signed 08/28/22 5:29 PM

## 2022-07-13 ENCOUNTER — OFFICE VISIT (OUTPATIENT)
Dept: RHEUMATOLOGY | Age: 57
End: 2022-07-13
Payer: MEDICAID

## 2022-07-13 VITALS
DIASTOLIC BLOOD PRESSURE: 106 MMHG | TEMPERATURE: 97 F | OXYGEN SATURATION: 99 % | SYSTOLIC BLOOD PRESSURE: 173 MMHG | HEART RATE: 67 BPM | RESPIRATION RATE: 16 BRPM

## 2022-07-13 DIAGNOSIS — M32.19 OTHER SYSTEMIC LUPUS ERYTHEMATOSUS WITH OTHER ORGAN INVOLVEMENT (HCC): ICD-10-CM

## 2022-07-13 DIAGNOSIS — R76.8 POSITIVE DOUBLE STRANDED DNA ANTIBODY TEST: Primary | ICD-10-CM

## 2022-07-13 PROCEDURE — 96413 CHEMO IV INFUSION 1 HR: CPT

## 2022-07-13 PROCEDURE — 96365 THER/PROPH/DIAG IV INF INIT: CPT

## 2022-07-13 RX ORDER — DIPHENHYDRAMINE HYDROCHLORIDE 50 MG/ML
25 INJECTION, SOLUTION INTRAMUSCULAR; INTRAVENOUS AS NEEDED
Status: CANCELLED
Start: 2022-08-10

## 2022-07-13 RX ORDER — SODIUM CHLORIDE 0.9 % (FLUSH) 0.9 %
10 SYRINGE (ML) INJECTION AS NEEDED
Status: DISCONTINUED | OUTPATIENT
Start: 2022-07-13 | End: 2022-07-13 | Stop reason: HOSPADM

## 2022-07-13 RX ORDER — DIPHENHYDRAMINE HYDROCHLORIDE 50 MG/ML
50 INJECTION, SOLUTION INTRAMUSCULAR; INTRAVENOUS AS NEEDED
Status: CANCELLED
Start: 2022-08-10

## 2022-07-13 RX ORDER — HYDROCORTISONE SODIUM SUCCINATE 100 MG/2ML
100 INJECTION, POWDER, FOR SOLUTION INTRAMUSCULAR; INTRAVENOUS AS NEEDED
Status: CANCELLED | OUTPATIENT
Start: 2022-08-10

## 2022-07-13 RX ORDER — EPINEPHRINE 1 MG/ML
0.3 INJECTION, SOLUTION, CONCENTRATE INTRAVENOUS AS NEEDED
Status: CANCELLED | OUTPATIENT
Start: 2022-08-10

## 2022-07-13 RX ORDER — SODIUM CHLORIDE 0.9 % (FLUSH) 0.9 %
10 SYRINGE (ML) INJECTION AS NEEDED
Status: CANCELLED | OUTPATIENT
Start: 2022-08-10

## 2022-07-13 RX ORDER — SODIUM CHLORIDE 9 MG/ML
10 INJECTION INTRAMUSCULAR; INTRAVENOUS; SUBCUTANEOUS AS NEEDED
Status: CANCELLED | OUTPATIENT
Start: 2022-08-10

## 2022-07-13 RX ORDER — HEPARIN 100 UNIT/ML
300-500 SYRINGE INTRAVENOUS AS NEEDED
Status: CANCELLED
Start: 2022-08-10

## 2022-07-13 RX ORDER — ONDANSETRON 2 MG/ML
8 INJECTION INTRAMUSCULAR; INTRAVENOUS AS NEEDED
Status: CANCELLED | OUTPATIENT
Start: 2022-08-10

## 2022-07-13 RX ORDER — SODIUM CHLORIDE 9 MG/ML
25 INJECTION, SOLUTION INTRAVENOUS CONTINUOUS
Status: CANCELLED | OUTPATIENT
Start: 2022-08-10

## 2022-07-13 RX ORDER — ACETAMINOPHEN 325 MG/1
650 TABLET ORAL AS NEEDED
Status: CANCELLED
Start: 2022-08-10

## 2022-07-13 RX ORDER — SODIUM CHLORIDE 9 MG/ML
25 INJECTION, SOLUTION INTRAVENOUS CONTINUOUS
Status: DISCONTINUED | OUTPATIENT
Start: 2022-07-13 | End: 2022-07-13 | Stop reason: HOSPADM

## 2022-07-13 RX ORDER — ALBUTEROL SULFATE 0.83 MG/ML
2.5 SOLUTION RESPIRATORY (INHALATION) AS NEEDED
Status: CANCELLED
Start: 2022-08-10

## 2022-07-13 RX ADMIN — SODIUM CHLORIDE 25 ML/HR: 9 INJECTION, SOLUTION INTRAVENOUS at 08:13

## 2022-07-13 RX ADMIN — Medication 10 ML: at 08:12

## 2022-07-28 NOTE — PROGRESS NOTES
[facility] Rheumatology  OBIC Note    Date: August 10, 2022  Name: Faye Govea  MRN: [id number]       : 1965  Diagnosis: Lupus  Treatment: Benlysta 960 mg every 4 wks  Referring Provider: Dr. Aneta Rubinstein  Supervising Provider: Dr. Aneta Rubinstein    Patient arrived to [facility] at 0800. Ms. Andreia Gonzalez allergies reviewed and she agreed to receiving today's treatment. A physical assessment was performed initially and post-treatment. Pt. denies new complaints today. Ms. Eduardo Valadez vitals were monitored before and after medication administration. Vitals:    08/10/22 0805 08/10/22 0856 08/10/22 0939   BP: (!) 184/98 (!) 194/80 (!) 174/98   Pulse: 80  66   Resp: 18  16   Temp: 98 °F (36.7 °C)     SpO2: 98%  99%     Labs due with next infusion. Medications given per providers orders:  Administrations This Visit       0.9% sodium chloride infusion       Admin Date  08/10/2022 Action  New Bag Dose  25 mL/hr Rate  25 mL/hr Route  IntraVENous Administered By  Lisseth Hester RN              belimumab (BENLYSTA) 960 mg in 0.9% sodium chloride 250 mL, overfill volume 25 mL infusion       Admin Date  08/10/2022 Action  New Bag Dose  960 mg Rate  287 mL/hr Route  IntraVENous Administered By  Lisseth Hester RN              sodium chloride (NS) flush 10 mL       Admin Date  08/10/2022 Action  Given Dose  10 mL Route  IntraVENous Administered By  Lisseth Hester RN                  [address] 960mg  [address]  Lot #  [id number]  Exp 2026  ---mixed in---  250ml 0.9% Normal Saline  [address] 8303-8990-37  Lot # [id number]  Exp 2023     START: 9650  STOP: 0940     500ml bag 0.9% Normal Saline @ 25ml/hr  NDC 9096-6228-24  Lot # [id number]  Exp 2024    0.9% Normal Saline Flush 10ml x 1  NDC [address]-38  Lot # [id number]  Exp 2024    Lines: 24G left FA. Blood return noted and IV site assessed before, during, and after treatment. Line flushed with 10-30 ml's 0.9% Normal Saline solution per protocol. Access was removed from Ms. Caballero after completion of infusion/injection. Ms. [facility] tolerated the treatment without complaints and no medication reactions were seen while in presence of this RN. Patient provided with AVS, which included future appointments and written educational material regarding Benlysta. All of the patients questions were answered and then discharged ambulatory from Rheumatology OBIC in stable condition at 0945. Encounter routed to supervising provider for co-signing. Future Appointments   Date Time Provider Giana Tere   9/7/2022  8:00 AM INFUSIONINJ_RC AOCR BS [facility]   9/7/2022  8:30 AM [facility]   10/6/2022  8:00 AM INFUSIONINJ_RC AOCR BS [facility]   11/2/2022  8:00 AM INFUSIONINJ_RC AOCR BS [facility]   11/30/2022  8:00 AM INFUSIONINJ_RC AOCR BS [facility]   11/30/2022  8:30 AM Tushar Kim MD [facility]     Erica Vaughn RN  August 10, 2022    ---  ATTENDING ATTESTATION    I, Joey Pizano, hereby attest that the above medical record entry accurately reflects notations that I made in my capacity as treating physician when I treated the above listed patient. I do hereby attest that this information is true, accurate and complete to the best of my knowledge.   Joey Pizano MD S  Adult Rheumatology  Signed 08/28/22 5:30 PM

## 2022-08-10 ENCOUNTER — TELEPHONE (OUTPATIENT)
Dept: INTERNAL MEDICINE CLINIC | Age: 57
End: 2022-08-10

## 2022-08-10 ENCOUNTER — OFFICE VISIT (OUTPATIENT)
Dept: RHEUMATOLOGY | Age: 57
End: 2022-08-10
Payer: MEDICAID

## 2022-08-10 VITALS
RESPIRATION RATE: 16 BRPM | TEMPERATURE: 98 F | SYSTOLIC BLOOD PRESSURE: 174 MMHG | HEART RATE: 66 BPM | DIASTOLIC BLOOD PRESSURE: 98 MMHG | OXYGEN SATURATION: 99 %

## 2022-08-10 DIAGNOSIS — R76.8 POSITIVE DOUBLE STRANDED DNA ANTIBODY TEST: Primary | ICD-10-CM

## 2022-08-10 DIAGNOSIS — M32.19 OTHER SYSTEMIC LUPUS ERYTHEMATOSUS WITH OTHER ORGAN INVOLVEMENT (HCC): ICD-10-CM

## 2022-08-10 PROCEDURE — 96413 CHEMO IV INFUSION 1 HR: CPT

## 2022-08-10 RX ORDER — HYDROCORTISONE SODIUM SUCCINATE 100 MG/2ML
100 INJECTION, POWDER, FOR SOLUTION INTRAMUSCULAR; INTRAVENOUS AS NEEDED
Status: CANCELLED | OUTPATIENT
Start: 2022-09-07

## 2022-08-10 RX ORDER — ACETAMINOPHEN 325 MG/1
650 TABLET ORAL AS NEEDED
Status: CANCELLED
Start: 2022-09-07

## 2022-08-10 RX ORDER — SODIUM CHLORIDE 0.9 % (FLUSH) 0.9 %
10 SYRINGE (ML) INJECTION AS NEEDED
Status: CANCELLED | OUTPATIENT
Start: 2022-09-07

## 2022-08-10 RX ORDER — ALBUTEROL SULFATE 0.83 MG/ML
2.5 SOLUTION RESPIRATORY (INHALATION) AS NEEDED
Status: CANCELLED
Start: 2022-09-07

## 2022-08-10 RX ORDER — SODIUM CHLORIDE 9 MG/ML
25 INJECTION, SOLUTION INTRAVENOUS CONTINUOUS
Status: DISCONTINUED | OUTPATIENT
Start: 2022-08-10 | End: 2022-08-10 | Stop reason: HOSPADM

## 2022-08-10 RX ORDER — EPINEPHRINE 1 MG/ML
0.3 INJECTION, SOLUTION, CONCENTRATE INTRAVENOUS AS NEEDED
Status: CANCELLED | OUTPATIENT
Start: 2022-09-07

## 2022-08-10 RX ORDER — HEPARIN 100 UNIT/ML
300-500 SYRINGE INTRAVENOUS AS NEEDED
Status: CANCELLED
Start: 2022-09-07

## 2022-08-10 RX ORDER — SODIUM CHLORIDE 0.9 % (FLUSH) 0.9 %
10 SYRINGE (ML) INJECTION AS NEEDED
Status: DISCONTINUED | OUTPATIENT
Start: 2022-08-10 | End: 2022-08-10 | Stop reason: HOSPADM

## 2022-08-10 RX ORDER — SODIUM CHLORIDE 9 MG/ML
10 INJECTION INTRAMUSCULAR; INTRAVENOUS; SUBCUTANEOUS AS NEEDED
Status: CANCELLED | OUTPATIENT
Start: 2022-09-07

## 2022-08-10 RX ORDER — SODIUM CHLORIDE 9 MG/ML
25 INJECTION, SOLUTION INTRAVENOUS CONTINUOUS
Status: CANCELLED | OUTPATIENT
Start: 2022-09-07

## 2022-08-10 RX ORDER — DIPHENHYDRAMINE HYDROCHLORIDE 50 MG/ML
50 INJECTION, SOLUTION INTRAMUSCULAR; INTRAVENOUS AS NEEDED
Status: CANCELLED
Start: 2022-09-07

## 2022-08-10 RX ORDER — ONDANSETRON 2 MG/ML
8 INJECTION INTRAMUSCULAR; INTRAVENOUS AS NEEDED
Status: CANCELLED | OUTPATIENT
Start: 2022-09-07

## 2022-08-10 RX ORDER — DIPHENHYDRAMINE HYDROCHLORIDE 50 MG/ML
25 INJECTION, SOLUTION INTRAMUSCULAR; INTRAVENOUS AS NEEDED
Status: CANCELLED
Start: 2022-09-07

## 2022-08-10 RX ADMIN — SODIUM CHLORIDE 25 ML/HR: 9 INJECTION, SOLUTION INTRAVENOUS at 08:12

## 2022-08-10 RX ADMIN — Medication 10 ML: at 08:12

## 2022-08-10 NOTE — TELEPHONE ENCOUNTER
Patient reports high BP readings during the past few rheumatology appointments. She notes that her BP was 191/112 during her rheumatology visit today. Rheumatology suggested she let us know incase any medications needed to be added or changed.

## 2022-08-11 ENCOUNTER — HOSPITAL ENCOUNTER (OUTPATIENT)
Age: 57
Setting detail: OBSERVATION
Discharge: HOME | End: 2022-08-12
Attending: EMERGENCY MEDICINE | Admitting: FAMILY MEDICINE
Payer: MEDICAID

## 2022-08-11 ENCOUNTER — APPOINTMENT (OUTPATIENT)
Dept: MRI IMAGING | Age: 57
End: 2022-08-11
Attending: FAMILY MEDICINE
Payer: MEDICAID

## 2022-08-11 ENCOUNTER — APPOINTMENT (OUTPATIENT)
Dept: CT IMAGING | Age: 57
End: 2022-08-11
Attending: EMERGENCY MEDICINE
Payer: MEDICAID

## 2022-08-11 ENCOUNTER — APPOINTMENT (OUTPATIENT)
Dept: GENERAL RADIOLOGY | Age: 57
End: 2022-08-11
Attending: EMERGENCY MEDICINE
Payer: MEDICAID

## 2022-08-11 DIAGNOSIS — M54.2 NECK PAIN: ICD-10-CM

## 2022-08-11 DIAGNOSIS — I10 HYPERTENSION, UNSPECIFIED TYPE: ICD-10-CM

## 2022-08-11 DIAGNOSIS — I10 ESSENTIAL HYPERTENSION: ICD-10-CM

## 2022-08-11 DIAGNOSIS — E78.5 HYPERLIPIDEMIA, UNSPECIFIED HYPERLIPIDEMIA TYPE: ICD-10-CM

## 2022-08-11 DIAGNOSIS — R53.1 ACUTE LEFT-SIDED WEAKNESS: Primary | ICD-10-CM

## 2022-08-11 DIAGNOSIS — J45.40 MODERATE PERSISTENT ASTHMA, UNSPECIFIED WHETHER COMPLICATED: ICD-10-CM

## 2022-08-11 PROBLEM — I63.9 CVA (CEREBRAL VASCULAR ACCIDENT) (HCC): Status: ACTIVE | Noted: 2022-08-11

## 2022-08-11 LAB
ALBUMIN SERPL-MCNC: 3.6 G/DL (ref 3.5–5)
ALBUMIN/GLOB SERPL: 0.8 {RATIO} (ref 1.1–2.2)
ALP SERPL-CCNC: 117 U/L (ref 45–117)
ALT SERPL-CCNC: 22 U/L (ref 12–78)
ANION GAP SERPL CALC-SCNC: 7 MMOL/L (ref 5–15)
AST SERPL-CCNC: 15 U/L (ref 15–37)
BASOPHILS # BLD: 0 K/UL (ref 0–0.1)
BASOPHILS NFR BLD: 0 % (ref 0–1)
BILIRUB SERPL-MCNC: 0.3 MG/DL (ref 0.2–1)
BUN SERPL-MCNC: 11 MG/DL (ref 6–20)
BUN/CREAT SERPL: 13 (ref 12–20)
CALCIUM SERPL-MCNC: 9.5 MG/DL (ref 8.5–10.1)
CHLORIDE SERPL-SCNC: 106 MMOL/L (ref 97–108)
CO2 SERPL-SCNC: 27 MMOL/L (ref 21–32)
COMMENT, HOLDF: NORMAL
CREAT SERPL-MCNC: 0.88 MG/DL (ref 0.55–1.02)
DIFFERENTIAL METHOD BLD: ABNORMAL
EOSINOPHIL # BLD: 0.1 K/UL (ref 0–0.4)
EOSINOPHIL NFR BLD: 1 % (ref 0–7)
ERYTHROCYTE [DISTWIDTH] IN BLOOD BY AUTOMATED COUNT: 13.3 % (ref 11.5–14.5)
GLOBULIN SER CALC-MCNC: 4.6 G/DL (ref 2–4)
GLUCOSE BLD STRIP.AUTO-MCNC: 99 MG/DL (ref 65–117)
GLUCOSE SERPL-MCNC: 100 MG/DL (ref 65–100)
HCT VFR BLD AUTO: 41.4 % (ref 35–47)
HGB BLD-MCNC: 13 G/DL (ref 11.5–16)
IMM GRANULOCYTES # BLD AUTO: 0 K/UL (ref 0–0.04)
IMM GRANULOCYTES NFR BLD AUTO: 0 % (ref 0–0.5)
INR PPP: 1 (ref 0.9–1.1)
LYMPHOCYTES # BLD: 2 K/UL (ref 0.8–3.5)
LYMPHOCYTES NFR BLD: 36 % (ref 12–49)
MCH RBC QN AUTO: 25.6 PG (ref 26–34)
MCHC RBC AUTO-ENTMCNC: 31.4 G/DL (ref 30–36.5)
MCV RBC AUTO: 81.7 FL (ref 80–99)
MONOCYTES # BLD: 0.5 K/UL (ref 0–1)
MONOCYTES NFR BLD: 9 % (ref 5–13)
NEUTS SEG # BLD: 3 K/UL (ref 1.8–8)
NEUTS SEG NFR BLD: 54 % (ref 32–75)
NRBC # BLD: 0 K/UL (ref 0–0.01)
NRBC BLD-RTO: 0 PER 100 WBC
PLATELET # BLD AUTO: 280 K/UL (ref 150–400)
PMV BLD AUTO: 10.8 FL (ref 8.9–12.9)
POTASSIUM SERPL-SCNC: 3.6 MMOL/L (ref 3.5–5.1)
PROT SERPL-MCNC: 8.2 G/DL (ref 6.4–8.2)
PROTHROMBIN TIME: 10.9 SEC (ref 9–11.1)
RBC # BLD AUTO: 5.07 M/UL (ref 3.8–5.2)
SAMPLES BEING HELD,HOLD: NORMAL
SERVICE CMNT-IMP: NORMAL
SODIUM SERPL-SCNC: 140 MMOL/L (ref 136–145)
TROPONIN-HIGH SENSITIVITY: 5 NG/L (ref 0–51)
TROPONIN-HIGH SENSITIVITY: 5 NG/L (ref 0–51)
WBC # BLD AUTO: 5.6 K/UL (ref 3.6–11)

## 2022-08-11 PROCEDURE — 74011250636 HC RX REV CODE- 250/636

## 2022-08-11 PROCEDURE — 96374 THER/PROPH/DIAG INJ IV PUSH: CPT

## 2022-08-11 PROCEDURE — 74011250637 HC RX REV CODE- 250/637: Performed by: EMERGENCY MEDICINE

## 2022-08-11 PROCEDURE — 93005 ELECTROCARDIOGRAM TRACING: CPT

## 2022-08-11 PROCEDURE — A9576 INJ PROHANCE MULTIPACK: HCPCS

## 2022-08-11 PROCEDURE — 74011250637 HC RX REV CODE- 250/637: Performed by: FAMILY MEDICINE

## 2022-08-11 PROCEDURE — 71045 X-RAY EXAM CHEST 1 VIEW: CPT

## 2022-08-11 PROCEDURE — 70496 CT ANGIOGRAPHY HEAD: CPT

## 2022-08-11 PROCEDURE — 74011000636 HC RX REV CODE- 636: Performed by: EMERGENCY MEDICINE

## 2022-08-11 PROCEDURE — 70450 CT HEAD/BRAIN W/O DYE: CPT

## 2022-08-11 PROCEDURE — 74011250636 HC RX REV CODE- 250/636: Performed by: EMERGENCY MEDICINE

## 2022-08-11 PROCEDURE — 99285 EMERGENCY DEPT VISIT HI MDM: CPT

## 2022-08-11 PROCEDURE — 85025 COMPLETE CBC W/AUTO DIFF WBC: CPT

## 2022-08-11 PROCEDURE — 74011000250 HC RX REV CODE- 250: Performed by: FAMILY MEDICINE

## 2022-08-11 PROCEDURE — 82962 GLUCOSE BLOOD TEST: CPT

## 2022-08-11 PROCEDURE — 84484 ASSAY OF TROPONIN QUANT: CPT

## 2022-08-11 PROCEDURE — 70553 MRI BRAIN STEM W/O & W/DYE: CPT

## 2022-08-11 PROCEDURE — 0042T CT CODE NEURO PERF W CBF: CPT

## 2022-08-11 PROCEDURE — 85610 PROTHROMBIN TIME: CPT

## 2022-08-11 PROCEDURE — G0378 HOSPITAL OBSERVATION PER HR: HCPCS

## 2022-08-11 PROCEDURE — 80053 COMPREHEN METABOLIC PANEL: CPT

## 2022-08-11 PROCEDURE — 65270000046 HC RM TELEMETRY

## 2022-08-11 PROCEDURE — 74011250636 HC RX REV CODE- 250/636: Performed by: FAMILY MEDICINE

## 2022-08-11 PROCEDURE — 36415 COLL VENOUS BLD VENIPUNCTURE: CPT

## 2022-08-11 RX ORDER — BUDESONIDE AND FORMOTEROL FUMARATE DIHYDRATE 160; 4.5 UG/1; UG/1
1 AEROSOL RESPIRATORY (INHALATION) 2 TIMES DAILY
COMMUNITY

## 2022-08-11 RX ORDER — LABETALOL HYDROCHLORIDE 5 MG/ML
10 INJECTION, SOLUTION INTRAVENOUS
Status: DISCONTINUED | OUTPATIENT
Start: 2022-08-11 | End: 2022-08-11

## 2022-08-11 RX ORDER — GUAIFENESIN 100 MG/5ML
81 LIQUID (ML) ORAL DAILY
Status: DISCONTINUED | OUTPATIENT
Start: 2022-08-12 | End: 2022-08-12 | Stop reason: HOSPADM

## 2022-08-11 RX ORDER — SODIUM CHLORIDE 0.9 % (FLUSH) 0.9 %
10 SYRINGE (ML) INJECTION
Status: COMPLETED | OUTPATIENT
Start: 2022-08-11 | End: 2022-08-11

## 2022-08-11 RX ORDER — HYDROXYCHLOROQUINE SULFATE 200 MG/1
200 TABLET, FILM COATED ORAL DAILY
COMMUNITY
End: 2022-09-23

## 2022-08-11 RX ORDER — ACETAMINOPHEN 325 MG/1
650 TABLET ORAL
Status: DISCONTINUED | OUTPATIENT
Start: 2022-08-11 | End: 2022-08-12 | Stop reason: HOSPADM

## 2022-08-11 RX ORDER — ATORVASTATIN CALCIUM 40 MG/1
80 TABLET, FILM COATED ORAL
Status: DISCONTINUED | OUTPATIENT
Start: 2022-08-11 | End: 2022-08-12 | Stop reason: HOSPADM

## 2022-08-11 RX ORDER — ACETAMINOPHEN 650 MG/1
650 SUPPOSITORY RECTAL
Status: DISCONTINUED | OUTPATIENT
Start: 2022-08-11 | End: 2022-08-12 | Stop reason: HOSPADM

## 2022-08-11 RX ORDER — CLOPIDOGREL 300 MG/1
300 TABLET, FILM COATED ORAL
Status: COMPLETED | OUTPATIENT
Start: 2022-08-11 | End: 2022-08-11

## 2022-08-11 RX ORDER — GUAIFENESIN 100 MG/5ML
324 LIQUID (ML) ORAL
Status: COMPLETED | OUTPATIENT
Start: 2022-08-11 | End: 2022-08-11

## 2022-08-11 RX ORDER — DULOXETIN HYDROCHLORIDE 30 MG/1
30 CAPSULE, DELAYED RELEASE ORAL DAILY
Status: DISCONTINUED | OUTPATIENT
Start: 2022-08-12 | End: 2022-08-11

## 2022-08-11 RX ORDER — HYDROXYCHLOROQUINE SULFATE 200 MG/1
200 TABLET, FILM COATED ORAL DAILY
Status: DISCONTINUED | OUTPATIENT
Start: 2022-08-12 | End: 2022-08-12 | Stop reason: HOSPADM

## 2022-08-11 RX ORDER — HYDRALAZINE HYDROCHLORIDE 20 MG/ML
10 INJECTION INTRAMUSCULAR; INTRAVENOUS
Status: DISCONTINUED | OUTPATIENT
Start: 2022-08-11 | End: 2022-08-12 | Stop reason: HOSPADM

## 2022-08-11 RX ORDER — AMLODIPINE BESYLATE 5 MG/1
10 TABLET ORAL DAILY
Status: DISCONTINUED | OUTPATIENT
Start: 2022-08-12 | End: 2022-08-12 | Stop reason: HOSPADM

## 2022-08-11 RX ORDER — FAMOTIDINE 20 MG/1
20 TABLET, FILM COATED ORAL EVERY 12 HOURS
Status: DISCONTINUED | OUTPATIENT
Start: 2022-08-11 | End: 2022-08-12 | Stop reason: HOSPADM

## 2022-08-11 RX ORDER — AMLODIPINE BESYLATE 5 MG/1
5 TABLET ORAL DAILY
Status: DISCONTINUED | OUTPATIENT
Start: 2022-08-12 | End: 2022-08-11

## 2022-08-11 RX ADMIN — ATORVASTATIN CALCIUM 80 MG: 40 TABLET, FILM COATED ORAL at 22:39

## 2022-08-11 RX ADMIN — SODIUM CHLORIDE 500 ML: 9 INJECTION, SOLUTION INTRAVENOUS at 15:01

## 2022-08-11 RX ADMIN — HYDRALAZINE HYDROCHLORIDE 10 MG: 20 INJECTION INTRAMUSCULAR; INTRAVENOUS at 18:39

## 2022-08-11 RX ADMIN — FAMOTIDINE 20 MG: 20 TABLET, FILM COATED ORAL at 22:39

## 2022-08-11 RX ADMIN — IOPAMIDOL 100 ML: 755 INJECTION, SOLUTION INTRAVENOUS at 14:36

## 2022-08-11 RX ADMIN — SODIUM CHLORIDE, PRESERVATIVE FREE 10 ML: 5 INJECTION INTRAVENOUS at 21:02

## 2022-08-11 RX ADMIN — GADOTERIDOL 20 ML: 279.3 INJECTION, SOLUTION INTRAVENOUS at 21:01

## 2022-08-11 RX ADMIN — CLOPIDOGREL BISULFATE 300 MG: 300 TABLET, FILM COATED ORAL at 15:01

## 2022-08-11 RX ADMIN — ASPIRIN 81 MG CHEWABLE TABLET 324 MG: 81 TABLET CHEWABLE at 15:01

## 2022-08-11 RX ADMIN — IOPAMIDOL 20 ML: 755 INJECTION, SOLUTION INTRAVENOUS at 14:36

## 2022-08-11 RX ADMIN — ACETAMINOPHEN 650 MG: 325 TABLET ORAL at 22:39

## 2022-08-11 NOTE — ED TRIAGE NOTES
Patient arrived ambulatory complaining of elevated blood pressure and left sided of face and body numbness. Also states \"my left eye and left ear fell weird, I can't explain it, I feel like my eye is closing\". Nurse noted weakness on  on the left side compared to right side.

## 2022-08-11 NOTE — PROGRESS NOTES
Neurocritical Care Code Stroke Documentation      Symptoms:  Left sided tinging and weakness   Baseline mRS:      Last Known Well:  0222   Medical hx: Past Medical History:   Diagnosis Date    Asthma     Fibroid     Headache 03/10/2020    Only due to the concussion. History of concussion 3/10/2020    Lupus ([facility] [address]     Menopause       Anticoagulation:  None   VAN:   Negative   NIHSS:   1a-LOC:0    1b-Month/Age:0    1c-Open/Close Hand:0    2-Best Gaze:0    3-Visual Fields:0    4-Facial Palsy:0    5a-Left Arm:0    5b-Right Arm:0    6a-Left Le    6b-Right Le    7-Limb Ataxia:0    8-Sensory:1    9-Best Language:0    10-Dysarthria:0    11-Extinction/Inattention:0  TOTAL SCORE:2   Imaging:   CT head negative for acute process    Very difficult IV access - CTA negative for LVO    CTP   Plan:   TNK Candidate: NO    Mechanical thrombectomy Candidate: NO     *Perform dysphagia screening prior to any PO intake*    Discussed with: Dr Can Miranda and Dr Zohreh Edmonds time: [id number]  Time spent: 25 minutes.      Laurie Vaughan NP  Neurocritical Care Nurse Practitioner  [phone]

## 2022-08-11 NOTE — PROGRESS NOTES
Admission Medication Reconciliation      Spoke with patient at the bedside in ED-18. Patient is a reliable historian. RX query is available at this time and aligns with patient report. Interview included questions regarding use of:  PTA medications including prescription/OTC, vitamins, supplements, inhaled, topical, injectable, otic and ophthalmic medications  Alcohol, nicotine products, THC and related compounds, illicit drugs, stimulant use (i.e., Red Bull), agents used to assist with sleep and/or pain control issues, and whether patient uses other people's medications of any kind    Notes: Allergy information updated to include AVOCADO which causes \"the same reaction is Lisinopril\"- updated  Benlysta 960 mg IV: treatment # 11 received 8/10/22    Recommendations: Follow up with respiratory therapy re: appropriate inhaler use, to include return demonstration. Patient is at risk for return admissions or treatment failure as a result of knowledge deficit related to inhaler therapies. Specifically: use of controller v. rescue inhaler-patient was using SYMBICORT \" as needed\"     Medication changes (since last review):  Revised:  Symbicort  Hydroxychloroquine: dose was reduced to 200 mg daily when Benlysta was added to her regimen    Removed:  Cyclobenzaprine  Hydrochlorothiazide  Duloxetine    Thank you for allowing me to participate in the care of your patient. Harpreet VenegasD, RN # [phone]       [facility] pharmacy benefit data reflects medications filled and processed through the patient's insurance, however   this data does NOT capture whether the medication was picked up or is currently being taken by the patient. Allergies:  Lisinopril and Avocado    Significant PMH/Disease States:   Past Medical History:   Diagnosis Date    Asthma     Fibroid     Headache 03/10/2020    Only due to the concussion.     History of concussion 3/10/2020    Lupus [facility])     Menopause      Chief Complaint for this Admission:    Chief Complaint   Patient presents with    Hypertension    Numbness     Prior to Admission Medications:   Prior to Admission Medications   Prescriptions Last Dose Informant Taking? albuterol (ACCUNEB) 1.25 mg/3 mL nebu 8/4/2022  Yes   Sig: 3 mL by Nebulization route every six (6) hours as needed for Shortness of Breath. Indications: asthma attack   albuterol (PROVENTIL HFA, VENTOLIN HFA, PROAIR HFA) 90 mcg/actuation inhaler 8/4/2022  Yes   Sig: Take 1 Puff by inhalation every four (4) hours as needed for Shortness of Breath. amLODIPine (NORVASC) 5 mg tablet 8/11/2022  Yes   Sig: Take 1 Tab by mouth daily. budesonide-formoteroL (Symbicort) 160-4.5 mcg/actuation HFAA 8/4/2022  Yes   Sig: Take 1 Puff by inhalation two (2) times a day.   hydrOXYchloroQUINE (PLAQUENIL) 200 mg tablet 8/11/2022  Yes   Sig: Take 200 mg by mouth in the morning.   montelukast (SINGULAIR) 10 mg tablet 8/10/2022  Yes   Sig: Take 1 Tab by mouth every evening. Facility-Administered Medications: None     Please contact the main inpatient pharmacy with any questions or concerns at [phone] and we will direct you to the clinical pharmacist covering this patient's care while in-house.    DUONG Palencia

## 2022-08-11 NOTE — ED PROVIDER NOTES
Graeme Benoit is a 62 yo F with h/o asthma, HTN and Lupus who presents to the ED with elevated blood pressure and left side facial numbness and left side weakness. She reports that her blood pressure was elevated this morning and so she took an extra dose of her amlodipine. Then around 12:45p she noted the left side of her face felt numb and there left hand was weaker than the right. Past Medical History:   Diagnosis Date    Asthma     Fibroid     Headache 03/10/2020    Only due to the concussion. History of concussion 3/10/2020    Lupus ([facility] [address]     Menopause        Past Surgical History:   Procedure Laterality Date    HX ORTHOPAEDIC Bilateral 1996 & 2015    HX TONSILLECTOMY           No family history on file. Social History     Socioeconomic History    Marital status: LIFE PARTNER     Spouse name: Not on file    Number of children: Not on file    Years of education: Not on file    Highest education level: Not on file   Occupational History    Not on file   Tobacco Use    Smoking status: Never    Smokeless tobacco: Never   Vaping Use    Vaping Use: Never used   Substance and Sexual Activity    Alcohol use: Yes     Comment: socially    Drug use: Never    Sexual activity: Yes     Partners: Male   Other Topics Concern    Not on file   Social History Narrative    Not on file     Social Determinants of Health     Financial Resource Strain: Not on file   Food Insecurity: Not on file   Transportation Needs: Not on file   Physical Activity: Not on file   Stress: Not on file   Social Connections: Not on file   Intimate Partner Violence: Not on file   Housing Stability: Not on file         ALLERGIES: Lisinopril    Review of Systems   Constitutional:  Negative for fever. HENT:  Negative for sore throat. Eyes:  Negative for visual disturbance. Respiratory:  Negative for cough. Cardiovascular:  Negative for chest pain. Gastrointestinal:  Negative for abdominal pain.    Genitourinary:  Negative for dysuria. Musculoskeletal:  Negative for back pain. Skin:  Negative for rash. Neurological:  Positive for weakness. Negative for headaches. Vitals:    08/11/22 1346   BP: (!) 181/121   Pulse: 77   Resp: 17   Temp: 98.7 °F (37.1 °C)   SpO2: 98%   Weight: 98.5 kg (217 lb 2.5 oz)   Height: 5' 4\" (1.626 m)            Physical Exam  Vitals and nursing note reviewed. Constitutional:       General: She is not in acute distress. Appearance: She is well-developed. HENT:      Head: Normocephalic and atraumatic. Mouth/Throat:      Mouth: Mucous membranes are moist.   Eyes:      Extraocular Movements: Extraocular movements intact. Conjunctiva/sclera: Conjunctivae normal.   Neck:      Trachea: Phonation normal.   Cardiovascular:      Rate and Rhythm: Normal rate. Pulmonary:      Effort: Pulmonary effort is normal. No respiratory distress. Abdominal:      General: There is no distension. Musculoskeletal:         General: No tenderness. Normal range of motion. Cervical back: Normal range of motion. Skin:     General: Skin is warm and dry. Neurological:      General: No focal deficit present. Mental Status: She is alert. She is not disoriented. Sensory: Sensory deficit (left face) present. Motor: Weakness (left) present. No abnormal muscle tone. ED EKG interpretation:  Rhythm: normal sinus rhythm; and regular . Rate (approx.): 73; Axis: normal; P wave: normal; QRS interval: normal ; ST/T wave: normal; Other findings: normal. This EKG was interpreted by Vesta Paiz MD,ED Provider. [facility]         2:04 PM  Discussed with Dr. Maddi Salgado, will evaluate the patient. 2:19 PM  Dr. Maddi Salgado evaluated patient. Patient declines TNK. NIH 2  Recommends starting dual antiplatelett therapy with Plavix and 325mg aspirin. Perfect Serve Consult for Admission  3:03 PM    ED Room Number: ER18/18  Patient Name and age:  Ashley Willson 61 y.o.  female  Working Diagnosis:   1.  Acute left-sided weakness    2. Hypertension, unspecified type        COVID-19 Suspicion:  no  Sepsis present:  no  Reassessment needed: N/A  Code Status:  Full Code  Readmission: no  Isolation Requirements:  no  Recommended Level of Care:  telemetry  Department:[facility] Adult ED - 21   Other:  Level 1 Stroke evaluation due to onset of symptoms at 12:45p. Left face numbness and leg weakness. NIH 2 patient declined TNK. Tele neuro recommended dual antiplatelet therapy with Aspirin 325mg daily and Plavix and permissive HTN.        Procedures

## 2022-08-11 NOTE — H&P
[facility] Adult  Hospitalist Group  History and Physical    Date of Service:  8/11/2022  Primary Care Provider: Valdemar Quinones PA-C  Source of information: The patient and Chart review    Chief Complaint: Hypertension and Numbness      History of Presenting Illness:   Adolfo Dial is a 61 y.o. female who presents with left-sided weakness. History obtained from the patient    Patient reports that she had sudden onset of left-sided weakness earlier today, patient reports that she felt \"weird sensation in the left eye, left face, and left arm\", patient reports that she felt that her left eye was closed but when she looked in the mirror it appeared to be normal, patient also reports that she had numbness in the left cheek, felt as if there was some weakness in the left arm, got concerned and decided to come to the hospital, patient arrived with a code stroke, patient reports that she has history of lupus, went for infusion yesterday, was noted to have a systolic blood pressure 541F, called her PCP, patient states amlodipine 5 mg daily, PCP told her to take an additional dose of 5 mg, continue to have elevated blood pressure, and the PCP had told her to take a total of 10 mg of amlodipine today, patient denies any other complaints or problems    The patient denies any headache, blurry vision, sore throat, trouble swallowing, trouble with speech, chest pain, SOB, cough, fever, chills, N/V/D, abd pain, urinary symptoms, constipation, recent travels, sick contacts, falls, injuries, rashes, contact with COVID-19 diagnosed patients, hematemesis, melena, hemoptysis, hematuria, rashes, denies starting any new medications and denies any other concerns or problems besides as mentioned above. REVIEW OF SYSTEMS:  A comprehensive review of systems was negative except for that written in the History of Present Illness.      Past Medical History:   Diagnosis Date    Asthma     Fibroid     Headache 03/10/2020    Only due to the concussion. History of concussion 3/10/2020    Lupus ([facility] [address]     Menopause       Past Surgical History:   Procedure Laterality Date    HX ORTHOPAEDIC Bilateral 1996 & 2015    HX TONSILLECTOMY       Prior to Admission medications    Medication Sig Start Date End Date Taking? Authorizing Provider   cyclobenzaprine (FLEXERIL) 5 mg tablet Take 1 Tablet by mouth three (3) times daily as needed for Muscle Spasm(s). For severe spasm, may take 2 tabs. Do not drive after taking, may cause sedation. 6/15/22   Nena Mccarty MD   hydrOXYchloroQUINE (PlaqueniL) 200 mg tablet Take 2 Tablets by mouth daily. 9/22/21   Nena Mccarty MD   hydroCHLOROthiazide (HYDRODIURIL) 25 mg tablet TAKE 1 TABLET BY MOUTH EVERY DAY 2/15/21   Hillary Patton PA-C   amLODIPine (NORVASC) 5 mg tablet Take 1 Tab by mouth daily. 10/26/20   Hillary Patton PA-C   montelukast (SINGULAIR) 10 mg tablet Take 1 Tab by mouth every evening. 8/28/20   Hillary Patton PA-C   budesonide-formoteroL (Symbicort) 160-4.5 mcg/actuation HFAA Take 2 Puffs by inhalation two (2) times a day. Indications: controller medication for asthma 8/28/20   Hillary Patton PA-C   albuterol (ACCUNEB) 1.25 mg/3 mL nebu 3 mL by Nebulization route every six (6) hours as needed for Shortness of Breath. Indications: asthma attack 8/28/20   Hillary Patton PA-C   albuterol (PROVENTIL HFA, VENTOLIN HFA, PROAIR HFA) 90 mcg/actuation inhaler Take 1 Puff by inhalation every four (4) hours as needed for Shortness of Breath. 8/28/20   Hillary Patton PA-C   DULoxetine (CYMBALTA) 30 mg capsule Take 1 Cap by mouth daily. 3/12/20   Bedelia Mutters, DO     Allergies   Allergen Reactions    Lisinopril Angioedema      No family history on file. Social History:  reports that she has never smoked. She has never used smokeless tobacco. She reports current alcohol use. She reports that she does not use drugs.      Family and social history were personally reviewed, all pertinent and relevant details are outlined as above. Objective:   Visit Vitals  BP (!) 186/94   Pulse 75   Temp 98.7 °F (37.1 °C)   Resp 21   Ht 5' 4\" (1.626 m)   Wt 98.5 kg (217 lb 2.5 oz)   SpO2 92%   BMI 37.27 kg/m²      O2 Device: None (Room air)    PHYSICAL EXAM:   General: Alert x oriented x 3, awake,  HEENT: PEERL, EOMI, moist mucus membranes  Neck: Supple, no JVD, no meningeal signs  Chest: Clear to auscultation bilaterally   CVS: RRR, S1 S2 heard, no murmurs/rubs/gallops  Abd: Soft, non-tender, non-distended, +bowel sounds   Ext: No clubbing, no cyanosis, no edema  Neuro/Psych: Pleasant mood and affect, CN 2-12 grossly intact, sensory grossly within normal limit, Strength 5/5 in all extremities, DTR 1+ x 4  Cap refill: Brisk, less than 3 seconds  Pulses: 2+, symmetric in all extremities  Skin: Warm, dry, without rashes or lesions    Data Review: All diagnostic labs and studies have been reviewed. Abnormal Labs Reviewed   CBC WITH AUTOMATED DIFF - Abnormal; Notable for the following components:       Result Value    MCH 25.6 (*)     All other components within normal limits   METABOLIC PANEL, COMPREHENSIVE - Abnormal; Notable for the following components:    Globulin 4.6 (*)     A-G Ratio 0.8 (*)     All other components within normal limits       All Micro Results       None            IMAGING:   CTA CODE NEURO HEAD AND NECK W CONT   Final Result   CTA Head:   1. No evidence of significant stenosis or aneurysm. CTA Neck:   1. No evidence of significant stenosis. 2. Multiple borderline enlarged left axillary lymph nodes are likely reactive. Clinical follow-up is recommended. CT Brain Perfusion:   1. No acute abnormality. CT CODE NEURO PERF W CBF   Final Result   CTA Head:   1. No evidence of significant stenosis or aneurysm. CTA Neck:   1. No evidence of significant stenosis. 2. Multiple borderline enlarged left axillary lymph nodes are likely reactive.    Clinical follow-up is recommended. CT Brain Perfusion:   1. No acute abnormality. XR CHEST PORT   Final Result   Clear lungs. CT CODE NEURO HEAD WO CONTRAST   Final Result   No acute intracranial process seen      MRI BRAIN WO CONT    (Results Pending)        ECG/ECHO:    Results for orders placed or performed during the hospital encounter of 08/11/22   EKG, 12 LEAD, INITIAL   Result Value Ref Range    Ventricular Rate 73 BPM    Atrial Rate 73 BPM    P-R Interval 176 ms    QRS Duration 76 ms    Q-T Interval 398 ms    QTC Calculation (Bezet) 438 ms    Calculated P Axis 26 degrees    Calculated R Axis 30 degrees    Calculated T Axis 32 degrees    Diagnosis       Normal sinus rhythm  Normal ECG  When compared with ECG of 01-OCT-2020 11:15,  No significant change was found          Assessment:   Given the patient's current clinical presentation, there is a high level of concern for decompensation if discharged from the emergency department. Complex decision making was performed, which includes reviewing the patient's available past medical records, laboratory results, and imaging studies. Left-sided weakness  Accelerated hypertension  Lupus    Plan:     Patient will be admitted on a telemetry bed, MRI of the brain, neurovascular checks, aspirin, statin, neurology consult, echocardiogram, PT OT speech consult, supportive care, any changes in neurological status will mandate emergent intervention  Increase amlodipine to 10 mg daily, add hydralazine as needed as patient with history of asthma so refrain from using beta-blockers, further intervention per hospital course, monitor on telemetry  Continue home medications        DIET: ADULT DIET Regular; 4 carb choices (60 gm/meal);  Low Fat/Low Chol/High Fiber/LUCY; Low Sodium (2 gm)   ISOLATION PRECAUTIONS: There are currently no Active Isolations  CODE STATUS: Full Code   DVT PROPHYLAXIS: SCDs  FUNCTIONAL STATUS PRIOR TO HOSPITALIZATION: Fully active and ambulatory; able to carry on all self-care without restriction. EARLY MOBILITY ASSESSMENT: Recommend an assessment from physical therapy and/or occupational therapy  ANTICIPATED DISCHARGE: Greater than 48 hours. Signed By: Enrike Dye MD     August 11, 2022         Please note that this dictation may have been completed with Dragon, the computer voice recognition software. Quite often unanticipated grammatical, syntax, homophones, and other interpretive errors are inadvertently transcribed by the computer software. Please disregard these errors. Please excuse any errors that have escaped final proofreading.

## 2022-08-12 ENCOUNTER — APPOINTMENT (OUTPATIENT)
Dept: NON INVASIVE DIAGNOSTICS | Age: 57
End: 2022-08-12
Attending: FAMILY MEDICINE
Payer: MEDICAID

## 2022-08-12 VITALS
WEIGHT: 217.15 LBS | TEMPERATURE: 97.7 F | HEIGHT: 64 IN | OXYGEN SATURATION: 96 % | RESPIRATION RATE: 21 BRPM | HEART RATE: 73 BPM | DIASTOLIC BLOOD PRESSURE: 72 MMHG | BODY MASS INDEX: 37.07 KG/M2 | SYSTOLIC BLOOD PRESSURE: 163 MMHG

## 2022-08-12 LAB
ANION GAP SERPL CALC-SCNC: 7 MMOL/L (ref 5–15)
ATRIAL RATE: 73 BPM
BUN SERPL-MCNC: 10 MG/DL (ref 6–20)
BUN/CREAT SERPL: 11 (ref 12–20)
CALCIUM SERPL-MCNC: 9.4 MG/DL (ref 8.5–10.1)
CALCULATED P AXIS, ECG09: 26 DEGREES
CALCULATED R AXIS, ECG10: 30 DEGREES
CALCULATED T AXIS, ECG11: 32 DEGREES
CHLORIDE SERPL-SCNC: 105 MMOL/L (ref 97–108)
CHOLEST SERPL-MCNC: 196 MG/DL
CO2 SERPL-SCNC: 28 MMOL/L (ref 21–32)
CREAT SERPL-MCNC: 0.9 MG/DL (ref 0.55–1.02)
CRP SERPL HS-MCNC: >9.5 MG/L
DIAGNOSIS, 93000: NORMAL
ECHO AV PEAK GRADIENT: 10 MMHG
ECHO AV PEAK VELOCITY: 1.6 M/S
ECHO AV VELOCITY RATIO: 0.69
ECHO EST RA PRESSURE: 3 MMHG
ECHO LA DIAMETER INDEX: 1.72 CM/M2
ECHO LA DIAMETER: 3.5 CM
ECHO LV E' LATERAL VELOCITY: 8 CM/S
ECHO LV E' SEPTAL VELOCITY: 6 CM/S
ECHO LV FRACTIONAL SHORTENING: 47 % (ref 28–44)
ECHO LV INTERNAL DIMENSION DIASTOLE INDEX: 2.51 CM/M2
ECHO LV INTERNAL DIMENSION DIASTOLIC: 5.1 CM (ref 3.9–5.3)
ECHO LV INTERNAL DIMENSION SYSTOLIC INDEX: 1.33 CM/M2
ECHO LV INTERNAL DIMENSION SYSTOLIC: 2.7 CM
ECHO LV IVSD: 0.7 CM (ref 0.6–0.9)
ECHO LV MASS 2D: 129.4 G (ref 67–162)
ECHO LV MASS INDEX 2D: 63.8 G/M2 (ref 43–95)
ECHO LV POSTERIOR WALL DIASTOLIC: 0.8 CM (ref 0.6–0.9)
ECHO LV RELATIVE WALL THICKNESS RATIO: 0.31
ECHO LVOT PEAK GRADIENT: 5 MMHG
ECHO LVOT PEAK VELOCITY: 1.1 M/S
ECHO MV E VELOCITY: 0.54 M/S
ECHO MV E/E' LATERAL: 6.75
ECHO MV E/E' RATIO (AVERAGED): 7.88
ECHO MV E/E' SEPTAL: 9
ECHO RIGHT VENTRICULAR SYSTOLIC PRESSURE (RVSP): 35 MMHG
ECHO RV TAPSE: 1.5 CM (ref 1.7–?)
ECHO TV REGURGITANT MAX VELOCITY: 2.85 M/S
ECHO TV REGURGITANT PEAK GRADIENT: 33 MMHG
ERYTHROCYTE [DISTWIDTH] IN BLOOD BY AUTOMATED COUNT: 13.2 % (ref 11.5–14.5)
ERYTHROCYTE [SEDIMENTATION RATE] IN BLOOD: 33 MM/HR (ref 0–30)
EST. AVERAGE GLUCOSE BLD GHB EST-MCNC: 131 MG/DL
GLUCOSE BLD STRIP.AUTO-MCNC: 107 MG/DL (ref 65–117)
GLUCOSE BLD STRIP.AUTO-MCNC: 128 MG/DL (ref 65–117)
GLUCOSE SERPL-MCNC: 106 MG/DL (ref 65–100)
HBA1C MFR BLD: 6.2 % (ref 4–5.6)
HCT VFR BLD AUTO: 40.2 % (ref 35–47)
HDLC SERPL-MCNC: 41 MG/DL
HDLC SERPL: 4.8 {RATIO} (ref 0–5)
HGB BLD-MCNC: 12.6 G/DL (ref 11.5–16)
LDLC SERPL CALC-MCNC: 130.4 MG/DL (ref 0–100)
MAGNESIUM SERPL-MCNC: 2.1 MG/DL (ref 1.6–2.4)
MCH RBC QN AUTO: 25.6 PG (ref 26–34)
MCHC RBC AUTO-ENTMCNC: 31.3 G/DL (ref 30–36.5)
MCV RBC AUTO: 81.5 FL (ref 80–99)
NRBC # BLD: 0 K/UL (ref 0–0.01)
NRBC BLD-RTO: 0 PER 100 WBC
P-R INTERVAL, ECG05: 176 MS
PHOSPHATE SERPL-MCNC: 3.7 MG/DL (ref 2.6–4.7)
PLATELET # BLD AUTO: 291 K/UL (ref 150–400)
PMV BLD AUTO: 10.8 FL (ref 8.9–12.9)
POTASSIUM SERPL-SCNC: 3.4 MMOL/L (ref 3.5–5.1)
Q-T INTERVAL, ECG07: 398 MS
QRS DURATION, ECG06: 76 MS
QTC CALCULATION (BEZET), ECG08: 438 MS
RBC # BLD AUTO: 4.93 M/UL (ref 3.8–5.2)
SERVICE CMNT-IMP: ABNORMAL
SERVICE CMNT-IMP: NORMAL
SODIUM SERPL-SCNC: 140 MMOL/L (ref 136–145)
TRIGL SERPL-MCNC: 123 MG/DL (ref ?–150)
TROPONIN-HIGH SENSITIVITY: 5 NG/L (ref 0–51)
TSH SERPL DL<=0.05 MIU/L-ACNC: 4.37 UIU/ML (ref 0.36–3.74)
VENTRICULAR RATE, ECG03: 73 BPM
VLDLC SERPL CALC-MCNC: 24.6 MG/DL
WBC # BLD AUTO: 4.4 K/UL (ref 3.6–11)

## 2022-08-12 PROCEDURE — 74011250637 HC RX REV CODE- 250/637: Performed by: INTERNAL MEDICINE

## 2022-08-12 PROCEDURE — 84484 ASSAY OF TROPONIN QUANT: CPT

## 2022-08-12 PROCEDURE — 93306 TTE W/DOPPLER COMPLETE: CPT | Performed by: INTERNAL MEDICINE

## 2022-08-12 PROCEDURE — 74011250636 HC RX REV CODE- 250/636: Performed by: FAMILY MEDICINE

## 2022-08-12 PROCEDURE — 83036 HEMOGLOBIN GLYCOSYLATED A1C: CPT

## 2022-08-12 PROCEDURE — 84100 ASSAY OF PHOSPHORUS: CPT

## 2022-08-12 PROCEDURE — 36415 COLL VENOUS BLD VENIPUNCTURE: CPT

## 2022-08-12 PROCEDURE — 74011000250 HC RX REV CODE- 250: Performed by: INTERNAL MEDICINE

## 2022-08-12 PROCEDURE — G0378 HOSPITAL OBSERVATION PER HR: HCPCS

## 2022-08-12 PROCEDURE — 97116 GAIT TRAINING THERAPY: CPT

## 2022-08-12 PROCEDURE — 97161 PT EVAL LOW COMPLEX 20 MIN: CPT

## 2022-08-12 PROCEDURE — 85652 RBC SED RATE AUTOMATED: CPT

## 2022-08-12 PROCEDURE — 80061 LIPID PANEL: CPT

## 2022-08-12 PROCEDURE — 92522 EVALUATE SPEECH PRODUCTION: CPT | Performed by: SPEECH-LANGUAGE PATHOLOGIST

## 2022-08-12 PROCEDURE — 74011250637 HC RX REV CODE- 250/637: Performed by: FAMILY MEDICINE

## 2022-08-12 PROCEDURE — 96375 TX/PRO/DX INJ NEW DRUG ADDON: CPT

## 2022-08-12 PROCEDURE — 83735 ASSAY OF MAGNESIUM: CPT

## 2022-08-12 PROCEDURE — 82962 GLUCOSE BLOOD TEST: CPT

## 2022-08-12 PROCEDURE — 93306 TTE W/DOPPLER COMPLETE: CPT

## 2022-08-12 PROCEDURE — 99223 1ST HOSP IP/OBS HIGH 75: CPT | Performed by: PSYCHIATRY & NEUROLOGY

## 2022-08-12 PROCEDURE — 86141 C-REACTIVE PROTEIN HS: CPT

## 2022-08-12 PROCEDURE — 85027 COMPLETE CBC AUTOMATED: CPT

## 2022-08-12 PROCEDURE — 84443 ASSAY THYROID STIM HORMONE: CPT

## 2022-08-12 PROCEDURE — 80048 BASIC METABOLIC PNL TOTAL CA: CPT

## 2022-08-12 RX ORDER — POTASSIUM CHLORIDE 750 MG/1
10 TABLET, FILM COATED, EXTENDED RELEASE ORAL
Status: COMPLETED | OUTPATIENT
Start: 2022-08-12 | End: 2022-08-12

## 2022-08-12 RX ORDER — LIDOCAINE 4 G/100G
1 PATCH TOPICAL EVERY 24 HOURS
Status: DISCONTINUED | OUTPATIENT
Start: 2022-08-12 | End: 2022-08-12 | Stop reason: HOSPADM

## 2022-08-12 RX ORDER — ATORVASTATIN CALCIUM 80 MG/1
80 TABLET, FILM COATED ORAL
Qty: 30 TABLET | Refills: 0 | Status: SHIPPED | OUTPATIENT
Start: 2022-08-12 | End: 2022-08-24 | Stop reason: SDUPTHER

## 2022-08-12 RX ORDER — ALBUTEROL SULFATE 90 UG/1
1 AEROSOL, METERED RESPIRATORY (INHALATION)
Qty: 1 EACH | Refills: 0 | Status: SHIPPED | OUTPATIENT
Start: 2022-08-12 | End: 2022-09-11

## 2022-08-12 RX ORDER — AMLODIPINE BESYLATE 5 MG/1
10 TABLET ORAL DAILY
Qty: 30 TABLET | Refills: 2 | Status: SHIPPED | OUTPATIENT
Start: 2022-08-12 | End: 2022-08-24 | Stop reason: SDUPTHER

## 2022-08-12 RX ORDER — GUAIFENESIN 100 MG/5ML
81 LIQUID (ML) ORAL DAILY
Qty: 30 TABLET | Refills: 0 | Status: SHIPPED | OUTPATIENT
Start: 2022-08-13 | End: 2022-08-24 | Stop reason: SDUPTHER

## 2022-08-12 RX ADMIN — ACETAMINOPHEN 650 MG: 325 TABLET ORAL at 07:51

## 2022-08-12 RX ADMIN — AMLODIPINE BESYLATE 10 MG: 5 TABLET ORAL at 09:34

## 2022-08-12 RX ADMIN — ASPIRIN 81 MG CHEWABLE TABLET 81 MG: 81 TABLET CHEWABLE at 09:33

## 2022-08-12 RX ADMIN — HYDRALAZINE HYDROCHLORIDE 10 MG: 20 INJECTION INTRAMUSCULAR; INTRAVENOUS at 14:19

## 2022-08-12 RX ADMIN — HYDROXYCHLOROQUINE SULFATE 200 MG: 200 TABLET ORAL at 09:33

## 2022-08-12 RX ADMIN — FAMOTIDINE 20 MG: 20 TABLET, FILM COATED ORAL at 09:34

## 2022-08-12 RX ADMIN — POTASSIUM CHLORIDE 10 MEQ: 750 TABLET, FILM COATED, EXTENDED RELEASE ORAL at 12:18

## 2022-08-12 NOTE — PROGRESS NOTES
Problem: Falls - Risk of  Goal: *Absence of Falls  Description: Document Alonzo Johnson Fall Risk and appropriate interventions in the flowsheet.   8/12/2022 1528 by Merline Stands, RN  Outcome: Resolved/Met  8/12/2022 1050 by Merline Stands, RN  Outcome: Progressing Towards Goal  Note: Fall Risk Interventions:            Medication Interventions: Patient to call before getting OOB, Teach patient to arise slowly                   Problem: Patient Education: Go to Patient Education Activity  Goal: Patient/Family Education  8/12/2022 1528 by Merline Stands, RN  Outcome: Resolved/Met  8/12/2022 1050 by Merline Stands, RN  Outcome: Progressing Towards Goal     Problem: Patient Education: Go to Patient Education Activity  Goal: Patient/Family Education  Outcome: Resolved/Met     Problem: TIA/CVA Stroke: 0-24 hours  Goal: Off Pathway (Use only if patient is Off Pathway)  Outcome: Resolved/Met  Goal: Activity/Safety  Outcome: Resolved/Met  Goal: Consults, if ordered  Outcome: Resolved/Met  Goal: Diagnostic Test/Procedures  Outcome: Resolved/Met  Goal: Nutrition/Diet  Outcome: Resolved/Met  Goal: Discharge Planning  Outcome: Resolved/Met  Goal: Medications  Outcome: Resolved/Met  Goal: Respiratory  Outcome: Resolved/Met  Goal: Treatments/Interventions/Procedures  Outcome: Resolved/Met  Goal: Minimize risk of bleeding post-thrombolytic infusion  Outcome: Resolved/Met  Goal: Monitor for complications post-thrombolytic infusion  Outcome: Resolved/Met  Goal: Psychosocial  Outcome: Resolved/Met  Goal: *Hemodynamically stable  Outcome: Resolved/Met  Goal: *Neurologically stable  Description: Absence of additional neurological deficits    Outcome: Resolved/Met  Goal: *Verbalizes anxiety and depression are reduced or absent  Outcome: Resolved/Met  Goal: *Absence of Signs of Aspiration on Current Diet  Outcome: Resolved/Met  Goal: *Absence of deep venous thrombosis signs and symptoms(Stroke Metric)  Outcome: Resolved/Met  Goal: *Ability to perform ADLs and demonstrates progressive mobility and function  Outcome: Resolved/Met  Goal: *Stroke education started(Stroke Metric)  Outcome: Resolved/Met  Goal: *Dysphagia screen performed(Stroke Metric)  Outcome: Resolved/Met  Goal: *Rehab consulted(Stroke Metric)  Outcome: Resolved/Met     Problem: TIA/CVA Stroke: Day 2 Until Discharge  Goal: Off Pathway (Use only if patient is Off Pathway)  Outcome: Resolved/Met  Goal: Activity/Safety  8/12/2022 1528 by Constanza Lucio RN  Outcome: Resolved/Met  8/12/2022 1050 by Constanza Lucio RN  Outcome: Progressing Towards Goal  Goal: Diagnostic Test/Procedures  8/12/2022 1528 by Constanza Lucio RN  Outcome: Resolved/Met  8/12/2022 1050 by Constanza Lucio RN  Outcome: Progressing Towards Goal  Goal: Nutrition/Diet  8/12/2022 1528 by Constanza Lucio RN  Outcome: Resolved/Met  8/12/2022 1050 by Constanza Lucio RN  Outcome: Progressing Towards Goal  Goal: Discharge Planning  8/12/2022 1528 by Constanza Lucio RN  Outcome: Resolved/Met  8/12/2022 1050 by Constanza Lucio RN  Outcome: Progressing Towards Goal  Goal: Medications  8/12/2022 1528 by Constanza Lucio RN  Outcome: Resolved/Met  8/12/2022 1050 by Constanza Lucio RN  Outcome: Progressing Towards Goal  Goal: Respiratory  8/12/2022 1528 by Constanza Lucio RN  Outcome: Resolved/Met  8/12/2022 1050 by Constanza Lucio RN  Outcome: Progressing Towards Goal  Goal: Treatments/Interventions/Procedures  8/12/2022 1528 by Constanza Lucio RN  Outcome: Resolved/Met  8/12/2022 1050 by Constanza Lucio RN  Outcome: Progressing Towards Goal  Goal: Psychosocial  8/12/2022 1528 by Constanza Lucio RN  Outcome: Resolved/Met  8/12/2022 1050 by Constanza Lucio RN  Outcome: Progressing Towards Goal  Goal: *Verbalizes anxiety and depression are reduced or absent  8/12/2022 1528 by Katherleen Casino, RN  Outcome: Resolved/Met  8/12/2022 1050 by Constanza Lucio, RN  Outcome: Progressing Towards Goal  Goal: *Absence of aspiration  8/12/2022 1528 by Kaylan Hernandez RN  Outcome: Resolved/Met  8/12/2022 1050 by Kaylan Hernandez RN  Outcome: Progressing Towards Goal  Goal: *Absence of deep venous thrombosis signs and symptoms(Stroke Metric)  8/12/2022 1528 by Kaylan Hernandez RN  Outcome: Resolved/Met  8/12/2022 1050 by Kaylan Hernandez RN  Outcome: Progressing Towards Goal  Goal: *Optimal pain control at patient's stated goal  8/12/2022 1528 by Kaylan Hernandez RN  Outcome: Resolved/Met  8/12/2022 1050 by Kaylan Hernandez RN  Outcome: Progressing Towards Goal  Goal: *Tolerating diet  8/12/2022 1528 by Kaylan Hernandez RN  Outcome: Resolved/Met  8/12/2022 1050 by Kaylan Hernandez RN  Outcome: Progressing Towards Goal  Goal: *Ability to perform ADLs and demonstrates progressive mobility and function  8/12/2022 1528 by Kaylan Hernandez RN  Outcome: Resolved/Met  8/12/2022 1050 by Kaylan Hernandez RN  Outcome: Progressing Towards Goal  Goal: *Stroke education continued(Stroke Metric)  8/12/2022 1528 by Kaylan Hernandez RN  Outcome: Resolved/Met  8/12/2022 1050 by Kaylan Hernandez RN  Outcome: Progressing Towards Goal     Problem: Ischemic Stroke: Discharge Outcomes  Goal: *Verbalizes anxiety and depression are reduced or absent  8/12/2022 1528 by Kaylan Hernandez RN  Outcome: Resolved/Met  8/12/2022 1050 by Kaylan Hernandez RN  Outcome: Progressing Towards Goal  Goal: *Verbalize understanding of risk factor modification(Stroke Metric)  8/12/2022 1528 by Kaylan Hernandez RN  Outcome: Resolved/Met  8/12/2022 1050 by Kaylan Hernandez RN  Outcome: Progressing Towards Goal  Goal: *Hemodynamically stable  8/12/2022 1528 by Kaylan Hernandez RN  Outcome: Resolved/Met  8/12/2022 1050 by Kaylan Hernandez RN  Outcome: Progressing Towards Goal  Goal: *Absence of aspiration pneumonia  8/12/2022 1528 by Kaylan Hernandez RN  Outcome: Resolved/Met  8/12/2022 1050 by Kaylan Hernandez RN  Outcome: Progressing Towards Goal  Goal: *Aware of needed dietary changes  8/12/2022 1528 by Kaylan Hernandez RN  Outcome: Resolved/Met  8/12/2022 1050 by Kaylan Hernandez RN  Outcome: Progressing Towards Goal  Goal: *Verbalize understanding of prescribed medications including anti-coagulants, anti-lipid, and/or anti-platelets(Stroke Metric)  8/12/2022 1528 by Kaylan Hernandez RN  Outcome: Resolved/Met  8/12/2022 1050 by Kaylan Hernandez RN  Outcome: Progressing Towards Goal  Goal: *Tolerating diet  8/12/2022 1528 by Kaylan Hernandez RN  Outcome: Resolved/Met  8/12/2022 1050 by Kaylan Hernandez RN  Outcome: Progressing Towards Goal  Goal: *Aware of follow-up diagnostics related to anticoagulants  8/12/2022 1528 by Kaylan Hernandez RN  Outcome: Resolved/Met  8/12/2022 1050 by Kaylan Hernandez RN  Outcome: Progressing Towards Goal  Goal: *Ability to perform ADLs and demonstrates progressive mobility and function  8/12/2022 1528 by Kaylan Hernandez RN  Outcome: Resolved/Met  8/12/2022 1050 by Kaylan Hernandez RN  Outcome: Progressing Towards Goal  Goal: *Absence of DVT(Stroke Metric)  8/12/2022 1528 by Kaylan Hernandez RN  Outcome: Resolved/Met  8/12/2022 1050 by Kaylan Hernandez RN  Outcome: Progressing Towards Goal  Goal: *Absence of aspiration  8/12/2022 1528 by Kaylan Hernandez RN  Outcome: Resolved/Met  8/12/2022 1050 by Kaylan Hernandez RN  Outcome: Progressing Towards Goal  Goal: *Optimal pain control at patient's stated goal  8/12/2022 1528 by Kaylan Hernandez RN  Outcome: Resolved/Met  8/12/2022 1050 by Kaylan Hernandez RN  Outcome: Progressing Towards Goal  Goal: *Home safety concerns addressed  8/12/2022 1528 by Kaylan Hernandez RN  Outcome: Resolved/Met  8/12/2022 1050 by Kaylan Hernandez RN  Outcome: Progressing Towards Goal  Goal: *Describes available resources and support systems  8/12/2022 1528 by Kaylan Mary, RN  Outcome: Resolved/Met  8/12/2022 1050 by Kaylan Hernandez, RN  Outcome: Progressing Towards Goal  Goal: *Verbalizes understanding of activation of EMS(911) for stroke symptoms(Stroke Metric)  8/12/2022 1528 by Staci Cruz RN  Outcome: Resolved/Met  8/12/2022 1050 by Staci Cruz RN  Outcome: Progressing Towards Goal  Goal: *Understands and describes signs and symptoms to report to providers(Stroke Metric)  8/12/2022 1528 by Staci Cruz RN  Outcome: Resolved/Met  8/12/2022 1050 by Staci Cruz RN  Outcome: Progressing Towards Goal  Goal: *Neurolgocially stable (absence of additional neurological deficits)  8/12/2022 1528 by Staci Cruz RN  Outcome: Resolved/Met  8/12/2022 1050 by Staci Cruz RN  Outcome: Progressing Towards Goal  Goal: *Verbalizes importance of follow-up with primary care physician(Stroke Metric)  8/12/2022 1528 by Staci Cruz RN  Outcome: Resolved/Met  8/12/2022 1050 by Staci Cruz RN  Outcome: Progressing Towards Goal  Goal: *Smoking cessation discussed,if applicable(Stroke Metric)  8/12/2022 1528 by Staci Cruz RN  Outcome: Resolved/Met  8/12/2022 1050 by Staci Cruz RN  Outcome: Progressing Towards Goal  Goal: *Depression screening completed(Stroke Metric)  8/12/2022 1528 by Staci Cruz RN  Outcome: Resolved/Met  8/12/2022 1050 by Staci Cruz RN  Outcome: Progressing Towards Goal     Problem: Discharge Planning  Goal: *Discharge to safe environment  Outcome: Resolved/Met

## 2022-08-12 NOTE — PROGRESS NOTES
SPEECH LANGUAGE PATHOLOGY EVALUATION/DISCHARGE  Patient: Benjamin Jim (26 y.o. female)  Date: 8/12/2022  Primary Diagnosis: CVA (cerebral vascular accident) [facility]) [I63.9]       Precautions:   Fall    ASSESSMENT :  Based on the objective data described below, the patient presents with intact motor speech. Patient reports numbness/pain on left side behind left ear that is resolved. Patient fully intelligible in conversation. Patient speaking in full fluent sentences with good fund of knowledge. Dysphagia screen complete with no difficulty noted. Regular diet initiated. Patient reports no difficulty swallowing. Full dysphagia evaluation not warranted. Skilled acute therapy provided by a speech-language pathologist is not indicated at this time. PLAN :  Recommendations:  No further SLP treatment warranted  Discharge Recommendations: None     SUBJECTIVE:   Patient stated I'm better than I was last night. .    OBJECTIVE:     Past Medical History:   Diagnosis Date    Asthma     Fibroid     Headache 03/10/2020    Only due to the concussion.     History of concussion 3/10/2020    Lupus ([facility] [address]     Menopause      Past Surgical History:   Procedure Laterality Date    HX ORTHOPAEDIC Bilateral 1996 & 2015    HX TONSILLECTOMY       Prior Level of Function/Home Situation:   Home Situation  Home Environment: Private residence  # Steps to Enter: 2  One/Two Story Residence: One story  Living Alone: Yes  Support Systems: Spouse/Significant Other  Patient Expects to be Discharged to[de-identified] Home with outpatient services  Current DME Used/Available at Home: None  Mental Status:  Neurologic State: Alert  Orientation Level: Oriented X4  Cognition: Appropriate for age attention/concentration, Follows commands           Motor Speech:  Oral-Motor Structure/Motor Speech  Labial: No impairment  Dentition: Natural;Intact  Oral Hygiene: Moist oral mucosa  Lingual: No impairment  Velum: Unable to visualize  Mandible: No impairment  Apraxic Characteristics: None  Dysarthric Characteristics: None  Intelligibility: No impairment    NOMS: The NOMS functional outcome measure was used to quantify this patient's level of motor speech impairment. Based on the NOMS, the patient was determined to be at level 7 for motor speech function. NOMS Motor Speech:  Level 1 (CN):  100% unintelligible  Level 2 (CM):  Communication partner responsible for message; can do CV or automatic words w/ max cues but rarely intelligible in context  Level 3 (CL): communication partner primarily responsible for message but says CV/automatic words intelligibly; mod cues to say simple words/phrases  Level 4 (CK): In structured conversation with familiar listener can say simple words and phrases. Mod cues for simple sentences  Level 5 (CJ):  Uses simple sentences for ADLs with familiar and unfamiliar listener; min cues for complex sentences  Level 6 (CI):  Intelligible in ADLs; difficulty in voc/social activites; rare cues for complex message; uses comp strategies  Level 7 ([address] [place] [address]):  Intelligible in all activities. May occasionally use compensatory strategies. ULISES. (2003). National Outcomes Measurement System (NOMS): Adult Speech-Language Pathology User's Guide. After treatment:   Patient left in no apparent distress sitting up in chair and Call bell within reach    COMMUNICATION/EDUCATION:   Patient was educated regarding role of SLP and POC. Patient verbalized understanding. The patient's plan of care including recommendations, planned interventions, and recommended diet changes were discussed with:        Thank you for this referral.  JANIS Rocha  Time Calculation: 10 mins

## 2022-08-12 NOTE — PROGRESS NOTES
Occupational Therapy  08/12/22    Orders received, chart reviewed and patient evaluated by occupational therapy. Pending progression with skilled acute occupational therapy, recommend:  Outpatient occupational therapy follow up recommended for LUE deficits for return to work      Recommend with nursing ADLs with supervision/setup, OOB to chair 3x/day and toileting via functional mobility to and from bathroom with supervision. Thank you for completing as able in order to maintain patient strength, endurance and independence. Full evaluation to follow.      Thank you,   JEAN CLAUDE Horvath, OTR/L

## 2022-08-12 NOTE — DISCHARGE SUMMARY
Physician Discharge Summary     Patient ID:    Danelle Menendez  [id number]  [unfilled]  1965    Admit date: 8/11/2022    Discharge date and time: 8/12/2022    Hospital Diagnoses and Treatment Rendered:   Danelle Menendez is a 61 y.o. female who presents with left-sided weakness. History obtained from the patient  Patient reports that she had sudden onset of left-sided weakness earlier today, patient reports that she felt \"weird sensation in the left eye, left face, and left arm\", patient reports that she felt that her left eye was closed but when she looked in the mirror it appeared to be normal, patient also reports that she had numbness in the left cheek, felt as if there was some weakness in the left arm, got concerned and decided to come to the hospital, patient arrived with a code stroke, patient reports that she has history of lupus, went for infusion yesterday, was noted to have a systolic blood pressure 478A, called her PCP, patient states amlodipine 5 mg daily, PCP told her to take an additional dose of 5 mg, continue to have elevated blood pressure, and the PCP had told her to take a total of 10 mg of amlodipine today, patient denies any other complaints or problems. The patient was admitted to medicine, stroke protocol was initiated, MRI of the brain was obtained and was negative for acute cerebrovascular accident, patient still experience left upper extremity numbness, neurologist was consulted recommended continue physical therapy as patient might have degenerative disc disease of spine as patient had neck pain, per neurologist unable to exclude TIA with patient risk factors including hypertension dyslipidemia and prediabetes, recommended continue Lipitor 40 mg and aspirin 81 mg. Amlodipine was increased to 10 mg p.o. daily. On current medical management condition of patient to improve left-sided weakness improved.   Rest of hospital stay patient remained hemodynamically stable, afebrile, was able to tolerate p.o. diet, ambulate, and she was stable to be discharged home, to continue outpatient therapy. Dominique Choi advised if condition of patient not improve after work with physical therapy patient might need further evaluation of your left-sided numbness and neck pain with MRI of the C-spine.       Left-sided weakness  Slight betted  Able to ambulate  MRI brain negative  Discussed with neurologist, evaluation is pending  Telemetry NSR  Continue ASA, statins     Neck pain  Add lidocaine patch neck     Accelerated hypertension  Improved  Continue Norvasc, was increased to 10 mg    Lupus  Continue Plaquenil     Hypokalemia  replaced        Chronic Diagnoses:    Problem List as of 8/12/2022 Date Reviewed: 8/10/2022            Codes Class Noted - Resolved    CVA (cerebral vascular accident) [facility]) ICD-10-CM: I63.9  ICD-9-CM: 434.91  8/11/2022 - Present        Positive double stranded DNA antibody test ICD-10-CM: R76.8  ICD-9-CM: 795.79  11/12/2021 - Present        Systemic lupus erythematosus ([facility] [address] ICD-10-CM: M32.9  ICD-9-CM: 710.0  11/12/2021 - Present        Severe obesity ([facility] [address] ICD-10-CM: E66.01  ICD-9-CM: 278.01  10/26/2020 - Present        History of smoking 10-25 pack years ICD-10-CM: Z87.891  ICD-9-CM: V15.82  10/26/2020 - Present        [place]'s neuroma, left ICD-10-CM: G57.62  ICD-9-CM: 355.6  10/19/2020 - Present        Elevated hemoglobin A1c ICD-10-CM: R73.09  ICD-9-CM: 790.29  9/3/2020 - Present        Hypercholesteremia ICD-10-CM: E78.00  ICD-9-CM: 272.0  9/3/2020 - Present        Achilles tendinitis, left leg ICD-10-CM: M76.62  ICD-9-CM: 726.71  8/24/2020 - Present        Essential hypertension ICD-10-CM: I10  ICD-9-CM: 401.9  3/10/2020 - Present        Headache ICD-10-CM: R51.9  ICD-9-CM: 784.0  3/10/2020 - Present        History of concussion ICD-10-CM: Z87.820  ICD-9-CM: V15.52  3/10/2020 - Present           Discharge Medications:   Current Discharge Medication List        START taking these medications    Details   aspirin 81 mg chewable tablet Take 1 Tablet by mouth in the morning for 30 days. Qty: 30 Tablet, Refills: 0  Start date: 8/13/2022, End date: 9/12/2022      atorvastatin (LIPITOR) 80 mg tablet Take 1 Tablet by mouth nightly for 30 days. Qty: 30 Tablet, Refills: 0  Start date: 8/12/2022, End date: 9/11/2022           CONTINUE these medications which have CHANGED    Details   amLODIPine (NORVASC) 5 mg tablet Take 2 Tablets by mouth in the morning. Qty: 30 Tablet, Refills: 2  Start date: 8/12/2022    Associated Diagnoses: Essential hypertension      albuterol (PROVENTIL HFA, VENTOLIN HFA, PROAIR HFA) 90 mcg/actuation inhaler Take 1 Puff by inhalation every four (4) hours as needed for Shortness of Breath for up to 30 days. NEEDS PRESCRIPTION  Qty: 1 Each, Refills: 0  Start date: 8/12/2022, End date: 9/11/2022    Associated Diagnoses: Moderate persistent asthma, unspecified whether complicated           CONTINUE these medications which have NOT CHANGED    Details   hydrOXYchloroQUINE (PLAQUENIL) 200 mg tablet Take 200 mg by mouth in the morning. budesonide-formoteroL (Symbicort) 160-4.5 mcg/actuation HFAA Take 1 Puff by inhalation two (2) times a day. montelukast (SINGULAIR) 10 mg tablet Take 1 Tab by mouth every evening. Qty: 90 Tab, Refills: 1    Associated Diagnoses: Moderate persistent asthma, unspecified whether complicated      albuterol (ACCUNEB) 1.25 mg/3 mL nebu 3 mL by Nebulization route every six (6) hours as needed for Shortness of Breath. Indications: asthma attack  Qty: 100 Each, Refills: 1    Associated Diagnoses: Moderate persistent asthma, unspecified whether complicated               Follow up Care:    1. Princess Rubio PA-C in 1-2 weeks. Please call to set up an appointment shortly after discharge. Diet:  Regular Diet    Disposition:  Home. Advanced Directive:   FULL    DNR      Discharge Exam:  See today's note.     CONSULTATIONS: Neurology    Significant Diagnostic Studies:   8/11/2022: BUN 11 MG/DL (Ref range: 6 - 20 MG/DL); Calcium 9.5 MG/DL (Ref range: 8.5 - 10.1 MG/DL); CO2 27 mmol/L (Ref range: 21 - 32 mmol/L); Creatinine 0.88 MG/DL (Ref range: 0.55 - 1.02 MG/DL); Glucose 100 mg/dL (Ref range: 65 - 100 mg/dL); HCT 41.4 % (Ref range: 35.0 - 47.0 %); HGB 13.0 g/dL (Ref range: 11.5 - 16.0 g/dL); Potassium 3.6 mmol/L (Ref range: 3.5 - 5.1 mmol/L); Sodium 140 mmol/L (Ref range: 136 - 145 mmol/L)  8/12/2022: BUN 10 MG/DL (Ref range: 6 - 20 MG/DL); Calcium 9.4 MG/DL (Ref range: 8.5 - 10.1 MG/DL); CO2 28 mmol/L (Ref range: 21 - 32 mmol/L); Creatinine 0.90 MG/DL (Ref range: 0.55 - 1.02 MG/DL); Glucose 106 mg/dL (H; Ref range: 65 - 100 mg/dL); HCT 40.2 % (Ref range: 35.0 - 47.0 %); HGB 12.6 g/dL (Ref range: 11.5 - 16.0 g/dL); Potassium 3.4 mmol/L (L; Ref range: 3.5 - 5.1 mmol/L); Sodium 140 mmol/L (Ref range: 136 - 145 mmol/L)  Recent Labs     08/12/22 0317 08/11/22  1420   WBC 4.4 5.6   HGB 12.6 13.0   HCT 40.2 41.4    280     Recent Labs     08/12/22 0317 08/11/22  1420    140   K 3.4* 3.6    106   CO2 28 27   BUN 10 11   CREA 0.90 0.88   * 100   CA 9.4 9.5   MG 2.1  --    PHOS 3.7  --      Recent Labs     08/11/22  1420      TP 8.2   ALB 3.6   GLOB 4.6*     Recent Labs     08/11/22  1420   INR 1.0   PTP 10.9      No results for input(s): FE, TIBC, PSAT, FERR in the last 72 hours. No results for input(s): PH, PCO2, PO2 in the last 72 hours. No results for input(s): CPK, CKMB in the last 72 hours. No lab exists for component: TROPONINI  No components found for: [place]    Total time to discharge patient was 31 minutes more than 50% of time was spent for counseling and coordination of care.       Signed:  Rachid Mathis MD  8/12/2022  2:33 PM

## 2022-08-12 NOTE — PROGRESS NOTES
Bedside RN performed patient education and medication education. Discharge concerns initiated and discussed with patient, including clarification on \"who\" assists the patient at their home and instructions for when the home going patient should call their provider after discharge. Opportunity for questions and clarification was provided. Patient receptive to education: YES  Patient stated: \"I will check about my BP meds\"  Barriers to Education: None  Diagnosis Education given:  YES    Length of stay: 1  Expected Day of Discharge: 1  Ask if they have \"Help at Home\" & add to white board?   YES    Education Day #: 1    Medication Education Given:  YES  M in the box Medication name: Felipe Cheese    Pt aware of HCAHPS survey: YES          Stroke Education documented in Patient Education: YES  Core Measures Documented in [facility]:  Risk Factors: YES  Warning signs of stroke: YES  When to Activate 911: YES  Medication Education for Risk Factors: YES  Smoking cessation if applicable: YES  Written Education Given:  YES    Discharge NIH Completed: YES  Score: 0    BRAINS: YES    Follow Up Appointment Made: NO  Date/Time if applicable: N/A

## 2022-08-12 NOTE — CONSULTS
Neuro consult complete, dictated note to follow. Pt still c/o left arm and leg numbness to touch and has give away weakness on left. No findings on CTA H/N or MRI brain to explain sxs. May have HNP given neck pain. Recommend PT. Cannot fully exclude TIA with risk factors of HTN, HLD, and Prediabetes. Recommend Lipitor 40mg daily with goal LDL<70, continue ASA 81mg daily at home, see PCP for management of abnormal glucose metabolism.

## 2022-08-12 NOTE — PROGRESS NOTES
Problem: Falls - Risk of  Goal: *Absence of Falls  Description: Document Arben Rodriguez Fall Risk and appropriate interventions in the flowsheet.   Outcome: Progressing Towards Goal  Note: Fall Risk Interventions:            Medication Interventions: Patient to call before getting OOB, Teach patient to arise slowly                   Problem: Patient Education: Go to Patient Education Activity  Goal: Patient/Family Education  Outcome: Progressing Towards Goal     Problem: TIA/CVA Stroke: Day 2 Until Discharge  Goal: Activity/Safety  Outcome: Progressing Towards Goal  Goal: Diagnostic Test/Procedures  Outcome: Progressing Towards Goal  Goal: Nutrition/Diet  Outcome: Progressing Towards Goal  Goal: Discharge Planning  Outcome: Progressing Towards Goal  Goal: Medications  Outcome: Progressing Towards Goal  Goal: Respiratory  Outcome: Progressing Towards Goal  Goal: Treatments/Interventions/Procedures  Outcome: Progressing Towards Goal  Goal: Psychosocial  Outcome: Progressing Towards Goal  Goal: *Verbalizes anxiety and depression are reduced or absent  Outcome: Progressing Towards Goal  Goal: *Absence of aspiration  Outcome: Progressing Towards Goal  Goal: *Absence of deep venous thrombosis signs and symptoms(Stroke Metric)  Outcome: Progressing Towards Goal  Goal: *Optimal pain control at patient's stated goal  Outcome: Progressing Towards Goal  Goal: *Tolerating diet  Outcome: Progressing Towards Goal  Goal: *Ability to perform ADLs and demonstrates progressive mobility and function  Outcome: Progressing Towards Goal  Goal: *Stroke education continued(Stroke Metric)  Outcome: Progressing Towards Goal     Problem: Ischemic Stroke: Discharge Outcomes  Goal: *Verbalizes anxiety and depression are reduced or absent  Outcome: Progressing Towards Goal  Goal: *Verbalize understanding of risk factor modification(Stroke Metric)  Outcome: Progressing Towards Goal  Goal: *Hemodynamically stable  Outcome: Progressing Towards Goal  Goal: *Absence of aspiration pneumonia  Outcome: Progressing Towards Goal  Goal: *Aware of needed dietary changes  Outcome: Progressing Towards Goal  Goal: *Verbalize understanding of prescribed medications including anti-coagulants, anti-lipid, and/or anti-platelets(Stroke Metric)  Outcome: Progressing Towards Goal  Goal: *Tolerating diet  Outcome: Progressing Towards Goal  Goal: *Aware of follow-up diagnostics related to anticoagulants  Outcome: Progressing Towards Goal  Goal: *Ability to perform ADLs and demonstrates progressive mobility and function  Outcome: Progressing Towards Goal  Goal: *Absence of DVT(Stroke Metric)  Outcome: Progressing Towards Goal  Goal: *Absence of aspiration  Outcome: Progressing Towards Goal  Goal: *Optimal pain control at patient's stated goal  Outcome: Progressing Towards Goal  Goal: *Home safety concerns addressed  Outcome: Progressing Towards Goal  Goal: *Describes available resources and support systems  Outcome: Progressing Towards Goal  Goal: *Verbalizes understanding of activation of EMS(911) for stroke symptoms(Stroke Metric)  Outcome: Progressing Towards Goal  Goal: *Understands and describes signs and symptoms to report to providers(Stroke Metric)  Outcome: Progressing Towards Goal  Goal: *Neurolgocially stable (absence of additional neurological deficits)  Outcome: Progressing Towards Goal  Goal: *Verbalizes importance of follow-up with primary care physician(Stroke Metric)  Outcome: Progressing Towards Goal  Goal: *Smoking cessation discussed,if applicable(Stroke Metric)  Outcome: Progressing Towards Goal  Goal: *Depression screening completed(Stroke Metric)  Outcome: Progressing Towards Goal

## 2022-08-12 NOTE — PROGRESS NOTES
OCCUPATIONAL THERAPY EVALUATION/DISCHARGE  Patient: Noelle Mei (89 y.o. female)  Date: 8/12/2022  Primary Diagnosis: CVA (cerebral vascular accident) [facility]) [I63.9]       Precautions:  Fall    ASSESSMENT  Based on the objective data described below, the patient presents with decreased LUE/LLE sensation and coordination s/p admission for CVA workup, imaging negative for acute neuro process. At baseline, she is highly IND, lives alone, working full time (mostly computer-based). She now presents slightly below her baseline however functional, largely Mod I for ADLs and bathroom mobility. LUE numbness and slightly diminished coordination noted however not limiting activity, morso noted only with objective assessment (slowed finger to nose & digital tip to tip), reviewed BE FAST, neuroplasticity principles, and LUE AROM/coordination exercises, as well as visual rest breaks d/t reports of HA and altered vision with prolonged screen time. No further acute OT needs, recommend OP OT follow up for vision & LUE assessment. Current Level of Function (ADLs/self-care): Mod I for ADLs and mobility    Functional Outcome Measure: The patient scored 100/100 on the Barthel Index outcome measure which is indicative of being independent in basic self care and mobility. Other factors to consider for discharge: PMH, PLOF     PLAN :  Recommend with staff: Recommend with nursing, ADLs with supervision/setup, OOB to chair 3x/day, and toileting via functional mobility to and from bathroom. Thank you for completing as able in order to maintain patient strength, endurance and independence.        Recommendation for discharge: (in order for the patient to meet his/her long term goals)  Outpatient occupational therapy follow up recommended for LUE sensation & coordination and visual assessment    This discharge recommendation:  Has been made in collaboration with the attending provider and/or case management    IF patient discharges home will need the following DME: none       SUBJECTIVE:   Patient stated This is better than yesterday! re: LUE finger to nose    OBJECTIVE DATA SUMMARY:   HISTORY:   Past Medical History:   Diagnosis Date    Asthma     Fibroid     Headache 03/10/2020    Only due to the concussion. History of concussion 3/10/2020    Lupus ([facility] [address]     Menopause      Past Surgical History:   Procedure Laterality Date    HX ORTHOPAEDIC Bilateral 1996 & 2015    HX TONSILLECTOMY         Prior Level of Function/Environment/Context:   Expanded or extensive additional review of patient history:   Home Situation  Home Environment: Private residence  # Steps to Enter: 2  One/Two Story Residence: One story  Living Alone: Yes  Support Systems: Spouse/Significant Other  Patient Expects to be Discharged to[de-identified] Home  Current DME Used/Available at Home: None  Tub or Shower Type: Shower    Hand dominance: Left    EXAMINATION OF PERFORMANCE DEFICITS:  Cognitive/Behavioral Status:  Neurologic State: Alert  Orientation Level: Oriented X4  Cognition: Appropriate decision making; Appropriate safety awareness; Appropriate for age attention/concentration; Follows commands  Perception: Appears intact  Perseveration: No perseveration noted  Safety/Judgement: Awareness of environment; Fall prevention    Skin: Appears intact    Edema: None    Hearing: Auditory  Auditory Impairment: None    Vision/Perceptual:    Tracking: Able to track stimulus in all quadrants w/o difficulty                 Diplopia: No    Acuity: Within Defined Limits    Corrective Lenses: Glasses    Range of Motion:  AROM: Within functional limits  PROM: Within functional limits                      Strength:  Strength: Within functional limits                Coordination:  Coordination: Generally decreased, functional (LUE slightly deminished, not limiting basic ADLs)  Fine Motor Skills-Upper: Left Impaired;Right Intact    Gross Motor Skills-Upper: Left Intact; Right Intact    Tone & Sensation:  Tone: Normal  Sensation: Impaired (decreased to light touch to LUE)                      Balance:  Sitting: Intact  Standing: Intact    Functional Mobility and Transfers for ADLs:  Bed Mobility:  Supine to Sit: Independent  Scooting: Independent    Transfers:  Sit to Stand: Independent  Stand to Sit: Independent  Bathroom Mobility: Independent  Toilet Transfer : Independent    ADL Assessment:  Feeding: Modified independent    Oral Facial Hygiene/Grooming: Modified Independent    Bathing: Modified independent         Upper Body Dressing: Modified independent    Lower Body Dressing: Modified independent    Toileting: Modified independent                ADL Intervention and task modifications:       Lower Body Dressing Assistance  Dressing Assistance: Modified independent  Pants With Elastic Waist: Modified independent (simulated)  Socks: Modified independent  Leg Crossed Method Used: Yes  Position Performed: Seated edge of bed;Standing         Cognitive Retraining  Safety/Judgement: Awareness of environment; Fall prevention    Functional Measure:    Barthel Index:  Bathin  Bladder: 10  Bowels: 10  Groomin  Dressing: 10  Feeding: 10  Mobility: 15  Stairs: 10  Toilet Use: 10  Transfer (Bed to Chair and Back): 15  Total: 100/100      The Barthel ADL Index: Guidelines  1. The index should be used as a record of what a patient does, not as a record of what a patient could do. 2. The main aim is to establish degree of independence from any help, physical or verbal, however minor and for whatever reason. 3. The need for supervision renders the patient not independent. 4. A patient's performance should be established using the best available evidence. Asking the patient, friends/relatives and nurses are the usual sources, but direct observation and common sense are also important. However direct testing is not needed.   5. Usually the patient's performance over the preceding 24-48 hours is important, but occasionally longer periods will be relevant. 6. Middle categories imply that the patient supplies over 50 per cent of the effort. 7. Use of aids to be independent is allowed. Score Interpretation (from [address])    Independent   60-79 Minimally independent   40-59 Partially dependent   20-39 Very dependent   <20 Totally dependent     -Tara Mathur, Barthel, D.W. (1965). Functional evaluation: the Barthel Index. [address] ([address] (1997). The Barthel activities of daily living index: self-reporting versus actual performance in the old (> or = 75 years). Journal of [address] [place] [address] 45(7), [facility], AMANDO, Andrews Clement., Venkatdo De La Cruz. (1999). Measuring the change in disability after inpatient rehabilitation; comparison of the responsiveness of the Barthel Index and Functional [place] Measure. Journal of Neurology, Neurosurgery, and Psychiatry, 66(4), 492-486. Darlene Arreguin, N.J.A, CARRIE Sommer, & Fiorella Michael MTAL. (2004) Assessment of post-stroke quality of life in cost-effectiveness studies: The usefulness of the Barthel Index and the EuroQoL-5D. Quality of Life Research, 15, 101-14         Occupational Therapy Evaluation Charge Determination   History Examination Decision-Making   LOW Complexity : Brief history review  LOW Complexity : 1-3 performance deficits relating to physical, cognitive , or psychosocial skils that result in activity limitations and / or participation restrictions  LOW Complexity : No comorbidities that affect functional and no verbal or physical assistance needed to complete eval tasks       Based on the above components, the patient evaluation is determined to be of the following complexity level: LOW   Pain Rating:  None reported    Activity Tolerance:    WNL    After treatment patient left in no apparent distress:    Supine in bed and Call bell within reach    COMMUNICATION/EDUCATION:   The patients plan of care was discussed with: Physical therapist, Registered nurse, and Case management. Patient was educated regarding her deficit(s) stated above as this relates to her diagnosis of negative CVA workup. She demonstrated good understanding as evidenced by verbal discussion & carryover. Patient and/or family was verbally educated on the BE FAST acronym for signs/symptoms of CVA and TIA. BE FAST was written on patient's communication board  for visual education and reinforcement. All questions answered with patient indicating good understanding.        Thank you for this referral.  Miles Claude, OTD, OTR/L  Time Calculation: 10 mins

## 2022-08-12 NOTE — PROGRESS NOTES
[facility]  Hospitalist Group                                                                                          Hospitalist Progress Note  Lisa Hernandes MD  Answering service: [phone] from in house phone        Date of Service:  2022  NAME:  Faye Govea  :  1965  MRN:  [id number]      Admission Summary:   Faye Govea is a 61 y.o. female who presents with left-sided weakness.     History obtained from the patient  Patient reports that she had sudden onset of left-sided weakness earlier today, patient reports that she felt \"weird sensation in the left eye, left face, and left arm\", patient reports that she felt that her left eye was closed but when she looked in the mirror it appeared to be normal, patient also reports that she had numbness in the left cheek, felt as if there was some weakness in the left arm, got concerned and decided to come to the hospital, patient arrived with a code stroke, patient reports that she has history of lupus, went for infusion yesterday, was noted to have a systolic blood pressure 484T, called her PCP, patient states amlodipine 5 mg daily, PCP told her to take an additional dose of 5 mg, continue to have elevated blood pressure, and the PCP had told her to take a total of 10 mg of amlodipine today, patient denies any other complaints or problems       Interval history / Subjective:   Admitted yesterday  NAD, no acute events over night  Still slight left sided weakness  But able to move all extremities  Left sided neck pain  MRI brain negative for CVA     Assessment & Plan:     Left-sided weakness  Slight betted  Able to ambulate  MRI brain negative  Discussed with neurologist, evaluation is pending  Telemetry NSR  Continue ASA, statins    Neck pain  Add lidocaine patch neck    Accelerated hypertension  Improved  Continue Norvasc    Lupus  Continue Plaquenil    Hypokalemia   replace        Code status: Full Code  Prophylaxis: SCD  Care Plan discussed with: patient, RN , neurologist  Anticipated Disposition: home today if OK per neurologist, pending ECHO       Hospital Problems  Date Reviewed: 8/10/2022            Codes Class Noted POA    CVA (cerebral vascular accident) [facility]) ICD-10-CM: I63.9  ICD-9-CM: 434.91  8/11/2022 Unknown             Review of Systems:   Pertinent items are noted in HPI. Vital Signs:    Last 24hrs VS reviewed since prior progress note. Most recent are:  Visit Vitals  BP (!) 160/74 (BP 1 Location: Right upper arm)   Pulse 82   Temp 98.4 °F (36.9 °C)   Resp 15   Ht 5' 4\" (1.626 m)   Wt 98.5 kg (217 lb 2.5 oz)   SpO2 96%   BMI 37.27 kg/m²       No intake or output data in the 24 hours ending 08/12/22 1110     Physical Examination:     I had a face to face encounter with this patient and independently examined them on 8/12/2022 as outlined below:          Constitutional:  No acute distress, cooperative, pleasant, in chair, some left side neck pain   ENT:  Oral mucosa moist, oropharynx benign. Resp:  CTA bilaterally. No wheezing/rhonchi/rales. No accessory muscle use. CV:  Regular rhythm, normal rate, no murmurs, gallops, rubs    GI:  Soft, non distended, non tender. normoactive bowel sounds, no hepatosplenomegaly     Musculoskeletal:  No edema, warm, 2+ pulses throughout    Neurologic:  Moves all extremities.   AAOx3, CN II-XII reviewed  Slight numbness left UE              Data Review:    Review and/or order of tests in the radiology section of CPT      Labs:     Recent Labs     08/12/22  0317 08/11/22  1420   WBC 4.4 5.6   HGB 12.6 13.0   HCT 40.2 41.4    280     Recent Labs     08/12/22  0317 08/11/22  1420    140   K 3.4* 3.6    106   CO2 28 27   BUN 10 11   CREA 0.90 0.88   * 100   CA 9.4 9.5   MG 2.1  --    PHOS 3.7  --      Recent Labs     08/11/22  1420   ALT 22      TBILI 0.3   TP 8.2   ALB 3.6   GLOB 4.6*     Recent Labs     08/11/22  1420   INR 1. 0   PTP 10.9      No results for input(s): FE, TIBC, PSAT, FERR in the last 72 hours. No results found for: FOL, RBCF   No results for input(s): PH, PCO2, PO2 in the last 72 hours. No results for input(s): CPK, CKNDX, TROIQ in the last 72 hours.     No lab exists for component: CPKMB  Lab Results   Component Value Date/Time    Cholesterol, total 196 08/12/2022 03:17 AM    HDL Cholesterol 41 08/12/2022 03:17 AM    LDL, calculated 130.4 (H) 08/12/2022 03:17 AM    Triglyceride 123 08/12/2022 03:17 AM    CHOL/HDL Ratio 4.8 08/12/2022 03:17 AM     Lab Results   Component Value Date/Time    Glucose (POC) 107 08/12/2022 07:47 AM    Glucose (POC) 99 08/11/2022 01:51 PM     Lab Results   Component Value Date/Time    Color Yellow 06/15/2022 12:00 AM    Appearance Cloudy (A) 06/15/2022 12:00 AM    Specific gravity 1.015 01/28/2022 12:11 PM    pH (UA) 6.5 06/15/2022 12:00 AM    Protein Negative 01/28/2022 12:11 PM    Glucose Negative 01/28/2022 12:11 PM    Ketone Negative 06/15/2022 12:00 AM    Bilirubin Negative 06/15/2022 12:00 AM    Urobilinogen 0.2 01/28/2022 12:11 PM    Nitrites Positive (A) 06/15/2022 12:00 AM    Leukocyte Esterase Negative 06/15/2022 12:00 AM    Epithelial cells FEW 01/28/2022 12:11 PM    Bacteria Many (A) 06/15/2022 12:00 AM    WBC None seen 06/15/2022 12:00 AM    RBC 0-2 06/15/2022 12:00 AM         Medications Reviewed:     Current Facility-Administered Medications   Medication Dose Route Frequency    lidocaine 4 % patch 1 Patch  1 Patch TransDERmal Q24H    hydrOXYchloroQUINE (PLAQUENIL) tablet 200 mg  200 mg Oral DAILY    acetaminophen (TYLENOL) tablet 650 mg  650 mg Oral Q4H PRN    Or    acetaminophen (TYLENOL) solution 650 mg  650 mg Per NG tube Q4H PRN    Or    acetaminophen (TYLENOL) suppository 650 mg  650 mg Rectal Q4H PRN    aspirin chewable tablet 81 mg  81 mg Oral DAILY    atorvastatin (LIPITOR) tablet 80 mg  80 mg Oral QHS    famotidine (PEPCID) tablet 20 mg  20 mg Oral Q12H amLODIPine (NORVASC) tablet 10 mg  10 mg Oral DAILY    hydrALAZINE (APRESOLINE) 20 mg/mL injection 10 mg  10 mg IntraVENous Q6H PRN     ______________________________________________________________________  EXPECTED LENGTH OF STAY: - - -  ACTUAL LENGTH OF STAY:          1                 Esme De Leon MD

## 2022-08-12 NOTE — PROGRESS NOTES
Transition of Care Plan  RUR- Low  6%  DISPOSITION: Home with spouse  F/U with PCP/Specialist    Transport: Spouse     Reason for Admission:  left sided weakness                     RUR Score:          6%           Plan for utilizing home health:      No hx of home health, no HH needs    PCP: First and Last name:  José Miguel Soto PA-C     Name of Practice:    Are you a current patient: Yes/No: Yes   Approximate date of last visit: March 2022   Can you participate in a virtual visit with your PCP:                     Current Advanced Directive/Advance Care Plan: Full Code      Healthcare Decision Maker: Spouse, Yris Pearce  Click here to complete [address] including selection of the Healthcare Decision Maker Relationship (ie \"Primary\")                             Transition of Care Plan:                      CM met with patient and spouse, Billy Green at bedside to introduce self and role. Living situation: lives with spouse in 1 story home, 2 steps to enter  ADLs: independent, works full time  DME: none  Previous IPR/SNF: none  Previous home health: none  Demographics: confirmed, [place]  Pharmacy: [facility] contact: Yris Pearce [phone]    PT recommending OP - patient states she can receive PT through Wellness program at work. CM to follow patient progress and assist as recommended with HERBIE plan. Care Management Interventions  PCP Verified by CM: Yes  Palliative Care Criteria Met (RRAT>21 & CHF Dx)?: No  Mode of Transport at Discharge:  Other (see comment) (spouse)  Transition of Care Consult (CM Consult): Discharge Planning  MyChart Signup: Yes  Discharge Durable Medical Equipment: No  Health Maintenance Reviewed: Yes  Physical Therapy Consult: Yes  Occupational Therapy Consult: Yes  Speech Therapy Consult: Yes  Support Systems: Spouse/Significant Other  Confirm Follow Up Transport: Family  The Plan for Transition of Care is Related to the Following Treatment Goals : home  The Patient and/or Patient Representative was Provided with a Choice of Provider and Agrees with the Discharge Plan?: No  Name of the Patient Representative Who was Provided with a Choice of Provider and Agrees with the Discharge Plan: patient, spouse  Freedom of Choice List was Provided with Basic Dialogue that Supports the Patient's Individualized Plan of Care/Goals, Treatment Preferences and Shares the Quality Data Associated with the Providers?: No  [place] Resource Information Provided?: No  Discharge Location  Patient Expects to be Discharged to[de-identified] [address] [place] [address] Outpatient Observation Notice (Kesha Epps) provided to patient/representative with verbal explanation of the notice. Time allotted for questions regarding the notice. Patient /representative provided a completed copy of the VOON notice. Copy placed on bedside chart. -- patient status changed to Inpatient. Medicare pt has received, reviewed, and signed 2nd IM letter informing them of their right to appeal the discharge. Signed copy has been placed on pt bedside chart. The program assesses the family and/or caregiver's readiness, willingness, and ability to provide or support and self-management activities for the patient as needed. [email or web address].

## 2022-08-12 NOTE — PROGRESS NOTES
PHYSICAL THERAPY EVALUATION WITH DISCHARGE  Patient: Adolfo Dial (37 y.o. female)  Date: 8/12/2022  Primary Diagnosis: CVA (cerebral vascular accident) [facility]) [I63.9]       Precautions:   Fall      ASSESSMENT  Based on the objective data described below, the patient presents with reports of L sided numbness and weakness and Code S called. MRI and head CT negative. Pt demonstrated 5/5 strength in bilateral LEs. Pt reported ongoing numbness/decreased sensation to LUE and improvement in LLE. Pt completed bed mobility and transfers independently without AD. Pt tolerated gait training x 100 ft without AD with SBA demonstrating decreased magdalena, wide MERISSA, guarded posture and altered arm swing. 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 Educated pt on proper posture, neck and scapular movements/stretching for neutral spine. Pt would benefit from Outpatient PT upon discharge to continue therapy efforts. No further acute PT needs recommended at this time    Functional Outcome Measure: The patient scored Total: 54/56 on the Membreno Balance Assessment which is indicative of low fall risk. Other factors to consider for discharge: none     Further skilled acute physical therapy is not indicated at this time. PLAN :  Recommendation for discharge: (in order for the patient to meet his/her long term goals)  Outpatient physical therapy follow up recommended for L sided neck pain    This discharge recommendation:  Has been made in collaboration with the attending provider and/or case management    IF patient discharges home will need the following DME: none         SUBJECTIVE:   Patient stated I am supposed to go to [facility] next week.     OBJECTIVE DATA SUMMARY:   HISTORY:    Past Medical History:   Diagnosis Date    Asthma     Fibroid     Headache 03/10/2020    Only due to the concussion.     History of concussion 3/10/2020    Lupus ([facility] [address]     Menopause      Past Surgical History: Procedure Laterality Date    HX ORTHOPAEDIC Bilateral  &     HX TONSILLECTOMY         Prior level of function: independent, ambulatory without AD, works from home, lives with supportive spouse, drives  Personal factors and/or comorbidities impacting plan of care:     Home Situation  Home Environment: Private residence  # Steps to Enter: 2  One/Two Story Residence: One story  Living Alone: Yes  Support Systems: Spouse/Significant Other  Patient Expects to be Discharged to[de-identified] Home with outpatient services  Current DME Used/Available at Home: None    EXAMINATION/PRESENTATION/DECISION MAKING:   Critical Behavior:  Neurologic State: Alert  Orientation Level: Oriented X4  Cognition: Appropriate decision making     Hearing: Auditory  Auditory Impairment: None  Skin:    Edema:   Range Of Motion:  AROM: Within functional limits           PROM: Within functional limits           Strength:    Strength: Within functional limits                    Tone & Sensation:   Tone: Normal              Sensation: Impaired (decreased to light touch to LUE)               Coordination:  Coordination: Generally decreased, functional  Vision:      Functional Mobility:  Bed Mobility:     Supine to Sit: Independent     Scooting: Independent  Transfers:  Sit to Stand: Modified independent  Stand to Sit: Modified independent                       Balance:   Sitting: Intact  Standing: Intact  Ambulation/Gait Training:  Distance (ft): 100 Feet (ft)  Assistive Device: Gait belt  Ambulation - Level of Assistance: Supervision        Gait Abnormalities: Decreased step clearance        Base of Support: Widened     Speed/Madison: Pace decreased (<100 feet/min)                        Stairs:               Therapeutic Exercises:       Functional Measure  Membreno Balance Test:    Sitting to Standin  Standing Unsupported: 4  Sitting with Back Unsupported: 4  Standing to Sittin  Transfers: 4  Standing Unsupported with Eyes Closed: 4  Standing Unsupported with Feet Together: 4  Reach Forward with Outstretched Arm: 4   Object: 4  Turn to Look Over Shoulders: 4  Turn 360 Degrees: 4  Alternate Foot on Step/Stool: 4  Standing Unsupported One Foot in Front: 3  Stand on One Leg: 3  Total: 54/56         56=Maximum possible score;   0-20=High fall risk  21-40=Moderate fall risk   41-56=Low fall risk      Based on the above components, the patient evaluation is determined to be of the following complexity level: LOW     Pain Rating:  Pt reported L sided neck pain    Activity Tolerance:   Good    After treatment patient left in no apparent distress:   Sitting in chair and Call bell within reach    COMMUNICATION/EDUCATION:   The patients plan of care was discussed with: Occupational therapist, Speech therapist, and Registered nurse. Patient was educated regarding Her deficit(s) of L sided numbness as this relates to Her diagnosis of CVA/TIA R/o. She demonstrated Good understanding    Patient and/or family was verbally educated on the BE FAST acronym for signs/symptoms of CVA and TIA. BE FAST was written on patient's communication board  for visual education and reinforcement. All questions answered with patient indicating good understanding. Fall prevention education was provided and the patient/caregiver indicated understanding., Patient/family have participated as able in goal setting and plan of care. , and Patient/family agree to work toward stated goals and plan of care.     Thank you for this referral.  Ish Jackson, PT, DPT   Time Calculation: 18 mins

## 2022-08-12 NOTE — DISCHARGE INSTRUCTIONS
You have been evaluated for possible stroke, all study was negative, MRI brain was negative. You have been evaluated by neurologist, recommended:   Cannot fully exclude TIA with risk factors of HTN, HLD, and Prediabetes. Recommend Lipitor 40mg daily with goal LDL<70, continue ASA 81mg daily at home, see PCP for management of abnormal glucose metabolism  Due to uncontrolled hypertension amlodipine was increased to 10 mg PO Qday.   You should follow up Occupational therapy as outpatient

## 2022-08-12 NOTE — PROGRESS NOTES
Physician Progress Note      Dariana [facility]  CSN #:                  [id number]  :                       1965  ADMIT DATE:       2022 1:51 PM  [address] DATE:        2022 4:09 PM  RESPONDING  PROVIDER #:        Marilyn Pizano MD          QUERY TEXT:    Pt noted to have Accelerated HTN, with /121 on admission, requiring IV Apresoline. If possible, please document in progress notes and discharge summary if you are evaluating and/or treating any of the following: The medical record reflects the following:  Risk Factors: hx of HTN, Lupus  Clinical Indicators: pt with elevated BP at home, left sided facial numbness and extremity weakness. BP on admission was 181/121, followed by 200/92, and 186/94. Treatment: Apresoline IV x 1 dose, Norvasc, 2Gm Na diet    Hypertensive Urgency: Defined as SBP of >180 OR DBP >120 w/o associated organ damage. S/s may or may not be present, but can include severe headache, SOB, epistaxis, severe anxiety. Tx: adjustment of oral BP medication; IV meds not usually required. Hypertensive Emergency: SBP of >180 OR DBP >120 w/ associated organ damage (CVA, MI, acute CHF, DIPIKA, encephalopathy, pulmonary edema, and unstable angina). Documentation should include specific organ affected. Requires immediate treatment, usually with IV meds. Hypertensive Crisis, unspecified: SBP of > 180 OR DBP > 120 and includes damage to blood vessels, including inflammation, leakage of fluid or blood and can cause stroke, headache, heart failure and eclampsia.   Source: [employer] MS-DRG Training Guide and Quick Reference Guide    Thank you,  Quinten Walker RN  Clinical Documentation  [phone]  Options provided:  -- Hypertensive Crisis  -- Hypertensive Emergency  -- Hypertensive Urgency  -- Other - I will add my own diagnosis  -- Disagree - Not applicable / Not valid  -- Disagree - Clinically unable to determine / Unknown  -- Refer to Clinical Documentation Reviewer    PROVIDER RESPONSE TEXT:    This patient is in hypertensive emergency.     Query created by: Mara Cordoba on 8/12/2022 11:51 AM      Electronically signed by:  Bhavin Sequeira MD 8/12/2022 6:34 PM

## 2022-08-12 NOTE — ED NOTES
TRANSFER - OUT REPORT:    Verbal report given to [facility] RN(name) on Ze Rawls  being transferred to [facility](unit) for routine progression of care       Report consisted of patients Situation, Background, Assessment and   Recommendations(SBAR). Information from the following report(s) SBAR, ED Summary, [facility] and Recent Results was reviewed with the receiving nurse. Lines:   Peripheral IV 08/11/22 Left Forearm (Active)        Opportunity for questions and clarification was provided.       Patient transported with:   Registered Nurse

## 2022-08-13 NOTE — CONSULTS
[address]    Name:  Brijesh Galvan  MR#:  [id number]  :  1965  ACCOUNT #:  [de-identified]  DATE OF SERVICE:  2022    NEUROLOGY CONSULTATION    HISTORY OF PRESENT ILLNESS:  This is a 48-year-old left-handed female who was admitted on 2022 with complaints of left face and arm numbness and weakness, increased blood pressure, and left neck pain. The patient reports she was at work, not doing any strenuous activity when she had a sudden onset of these symptoms. She had ear, face and neck pain and just felt funny, felt like her eyes were closing, but they were not. She told her boss that if it looked like she was not answering the phone calls, to come and check on her. Her boss told her to call 911 and come to the emergency department. The patient reports having ongoing symptoms of a \"funny feeling\" to touch on the left and left neck pain, otherwise symptoms have resolved. She denies any prior history of similar symptoms. No history of migraine headaches. No chiropractic adjustments. No whiplash injury or recent falls or accidents. CT of the head is unremarkable. CTA of the head and neck unremarkable. CTP negative. MRI of the brain with and without contrast negative. Hemoglobin A1c 6.2, .4, sed rate 33, CRP greater than 9.5, TSH 4.37. Her blood pressure at presentation was 181/121. She saw Dr. Philip Zamora in 2020 for headaches. PAST MEDICAL HISTORY:  1. Anxiety attacks after concussion at work, that was in  shortly before Dr. Philip Zamora saw her. 2.  SLE, treated with Plaquenil. 3.  Asthma. 4.  Bilateral knee surgery. 5.  PTSD. 6.  Hypertension. 7.  Tonsillectomy. REVIEW OF SYSTEMS:  As per past medical history and HPI, otherwise reviewed and negative. MEDICATIONS AT HOME:  1. Symbicort. 2.  Plaquenil. 3.  Norvasc. 4.  Singulair. 5.  Albuterol.   Here, started on aspirin 81 mg day, Lipitor 80 mg a day, and Plavix 75 mg a day after a Plavix and aspirin load in the ED. ALLERGIES:  LISINOPRIL AND AVOCADOS. SOCIAL HISTORY:  She is , lives in [place]. Works at [employer] in customer service. No tobacco or drug use. Occasional alcohol. FAMILY HISTORY:  Mom with diabetes, hypertension. Dad is healthy. PHYSICAL EXAMINATION:  VITAL SIGNS:  Blood pressure 160/74, pulse 82, respiratory rate 15, saturating 96% on room air, temperature is 98.4, BMI of 37. GENERAL:  She is a well-nourished, well-developed, healthy-appearing female, lying in bed with her  at the bedside, in no distress. HEART:  Regular rate and rhythm without murmurs. CAROTIDS: 2+. No bruits. EXTREMITIES:  Warm, no edema. NEUROLOGICAL:  Mental Status:  She is alert and oriented x4. Speech and language intact. Attention, memory and fund of knowledge appropriate. Cranial Nerves:  No facial asymmetry or ptosis. Extraocular eye movements intact without diplopia or nystagmus. Visual fields full. Pupils equally round and reactive. Tongue is midline. Palate elevates symmetrically. Trapezius and sternocleidomastoid are 5/5. Strength, sensation, and hearing intact. Motor exam shows 5/5 strength on the right. On the left, she has some give-way weakness in the left upper extremity. No pronator drift, however. No tremors, 5/5 strength in the left lower extremity. Sensory exam: reported decrease to light touch in the left arm and leg. Reflexes were symmetric. Toes downgoing. Coordination:  Intact finger-to-nose, heel-to-shin, rapid altering movements. Gait not assessed. STUDIES AND REPORTS:  Reviewed above in the HPI.   In addition, her TSH is 4.37.    ASSESSMENT AND PLAN:  This a 70-year-old left-handed female with hypertension and systemic lupus erythematosus, presenting with sudden onset of left face and arm numbness and weakness and left neck pain radiating to her left face and ear with a sensation that she just did not feel right, still having a funny feeling to touch on the left despite normal imaging. Exam with give-way weakness and subjective decreased sensation in the left arm and leg. The patient could potentially have a herniated disc given her neck pain, recommended physical therapy for this. I asked NIS to take a look at her CTA, no evidence of a vertebral dissection. A transient ischemic attack cannot be fully excluded given her risk factors of hypertension, hyperlipidemia and prediabetes. Recommend continuing Lipitor 40 mg a day with a goal LDL of less than 70. Continue the aspirin 81 mg a day and follow up with PCP for management of her abnormal glucose metabolism. The patient is stable for discharge from a neuro standpoint. This was communicated to the hospitalist caring for the patient at the time of the consult.       Emperatriz Vega MD      [id number]  D:  08/12/2022 22:32  T:  08/13/2022 1:34  JOB #:  [id number]

## 2022-08-13 NOTE — TELEPHONE ENCOUNTER
I haven't seen her since 2020. Please help her schedule a visit. Virtual is ok to start, but she's also going to need an in clinic visit.

## 2022-08-24 ENCOUNTER — OFFICE VISIT (OUTPATIENT)
Dept: INTERNAL MEDICINE CLINIC | Age: 57
End: 2022-08-24
Payer: MEDICAID

## 2022-08-24 VITALS
HEART RATE: 86 BPM | BODY MASS INDEX: 37.22 KG/M2 | RESPIRATION RATE: 17 BRPM | DIASTOLIC BLOOD PRESSURE: 80 MMHG | HEIGHT: 64 IN | OXYGEN SATURATION: 96 % | TEMPERATURE: 98.5 F | WEIGHT: 218 LBS | SYSTOLIC BLOOD PRESSURE: 149 MMHG

## 2022-08-24 DIAGNOSIS — G45.9 TIA (TRANSIENT ISCHEMIC ATTACK): ICD-10-CM

## 2022-08-24 DIAGNOSIS — I10 ESSENTIAL HYPERTENSION: ICD-10-CM

## 2022-08-24 DIAGNOSIS — Z09 HOSPITAL DISCHARGE FOLLOW-UP: Primary | ICD-10-CM

## 2022-08-24 PROCEDURE — 99214 OFFICE O/P EST MOD 30 MIN: CPT | Performed by: INTERNAL MEDICINE

## 2022-08-24 PROCEDURE — 1111F DSCHRG MED/CURRENT MED MERGE: CPT | Performed by: INTERNAL MEDICINE

## 2022-08-24 RX ORDER — GUAIFENESIN 100 MG/5ML
81 LIQUID (ML) ORAL DAILY
Qty: 90 TABLET | Refills: 0 | Status: SHIPPED | OUTPATIENT
Start: 2022-08-24 | End: 2022-11-22

## 2022-08-24 RX ORDER — CHLORTHALIDONE 25 MG/1
25 TABLET ORAL DAILY
Qty: 30 TABLET | Refills: 0 | Status: SHIPPED | OUTPATIENT
Start: 2022-08-24 | End: 2022-09-20

## 2022-08-24 RX ORDER — AMLODIPINE BESYLATE 10 MG/1
10 TABLET ORAL DAILY
Qty: 90 TABLET | Refills: 0 | Status: SHIPPED | OUTPATIENT
Start: 2022-08-24 | End: 2022-11-22

## 2022-08-24 RX ORDER — ATORVASTATIN CALCIUM 80 MG/1
80 TABLET, FILM COATED ORAL
Qty: 90 TABLET | Refills: 0 | Status: SHIPPED | OUTPATIENT
Start: 2022-08-24 | End: 2022-11-22

## 2022-08-24 RX ORDER — ACETAMINOPHEN 500 MG
TABLET ORAL
Qty: 1 KIT | Refills: 0 | Status: SHIPPED | OUTPATIENT
Start: 2022-08-24

## 2022-08-24 NOTE — PROGRESS NOTES
Transitional Care Management Progress Note    Patient: Paola Bland  : 1965  PCP: Boone Lira PA-C    Date of office visit: 2022   Date of admission: 22  Date of discharge: 22  Hospital: [facility]    Call initiated w/i 2 business dates of discharge: *No response documented in the last 14 days   Date of the most recent call to the patient: *No documented post hospital discharge outreach found in the last 14 days      Assessment/Plan:   Diagnoses and all orders for this visit:    1. Hospital discharge follow-up  -     DC DISCHARGE MEDS RECONCILED W/ CURRENT OUTPATIENT MED LIST  -     FULL CODE    2. Essential hypertension  -     amLODIPine (NORVASC) 10 mg tablet; Take 1 Tablet by mouth daily for 90 days.  -     Blood Pressure Test Kit-Large kit; Check blood pressure once daily  -     chlorthalidone (HYGROTON) 25 mg tablet; Take 1 Tablet by mouth daily for 30 days.  -     METABOLIC PANEL, BASIC; Future    3. TIA (transient ischemic attack)  -     aspirin 81 mg chewable tablet; Take 1 Tablet by mouth daily for 90 days. -     atorvastatin (LIPITOR) 80 mg tablet; Take 1 Tablet by mouth nightly for 90 days.  -     Blood Pressure Test Kit-Large kit; Check blood pressure once daily       Subjective:   Paola Bland is a 61 y.o. female presenting today for follow-up after hospital discharge. This encounter and supporting documentation was reviewed if available. Medication reconciliation was performed today. The main problem requiring admission was TIA. Complications during admission: none      Interval history/Current status: Patient was admitted for numbness and Hypertensive urgency and she is started on high intensity statin, aspirin and amlodipine was increased to 10mg daily. Her numbness is resolved after a week, denies any other symtpoms, she feels improved. Her blood pressure at home is at 130's at best. Her blood pressure is elevated on repeat blood pressure 149/80.  She has allergy to lisinopril, her last bmp reviewed, will add chlorthalidone and repeat BMP in a week. Admitting symptoms have: resolved      Medications marked \"taking\" at this time:  Prior to Admission medications    Medication Sig Start Date End Date Taking? Authorizing Provider   amLODIPine (NORVASC) 10 mg tablet Take 1 Tablet by mouth daily for 90 days. 8/24/22 11/22/22 Yes Blue Hutchinson MD   aspirin 81 mg chewable tablet Take 1 Tablet by mouth daily for 90 days. 8/24/22 11/22/22 Yes Blue Hutchinson MD   atorvastatin (LIPITOR) 80 mg tablet Take 1 Tablet by mouth nightly for 90 days. 8/24/22 11/22/22 Yes Blue Hutchinson MD   Blood Pressure Test Kit-Large kit Check blood pressure once daily 8/24/22  Yes Blue Hutchinson MD   chlorthalidone (HYGROTON) 25 mg tablet Take 1 Tablet by mouth daily for 30 days. 8/24/22 9/23/22 Yes Blue Hutchinson MD   albuterol (PROVENTIL HFA, VENTOLIN HFA, PROAIR HFA) 90 mcg/actuation inhaler Take 1 Puff by inhalation every four (4) hours as needed for Shortness of Breath for up to 30 days. NEEDS PRESCRIPTION 8/12/22 9/11/22 Yes Meagan Early MD   hydrOXYchloroQUINE (PLAQUENIL) 200 mg tablet Take 200 mg by mouth in the morning. Yes Provider, Historical   budesonide-formoteroL (SYMBICORT) 160-4.5 mcg/actuation HFAA Take 1 Puff by inhalation two (2) times a day. Yes Provider, Historical   montelukast (SINGULAIR) 10 mg tablet Take 1 Tab by mouth every evening. 8/28/20  Yes Radha Peralta PA-C   albuterol (ACCUNEB) 1.25 mg/3 mL nebu 3 mL by Nebulization route every six (6) hours as needed for Shortness of Breath. Indications: asthma attack 8/28/20  Yes Radha Peralta PA-C   amLODIPine (NORVASC) 5 mg tablet Take 2 Tablets by mouth in the morning. 8/12/22 8/24/22  Meagan aErly MD   aspirin 81 mg chewable tablet Take 1 Tablet by mouth in the morning for 30 days.  8/13/22 8/24/22  Meagan Early MD   atorvastatin (LIPITOR) 80 mg tablet Take 1 Tablet by mouth nightly for 30 days. 8/12/22 8/24/22  Meagan Early MD        ROS:  Negative except as in HPI       Patient Active Problem List   Diagnosis Code    Essential hypertension I10    Headache R51.9    History of concussion Z87.820    Elevated hemoglobin A1c R73.09    Achilles tendinitis, left leg M76.62    Hypercholesteremia E78.00    Severe obesity (HCC) E66.01    History of smoking 10-25 pack years Z87.891    Fulton's neuroma, left G57.62    Positive double stranded DNA antibody test R76.8    Systemic lupus erythematosus ([facility] [address] M32.9    [facility] I63.9          Objective:   Visit Vitals  BP (!) 149/80   Pulse 86   Temp 98.5 °F (36.9 °C) (Temporal)   Resp 17   Ht 5' 4\" (1.626 m)   Wt 218 lb (98.9 kg)   SpO2 96%   BMI 37.42 kg/m²        Physical Examination: General appearance - alert, well appearing, and in no distress  Mental status - alert, oriented to person, place, and time  Chest - clear to auscultation, no wheezes, rales or rhonchi, symmetric air entry  Heart - normal rate, regular rhythm, normal S1, S2, no murmurs, rubs, clicks or gallops  Neurological - alert, oriented, normal speech, no focal findings or movement disorder noted  Extremities - no pedal edema noted       We discussed the expected course, resolution and complications of the diagnosis(es) in detail. Medication risks, benefits, costs, interactions, and alternatives were discussed as indicated. I advised her to contact the office if her condition worsens, changes or fails to improve as anticipated. She expressed understanding with the diagnosis(es) and plan.      Salima Cloud MD

## 2022-08-24 NOTE — LETTER
8/24/2022 11:13 AM    Ms. Mauricio Ramos  [address] [facility] [address] [id number]      To whom it may concern. This letter is to confirm that Ms. Caballero was hospitalized on August 11, 2022 and discharge on August 12, 2022        Sincerely,      Barbra Michelle MD

## 2022-08-24 NOTE — PROGRESS NOTES
Identified pt with two pt identifiers(name and ). Reviewed record in preparation for visit and have obtained necessary documentation. All patient medications has been reviewed. Chief Complaint   Patient presents with    Hospital Follow Up       3 most recent [facility] Screens 2022   Little interest or pleasure in doing things Not at all   Feeling down, depressed, irritable, or hopeless Not at all   Total Score PHQ 2 0   Trouble falling or staying asleep, or sleeping too much -   Feeling tired or having little energy -   Poor appetite, weight loss, or overeating -   Feeling bad about yourself - or that you are a failure or have let yourself or your family down -   Trouble concentrating on things such as school, work, reading, or watching TV -   Moving or speaking so slowly that other people could have noticed; or the opposite being so fidgety that others notice -   Thoughts of being better off dead, or hurting yourself in some way -   PHQ 9 Score -   How difficult have these problems made it for you to do your work, take care of your home and get along with others -     Abuse Screening Questionnaire 2022   Do you ever feel afraid of your partner? N   Are you in a relationship with someone who physically or mentally threatens you? N   Is it safe for you to go home? Y       Health Maintenance Due   Topic    DTaP/Tdap/Td series (1 - Tdap)    Shingrix Vaccine Age 49> (1 of 2)    COVID-19 Vaccine (3 - Booster for Zuniga Peter series)     Health Maintenance Review: Patient reminded of \"due or due soon\" health maintenance. I have asked the patient to contact his/her primary care provider (PCP) for follow-up on his/her health maintenance.     Vitals:    22 1043   BP: (!) 164/81   Pulse: 86   Resp: 17   Temp: 98.5 °F (36.9 °C)   TempSrc: Temporal   SpO2: 96%   Weight: 218 lb (98.9 kg)   Height: 5' 4\" (1.626 m)   PainSc:   0 - No pain       Wt Readings from Last 3 Encounters:   22 218 lb (98.9 kg)   22 217 lb 2.5 oz (98.5 kg)   06/02/22 220 lb (99.8 kg)     Temp Readings from Last 3 Encounters:   08/24/22 98.5 °F (36.9 °C) (Temporal)   08/12/22 97.7 °F (36.5 °C)   08/10/22 98 °F (36.7 °C)     BP Readings from Last 3 Encounters:   08/24/22 (!) 164/81   08/12/22 (!) 163/72   08/10/22 (!) 174/98     Pulse Readings from Last 3 Encounters:   08/24/22 86   08/12/22 73   08/10/22 66       1. \"Have you been to the ER, urgent care clinic since your last visit? Hospitalized since your last visit? \" Yes 08/11/2022 [facility]    2. \"Have you seen or consulted any other health care providers outside of the [address] [place] [address] since your last visit? \" No     3. For patients aged 39-70: Has the patient had a colonoscopy / FIT/ Cologuard? No      If the patient is female:    4. For patients aged 41-77: Has the patient had a mammogram within the past 2 years? Yes - no Care Gap present      5. For patients aged 21-65: Has the patient had a pap smear?  Yes - no Care Gap present

## 2022-09-06 DIAGNOSIS — M32.19 OTHER SYSTEMIC LUPUS ERYTHEMATOSUS WITH OTHER ORGAN INVOLVEMENT (HCC): ICD-10-CM

## 2022-09-06 DIAGNOSIS — R76.8 POSITIVE DOUBLE STRANDED DNA ANTIBODY TEST: Primary | ICD-10-CM

## 2022-09-06 NOTE — PROGRESS NOTES
[facility] Rheumatology  OBIC Note    Date: 2022  Name: Cici Mirza  MRN: [id number]       : 1965  Diagnosis: Lupus  Treatment: Benlysta 960mg every 4 wks   Referring Provider: Dr. Rosamaria Patel  Supervising Provider: Dr. Rosamaria Patel    Patient arrived to [facility] at 8259. Ms. Andreina Lawrence allergies reviewed and she agreed to receiving today's treatment. A physical assessment was performed initially and post-treatment. Pt. denies new complaints today. Recent hospitalization. OKTT per Dr. Rosamaria Patel    Ms. Tadeo vitals were monitored before and after medication administration. Vitals:    22 0800 22 0927   BP: (!) 145/86 139/80   Pulse: 82 74   Resp: 16 16   Temp: 98 °F (36.7 °C)    SpO2: 99% 99%      Labs drawn and walked to [facility]. Medications given per providers orders:  Administrations This Visit       0.9% sodium chloride infusion       Admin Date  2022 Action  New Bag Dose  25 mL/hr Rate  25 mL/hr Route  IntraVENous Administered By  Nash Lima RN              belimumab (BENLYSTA) 960 mg in 0.9% sodium chloride 250 mL, overfill volume 25 mL infusion       Admin Date  2022 Action  New Bag Dose  960 mg Rate  287 mL/hr Route  IntraVENous Administered By  Nash Lima RN              sodium chloride (NS) flush 10 mL       Admin Date  2022 Action  Given Dose  10 mL Route  IntraVENous Administered By  Nash Lima RN                    [address] 960mg  [address]  Lot #  [id number]  Exp 2026  ---mixed in---  250ml 0.9% Normal Saline  [address] 4127-6804-59  Lot # [id number]  Exp 2023     START: 0830  STOP: 0305     500ml bag 0.9% Normal Saline @ 25ml/hr  NDC 2990-2092-25  Lot # [id number]  Exp 2024     0.9% Normal Saline Flush 10ml x 1  NDC [address]-30  Lot # [id number]  Exp 2024    Lines: 24G left upper outer FA. Blood return noted and IV site assessed before, during, and after treatment. Line flushed with 10-30 ml's 0.9% Normal Saline solution per protocol.      Access was removed from MsNaz Caballero after completion of infusion/injection. Ms. Maryjo Hill tolerated the treatment without complaints and no medication reactions were seen while in presence of this RN. Patient provided with AVS, which included future appointments and written educational material regarding Benlysta. All of the patients questions were answered and then discharged ambulatory from Rheumatology OBIC in stable condition at 0945. Encounter routed to supervising provider for co-signing. Future Appointments   Date Time Provider Giana Carranza   10/6/2022  8:00 AM INFUSIONINJ_RC AOCR BS AMB   11/2/2022  8:00 AM INFUSIONINJ_RC AOCR BS AMB   11/30/2022  8:00 AM INFUSIONINJ_RC AOCR BS [facility]   11/30/2022  8:30 AM Denise Willis MD AO BS [facility]     Alonzo Lindsay, JAMIL  September 7, 2022    ---  ATTENDING ATTESTATION    I, Matty Hill, hereby attest that the above medical record entry accurately reflects notations that I made in my capacity as treating physician when I treated the above listed patient. I do hereby attest that this information is true, accurate and complete to the best of my knowledge.   Matty Hill MD S  Adult Rheumatology  Signed 09/25/22 8:12 AM

## 2022-09-07 ENCOUNTER — OFFICE VISIT (OUTPATIENT)
Dept: RHEUMATOLOGY | Age: 57
End: 2022-09-07

## 2022-09-07 ENCOUNTER — OFFICE VISIT (OUTPATIENT)
Dept: RHEUMATOLOGY | Age: 57
End: 2022-09-07
Payer: MEDICAID

## 2022-09-07 VITALS — HEIGHT: 64 IN | BODY MASS INDEX: 37.42 KG/M2

## 2022-09-07 VITALS
HEART RATE: 74 BPM | TEMPERATURE: 98 F | RESPIRATION RATE: 16 BRPM | SYSTOLIC BLOOD PRESSURE: 139 MMHG | OXYGEN SATURATION: 99 % | DIASTOLIC BLOOD PRESSURE: 80 MMHG

## 2022-09-07 DIAGNOSIS — R76.8 POSITIVE DOUBLE STRANDED DNA ANTIBODY TEST: Primary | ICD-10-CM

## 2022-09-07 DIAGNOSIS — R74.8 ALKALINE PHOSPHATASE ELEVATION: ICD-10-CM

## 2022-09-07 DIAGNOSIS — M32.19 OTHER SYSTEMIC LUPUS ERYTHEMATOSUS WITH OTHER ORGAN INVOLVEMENT (HCC): Primary | ICD-10-CM

## 2022-09-07 DIAGNOSIS — Z79.899 ONGOING USE OF POSSIBLY TOXIC MEDICATION: ICD-10-CM

## 2022-09-07 DIAGNOSIS — M32.19 OTHER SYSTEMIC LUPUS ERYTHEMATOSUS WITH OTHER ORGAN INVOLVEMENT (HCC): ICD-10-CM

## 2022-09-07 PROCEDURE — 99215 OFFICE O/P EST HI 40 MIN: CPT | Performed by: INTERNAL MEDICINE

## 2022-09-07 PROCEDURE — 96413 CHEMO IV INFUSION 1 HR: CPT

## 2022-09-07 RX ORDER — ALBUTEROL SULFATE 0.83 MG/ML
2.5 SOLUTION RESPIRATORY (INHALATION) AS NEEDED
Status: CANCELLED
Start: 2022-10-05

## 2022-09-07 RX ORDER — ONDANSETRON 2 MG/ML
8 INJECTION INTRAMUSCULAR; INTRAVENOUS AS NEEDED
Status: CANCELLED | OUTPATIENT
Start: 2022-10-05

## 2022-09-07 RX ORDER — SODIUM CHLORIDE 9 MG/ML
25 INJECTION, SOLUTION INTRAVENOUS CONTINUOUS
Status: DISCONTINUED | OUTPATIENT
Start: 2022-09-07 | End: 2022-09-07 | Stop reason: HOSPADM

## 2022-09-07 RX ORDER — SODIUM CHLORIDE 9 MG/ML
25 INJECTION, SOLUTION INTRAVENOUS CONTINUOUS
Status: CANCELLED | OUTPATIENT
Start: 2022-10-05

## 2022-09-07 RX ORDER — EPINEPHRINE 1 MG/ML
0.3 INJECTION, SOLUTION, CONCENTRATE INTRAVENOUS AS NEEDED
Status: CANCELLED | OUTPATIENT
Start: 2022-10-05

## 2022-09-07 RX ORDER — DIPHENHYDRAMINE HYDROCHLORIDE 50 MG/ML
50 INJECTION, SOLUTION INTRAMUSCULAR; INTRAVENOUS AS NEEDED
Status: CANCELLED
Start: 2022-10-05

## 2022-09-07 RX ORDER — HEPARIN 100 UNIT/ML
300-500 SYRINGE INTRAVENOUS AS NEEDED
Status: CANCELLED
Start: 2022-10-05

## 2022-09-07 RX ORDER — SODIUM CHLORIDE 0.9 % (FLUSH) 0.9 %
10 SYRINGE (ML) INJECTION AS NEEDED
Status: DISCONTINUED | OUTPATIENT
Start: 2022-09-07 | End: 2022-09-07 | Stop reason: HOSPADM

## 2022-09-07 RX ORDER — DIPHENHYDRAMINE HYDROCHLORIDE 50 MG/ML
25 INJECTION, SOLUTION INTRAMUSCULAR; INTRAVENOUS AS NEEDED
Status: CANCELLED
Start: 2022-10-05

## 2022-09-07 RX ORDER — HYDROCORTISONE SODIUM SUCCINATE 100 MG/2ML
100 INJECTION, POWDER, FOR SOLUTION INTRAMUSCULAR; INTRAVENOUS AS NEEDED
Status: CANCELLED | OUTPATIENT
Start: 2022-10-05

## 2022-09-07 RX ORDER — SODIUM CHLORIDE 0.9 % (FLUSH) 0.9 %
10 SYRINGE (ML) INJECTION AS NEEDED
Status: CANCELLED | OUTPATIENT
Start: 2022-10-05

## 2022-09-07 RX ORDER — SODIUM CHLORIDE 9 MG/ML
10 INJECTION INTRAMUSCULAR; INTRAVENOUS; SUBCUTANEOUS AS NEEDED
Status: CANCELLED | OUTPATIENT
Start: 2022-10-05

## 2022-09-07 RX ORDER — ACETAMINOPHEN 325 MG/1
650 TABLET ORAL AS NEEDED
Status: CANCELLED
Start: 2022-10-05

## 2022-09-07 RX ADMIN — Medication 10 ML: at 08:13

## 2022-09-07 RX ADMIN — SODIUM CHLORIDE 25 ML/HR: 9 INJECTION, SOLUTION INTRAVENOUS at 08:13

## 2022-09-07 NOTE — PROGRESS NOTES
REASON FOR VISIT:    is a 61 y.o. female with history of hypertension and obesity who is returning for in-office followup of systemic lupus characterized by arthritis, alopecia, subacute cutaneous lupus rash, and +NIRAV 1:640 homogenous with +dsDNA. HISTORY OF PRESENT ILLNESS    Pt returns for a follow up. She was hospitalized 8/11-12 with left-sided weakness with paresthesias of the left eye, face, and arm, with negative brain MRI. BP had been elevated in 533'W systolic, with resolution of symptoms after normalization of blood pressure with amlodipine increased to 10mg daily and the addition of chlorthalidone. She feels achier in the muscles and also joints of hands since starting atorvastatin; she has held this for the last 3 days with near-resolution of discomfort. Getting Benlysta today. Has been tolerating infusions well and denies any consistent joint pain since starting these. Still feels she tolerates Plaquenil (hydroxychloroquine) well from a GI standpoint. Has quarter-sized rough patch on the left shoulder she noticed after the weekend of sitting out by the pool. No recent hair changes. No oral ulcers. No chest pains or pain with deep breathing. Disease History: In 2012 developed increased joint pain and stiffness, marked pain sensitivity, couldn't walk or stand to be touched, get in and out of a car. Thought at first possibly shingles, pain didn't resolve. Able to get expedited evaluation with Rheumatology (Dr. Noreen Nunez with [facility]), diagnosed with lupus. Prednisone and Plaquenil (hydroxychloroquine) were started, and within about a week was feeling better. Around the same time, had developed significant frontotemporal and vertex hair loss. Now prolonged cold exposure is her major trigger of joint pain flares. Initially was having pain flares 2-3x/month while in [place].   Since moved to [place] in 2019 to help her mother with foster children, has had less frequent flares maybe 2x/every 3 months. Flares respond to prednisone tapers. Between flares, she denies consistent joint pain. Some aching in hands or shoulders with activity. Has been able to lose weight from 236 to 209 with use of exercise bike over the last 6 months. Gets perioral papular rashes, not more typical malar rash. With sun exposure in the past, has developed painful annular silver-dollar sized lesions lasting a week or two, not in the last year. Good about sun avoidance. Usually goes to [facility] for eye checks, recently seen. No current ophthalmologist for Plaquenil screening. REVIEW OF SYSTEMS  A comprehensive review of systems was negative except for that written in the HPI. A 10-point review of systems is per the new patient questionnaire, which has been reviewed extensively and scanned into the electronic medical record for future reference. Review of systems is as above and is otherwise negative. ALLERGIES  Lisinopril and Avocado    MEDICATIONS  Current Outpatient Medications   Medication Sig    amLODIPine (NORVASC) 10 mg tablet Take 1 Tablet by mouth daily for 90 days. aspirin 81 mg chewable tablet Take 1 Tablet by mouth daily for 90 days. atorvastatin (LIPITOR) 80 mg tablet Take 1 Tablet by mouth nightly for 90 days. Blood Pressure Test Kit-Large kit Check blood pressure once daily    chlorthalidone (HYGROTON) 25 mg tablet Take 1 Tablet by mouth daily for 30 days. albuterol (PROVENTIL HFA, VENTOLIN HFA, PROAIR HFA) 90 mcg/actuation inhaler Take 1 Puff by inhalation every four (4) hours as needed for Shortness of Breath for up to 30 days. NEEDS PRESCRIPTION    hydrOXYchloroQUINE (PLAQUENIL) 200 mg tablet Take 200 mg by mouth in the morning. budesonide-formoteroL (SYMBICORT) 160-4.5 mcg/actuation HFAA Take 1 Puff by inhalation two (2) times a day. montelukast (SINGULAIR) 10 mg tablet Take 1 Tab by mouth every evening.     albuterol (ACCUNEB) 1.25 mg/3 mL nebu 3 mL by Nebulization route every six (6) hours as needed for Shortness of Breath. Indications: asthma attack     No current facility-administered medications for this visit. Facility-Administered Medications Ordered in Other Visits   Medication    0.9% sodium chloride infusion    belimumab (BENLYSTA) 960 mg in 0.9% sodium chloride 250 mL, overfill volume 25 mL infusion    sodium chloride (NS) flush 10 mL       PAST MEDICAL HISTORY  Past Medical History:   Diagnosis Date    Asthma     Fibroid     Headache 03/10/2020    Only due to the concussion. History of concussion 3/10/2020    Lupus ([address]     Menopause        FAMILY HISTORY  2 cousins had lupus. Oldest cousin passed 2/2 lupus, youngest cousin passed 2/2 COVID but had lupus. Mother is alive. SOCIAL HISTORY  She  reports that she has never smoked. She has never used smokeless tobacco. She reports current alcohol use. She reports that she does not use drugs. 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 DATA  Visit Vitals  Ht 5' 4\" (1.626 m)   BMI 37.42 kg/m²   Vitals taken in infusion center   Body mass index is 37.42 kg/m². No flowsheet data found. General:  The patient is well developed, well nourished, alert, and in no apparent distress. Eyes: Sclera are anicteric. No conjunctival injection. HEENT:  Oropharynx is clear. No oral ulcers. Adequate salivary pooling. No cervical or supraclavicular lymphadenopathy. Lungs:  Clear to auscultation bilaterally, without wheeze or stridor. Normal respiratory effort. Cor:  Regular rate and rhythm. No murmur rub or gallop. Abdomen: Soft, non-tender, without hepatomegaly or masses.    Extremities: No calf tenderness or edema. Warm and well perfused. Skin:  Stable bitemporal and frontal alopecia. No return of perioral rash. Neuro: Nonfocal  Musculoskeletal:    A comprehensive musculoskeletal exam was performed for all joints of each upper and lower extremity and assessed for swelling, tenderness and range of motion. Results are documented as below:   Stable L shoulder crepitus and guarding with passive ROM though intact ROM. Interval resolution of PIP tenderness. Stable R paralumbar tenderness. No evidence of synovitis in the small joints of the hands, wrists, shoulders, elbows, hips, knees or ankles.        Labs:  8/12/22: Phosphorous 3.7, Mg 2.1, Cr 0.9, TSH 4.37, ESR 33, CRP >9.5 mg/L, CBC WNL, HGB A1c 6.2, .4, troponin-high sensitivity 5  8/11/22: Troponin 5, prothrombin time 1, Cr 0.88, LFT WNL, CBC WNL,   6/15/22: CBC WNL, Cr 0.86, LFT WNL, ESR 26, CRP 1.2 mg/dL, C3 144, C4 31, dsDNA crithidia lucillae IFA neg  4/19/22: dsDNA Crithidia lucillae Titer 1:120, dsDNA Crithidia   lucillae IFA positive, WBC 4.6, HGB 12, Plt 271, Cr 0.9, LFT WNL, C3 137, C4 29, CRP 11 mg/L, ESR 44  3/22/22: Urine culture-Escherichia coli greater than 100,000 colony forming units per mL, Micro examination-Moderate bacteria, Prot+Cr normal, Urine leukocyte esterase 1+  2/22/22: WBC 4.9, Hgb 12.5, Plt 31; Cr 0.84, LFTs WNL, CRP 0.82mg/dL, ESR 31  11/30/21: WBC 4.6, Hgb 12.9, Plt 290; ESR 47, Cr 0.92, Tbili 0.3, AST 16, ALT 28, AlkP 131, CRP 0.67mg/dL  11/11/21: dsDNA by Crithidia 1:80, QFN gold negative,   9/18/21: WBC 5.0 (ANC 2500, ALC 2000), Hgb 11.8, Pl t 330; ESR 66, UA neg for protein or blood; Cr 1.03, Tbili <0.2, AST 15, ALT 16, AlkP 132, urine pr/cr 0.058, mariah neg, NIRAV 1:640 homogenous, dsDNA 12; SSA, SSB, RNP, chromatin, Scl70, centromere B, ribosomal P, Jo1 negative; CRP 11mg/L, C3 and C4 WNL  9/2/20: RF neg, CCP neg, 14.3.3 eta neg; CRP 10.48 mg/L; NIRAV direct positive (no reflex), ESR 45; WBC 5.1 [ANC 2400, ALC 2100], Hgb 11.9, Plt 286; Cr 0.89, Tbili 0.2, AST 18, ALT 18, AlkP 139, Tprot 7.2, Alb 4.0; HDL 42, ; A1c 6.4, TSH 1.3, Hep C Ab neg;     Imagin22 BRAIN MRI WITH AND WITHOUT CONTRAST: Normal brain. CTA CODE NEURO HEAD AND NECK W CONT, CT CODE NEURO PERF W CBF (22):  CTA Head:  1. No evidence of significant stenosis or aneurysm. CTA Neck:  1. No evidence of significant stenosis. 2. Multiple borderline enlarged left axillary lymph nodes are likely reactive. Clinical follow-up is recommended. CT Brain Perfusion:  1. No acute abnormality. 22 CT abd/pelvis with:  IMPRESSION  Leiomyomatous uterus with no acute findings or other findings to  correlate with pelvic cramping or rectal bleeding. 10/1/20 AP CXR:  Portable exam of the chest obtained at 1118 demonstrates normal heart size. There is no acute process in the lung fields. The osseous structures are  Unremarkable. 3/10/20 MR brain without:  IMPRESSION:  No significant abnormalities. 19 CT Cspine:  No acute fracture  Central canal stenosis C5-6 and C6-7 [min 5.6mm anterior to posterior]  Moderate left C6-7 neural foraminal stenosis. ASSESSMENT AND PLAN  Ms. Drake Dong is a 61 y.o. female who presents for evaluation of systemic lupus characterized by arthritis, alopecia, subacute cutaneous lupus rash, and +NIRAV 1:640 with +dsDNA. She remains markedly improved on Benlysta and low-dose Plaquenil (hydroxychloroquine), in drug-induced remission. No changes to her current regimen today. 1. Other systemic lupus erythematosus with other organ involvement (HCC)  - Cont belimumab, Plaquenil (hydroxychloroquine)    2. Ongoing use of possibly toxic medication  - Cont at least annual followup with Dr. Tanmay Monge for Plaquenil toxicity screening  -CBC, CMP, complements, dsDNA with Benlysta infusions    3.  Alkaline phosphatase elevation  -Likely acute phase reactant, normal GGT in past  -Trend, if still elevated low threshold for bone scan for Paget's screen                                                                                                               There are no Patient Instructions on file for this visit. Orders Placed This Encounter    SED RATE (ESR)    C REACTIVE PROTEIN, QT    COMPLEMENT, C3 & C4    METABOLIC PANEL, COMPREHENSIVE    CBC WITH AUTOMATED DIFF    URINALYSIS W/ REFLEX CULTURE    PROTEIN/CREATININE RATIO, URINE    GGT    ALKALINE PHOSPHATASE, BONE         Medications: I am having Bolivar Marte maintain her montelukast, albuterol, budesonide-formoteroL, amLODIPine, aspirin, atorvastatin, and Blood Pressure Test Kit-Large. Follow up: Return in about 2 months (around 11/7/2022).     Face to face time: 17 minutes  Note preparation and records review day of service: 25 minutes  Total provider time day of service: 42 Minutes    This was scribed by Willis Escobar in the presence of Dr. Vik Clement MD    Adult Rheumatology   [address], [address] [facility]   Phone [phone]  Fax [phone]

## 2022-09-08 LAB
ALBUMIN SERPL-MCNC: 3.8 G/DL (ref 3.5–5)
ALBUMIN/GLOB SERPL: 0.9 {RATIO} (ref 1.1–2.2)
ALP SERPL-CCNC: 166 U/L (ref 45–117)
ALT SERPL-CCNC: 45 U/L (ref 12–78)
ANION GAP SERPL CALC-SCNC: 6 MMOL/L (ref 5–15)
APPEARANCE UR: ABNORMAL
AST SERPL-CCNC: 31 U/L (ref 15–37)
BACTERIA URNS QL MICRO: ABNORMAL /HPF
BASOPHILS # BLD: 0 K/UL (ref 0–0.1)
BASOPHILS NFR BLD: 1 % (ref 0–1)
BILIRUB SERPL-MCNC: 0.2 MG/DL (ref 0.2–1)
BILIRUB UR QL: NEGATIVE
BUN SERPL-MCNC: 15 MG/DL (ref 6–20)
BUN/CREAT SERPL: 15 (ref 12–20)
CALCIUM SERPL-MCNC: 9.1 MG/DL (ref 8.5–10.1)
CHLORIDE SERPL-SCNC: 102 MMOL/L (ref 97–108)
CO2 SERPL-SCNC: 29 MMOL/L (ref 21–32)
COLOR UR: ABNORMAL
CREAT SERPL-MCNC: 1.03 MG/DL (ref 0.55–1.02)
CREAT UR-MCNC: 241 MG/DL
CRP SERPL-MCNC: 2.33 MG/DL (ref 0–0.6)
DIFFERENTIAL METHOD BLD: ABNORMAL
EOSINOPHIL # BLD: 0.1 K/UL (ref 0–0.4)
EOSINOPHIL NFR BLD: 2 % (ref 0–7)
EPITH CASTS URNS QL MICRO: ABNORMAL /LPF
ERYTHROCYTE [DISTWIDTH] IN BLOOD BY AUTOMATED COUNT: 13.6 % (ref 11.5–14.5)
ERYTHROCYTE [SEDIMENTATION RATE] IN BLOOD: 46 MM/HR (ref 0–30)
GLOBULIN SER CALC-MCNC: 4.2 G/DL (ref 2–4)
GLUCOSE SERPL-MCNC: 116 MG/DL (ref 65–100)
GLUCOSE UR STRIP.AUTO-MCNC: NEGATIVE MG/DL
HCT VFR BLD AUTO: 41.9 % (ref 35–47)
HGB BLD-MCNC: 12.8 G/DL (ref 11.5–16)
HGB UR QL STRIP: NEGATIVE
IMM GRANULOCYTES # BLD AUTO: 0 K/UL (ref 0–0.04)
IMM GRANULOCYTES NFR BLD AUTO: 0 % (ref 0–0.5)
KETONES UR QL STRIP.AUTO: NEGATIVE MG/DL
LEUKOCYTE ESTERASE UR QL STRIP.AUTO: ABNORMAL
LYMPHOCYTES # BLD: 2 K/UL (ref 0.8–3.5)
LYMPHOCYTES NFR BLD: 43 % (ref 12–49)
MCH RBC QN AUTO: 25.7 PG (ref 26–34)
MCHC RBC AUTO-ENTMCNC: 30.5 G/DL (ref 30–36.5)
MCV RBC AUTO: 84.1 FL (ref 80–99)
MONOCYTES # BLD: 0.4 K/UL (ref 0–1)
MONOCYTES NFR BLD: 9 % (ref 5–13)
NEUTS SEG # BLD: 2.1 K/UL (ref 1.8–8)
NEUTS SEG NFR BLD: 45 % (ref 32–75)
NITRITE UR QL STRIP.AUTO: NEGATIVE
NRBC # BLD: 0 K/UL (ref 0–0.01)
NRBC BLD-RTO: 0 PER 100 WBC
PH UR STRIP: 5 [PH] (ref 5–8)
PLATELET # BLD AUTO: 323 K/UL (ref 150–400)
PMV BLD AUTO: 11.5 FL (ref 8.9–12.9)
POTASSIUM SERPL-SCNC: 3.8 MMOL/L (ref 3.5–5.1)
PROT SERPL-MCNC: 8 G/DL (ref 6.4–8.2)
PROT UR STRIP-MCNC: ABNORMAL MG/DL
PROT UR-MCNC: 24 MG/DL (ref 0–11.9)
PROT/CREAT UR-RTO: 0.1
RBC # BLD AUTO: 4.98 M/UL (ref 3.8–5.2)
RBC #/AREA URNS HPF: ABNORMAL /HPF (ref 0–5)
SODIUM SERPL-SCNC: 137 MMOL/L (ref 136–145)
SP GR UR REFRACTOMETRY: 1.02 (ref 1–1.03)
UROBILINOGEN UR QL STRIP.AUTO: 0.2 EU/DL (ref 0.2–1)
WBC # BLD AUTO: 4.7 K/UL (ref 3.6–11)
WBC URNS QL MICRO: ABNORMAL /HPF (ref 0–4)
YEAST URNS QL MICRO: PRESENT

## 2022-09-09 LAB
C3 SERPL-MCNC: 178 MG/DL (ref 82–167)
C4 SERPL-MCNC: 34 MG/DL (ref 12–38)

## 2022-09-15 LAB — DSDNA CRITHIDIA LUCILIAE IFA: NEGATIVE

## 2022-09-18 DIAGNOSIS — I10 ESSENTIAL HYPERTENSION: ICD-10-CM

## 2022-09-20 RX ORDER — CHLORTHALIDONE 25 MG/1
TABLET ORAL
Qty: 30 TABLET | Refills: 2 | Status: SHIPPED | OUTPATIENT
Start: 2022-09-20

## 2022-09-23 DIAGNOSIS — M15.9 PRIMARY OSTEOARTHRITIS INVOLVING MULTIPLE JOINTS: ICD-10-CM

## 2022-09-23 DIAGNOSIS — M32.19 OTHER ORGAN OR SYSTEM INVOLVEMENT IN SYSTEMIC LUPUS ERYTHEMATOSUS (HCC): ICD-10-CM

## 2022-09-23 RX ORDER — HYDROXYCHLOROQUINE SULFATE 200 MG/1
TABLET, FILM COATED ORAL
Qty: 60 TABLET | Refills: 5 | Status: SHIPPED | OUTPATIENT
Start: 2022-09-23

## 2022-09-23 RX ORDER — MELOXICAM 15 MG/1
15 TABLET ORAL
Qty: 30 TABLET | Refills: 6 | Status: SHIPPED | OUTPATIENT
Start: 2022-09-23 | End: 2022-10-23

## 2022-09-27 ENCOUNTER — TELEPHONE (OUTPATIENT)
Dept: RHEUMATOLOGY | Age: 57
End: 2022-09-27

## 2022-09-27 NOTE — TELEPHONE ENCOUNTER
Pt called stating she dropped off a work from home accomodation form last week and would like to know if it has been completed. Please advise.     [phone]

## 2022-10-03 NOTE — PROGRESS NOTES
[facility] Rheumatology  OBIC Note    Date: 2022  Name: Patsy Booth  MRN: [id number]       : 1965  Diagnosis: Lupus  Treatment: Benlysta 960mg every 4 wks   Referring Provider: Dr. Edi Jeronimo  Supervising Provider: Dr. Edi Jeronimo    Patient arrived to [facility] at 0800. Ms. Gretta Zhong allergies reviewed and she agreed to receiving today's treatment. A physical assessment was performed initially and post-treatment. Pt. denies new complaints today. Ms. Blaire Arana vitals were monitored before and after medication administration. Vitals:    10/06/22 0756 10/06/22 0900 10/06/22 0941   BP: (!) 162/100 (!) 147/86 (!) 145/84   Pulse: 74 64 67   Resp: 16 16 14   Temp: 97 °F (36.1 °C)     SpO2: 99% 99% 99%     Labs completed in September. Not due today. Medications given per providers orders:  Administrations This Visit       0.9% sodium chloride infusion       Admin Date  10/06/2022 Action  New Bag Dose  25 mL/hr Rate  25 mL/hr Route  IntraVENous Administered By  Arcadio Han RN              belimumab (BENLYSTA) 960 mg in 0.9% sodium chloride 250 mL, overfill volume 25 mL infusion       Admin Date  10/06/2022 Action  New Bag Dose  960 mg Rate  287 mL/hr Route  IntraVENous Administered By  Arcadio Han RN              sodium chloride (NS) flush 10 mL       Admin Date  10/06/2022 Action  Given Dose  10 mL Route  IntraVENous Administered By  Arcadio Han RN                Benlysta 120mg vial (Total dose 960mg)  NDC [id number]  Lot #  [id number]  Exp 2026  ---mixed in---  250ml 0.9% Normal Saline  NDC 7526-3165-38  Lot # [id number]  Exp 2023     START: 0830  STOP: 0930     250 ml bag 0.9% Normal Saline @ 25ml/hr  NDC 8509-3625-69  Lot # [id number]  Exp 2024    0.9% Normal Saline 10ml flush  NDC 3745168906  Lot # [id number]  Exp 2025    Lines: 24G left upper FA. Blood return noted and IV site assessed before, during, and after treatment. Line flushed with 10-30 ml's 0.9% Normal Saline solution per protocol. Access was removed from Ms. Caballero after completion of infusion/injection. Ms. Giancarlo Lunsford tolerated the treatment without complaints and no medication reactions were seen while in presence of this RN. Patient provided with AVS, which included future appointments and written educational material regarding Benlysta. All of the patients questions were answered and then discharged ambulatory from Rheumatology OBIC in stable condition at 0945. Encounter routed to supervising provider for co-signing.      Future Appointments   Date Time Provider Giana Carranza   11/2/2022  8:00 AM INFUSIONINJ_RC AOCR BS AMB   11/30/2022  8:00 AM INFUSIONINJ_RC AOCR BS AMB   11/30/2022  8:30 AM Tamiko Mcnamara MD AOCR BS AMB   1/3/2023  2:30 PM INFUSIONINJ_RC AOCR BS AMB   1/30/2023  8:00 AM INFUSIONINJ_RC AOCR BS AMB   2/27/2023  8:00 AM INFUSIONINJ_RC AOCR BS AMB     Trey Sharma RN  October 6, 2022

## 2022-10-06 ENCOUNTER — OFFICE VISIT (OUTPATIENT)
Dept: RHEUMATOLOGY | Age: 57
End: 2022-10-06
Payer: MEDICAID

## 2022-10-06 VITALS
HEART RATE: 67 BPM | DIASTOLIC BLOOD PRESSURE: 84 MMHG | TEMPERATURE: 97 F | OXYGEN SATURATION: 99 % | SYSTOLIC BLOOD PRESSURE: 145 MMHG | RESPIRATION RATE: 14 BRPM

## 2022-10-06 DIAGNOSIS — M32.19 OTHER SYSTEMIC LUPUS ERYTHEMATOSUS WITH OTHER ORGAN INVOLVEMENT (HCC): ICD-10-CM

## 2022-10-06 DIAGNOSIS — R76.8 POSITIVE DOUBLE STRANDED DNA ANTIBODY TEST: Primary | ICD-10-CM

## 2022-10-06 PROCEDURE — 96413 CHEMO IV INFUSION 1 HR: CPT

## 2022-10-06 PROCEDURE — 96365 THER/PROPH/DIAG IV INF INIT: CPT

## 2022-10-06 RX ORDER — DIPHENHYDRAMINE HYDROCHLORIDE 50 MG/ML
50 INJECTION, SOLUTION INTRAMUSCULAR; INTRAVENOUS AS NEEDED
Status: CANCELLED
Start: 2022-11-03

## 2022-10-06 RX ORDER — HYDROCORTISONE SODIUM SUCCINATE 100 MG/2ML
100 INJECTION, POWDER, FOR SOLUTION INTRAMUSCULAR; INTRAVENOUS AS NEEDED
Status: CANCELLED | OUTPATIENT
Start: 2022-11-03

## 2022-10-06 RX ORDER — EPINEPHRINE 1 MG/ML
0.3 INJECTION, SOLUTION, CONCENTRATE INTRAVENOUS AS NEEDED
Status: CANCELLED | OUTPATIENT
Start: 2022-11-03

## 2022-10-06 RX ORDER — ONDANSETRON 2 MG/ML
8 INJECTION INTRAMUSCULAR; INTRAVENOUS AS NEEDED
Status: CANCELLED | OUTPATIENT
Start: 2022-11-03

## 2022-10-06 RX ORDER — SODIUM CHLORIDE 9 MG/ML
25 INJECTION, SOLUTION INTRAVENOUS CONTINUOUS
Status: DISCONTINUED | OUTPATIENT
Start: 2022-10-06 | End: 2022-10-06 | Stop reason: HOSPADM

## 2022-10-06 RX ORDER — SODIUM CHLORIDE 9 MG/ML
10 INJECTION INTRAMUSCULAR; INTRAVENOUS; SUBCUTANEOUS AS NEEDED
Status: CANCELLED | OUTPATIENT
Start: 2022-11-03

## 2022-10-06 RX ORDER — SODIUM CHLORIDE 0.9 % (FLUSH) 0.9 %
10 SYRINGE (ML) INJECTION AS NEEDED
Status: DISCONTINUED | OUTPATIENT
Start: 2022-10-06 | End: 2022-10-06 | Stop reason: HOSPADM

## 2022-10-06 RX ORDER — HEPARIN 100 UNIT/ML
300-500 SYRINGE INTRAVENOUS AS NEEDED
Status: CANCELLED
Start: 2022-11-03

## 2022-10-06 RX ORDER — SODIUM CHLORIDE 0.9 % (FLUSH) 0.9 %
10 SYRINGE (ML) INJECTION AS NEEDED
Status: CANCELLED | OUTPATIENT
Start: 2022-11-03

## 2022-10-06 RX ORDER — SODIUM CHLORIDE 9 MG/ML
25 INJECTION, SOLUTION INTRAVENOUS CONTINUOUS
Status: CANCELLED | OUTPATIENT
Start: 2022-11-03

## 2022-10-06 RX ORDER — ALBUTEROL SULFATE 0.83 MG/ML
2.5 SOLUTION RESPIRATORY (INHALATION) AS NEEDED
Status: CANCELLED
Start: 2022-11-03

## 2022-10-06 RX ORDER — ACETAMINOPHEN 325 MG/1
650 TABLET ORAL AS NEEDED
Status: CANCELLED
Start: 2022-11-03

## 2022-10-06 RX ORDER — DIPHENHYDRAMINE HYDROCHLORIDE 50 MG/ML
25 INJECTION, SOLUTION INTRAMUSCULAR; INTRAVENOUS AS NEEDED
Status: CANCELLED
Start: 2022-11-03

## 2022-10-06 RX ADMIN — Medication 10 ML: at 08:03

## 2022-10-06 RX ADMIN — SODIUM CHLORIDE 25 ML/HR: 9 INJECTION, SOLUTION INTRAVENOUS at 08:03

## 2022-10-20 ENCOUNTER — TELEPHONE (OUTPATIENT)
Dept: RHEUMATOLOGY | Age: 57
End: 2022-10-20

## 2022-10-20 NOTE — TELEPHONE ENCOUNTER
Pt called office to know when next infusion appt was. Provided next infusion appt details to pt. Pt is fully aware and understood.

## 2022-11-04 RX ORDER — DIPHENHYDRAMINE HYDROCHLORIDE 50 MG/ML
25 INJECTION, SOLUTION INTRAMUSCULAR; INTRAVENOUS AS NEEDED
Status: CANCELLED
Start: 2022-11-08

## 2022-11-04 RX ORDER — HEPARIN 100 UNIT/ML
300-500 SYRINGE INTRAVENOUS AS NEEDED
Status: CANCELLED
Start: 2022-11-08

## 2022-11-04 RX ORDER — ACETAMINOPHEN 325 MG/1
650 TABLET ORAL AS NEEDED
Status: CANCELLED
Start: 2022-11-08

## 2022-11-04 RX ORDER — ONDANSETRON 2 MG/ML
8 INJECTION INTRAMUSCULAR; INTRAVENOUS AS NEEDED
Status: CANCELLED | OUTPATIENT
Start: 2022-11-08

## 2022-11-04 RX ORDER — DIPHENHYDRAMINE HYDROCHLORIDE 50 MG/ML
50 INJECTION, SOLUTION INTRAMUSCULAR; INTRAVENOUS AS NEEDED
Status: CANCELLED
Start: 2022-11-08

## 2022-11-04 RX ORDER — SODIUM CHLORIDE 0.9 % (FLUSH) 0.9 %
10 SYRINGE (ML) INJECTION AS NEEDED
Status: CANCELLED | OUTPATIENT
Start: 2022-11-08

## 2022-11-04 RX ORDER — SODIUM CHLORIDE 9 MG/ML
10 INJECTION INTRAMUSCULAR; INTRAVENOUS; SUBCUTANEOUS AS NEEDED
Status: CANCELLED | OUTPATIENT
Start: 2022-11-08

## 2022-11-04 RX ORDER — ALBUTEROL SULFATE 0.83 MG/ML
2.5 SOLUTION RESPIRATORY (INHALATION) AS NEEDED
Status: CANCELLED
Start: 2022-11-08

## 2022-11-04 RX ORDER — SODIUM CHLORIDE 9 MG/ML
25 INJECTION, SOLUTION INTRAVENOUS CONTINUOUS
Status: CANCELLED | OUTPATIENT
Start: 2022-11-08

## 2022-11-04 RX ORDER — EPINEPHRINE 1 MG/ML
0.3 INJECTION, SOLUTION, CONCENTRATE INTRAVENOUS AS NEEDED
Status: CANCELLED | OUTPATIENT
Start: 2022-11-08

## 2022-11-04 RX ORDER — HYDROCORTISONE SODIUM SUCCINATE 100 MG/2ML
100 INJECTION, POWDER, FOR SOLUTION INTRAMUSCULAR; INTRAVENOUS AS NEEDED
Status: CANCELLED | OUTPATIENT
Start: 2022-11-08

## 2022-11-07 NOTE — PROGRESS NOTES
[facility] Rheumatology  OBIC Note    Date: 2022  Name: Makayla Donahue  MRN: [id number]       : 1965  Diagnosis: Lupus (M32.9)  Treatment: Benlysta 960mg every 4 wks   Referring Provider: Dr. Uday Lopez  Supervising Provider: Dr. Uday Lopez    Patient arrived to [facility] at 6120. Ms. Kapoor Sickle allergies reviewed and she agreed to receiving today's treatment. A physical assessment was performed initially and post-treatment. Pt. denies new complaints today. Pt reports had pulmonary function test on Wednesday with [facility]. Ms. Mario Guerrero vitals were monitored before and after medication administration. Vitals:    22 0806 22   BP: (!) 148/85 139/85   Pulse: 76 76   Resp: 15 15   Temp: 98 °F (36.7 °C)    SpO2: 99% 99%     Labs due beginning of December. Medications given per providers orders:  Administrations This Visit       0.9% sodium chloride infusion       Admin Date  2022 Action  New Bag Dose  25 mL/hr Rate  25 mL/hr Route  IntraVENous Administered By  Jaziel Johnston RN              belimumab (BENLYSTA) 960 mg in 0.9% sodium chloride 250 mL, overfill volume 25 mL infusion       Admin Date  2022 Action  New Bag Dose  960 mg Rate  287 mL/hr Route  IntraVENous Administered By  Jaziel Johnston RN              sodium chloride (NS) flush 10 mL       Admin Date  2022 Action  Given Dose  10 mL Route  IntraVENous Administered By  Jaziel Johnston RN                  Benlysta 120mg vial (Total dose 960mg)  NDC [id number]  Lot #  [id number]  Exp 2026  ---mixed in---  250ml 0.9% Normal Saline  NDC 9857-0132-42  Lot # [id number]  Exp 2024     START: 9084  STOP: 8202     276 ml bag 0.9% Normal Saline @ 25ml/hr  NDC 9752-7609-66  Lot # [id number]  Exp 2024    0.9% Normal Saline 10ml flush  NDC 8208285288  Lot # [id number]  Exp 2025    Lines: 24G left upper FA. Blood return noted and IV site assessed before, during, and after treatment.   Line flushed with 10-30 ml's 0.9% Normal Saline solution per protocol. Access was removed from Ms. Caballero after completion of infusion/injection. Ms. Serena Pierre tolerated the treatment without complaints and no medication reactions were seen while in presence of this RN. Patient provided with AVS, which included future appointments and written educational material regarding Benlysta. All of the patients questions were answered and then discharged ambulatory from Rheumatology OBIC in stable condition at 0940. Encounter routed to supervising provider for co-signing.      Future Appointments   Date Time Provider Giana Carranza   12/5/2022  8:00 AM INFUSIONINJ_RC AOCR BS AMB   12/5/2022  8:20 AM Artemio Maria MD AOCR BS AMB   1/3/2023  2:30 PM INFUSIONINJ_RC AOCR BS AMB   1/30/2023  8:00 AM INFUSIONINJ_RC AOCR BS AMB   2/27/2023  8:00 AM INFUSIONINJ_RC AOCR BS AMB     Marcie Schwartz RN  November 8, 2022

## 2022-11-08 ENCOUNTER — OFFICE VISIT (OUTPATIENT)
Dept: RHEUMATOLOGY | Age: 57
End: 2022-11-08
Payer: MEDICAID

## 2022-11-08 VITALS
SYSTOLIC BLOOD PRESSURE: 139 MMHG | DIASTOLIC BLOOD PRESSURE: 85 MMHG | OXYGEN SATURATION: 99 % | TEMPERATURE: 98 F | RESPIRATION RATE: 15 BRPM | HEART RATE: 76 BPM

## 2022-11-08 DIAGNOSIS — M32.19 OTHER SYSTEMIC LUPUS ERYTHEMATOSUS WITH OTHER ORGAN INVOLVEMENT (HCC): ICD-10-CM

## 2022-11-08 DIAGNOSIS — R76.8 POSITIVE DOUBLE STRANDED DNA ANTIBODY TEST: Primary | ICD-10-CM

## 2022-11-08 PROCEDURE — 96413 CHEMO IV INFUSION 1 HR: CPT

## 2022-11-08 RX ORDER — DIPHENHYDRAMINE HYDROCHLORIDE 50 MG/ML
25 INJECTION, SOLUTION INTRAMUSCULAR; INTRAVENOUS AS NEEDED
Start: 2022-11-29

## 2022-11-08 RX ORDER — HYDROCORTISONE SODIUM SUCCINATE 100 MG/2ML
100 INJECTION, POWDER, FOR SOLUTION INTRAMUSCULAR; INTRAVENOUS AS NEEDED
OUTPATIENT
Start: 2022-11-29

## 2022-11-08 RX ORDER — SODIUM CHLORIDE 0.9 % (FLUSH) 0.9 %
10 SYRINGE (ML) INJECTION AS NEEDED
OUTPATIENT
Start: 2022-11-29

## 2022-11-08 RX ORDER — SODIUM CHLORIDE 0.9 % (FLUSH) 0.9 %
10 SYRINGE (ML) INJECTION AS NEEDED
Status: DISCONTINUED | OUTPATIENT
Start: 2022-11-08 | End: 2022-11-08 | Stop reason: HOSPADM

## 2022-11-08 RX ORDER — ACETAMINOPHEN 325 MG/1
650 TABLET ORAL AS NEEDED
Start: 2022-11-29

## 2022-11-08 RX ORDER — HEPARIN 100 UNIT/ML
300-500 SYRINGE INTRAVENOUS AS NEEDED
Start: 2022-11-29

## 2022-11-08 RX ORDER — DIPHENHYDRAMINE HYDROCHLORIDE 50 MG/ML
50 INJECTION, SOLUTION INTRAMUSCULAR; INTRAVENOUS AS NEEDED
Start: 2022-11-29

## 2022-11-08 RX ORDER — SODIUM CHLORIDE 9 MG/ML
25 INJECTION, SOLUTION INTRAVENOUS CONTINUOUS
Status: DISCONTINUED | OUTPATIENT
Start: 2022-11-08 | End: 2022-11-08 | Stop reason: HOSPADM

## 2022-11-08 RX ORDER — ALBUTEROL SULFATE 0.83 MG/ML
2.5 SOLUTION RESPIRATORY (INHALATION) AS NEEDED
Start: 2022-11-29

## 2022-11-08 RX ORDER — SODIUM CHLORIDE 9 MG/ML
10 INJECTION INTRAMUSCULAR; INTRAVENOUS; SUBCUTANEOUS AS NEEDED
OUTPATIENT
Start: 2022-11-29

## 2022-11-08 RX ORDER — ONDANSETRON 2 MG/ML
8 INJECTION INTRAMUSCULAR; INTRAVENOUS AS NEEDED
OUTPATIENT
Start: 2022-11-29

## 2022-11-08 RX ORDER — EPINEPHRINE 1 MG/ML
0.3 INJECTION, SOLUTION, CONCENTRATE INTRAVENOUS AS NEEDED
OUTPATIENT
Start: 2022-11-29

## 2022-11-08 RX ORDER — SODIUM CHLORIDE 9 MG/ML
25 INJECTION, SOLUTION INTRAVENOUS CONTINUOUS
OUTPATIENT
Start: 2022-11-29

## 2022-11-08 RX ADMIN — SODIUM CHLORIDE 25 ML/HR: 9 INJECTION, SOLUTION INTRAVENOUS at 08:13

## 2022-11-08 RX ADMIN — Medication 10 ML: at 08:13

## 2022-11-08 NOTE — PROGRESS NOTES
[facility] Rheumatology  OBIC Note    Date: 2022  Name: Hannah Chatterjee  MRN: [id number]       : 1965  Diagnosis: Lupus (M32.9)  Treatment: Benlysta 960mg every 4 wks   Referring Provider: Dr. Ruben Sanders  Supervising Provider: Dr. Ruben Sanders    Patient arrived to [facility] at 0800. Ms. Anna Hirsch allergies reviewed and she agreed to receiving today's treatment. A physical assessment was performed initially and post-treatment. Pt. denies new complaints today. Ms. Tania Mcneil vitals were monitored before and after medication administration. Vitals:    22 0802 22 0928   BP: 138/85 135/84   Pulse: 67 64   Resp: 16 16   Temp: 98 °F (36.7 °C)    SpO2: 99% 99%     Labs drawn & urine collected and walked to [facility]    Medications given per providers orders:  Administrations This Visit       0.9% sodium chloride infusion       Admin Date  2022 Action  New Bag Dose  25 mL/hr Rate  25 mL/hr Route  IntraVENous Administered By  Nellie Cook RN              belimumab (BENLYSTA) 960 mg in 0.9% sodium chloride 250 mL, overfill volume 25 mL infusion       Admin Date  2022 Action  New Bag Dose  960 mg Rate  287 mL/hr Route  IntraVENous Administered By  Nellie Cook RN              sodium chloride (NS) flush 10 mL       Admin Date  2022 Action  Given Dose  10 mL Route  IntraVENous Administered By  Nellie Cook RN                Benlysta 120mg vial (Total dose 960mg)  NDC [id number]  Lot #  [id number]  Exp 2026  ---mixed in---  250ml 0.9% Normal Saline  NDC 1756-4765-45  Lot # [id number]  Exp 2024     START: 0830  STOP: 09     250 ml bag 0.9% Normal Saline @ 25ml/hr  NDC 3918-8938-53  Lot # [id number]  Exp 2024    0.9% Normal Saline 10ml flush  NDC 9066006538  Lot # [id number]  Exp 2025    Lines: 24G left upper FA. Blood return noted and IV site assessed before, during, and after treatment. Line flushed with 10-30 ml's 0.9% Normal Saline solution per protocol.      Access was removed from Ms. Best after completion of infusion/injection. Ms. Drake Dong tolerated the treatment without complaints and no medication reactions were seen while in presence of this RN. Patient provided with AVS, which included future appointments and written educational material regarding Benlysta. All of the patients questions were answered and then discharged ambulatory from Rheumatology OBIC in stable condition at 0935. Encounter routed to supervising provider for co-signing.      Future Appointments   Date Time Provider Giana Carranza   1/3/2023  2:30 PM INFUSIONINJ_RC AOCR BS AMB   1/9/2023  8:00 AM KEREN Agustin BS AMB   1/30/2023  8:00 AM INFUSIONINJ_RC AOCR BS AMB   2/27/2023  8:00 AM INFUSIONINJ_RC AOCR BS AMB   3/27/2023  8:00 AM INFUSIONINJ_RC AOCR BS AMB   3/27/2023  8:20 AM Gerhardt Jerry, MD AOCR BS AMB   4/24/2023  8:00 AM INFUSIONINJ_RC AOCR BS AMB     Mela Badillo RN  December 5, 2022

## 2022-11-30 ENCOUNTER — OFFICE VISIT (OUTPATIENT)
Dept: INTERNAL MEDICINE CLINIC | Age: 57
End: 2022-11-30
Payer: MEDICAID

## 2022-11-30 VITALS
BODY MASS INDEX: 37.18 KG/M2 | HEART RATE: 80 BPM | TEMPERATURE: 98.2 F | OXYGEN SATURATION: 100 % | RESPIRATION RATE: 18 BRPM | DIASTOLIC BLOOD PRESSURE: 80 MMHG | SYSTOLIC BLOOD PRESSURE: 166 MMHG | WEIGHT: 217.8 LBS | HEIGHT: 64 IN

## 2022-11-30 DIAGNOSIS — I10 ESSENTIAL HYPERTENSION: ICD-10-CM

## 2022-11-30 DIAGNOSIS — E66.01 SEVERE OBESITY (HCC): ICD-10-CM

## 2022-11-30 DIAGNOSIS — J30.9 ALLERGIC RHINITIS, UNSPECIFIED SEASONALITY, UNSPECIFIED TRIGGER: Primary | ICD-10-CM

## 2022-11-30 DIAGNOSIS — M32.9 SYSTEMIC LUPUS ERYTHEMATOSUS, UNSPECIFIED SLE TYPE, UNSPECIFIED ORGAN INVOLVEMENT STATUS (HCC): ICD-10-CM

## 2022-11-30 PROBLEM — Z86.73 HISTORY OF CVA (CEREBROVASCULAR ACCIDENT): Status: ACTIVE | Noted: 2022-08-11

## 2022-11-30 RX ORDER — FLUTICASONE PROPIONATE 50 MCG
2 SPRAY, SUSPENSION (ML) NASAL DAILY
Qty: 3 EACH | Refills: 3 | Status: SHIPPED | OUTPATIENT
Start: 2022-11-30

## 2022-11-30 RX ORDER — CETIRIZINE HYDROCHLORIDE 10 MG/1
10 TABLET ORAL
Qty: 90 TABLET | Refills: 3 | Status: SHIPPED | OUTPATIENT
Start: 2022-11-30

## 2022-11-30 RX ORDER — MELOXICAM 15 MG/1
TABLET ORAL
COMMUNITY
Start: 2022-11-22

## 2022-11-30 RX ORDER — AMLODIPINE BESYLATE 10 MG/1
TABLET ORAL DAILY
COMMUNITY
End: 2022-11-30 | Stop reason: SDUPTHER

## 2022-11-30 RX ORDER — CHLORTHALIDONE 25 MG/1
25 TABLET ORAL DAILY
Qty: 90 TABLET | Refills: 1 | Status: SHIPPED | OUTPATIENT
Start: 2022-11-30

## 2022-11-30 RX ORDER — MOMETASONE FUROATE AND FORMOTEROL FUMARATE DIHYDRATE 200; 5 UG/1; UG/1
AEROSOL RESPIRATORY (INHALATION)
COMMUNITY
Start: 2022-11-23

## 2022-11-30 RX ORDER — AMLODIPINE BESYLATE 10 MG/1
10 TABLET ORAL DAILY
Qty: 90 TABLET | Refills: 1 | Status: SHIPPED | OUTPATIENT
Start: 2022-11-30

## 2022-11-30 NOTE — PROGRESS NOTES
Tory Hickman is a 61 y.o. female  Chief Complaint   Patient presents with    Ear Pain     Visit Vitals  BP (!) 166/80 (BP 1 Location: Left upper arm, BP Patient Position: Sitting, BP Cuff Size: Adult)   Pulse 80   Temp 98.2 °F (36.8 °C) (Temporal)   Resp 18   Ht 5' 4\" (1.626 m)   Wt 217 lb 12.8 oz (98.8 kg)   SpO2 100%   BMI 37.39 kg/m²      Health Maintenance Due   Topic Date Due    DTaP/Tdap/Td series (1 - Tdap) Never done    Shingrix Vaccine Age 50> (1 of 2) Never done    COVID-19 Vaccine (3 - Booster for Pfizer series) 06/28/2021    Flu Vaccine (1) Never done     HPI  HTN Follow up - BP uncontrolled off of Chlorthalidone (misunderstood directions and stopped in August after TIA):    BP Readings from Last 9 Encounters:   11/30/22 (!) 166/80   11/08/22 139/85   10/06/22 (!) 145/84   09/07/22 139/80   08/24/22 (!) 149/80   08/12/22 (!) 163/72   08/10/22 (!) 174/98   07/13/22 (!) 173/106   06/15/22 (!) 162/95       Currently taking (but hasn't yet taken this morning):  Key CAD CHF Meds               amLODIPine (NORVASC) 10 mg tablet (Taking) Take  by mouth daily. Lab Results   Component Value Date/Time    Creatinine 1.03 (H) 09/07/2022 08:05 AM     Lupus is much improved with Rheumatology! Very pleased with results, and sings the praises of Dr. Natalya Ruiz! Jumps out of chair and dances around, and I dance with her! Thank you Dr. Natalya Ruiz! :)   Has been able to exercise and is losing weight! \"My  can't keep up with me now! \"     Wt Readings from Last 9 Encounters:   11/30/22 217 lb 12.8 oz (98.8 kg)   08/24/22 218 lb (98.9 kg)   08/12/22 217 lb 2.5 oz (98.5 kg)   06/02/22 220 lb (99.8 kg)   03/21/22 218 lb 12.8 oz (99.2 kg)   02/22/22 215 lb 2.7 oz (97.6 kg)   01/28/22 229 lb (103.9 kg)   12/21/21 214 lb 9.6 oz (97.3 kg)   11/30/21 208 lb 6.4 oz (94.5 kg)     Ear discomfort B x 1 month. ROS  Review of Systems   Constitutional:  Negative for fever. HENT:  Positive for ear pain.  Negative for congestion and ear discharge. Respiratory:  Negative for cough and shortness of breath. Cardiovascular:  Negative for chest pain and palpitations. Musculoskeletal:  Negative for falls and joint pain. Neurological:  Negative for dizziness, loss of consciousness and weakness. EXAM  Physical Exam  Vitals and nursing note reviewed. Constitutional:       General: She is not in acute distress. Appearance: She is well-developed. HENT:      Head: Normocephalic and atraumatic. Ears:      Comments: B TMs with fluid. No erythema. Nose: Congestion and rhinorrhea present. Comments: Pale, inflamed nasal mucosa  Eyes:      Conjunctiva/sclera: Conjunctivae normal.   Neck:      Vascular: No JVD. Cardiovascular:      Rate and Rhythm: Normal rate and regular rhythm. Heart sounds: Normal heart sounds. Pulmonary:      Effort: Pulmonary effort is normal. No respiratory distress. Breath sounds: Normal breath sounds. Musculoskeletal:      Cervical back: Neck supple. Lymphadenopathy:      Cervical: No cervical adenopathy. Skin:     General: Skin is warm and dry. Neurological:      Mental Status: She is alert and oriented to person, place, and time. Psychiatric:         Mood and Affect: Mood normal.         Behavior: Behavior normal.         Thought Content: Thought content normal.         Judgment: Judgment normal.     ASSESSMENT/PLAN  Encounter Diagnoses     ICD-10-CM ICD-9-CM   1. Allergic rhinitis, unspecified seasonality, unspecified trigger  J30.9 477.9   2. Essential hypertension  I10 401.9   3. Systemic lupus erythematosus, unspecified SLE type, unspecified organ involvement status ([facility]  M32.9 710.0   4.  Severe obesity ([facility]  E66.01 278.01     Orders Placed This Encounter    Start   cetirizine (ZYRTEC) 10 mg tablet    fluticasone propionate (FLONASE) 50 mcg/actuation nasal spray    Continue: amLODIPine (NORVASC) 10 mg tablet    Restart: chlorthalidone (HYGROTON) 25 mg tablet   Continue routine follow ups with rheumatology. Congratulated pt on weight loss success and encouraged continued efforts!

## 2022-11-30 NOTE — PROGRESS NOTES
Reviewed record in preparation for visit and have obtained necessary documentation. Identified pt with two pt identifiers(name and ). Chief Complaint   Patient presents with    Ear Pain     Blood pressure (!) 166/80, pulse 80, temperature 98.2 °F (36.8 °C), temperature source Temporal, resp. rate 18, height 5' 4\" (1.626 m), weight 217 lb 12.8 oz (98.8 kg), SpO2 100 %. Health Maintenance Due   Topic Date Due    DTaP/Tdap/Td  (1 - Tdap) Never done    Shingles Vaccine (1 of 2) Never done    COVID-19 Vaccine (3 - Booster for Pfizer series) 2021    Yearly Flu Vaccine (1) Never done       Ms. Caballero has a reminder for a \"due or due soon\" health maintenance. I have asked that she discuss this further with her primary care provider for follow-up on this health maintenance. Coordination of Care Questionnaire:  :     1) Have you been to an emergency room, urgent care clinic since your last visit? yes , Patient states TIA in august or september at [address] since your last visit? yes             2) Have you seen or consulted any other health care providers outside of [address] [place] [address] since your last visit? no      3) In the event something were to happen to you and you were unable to speak on your behalf, do you have an Advance Directive/ Living Will in place stating your wishes?  NO

## 2022-11-30 NOTE — Clinical Note
I saw our mutual patient today and thought you might enjoy this:  \"Lupus is much improved with Rheumatology! Very pleased with results, and sings the praises of Dr. Dominique Ferrera! Jumps out of chair and dances around, and I dance with her!  Thank you Dr. Dominique Ferrera! :)\" Shasha Montiel

## 2022-12-05 ENCOUNTER — OFFICE VISIT (OUTPATIENT)
Dept: RHEUMATOLOGY | Age: 57
End: 2022-12-05
Payer: MEDICAID

## 2022-12-05 ENCOUNTER — OFFICE VISIT (OUTPATIENT)
Dept: RHEUMATOLOGY | Age: 57
End: 2022-12-05

## 2022-12-05 VITALS
SYSTOLIC BLOOD PRESSURE: 138 MMHG | DIASTOLIC BLOOD PRESSURE: 85 MMHG | HEART RATE: 67 BPM | TEMPERATURE: 98 F | RESPIRATION RATE: 16 BRPM | OXYGEN SATURATION: 99 %

## 2022-12-05 VITALS
RESPIRATION RATE: 16 BRPM | HEART RATE: 64 BPM | SYSTOLIC BLOOD PRESSURE: 135 MMHG | TEMPERATURE: 98 F | DIASTOLIC BLOOD PRESSURE: 84 MMHG | OXYGEN SATURATION: 99 %

## 2022-12-05 DIAGNOSIS — Z79.899 ONGOING USE OF POSSIBLY TOXIC MEDICATION: ICD-10-CM

## 2022-12-05 DIAGNOSIS — M32.19 OTHER SYSTEMIC LUPUS ERYTHEMATOSUS WITH OTHER ORGAN INVOLVEMENT (HCC): ICD-10-CM

## 2022-12-05 DIAGNOSIS — R74.8 ALKALINE PHOSPHATASE ELEVATION: Primary | ICD-10-CM

## 2022-12-05 DIAGNOSIS — R76.8 POSITIVE DOUBLE STRANDED DNA ANTIBODY TEST: Primary | ICD-10-CM

## 2022-12-05 PROCEDURE — 99215 OFFICE O/P EST HI 40 MIN: CPT | Performed by: INTERNAL MEDICINE

## 2022-12-05 PROCEDURE — 3074F SYST BP LT 130 MM HG: CPT | Performed by: INTERNAL MEDICINE

## 2022-12-05 PROCEDURE — 96365 THER/PROPH/DIAG IV INF INIT: CPT

## 2022-12-05 PROCEDURE — 96413 CHEMO IV INFUSION 1 HR: CPT

## 2022-12-05 PROCEDURE — 3078F DIAST BP <80 MM HG: CPT | Performed by: INTERNAL MEDICINE

## 2022-12-05 RX ORDER — DIPHENHYDRAMINE HYDROCHLORIDE 50 MG/ML
25 INJECTION, SOLUTION INTRAMUSCULAR; INTRAVENOUS AS NEEDED
Start: 2023-01-02

## 2022-12-05 RX ORDER — ALBUTEROL SULFATE 90 UG/1
AEROSOL, METERED RESPIRATORY (INHALATION)
COMMUNITY
Start: 2022-10-05

## 2022-12-05 RX ORDER — ONDANSETRON 2 MG/ML
8 INJECTION INTRAMUSCULAR; INTRAVENOUS AS NEEDED
OUTPATIENT
Start: 2023-01-02

## 2022-12-05 RX ORDER — DIPHENHYDRAMINE HYDROCHLORIDE 50 MG/ML
50 INJECTION, SOLUTION INTRAMUSCULAR; INTRAVENOUS AS NEEDED
Start: 2023-01-02

## 2022-12-05 RX ORDER — SODIUM CHLORIDE 9 MG/ML
25 INJECTION, SOLUTION INTRAVENOUS CONTINUOUS
Status: DISCONTINUED | OUTPATIENT
Start: 2022-12-05 | End: 2022-12-05 | Stop reason: HOSPADM

## 2022-12-05 RX ORDER — SODIUM CHLORIDE 9 MG/ML
25 INJECTION, SOLUTION INTRAVENOUS CONTINUOUS
OUTPATIENT
Start: 2023-01-02

## 2022-12-05 RX ORDER — ACETAMINOPHEN 325 MG/1
650 TABLET ORAL AS NEEDED
Start: 2023-01-02

## 2022-12-05 RX ORDER — HEPARIN 100 UNIT/ML
300-500 SYRINGE INTRAVENOUS AS NEEDED
Start: 2023-01-02

## 2022-12-05 RX ORDER — SODIUM CHLORIDE 9 MG/ML
10 INJECTION INTRAMUSCULAR; INTRAVENOUS; SUBCUTANEOUS AS NEEDED
OUTPATIENT
Start: 2023-01-02

## 2022-12-05 RX ORDER — ALBUTEROL SULFATE 0.83 MG/ML
SOLUTION RESPIRATORY (INHALATION)
COMMUNITY
Start: 2022-10-05

## 2022-12-05 RX ORDER — EPINEPHRINE 1 MG/ML
0.3 INJECTION, SOLUTION, CONCENTRATE INTRAVENOUS AS NEEDED
OUTPATIENT
Start: 2023-01-02

## 2022-12-05 RX ORDER — HYDROCORTISONE SODIUM SUCCINATE 100 MG/2ML
100 INJECTION, POWDER, FOR SOLUTION INTRAMUSCULAR; INTRAVENOUS AS NEEDED
OUTPATIENT
Start: 2023-01-02

## 2022-12-05 RX ORDER — SODIUM CHLORIDE 0.9 % (FLUSH) 0.9 %
10 SYRINGE (ML) INJECTION AS NEEDED
OUTPATIENT
Start: 2023-01-02

## 2022-12-05 RX ORDER — ALBUTEROL SULFATE 0.83 MG/ML
2.5 SOLUTION RESPIRATORY (INHALATION) AS NEEDED
Start: 2023-01-02

## 2022-12-05 RX ORDER — SODIUM CHLORIDE 0.9 % (FLUSH) 0.9 %
10 SYRINGE (ML) INJECTION AS NEEDED
Status: DISCONTINUED | OUTPATIENT
Start: 2022-12-05 | End: 2022-12-05 | Stop reason: HOSPADM

## 2022-12-05 RX ADMIN — SODIUM CHLORIDE 25 ML/HR: 9 INJECTION, SOLUTION INTRAVENOUS at 08:14

## 2022-12-05 RX ADMIN — Medication 10 ML: at 08:13

## 2022-12-05 NOTE — PATIENT INSTRUCTIONS
Continue Plaquenil 200mg daily, monthly Benlysta infusions. Try a spica splint if you can on heavier work days.   Topical diclofenac gel can be helpful if you're not frequently washing hands, eg under gloves  OK for continued meloxicam 3d/week, or take ibuprofen 400-600mg when pain is severe if you haven't already taken meloxicam within the last 24 hours  We'll keep checking labs every 3 months with infusions  Return in 3 months, OK to keep scheduling with your infusions

## 2022-12-05 NOTE — PROGRESS NOTES
Chief Complaint   Patient presents with    Lupus     1. Have you been to the ER, urgent care clinic since your last visit? Hospitalized since your last visit? No    2. Have you seen or consulted any other health care providers outside of the [address] [place] [address] since your last visit? Include any pap smears or colon screening.  Yes Where: PCP Reason for visit: Fluid behind the ears

## 2022-12-05 NOTE — PROGRESS NOTES
REASON FOR VISIT:    is a 61 y.o. female with history of hypertension and obesity who is returning for in-office followup of systemic lupus characterized by arthritis, alopecia, subacute cutaneous lupus rash, and +NIRAV 1:640 homogenous with +dsDNA. HISTORY OF PRESENT ILLNESS    Pt returns for a follow up. Overall feels she is doing well. No further episodes of focal weakness, no chest pain. Her PCP has added hydrochlorthiazide which has helped keep her BP down, today is 138/85. Only pain complaint is her left (dominant) 1st MCP and CMC. 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 She has a spica splint at home though hasn't tried wearing this at work. For pain, takes meloxicam about 3 days out of the week. No interval infections. PCP told her she has fluid behind both ears, prescribed zyrtec and Singulair. Her asthma has been more bothersome, \"can tell it's going to be a bad year. \" No need for antibiotics to date. Takes 200mg of Plaquenil daily. Last ophthalmology visit was in March, says no e/o toxicity noted at that time. No interval rashes, no pleuritic chest pain, no LE swelling. She has lost 12 pounds in the last month, since starting hydrochlorthiazide. Disease History: In 2012 developed increased joint pain and stiffness, marked pain sensitivity, couldn't walk or stand to be touched, get in and out of a car. Thought at first possibly shingles, pain didn't resolve. Able to get expedited evaluation with Rheumatology (Dr. Lawrence with [facility]), diagnosed with lupus. Prednisone and Plaquenil (hydroxychloroquine) were started, and within about a week was feeling better. Around the same time, had developed significant frontotemporal and vertex hair loss. Now prolonged cold exposure is her major trigger of joint pain flares. Initially was having pain flares 2-3x/month while in [place].   Since moved to [place] in 2019 to help her mother with foster children, has had less frequent flares maybe 2x/every 3 months. Flares respond to prednisone tapers. Between flares, she denies consistent joint pain. Some aching in hands or shoulders with activity. Has been able to lose weight from 236 to 209 with use of exercise bike over the last 6 months. Gets perioral papular rashes, not more typical malar rash. With sun exposure in the past, has developed painful annular silver-dollar sized lesions lasting a week or two, not in the last year. Good about sun avoidance. Usually goes to [facility] for eye checks, recently seen. No current ophthalmologist for Plaquenil screening. REVIEW OF SYSTEMS  A comprehensive review of systems was negative except for that written in the HPI. A 10-point review of systems is per the new patient questionnaire, which has been reviewed extensively and scanned into the electronic medical record for future reference. Review of systems is as above and is otherwise negative. ALLERGIES  Lisinopril and Avocado    MEDICATIONS  Current Outpatient Medications   Medication Sig    meloxicam (MOBIC) 15 mg tablet     Dulera 200-5 mcg/actuation HFA inhaler     cetirizine (ZYRTEC) 10 mg tablet Take 1 Tablet by mouth nightly. Indications: inflammation of the nose due to an allergy    fluticasone propionate (FLONASE) 50 mcg/actuation nasal spray 2 Sprays by Both Nostrils route daily. Indications: inflammation of the nose due to an allergy    amLODIPine (NORVASC) 10 mg tablet Take 1 Tablet by mouth daily. chlorthalidone (HYGROTON) 25 mg tablet Take 1 Tablet by mouth daily. hydrOXYchloroQUINE (PLAQUENIL) 200 mg tablet TAKE 2 TABLETS BY MOUTH EVERY DAY    Blood Pressure Test Kit-Large kit Check blood pressure once daily    budesonide-formoteroL (SYMBICORT) 160-4.5 mcg/actuation HFAA Take 1 Puff by inhalation two (2) times a day.  (Patient not taking: Reported on 11/30/2022)    montelukast (SINGULAIR) 10 mg tablet Take 1 Tab by mouth every evening. albuterol (ACCUNEB) 1.25 mg/3 mL nebu 3 mL by Nebulization route every six (6) hours as needed for Shortness of Breath. Indications: asthma attack     No current facility-administered medications for this visit. Facility-Administered Medications Ordered in Other Visits   Medication    0.9% sodium chloride infusion    belimumab (BENLYSTA) 960 mg in 0.9% sodium chloride 250 mL, overfill volume 25 mL infusion    sodium chloride (NS) flush 10 mL       PAST MEDICAL HISTORY  Past Medical History:   Diagnosis Date    Asthma     Fibroid     Headache 03/10/2020    Only due to the concussion. History of concussion 3/10/2020    Lupus ([facility]     Menopause        FAMILY HISTORY  2 cousins had lupus. Oldest cousin passed 2/2 lupus, youngest cousin passed 2/2 COVID but had lupus. Mother is alive. SOCIAL HISTORY  She  reports that she has never smoked. She has never used smokeless tobacco. She reports current alcohol use. She reports that she does not use drugs. 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 DATA  Visit Vitals  /85   Pulse 67   Temp 98 °F (36.7 °C)   Resp 16   SpO2 99%     Vitals taken in infusion center   There is no height or weight on file to calculate BMI. No flowsheet data found. General:  The patient is pleasant, obese, alert, and in no apparent distress. Eyes: Sclera are anicteric. No conjunctival injection. HEENT:  Oropharynx is clear. No oral ulcers. Adequate salivary pooling. No cervical or supraclavicular lymphadenopathy. Lungs:  Clear to auscultation bilaterally, without wheeze or stridor. Normal respiratory effort.    Cor:  Regular rate and rhythm. No murmur rub or gallop. Abdomen: Soft, non-tender, without hepatomegaly or masses. Extremities: No calf tenderness, trace edema of distal 1/3 of shin. Warm and well perfused. Skin:  Unchanged bitemporal and frontal alopecia. No return of perioral rash. Neuro: Nonfocal, gait not witnessed today. Musculoskeletal:    A comprehensive musculoskeletal exam was performed for all joints of each upper and lower extremity and assessed for swelling, tenderness and range of motion. Results are documented as below:   Stable L shoulder crepitus with painless ROM today  Interval development of left MCP1 and CMC tenderness without synovitis. No return of PIP tenderness. No evidence of synovitis in the small joints of the hands, wrists, shoulders, elbows, hips, knees or ankles.        Labs:  9/7/22: CBC WNL, Cr 1.03, Alk phos 166, ALT 45, AST 31, Tbii 0.2, ESR 46, CRP 2.33 mg/dL, dsDNA Crithidia Luciliae IFA neg, C3 178, C4 34, UA turbid appearance, UA trace protein, UA moderate leukocyte esterase, UA many eptelial cells, UA 2+ bacteria, UA Yeast present, Pr/Cr 0.1  8/12/22: Phosphorous 3.7, Mg 2.1, Cr 0.9, TSH 4.37, ESR 33, CRP >9.5 mg/L, CBC WNL, HGB A1c 6.2, .4, troponin-high sensitivity 5  8/11/22: Troponin 5, prothrombin time 1, Cr 0.88, LFT WNL, CBC WNL,   6/15/22: CBC WNL, Cr 0.86, LFT WNL, ESR 26, CRP 1.2 mg/dL, C3 144, C4 31, dsDNA crithidia lucillae IFA neg  4/19/22: dsDNA Crithidia lucillae Titer 1:120, dsDNA Crithidia   lucillae IFA positive, WBC 4.6, HGB 12, Plt 271, Cr 0.9, LFT WNL, C3 137, C4 29, CRP 11 mg/L, ESR 44  3/22/22: Urine culture-Escherichia coli greater than 100,000 colony forming units per mL, Micro examination-Moderate bacteria, Prot+Cr normal, Urine leukocyte esterase 1+  2/22/22: WBC 4.9, Hgb 12.5, Plt 31; Cr 0.84, LFTs WNL, CRP 0.82mg/dL, ESR 31  11/30/21: WBC 4.6, Hgb 12.9, Plt 290; ESR 47, Cr 0.92, Tbili 0.3, AST 16, ALT 28, AlkP 131, CRP 0.67mg/dL  11/11/21: dsDNA by Crithidia 1:80, QFN gold negative,   21: WBC 5.0 (ANC 2500, ALC 2000), Hgb 11.8, Pl t 330; ESR 66, UA neg for protein or blood; Cr 1.03, Tbili <0.2, AST 15, ALT 16, AlkP 132, urine pr/cr 0.058, mariah neg, NIRAV 1:640 homogenous, dsDNA 12; SSA, SSB, RNP, chromatin, Scl70, centromere B, ribosomal P, Jo1 negative; CRP 11mg/L, C3 and C4 WNL  20: RF neg, CCP neg, 14.3.3 eta neg; CRP 10.48 mg/L; NIRAV direct positive (no reflex), ESR 45; WBC 5.1 [ANC 2400, ALC 2100], Hgb 11.9, Plt 286; Cr 0.89, Tbili 0.2, AST 18, ALT 18, AlkP 139, Tprot 7.2, Alb 4.0; HDL 42, ; A1c 6.4, TSH 1.3, Hep C Ab neg;     Imagin22 BRAIN MRI WITH AND WITHOUT CONTRAST: Normal brain. CTA CODE NEURO HEAD AND NECK W CONT, CT CODE NEURO PERF W CBF (22):  CTA Head:  1. No evidence of significant stenosis or aneurysm. CTA Neck:  1. No evidence of significant stenosis. 2. Multiple borderline enlarged left axillary lymph nodes are likely reactive. Clinical follow-up is recommended. CT Brain Perfusion:  1. No acute abnormality. 22 CT abd/pelvis with:  IMPRESSION  Leiomyomatous uterus with no acute findings or other findings to  correlate with pelvic cramping or rectal bleeding. 10/1/20 AP CXR:  Portable exam of the chest obtained at 1118 demonstrates normal heart size. There is no acute process in the lung fields. The osseous structures are  Unremarkable. 3/10/20 MR brain without:  IMPRESSION:  No significant abnormalities. 19 CT Cspine:  No acute fracture  Central canal stenosis C5-6 and C6-7 [min 5.6mm anterior to posterior]  Moderate left C6-7 neural foraminal stenosis. ASSESSMENT AND PLAN  Ms. Asaf Will is a 61 y.o. female who presents for evaluation of systemic lupus characterized by arthritis, alopecia, subacute cutaneous lupus rash, and +NIRAV 1:640 with +dsDNA. She remains markedly improved on Benlysta and low-dose Plaquenil (hydroxychloroquine), still in drug-induced remission.  Reviewed conservative management of hand osteoarthritis today. 1. Alkaline phosphatase elevation  - ALKALINE PHOSPHATASE, BONE; Future  - METABOLIC PANEL, COMPREHENSIVE; Future    2. Other systemic lupus erythematosus with other organ involvement (HCC)  - ALKALINE PHOSPHATASE, BONE; Future  - SED RATE (ESR); Future  - C REACTIVE PROTEIN, QT; Future  - COMPLEMENT, C3 & C4; Future  - METABOLIC PANEL, COMPREHENSIVE; Future  - CBC WITH AUTOMATED DIFF; Future  - URINALYSIS W/ REFLEX CULTURE; Future  - DSDNA AB BY DARLENE MARSHALL W RFLX TO TITER; Future    3. Ongoing use of possibly toxic medication  - METABOLIC PANEL, COMPREHENSIVE; Future  - CBC WITH AUTOMATED DIFF; Future                                                                                                                       Patient Instructions   Continue Plaquenil 200mg daily, monthly Benlysta infusions. Try a spica splint if you can on heavier work days.   Topical diclofenac gel can be helpful if you're not frequently washing hands, eg under gloves  OK for continued meloxicam 3d/week, or take ibuprofen 400-600mg when pain is severe if you haven't already taken meloxicam within the last 24 hours  We'll keep checking labs every 3 months with infusions  Return in 3 months, OK to keep scheduling with your infusions    Orders Placed This Encounter    ALKALINE PHOSPHATASE, BONE    SED RATE (ESR)    C REACTIVE PROTEIN, QT    COMPLEMENT, C3 & C4    METABOLIC PANEL, COMPREHENSIVE    CBC WITH AUTOMATED DIFF    URINALYSIS W/ REFLEX CULTURE    DSDNA AB BY DARLENE MARSHALL W RFLX TO TITER    albuterol (PROVENTIL HFA, VENTOLIN HFA, PROAIR HFA) 90 mcg/actuation inhaler    albuterol (PROVENTIL VENTOLIN) 2.5 mg /3 mL (0.083 %) nebu       Medications: I am having Neelima Dorsey Best maintain her montelukast, budesonide-formoteroL, Blood Pressure Test Kit-Large, hydrOXYchloroQUINE, meloxicam, Dulera, cetirizine, fluticasone propionate, amLODIPine, chlorthalidone, albuterol, and albuterol. Follow up: Return in about 3 months (around 3/5/2023). Face to face time: 27 minutes  Note preparation and records review day of service: 15 minutes  Total provider time day of service: 42 Minutes    This was scribed by Quintin Potter in the presence of Dr. Reji Herrera. The note was reviewed and amended personally, and I agree with the above information.     Priyank Orona MD    Adult Rheumatology   [facility]  A Part of [facility], [address] [place] [address]   Phone [phone]  Fax [phone]

## 2022-12-15 ENCOUNTER — HOSPITAL ENCOUNTER (EMERGENCY)
Age: 57
Discharge: HOME | End: 2022-12-15
Attending: EMERGENCY MEDICINE
Payer: MEDICAID

## 2022-12-15 VITALS
BODY MASS INDEX: 36.37 KG/M2 | RESPIRATION RATE: 14 BRPM | DIASTOLIC BLOOD PRESSURE: 84 MMHG | HEART RATE: 69 BPM | WEIGHT: 213 LBS | OXYGEN SATURATION: 98 % | SYSTOLIC BLOOD PRESSURE: 136 MMHG | TEMPERATURE: 98.7 F | HEIGHT: 64 IN

## 2022-12-15 DIAGNOSIS — J45.901 MILD ASTHMA WITH ACUTE EXACERBATION, UNSPECIFIED WHETHER PERSISTENT: Primary | ICD-10-CM

## 2022-12-15 LAB
ATRIAL RATE: 74 BPM
CALCULATED P AXIS, ECG09: 36 DEGREES
CALCULATED R AXIS, ECG10: 12 DEGREES
CALCULATED T AXIS, ECG11: 11 DEGREES
DIAGNOSIS, 93000: NORMAL
P-R INTERVAL, ECG05: 182 MS
Q-T INTERVAL, ECG07: 414 MS
QRS DURATION, ECG06: 76 MS
QTC CALCULATION (BEZET), ECG08: 459 MS
VENTRICULAR RATE, ECG03: 74 BPM

## 2022-12-15 PROCEDURE — 99284 EMERGENCY DEPT VISIT MOD MDM: CPT

## 2022-12-15 RX ORDER — IPRATROPIUM BROMIDE AND ALBUTEROL SULFATE 2.5; .5 MG/3ML; MG/3ML
3 SOLUTION RESPIRATORY (INHALATION)
Status: DISCONTINUED | OUTPATIENT
Start: 2022-12-15 | End: 2022-12-15 | Stop reason: HOSPADM

## 2022-12-15 NOTE — ED PROVIDER NOTES
34-year-old female with history of asthma presents with complaints of episode of shortness of breath while at work today when exposed to coworker solution. Patient reports much improved after her home Ventolin inhaler. Denies any current shortness of breath or chest tightness. Denies any recent illness, fever, chills, nausea, vomiting, diarrhea, constipation. No other complaints. Denies tobacco use, drug use  Occasional alcohol use       Past Medical History:   Diagnosis Date    Asthma     Fibroid     Headache 03/10/2020    Only due to the concussion. History of concussion 3/10/2020    Lupus ([facility] [address]     Menopause        Past Surgical History:   Procedure Laterality Date    HX ORTHOPAEDIC Bilateral 1996 & 2015    HX TONSILLECTOMY           History reviewed. No pertinent family history. Social History     Socioeconomic History    Marital status: LIFE PARTNER     Spouse name: Not on file    Number of children: Not on file    Years of education: Not on file    Highest education level: Not on file   Occupational History    Not on file   Tobacco Use    Smoking status: Never    Smokeless tobacco: Never   Vaping Use    Vaping Use: Never used   Substance and Sexual Activity    Alcohol use: Yes     Comment: socially    Drug use: Never    Sexual activity: Yes     Partners: Male   Other Topics Concern    Not on file   Social History Narrative    Not on file     Social Determinants of Health     Financial Resource Strain: Not on file   Food Insecurity: Not on file   Transportation Needs: Not on file   Physical Activity: Not on file   Stress: Not on file   Social Connections: Not on file   Intimate Partner Violence: Not on file   Housing Stability: Not on file         ALLERGIES: Lisinopril and Avocado    Review of Systems   Constitutional:  Negative for chills and fever. HENT:  Negative for congestion, nosebleeds and rhinorrhea. Eyes:  Negative for pain and redness.    Respiratory:  Positive for shortness of breath. Negative for cough. Cardiovascular:  Negative for chest pain and palpitations. Gastrointestinal:  Negative for abdominal pain, nausea and vomiting. Genitourinary:  Negative for dysuria, frequency, vaginal bleeding and vaginal pain. Musculoskeletal:  Negative for myalgias. Skin:  Negative for rash and wound. Neurological:  Negative for seizures, syncope and weakness. Hematological:  Does not bruise/bleed easily. Psychiatric/Behavioral:  Negative for agitation, confusion, dysphoric mood and suicidal ideas. The patient is not nervous/anxious. Vitals:    12/15/22 1441 12/15/22 1505   BP: 128/61    Pulse: 76    Resp: 16    Temp: 98.7 °F (37.1 °C)    SpO2: 96% 96%   Weight: 96.6 kg (213 lb)    Height: 5' 4\" (1.626 m)             Physical Exam  Vitals and nursing note reviewed. Constitutional:       Appearance: She is well-developed. HENT:      Head: Normocephalic and atraumatic. Eyes:      Pupils: Pupils are equal, round, and reactive to light. Neck:      Trachea: No tracheal deviation. Cardiovascular:      Rate and Rhythm: Normal rate and regular rhythm. Heart sounds: Normal heart sounds. Pulmonary:      Effort: Pulmonary effort is normal. No respiratory distress. Breath sounds: Normal breath sounds. No stridor. No wheezing or rales. Chest:      Chest wall: No tenderness. Abdominal:      General: Bowel sounds are normal. There is no distension. Palpations: Abdomen is soft. Tenderness: There is no abdominal tenderness. There is no rebound. Musculoskeletal:         General: No tenderness. Normal range of motion. Cervical back: Normal range of motion and neck supple. Skin:     General: Skin is warm and dry. Coloration: Skin is not pale. Findings: No rash. Neurological:      Mental Status: She is alert and oriented to person, place, and time. Cranial Nerves: No cranial nerve deficit.         MDM  Number of Diagnoses or Management Options  Mild asthma with acute exacerbation, unspecified whether persistent  Diagnosis management comments: 18-year-old female with history of asthma presents with episode of shortness of breath after exposure to coworker solution. Patient reports much improved after Ventolin inhaler. Denies fever, chills, nausea, vomiting. Patient is well-appearing, no acute distress, hemodynamically stable, afebrile, nontoxic, no respiratory distress, clear to auscultation bilaterally, normal room oxygen saturation. Patient declines DuoNeb at this time. ED Course as of 12/15/22 1520   Thu Dec 15, 2022   1449 ED EKG interpretation:  Rhythm: normal sinus rhythm; and regular . Rate (approx.): 74; Axis: normal; P wave: normal; QRS interval: normal ; ST/T wave: normal; Other findings: no ischemia. This EKG was interpreted by Flavio Calles MD,ED Provider.   [LA]      ED Course User Index  [LA] Lm Ryan MD       Procedures    3:23 PM  Patient's results have been reviewed with them. Patient and/or family have verbally conveyed their understanding and agreement of the patient's signs, symptoms, diagnosis, treatment and prognosis and additionally agree to follow up as recommended or return to the Emergency Room should their condition change prior to follow-up. Discharge instructions have also been provided to the patient with some educational information regarding their diagnosis as well a list of reasons why they would want to return to the ER prior to their follow-up appointment should their condition change.

## 2022-12-15 NOTE — ED NOTES
The patient was discharged home by Dr. Cruz Rivera  in stable condition. The patient is alert and oriented, in no respiratory distress and discharge vital signs obtained. The patient's diagnosis, condition and treatment were explained. The patient expressed understanding. No prescriptions given/e-scribed to pharmacy. No work/school note given. A discharge plan has been developed. A  was not involved in the process. Aftercare instructions were given. Pt ambulatory out of the ED.

## 2022-12-15 NOTE — LETTER
[address] EMERGENCY DEPT  [address] [facility] [address] [id number] [phone]    Work/School Note    Date: 12/15/2022    To Whom It May concern:    Delaney Larsen was seen and treated today in the emergency room by the following provider(s):  Attending Provider: Renny Dias MD.      Delaney Larsen may return to work on 12/17/2022.     Sincerely,          Inez Hunt RN

## 2022-12-15 NOTE — ED TRIAGE NOTES
Arrived via EMS with c/o chest tightness that began today about 10:45am. Patient states \"I think I was having an asthma attack from some shea butter lotion from someone at work. \" Patient c/o right sided chest pain with sob; + diaphoresis . Denies any nausea or vomiting. Patient reports using albuterol inhaler x 2 with some relief. Asa given per EMS. Lungs clear. NAD.

## 2022-12-15 NOTE — ED NOTES
Patient refused nebulizer at this time. Lungs clear. No wheezing or respiratory  distress noted. Dr. Tonya Ng notified.

## 2022-12-15 NOTE — Clinical Note
[address] EMERGENCY DEPT  [address] [facility] [id number] [phone]    Work/School Note    Date: 12/15/2022    To Whom It May concern:      Makayla Donahue was seen and treated today in the emergency room by the following provider(s):  Attending Provider: Félix Miles MD.      Makayla Donahue is excused from work/school on 12/15/22. She is clear to return to work/school on 12/16/22.         Sincerely,          Ela Lovell MD

## 2022-12-28 NOTE — PROGRESS NOTES
[facility] Rheumatology  OBIC Note    Date: January 3, 2023  Name: Ze Rawls  MRN: [id number]       : 1965  Diagnosis: Lupus (M32.9)  Treatment: Benlysta 960mg every 4 wks   Referring Provider: Dr. Lyndal Hodgkins  Supervising Provider: Dr. Lyndal Hodgkins    Patient arrived to [facility] at [phone]. Ms. Meghann Sanchez allergies reviewed and she agreed to receiving today's treatment. A physical assessment was performed initially and post-treatment. Pt. Reports sinus congestion/head cold this morning with a minor cough. Denies fevers . Pt currently wearing mask. Ms. July Kee vitals were monitored before and after medication administration. Vitals:    23 1452 23 1511 23 1551   BP: (!) 149/89 (!) 141/81 (!) 150/83   Pulse: 97 94 83   Resp: 18 16 16   Temp: 98 °F (36.7 °C)     SpO2: 98% 99% 98%   No labs due today  Medications given per providers orders:  Administrations This Visit       0.9% sodium chloride infusion       Admin Date  2023 Action  New Bag Dose  25 mL/hr Rate  25 mL/hr Route  IntraVENous Administered By  Aubree Kruse RN              belimumab (BENLYSTA) 960 mg in 0.9% sodium chloride 250 mL, overfill volume 25 mL infusion       Admin Date  2023 Action  New Bag Dose  960 mg Rate  287 mL/hr Route  IntraVENous Administered By  Aubree Kruse RN              sodium chloride (NS) flush 10 mL       Admin Date  2023 Action  Given Dose  10 mL Route  IntraVENous Administered By  Aubree Kruse RN                Benlysta 120mg vial (Total dose 960mg)  NDC J6022446  Lot #  [id number]  Exp 2026  ---mixed in---  250ml 0.9% Normal Saline  NDC 2099-9789-82  Lot # [id number]  Exp 2024     START: 823  STOP: 9678     250 ml bag 0.9% Normal Saline @ 25ml/hr  NDC 0631-2522-00  Lot # [id number]  Exp 2023    0.9% Normal Saline 10ml flush  NDC 5373435976  Lot # [id number]  Exp 2025    Lines: 24G left upper FA. Blood return noted and IV site assessed before, during, and after treatment.   Line flushed with 10-30 ml's 0.9% Normal Saline solution per protocol. Access was removed from Ms. Caballero after completion of infusion/injection. Ms. Santiago Deems tolerated the treatment without complaints and no medication reactions were seen while in presence of this RN. Patient provided with AVS, which included future appointments and written educational material regarding Benlysta. All of the patients questions were answered and then discharged ambulatory from Rheumatology OBIC in stable condition at 1600. Encounter routed to supervising provider for co-signing.      Future Appointments   Date Time Provider Giana Carranza   1/9/2023  8:00 AM KEREN Warner BS AMB   1/30/2023  8:00 AM INFUSIONINJ_RC AOCR BS AMB   2/27/2023  8:00 AM INFUSIONINJ_RC AOCR BS AMB   3/27/2023  8:00 AM INFUSIONINJ_RC AOCR BS AMB   3/27/2023  8:20 AM Reva Lam MD AOCR BS AMB   4/24/2023  8:00 AM INFUSIONINJ_RC AOCR BS AMB     Myra Parker RN  January 3, 2023

## 2023-01-03 ENCOUNTER — OFFICE VISIT (OUTPATIENT)
Dept: RHEUMATOLOGY | Age: 58
End: 2023-01-03
Payer: MEDICAID

## 2023-01-03 ENCOUNTER — TELEPHONE (OUTPATIENT)
Dept: RHEUMATOLOGY | Age: 58
End: 2023-01-03

## 2023-01-03 VITALS
SYSTOLIC BLOOD PRESSURE: 150 MMHG | HEART RATE: 83 BPM | OXYGEN SATURATION: 98 % | TEMPERATURE: 98 F | RESPIRATION RATE: 16 BRPM | DIASTOLIC BLOOD PRESSURE: 83 MMHG

## 2023-01-03 DIAGNOSIS — R76.8 POSITIVE DOUBLE STRANDED DNA ANTIBODY TEST: Primary | ICD-10-CM

## 2023-01-03 DIAGNOSIS — M32.19 OTHER SYSTEMIC LUPUS ERYTHEMATOSUS WITH OTHER ORGAN INVOLVEMENT (HCC): ICD-10-CM

## 2023-01-03 PROCEDURE — 96365 THER/PROPH/DIAG IV INF INIT: CPT

## 2023-01-03 PROCEDURE — 96413 CHEMO IV INFUSION 1 HR: CPT

## 2023-01-03 RX ORDER — ALBUTEROL SULFATE 0.83 MG/ML
2.5 SOLUTION RESPIRATORY (INHALATION) AS NEEDED
Start: 2023-01-31

## 2023-01-03 RX ORDER — SODIUM CHLORIDE 9 MG/ML
10 INJECTION INTRAMUSCULAR; INTRAVENOUS; SUBCUTANEOUS AS NEEDED
OUTPATIENT
Start: 2023-01-31

## 2023-01-03 RX ORDER — ONDANSETRON 2 MG/ML
8 INJECTION INTRAMUSCULAR; INTRAVENOUS AS NEEDED
OUTPATIENT
Start: 2023-01-31

## 2023-01-03 RX ORDER — HYDROCORTISONE SODIUM SUCCINATE 100 MG/2ML
100 INJECTION, POWDER, FOR SOLUTION INTRAMUSCULAR; INTRAVENOUS AS NEEDED
OUTPATIENT
Start: 2023-01-31

## 2023-01-03 RX ORDER — ACETAMINOPHEN 325 MG/1
650 TABLET ORAL AS NEEDED
Start: 2023-01-31

## 2023-01-03 RX ORDER — SODIUM CHLORIDE 0.9 % (FLUSH) 0.9 %
10 SYRINGE (ML) INJECTION AS NEEDED
Status: DISCONTINUED | OUTPATIENT
Start: 2023-01-03 | End: 2023-01-03 | Stop reason: HOSPADM

## 2023-01-03 RX ORDER — SODIUM CHLORIDE 9 MG/ML
25 INJECTION, SOLUTION INTRAVENOUS CONTINUOUS
OUTPATIENT
Start: 2023-01-31

## 2023-01-03 RX ORDER — SODIUM CHLORIDE 0.9 % (FLUSH) 0.9 %
10 SYRINGE (ML) INJECTION AS NEEDED
OUTPATIENT
Start: 2023-01-31

## 2023-01-03 RX ORDER — SODIUM CHLORIDE 9 MG/ML
25 INJECTION, SOLUTION INTRAVENOUS CONTINUOUS
Status: DISCONTINUED | OUTPATIENT
Start: 2023-01-03 | End: 2023-01-03 | Stop reason: HOSPADM

## 2023-01-03 RX ORDER — HEPARIN 100 UNIT/ML
300-500 SYRINGE INTRAVENOUS AS NEEDED
Start: 2023-01-31

## 2023-01-03 RX ORDER — DIPHENHYDRAMINE HYDROCHLORIDE 50 MG/ML
50 INJECTION, SOLUTION INTRAMUSCULAR; INTRAVENOUS AS NEEDED
Start: 2023-01-31

## 2023-01-03 RX ORDER — DIPHENHYDRAMINE HYDROCHLORIDE 50 MG/ML
25 INJECTION, SOLUTION INTRAMUSCULAR; INTRAVENOUS AS NEEDED
Start: 2023-01-31

## 2023-01-03 RX ORDER — EPINEPHRINE 1 MG/ML
0.3 INJECTION, SOLUTION, CONCENTRATE INTRAVENOUS AS NEEDED
OUTPATIENT
Start: 2023-01-31

## 2023-01-03 RX ADMIN — SODIUM CHLORIDE 25 ML/HR: 9 INJECTION, SOLUTION INTRAVENOUS at 14:57

## 2023-01-03 RX ADMIN — Medication 10 ML: at 14:57

## 2023-01-03 NOTE — TELEPHONE ENCOUNTER
Pt called OBIC this morning, reporting head cold/sinus congestion and slight cough. Denies fever. Asking if she can still receive her Benlysta infusion. Spoke with Dr. Aneta Rubinstein who gave OKTT. Returned call to patient and notified she is OK to come in and get her infusion today, confirmed appt at 2:30.

## 2023-01-05 ENCOUNTER — APPOINTMENT (OUTPATIENT)
Dept: GENERAL RADIOLOGY | Age: 58
End: 2023-01-05
Attending: NURSE PRACTITIONER
Payer: MEDICAID

## 2023-01-05 ENCOUNTER — HOSPITAL ENCOUNTER (EMERGENCY)
Age: 58
Discharge: HOME | End: 2023-01-05
Attending: EMERGENCY MEDICINE
Payer: MEDICAID

## 2023-01-05 VITALS
RESPIRATION RATE: 16 BRPM | OXYGEN SATURATION: 98 % | HEART RATE: 75 BPM | TEMPERATURE: 97.1 F | SYSTOLIC BLOOD PRESSURE: 172 MMHG | DIASTOLIC BLOOD PRESSURE: 76 MMHG

## 2023-01-05 DIAGNOSIS — J11.1 INFLUENZA-LIKE ILLNESS: Primary | ICD-10-CM

## 2023-01-05 LAB
FLUAV AG NPH QL IA: NEGATIVE
FLUBV AG NOSE QL IA: NEGATIVE

## 2023-01-05 PROCEDURE — 71046 X-RAY EXAM CHEST 2 VIEWS: CPT

## 2023-01-05 PROCEDURE — 87804 INFLUENZA ASSAY W/OPTIC: CPT

## 2023-01-05 PROCEDURE — 74011250637 HC RX REV CODE- 250/637: Performed by: NURSE PRACTITIONER

## 2023-01-05 PROCEDURE — 99284 EMERGENCY DEPT VISIT MOD MDM: CPT

## 2023-01-05 RX ORDER — IBUPROFEN 400 MG/1
800 TABLET ORAL ONCE
Status: COMPLETED | OUTPATIENT
Start: 2023-01-05 | End: 2023-01-05

## 2023-01-05 RX ORDER — SODIUM CHLORIDE 0.65 %
1 AEROSOL, SPRAY (ML) NASAL AS NEEDED
Qty: 44 ML | Refills: 0 | Status: SHIPPED | OUTPATIENT
Start: 2023-01-05 | End: 2023-01-10

## 2023-01-05 RX ORDER — IPRATROPIUM BROMIDE AND ALBUTEROL SULFATE 2.5; .5 MG/3ML; MG/3ML
3 SOLUTION RESPIRATORY (INHALATION)
Status: DISCONTINUED | OUTPATIENT
Start: 2023-01-05 | End: 2023-01-05

## 2023-01-05 RX ORDER — FLUTICASONE PROPIONATE 50 MCG
2 SPRAY, SUSPENSION (ML) NASAL DAILY
Qty: 16 G | Refills: 0 | Status: SHIPPED | OUTPATIENT
Start: 2023-01-05

## 2023-01-05 RX ADMIN — IBUPROFEN 800 MG: 400 TABLET, FILM COATED ORAL at 09:23

## 2023-01-05 NOTE — ED PROVIDER NOTES
HPI   Patient is a 61 y.o. F with past Medicus history of asthma, lupus, and hypertension who presents today with complaints of nasal congestion. Symptoms started 4 days ago, states she had a negative PCR COVID test at [facility].  Associated throat pain, left upper dental pain, cought with green phlegm, facial pain, L ear pain, lightheadness, fatigue,  and wheezing. Denies hemoptysis, difficulty handling secretions, no facial or oral swelling. Denies any syncope, seizure, focal weakness. States she has had decreased p.o. intake, but denies dysphagia and is able to swallow without issue. She is concerned because she felt a delayed burning sensation on her throat after drinking tea on Monday and is concerned that she burned herself. There are no other complaints, changes or physical findings at this time. PMH: as listed below plus hypertension  Surgical History: None  Smoking: None  Alcohol: None  Drug Use: None  ALLERGIES: Lisinopril and Avocado    Past Medical History:   Diagnosis Date    Asthma     Fibroid     Headache 03/10/2020    Only due to the concussion. History of concussion 3/10/2020    Lupus ([facility] [address]     Menopause      Past Surgical History:   Procedure Laterality Date    HX ORTHOPAEDIC Bilateral 1996 & 2015    HX TONSILLECTOMY       History reviewed. No pertinent family history.   Social History     Socioeconomic History    Marital status: LIFE PARTNER     Spouse name: Not on file    Number of children: Not on file    Years of education: Not on file    Highest education level: Not on file   Occupational History    Not on file   Tobacco Use    Smoking status: Never    Smokeless tobacco: Never   Vaping Use    Vaping Use: Never used   Substance and Sexual Activity    Alcohol use: Yes     Comment: socially    Drug use: Never    Sexual activity: Yes     Partners: Male   Other Topics Concern    Not on file   Social History Narrative    Not on file     Social Determinants of Health     Financial Resource Strain: Not on file   Food Insecurity: Not on file   Transportation Needs: Not on file   Physical Activity: Not on file   Stress: Not on file   Social Connections: Not on file   Intimate Partner Violence: Not on file   Housing Stability: Not on file           Review of Systems   Constitutional:  Positive for fatigue. Negative for fever. HENT:  Positive for congestion and ear pain. Eyes:  Negative for discharge. Respiratory:  Negative for cough and shortness of breath. Cardiovascular:  Negative for chest pain. Gastrointestinal:  Negative for nausea. Genitourinary:  Negative for dysuria. Musculoskeletal:  Negative for myalgias. Neurological:  Positive for light-headedness. Negative for seizures and syncope. Psychiatric/Behavioral:  Negative for behavioral problems. Vitals:    01/05/23 0807   BP: (!) 172/76   Pulse: 75   Resp: 16   Temp: 97.1 °F (36.2 °C)   SpO2: 98%            Physical Exam  Constitutional:       Appearance: Normal appearance. HENT:      Head: Normocephalic and atraumatic. Mouth/Throat:      Comments:   Posterior pharynx is erythematous, Tonsils 1+ bilaterally, uvula midline, no exudate to tonsils, evidence of airway compromise. Eyes:      Pupils: Pupils are equal, round, and reactive to light. Cardiovascular:      Rate and Rhythm: Normal rate and regular rhythm. Pulmonary:      Effort: Pulmonary effort is normal.      Comments: Mild expiratory wheeze noted and upper lobes; no stridor  Abdominal:      General: Abdomen is flat. Musculoskeletal:      Cervical back: Neck supple. Skin:     General: Skin is dry. Neurological:      Mental Status: She is alert. Mental status is at baseline.    Psychiatric:         Mood and Affect: Mood normal.         Behavior: Behavior normal.            LABORATORY RESULTS:  Recent Results (from the past 24 hour(s))   INFLUENZA A+B VIRAL AGS    Collection Time: 01/05/23  9:26 AM   Result Value Ref Range    Influenza A Antigen Negative NEG      Influenza B Antigen Negative NEG         IMAGING RESULTS:  XR CHEST PA LAT    Result Date: 1/5/2023  Normal PA and lateral chest views. MEDICATIONS GIVEN:  Medications   ibuprofen (MOTRIN) tablet 800 mg (800 mg Oral Given 1/5/23 0923)            MDM  Number of Diagnoses or Management Options  Influenza-like illness  Diagnosis management comments: Seen today for constellation of symptoms concerning for viral illness. No difficulty breathing, seizure, syncope. Eating and drinking appropriately, no change in bowel or bladder. Physical exam is unremarkable, in no acute distress, normal inspiratory and expiratory effort, lungs with minor wheezing in upper lobes, she does have a history of asthma. No evidence of PTA, AOM. Patient was concerned about burning the back of her throat, no evidence of airway compromise on my exam-she was reassured by this. .  Considered differentials including Influenza, COVID-19, pneumonia. She states she had a negative COVID-19 test previously at [facility], she has deferred a repeat COVID-19 test.  Flu test was obtained and negative. Patient was given ibuprofen while here in emergency department, she states she is feeling much better and was able to eat her breakfast sandwich in the waiting room. As patient has wheezing on exam, I did order her a nebulizer treatment. Patient states  she states that she has plenty of nebulizer solution/ machine at home, and would prefer to be discharged rather than continue to wait in the waiting room for nebulizer treatment. As patient has a oxygen percent of 98%'s and her wheezing is very minor, I am agreeable to this plan. Discharged with symptomatic management, explained the importance of sinus rinses, and given her prescription for Flonase. She has at home Tylenol Motrin for any fevers/myalgias/pain-I reviewed the correct dosing. Recommend follow-up with PCP and given return  precautions.  Recommended drinking plenty of fluids, resting, and staying home until fever free for 24 hours without the use of antipyretics. Amount and/or Complexity of Data Reviewed  Clinical lab tests: reviewed        Presentation, management, and disposition were discussed with the attending physician, Dr. Corinne Bellman, who is in agreement with plan of care. ED Course as of 01/05/23 1109   Thu Jan 05, 2023   1051 XR CHEST PA LAT [LM]      ED Course User Index  [LM] Morgan Garrison NP       Discussed results and work-up with patient and answered all questions, the patient expresses understanding and agrees with the care plan and disposition. The patient was given an opportunity to ask questions and all concerns raised were addressed prior to discharge. Recommended patient follow-up with provider as listed below. Counseled patient on standard home and self-care measures. Specifically explained the emergent conditions that could arise and clearly instructed the patient to return to the emergency department for those and any other new, worsening, or concerning symptoms. Patient stable and ready for discharge. IMPRESSION:  1. Influenza-like illness        DISPOSITION:  Discharge    PLAN:  Follow-up Information       Follow up With Specialties Details Why Contact Info    Gerry Miller PA-C Physician Assistant Schedule an appointment as soon as possible for a visit   [address] [facility] [address] [phone]      [facility] EMERGENCY DEP Emergency Medicine  As needed, If symptoms worsen [address] [facility]  [phone]          Current Discharge Medication List        START taking these medications    Details   !! fluticasone propionate (FLONASE) 50 mcg/actuation nasal spray 2 Sprays by Both Nostrils route daily. Qty: 16 g, Refills: 0  Start date: 1/5/2023      sodium chloride (Saline Mist) 0.65 % nasal squeeze bottle 0.05 mL by Both Nostrils route as needed for Congestion for up to 5 days.   Qty: 44 mL, Refills: 0  Start date: 1/5/2023, End date: 1/10/2023       !! - Potential duplicate medications found. Please discuss with provider. CONTINUE these medications which have NOT CHANGED    Details   !! fluticasone propionate (FLONASE) 50 mcg/actuation nasal spray 2 Sprays by Both Nostrils route daily. Indications: inflammation of the nose due to an allergy  Qty: 3 Each, Refills: 3  Start date: 11/30/2022    Associated Diagnoses: Allergic rhinitis, unspecified seasonality, unspecified trigger       !! - Potential duplicate medications found. Please discuss with provider. Please note that this dictation was completed with [employer], the computer voice recognition software. Quite often unanticipated grammatical, syntax, homophones, and other interpretive errors are inadvertently transcribed by the computer software. Please disregard these errors. Please excuse any errors that have escaped final proofreading.

## 2023-01-05 NOTE — ED TRIAGE NOTES
Pt c/o headache, congestion, facial numbness, had some tea the other day and may have burnt her mouth , denies cough , + fatigue

## 2023-01-05 NOTE — Clinical Note
Mckayla Galeano was seen and treated in our emergency department on 1/5/2023.     Patient should remain out of school/work until fever free for 24 hours without the use of fever reducing medication such as Tylenol or ibuprofen      Judi Velazquez NP

## 2023-01-05 NOTE — DISCHARGE INSTRUCTIONS
Tonight you were seen in the emergency room for a viral illness. You may continue to feel sick for the coming week or two, but there are some things you can do to feel better. I recommend increasing your fluid intake. Very warm or very cold fluids feel best when you have been coughing and have a sore throat. I recommend  tea with honey, popsicles and soups. Rest often and stay away from others to control the spread of the virus    We do not have many good cough medicines that can eliminate your cough. That being said there have been multiple studies that show honey can improve/ quiet your cough. You can having this plain on a spoon or mixed into a hot drink such as tea. Viral illnesses can changes as they go along, likely your symptoms shall improve over time. If for any reason your symptoms worsen please follow-up with your primary care provider. If you have a high fever that is not relieved by over-the-counter fever reducing medications or difficulty breathing please come back to the emergency room for reevaluation.     I recommend the following over-the-counter medications-   OTC ibuprofen 600mg every 6 hours  Can take OTC tylenol PRN up to 3500mg in 24 hours

## 2023-01-11 ENCOUNTER — OFFICE VISIT (OUTPATIENT)
Dept: INTERNAL MEDICINE CLINIC | Age: 58
End: 2023-01-11
Payer: MEDICAID

## 2023-01-11 VITALS
HEART RATE: 70 BPM | SYSTOLIC BLOOD PRESSURE: 130 MMHG | RESPIRATION RATE: 14 BRPM | HEIGHT: 64 IN | BODY MASS INDEX: 36.7 KG/M2 | DIASTOLIC BLOOD PRESSURE: 70 MMHG | OXYGEN SATURATION: 98 % | TEMPERATURE: 98 F | WEIGHT: 215 LBS

## 2023-01-11 DIAGNOSIS — I10 ESSENTIAL HYPERTENSION: Primary | ICD-10-CM

## 2023-01-11 DIAGNOSIS — J40 BRONCHITIS: ICD-10-CM

## 2023-01-11 PROCEDURE — 99214 OFFICE O/P EST MOD 30 MIN: CPT | Performed by: PHYSICIAN ASSISTANT

## 2023-01-11 RX ORDER — GUAIFENESIN 600 MG/1
600 TABLET, EXTENDED RELEASE ORAL 2 TIMES DAILY
Qty: 20 TABLET | Refills: 0 | Status: SHIPPED | OUTPATIENT
Start: 2023-01-11 | End: 2023-01-21

## 2023-01-11 RX ORDER — DOXYCYCLINE 100 MG/1
100 CAPSULE ORAL 2 TIMES DAILY
Qty: 20 CAPSULE | Refills: 0 | Status: SHIPPED | OUTPATIENT
Start: 2023-01-11 | End: 2023-01-21

## 2023-01-11 NOTE — PROGRESS NOTES
Katiana Joseph is a 61 y.o. female  Chief Complaint   Patient presents with    Follow-up     Sinus pressure is now chest congestion started 01/07/2023 - fasting     Visit Vitals  /70   Pulse 70   Temp 98 °F (36.7 °C) (Temporal)   Resp 14   Ht 5' 4\" (1.626 m)   Wt 215 lb (97.5 kg)   SpO2 98%   BMI 36.90 kg/m²      Health Maintenance Due   Topic Date Due    DTaP/Tdap/Td series (1 - Tdap) Never done    Shingles Vaccine (1 of 2) Never done    COVID-19 Vaccine (3 - Booster for Pfizer series) 06/28/2021    Flu Vaccine (1) Never done    Colorectal Cancer Screening Combo  01/28/2023     HPI    Sick since 1/1/23 with productive cough at first but no longer productive, Other sx have resolved including nasal congestion, facial pain. ER visit 1/5/23: Negative COVID, Flu, CXR. Flonase and Saline Neb ordered. Alkaseltzer OTC but no expectorant. HTN Follow up - BP controlled: 1370s/60s at home. BP Readings from Last 3 Encounters:   01/11/23 130/70   01/05/23 (!) 172/76   01/03/23 (!) 150/83     Currently taking:   Key CAD CHF Meds               amLODIPine (NORVASC) 10 mg tablet Take 1 Tablet by mouth daily. chlorthalidone (HYGROTON) 25 mg tablet Take 1 Tablet by mouth daily. Lab Results   Component Value Date/Time    Creatinine 1.02 (H) 12/05/2022 12:00 AM       ROS  Review of Systems   Constitutional:  Negative for fever and malaise/fatigue. HENT:  Negative for congestion, ear pain and sinus pain. Respiratory:  Positive for cough. Negative for sputum production and shortness of breath. Cardiovascular:  Negative for chest pain and palpitations. Neurological:  Negative for dizziness, loss of consciousness, weakness and headaches. Endo/Heme/Allergies:  Negative for environmental allergies. EXAM  Physical Exam  Vitals and nursing note reviewed. Constitutional:       General: She is not in acute distress. Appearance: She is well-developed. HENT:      Head: Normocephalic and atraumatic. Comments: B TMs with fluid. No erythema. Nose: Congestion and rhinorrhea present. Comments: Red, inflamed nasal mucosa. Mouth/Throat:      Pharynx: No oropharyngeal exudate. Eyes:      Conjunctiva/sclera: Conjunctivae normal.   Cardiovascular:      Rate and Rhythm: Normal rate and regular rhythm. Heart sounds: Normal heart sounds. Pulmonary:      Effort: Pulmonary effort is normal.      Breath sounds: Normal breath sounds. Musculoskeletal:      Cervical back: Neck supple. Lymphadenopathy:      Cervical: No cervical adenopathy. Skin:     General: Skin is warm and dry. Neurological:      Mental Status: She is alert and oriented to person, place, and time. ASSESSMENT/PLAN  Encounter Diagnoses     ICD-10-CM ICD-9-CM   1. Essential hypertension  I10 401.9   2. Bronchitis  J40 490     Orders Placed This Encounter    guaiFENesin ER (Mucinex) 600 mg ER tablet    doxycycline (MONODOX) 100 mg capsule     Start with Mucinex x 3-7 days. INI, ok to take Doxycycline.

## 2023-01-25 NOTE — PROGRESS NOTES
[facility] Rheumatology  OBIC Note    Date: 2023  Name: Makayla Donahue  MRN: [id number]       : 1965  Diagnosis: Lupus (M32.9)  Treatment: Benlysta 960mg every 4 wks   Referring Provider: Dr. Uday Lopez  Supervising Provider: Dr. Uday Lopez    Patient arrived to [facility] at [phone]. Ms. Kapoor Sickle allergies reviewed and she agreed to receiving today's treatment. A physical assessment was performed initially and post-treatment. Ms. Mario Guerrero vitals were monitored before and after medication administration. Vitals:    23 1350 23 1505   BP: 133/86 (!) 143/84   Pulse: 74 67   Resp: 16 14   Temp: 98 °F (36.7 °C)    SpO2: 99% 99%   No labs due today    Medications given per providers orders:  Administrations This Visit       0.9% sodium chloride infusion       Admin Date  2023 Action  New Bag Dose  25 mL/hr Rate  25 mL/hr Route  IntraVENous Administered By  Jaziel Johnston RN              belimumab (BENLYSTA) 960 mg in 0.9% sodium chloride 250 mL, overfill volume 25 mL infusion       Admin Date  2023 Action  New Bag Dose  960 mg Rate  287 mL/hr Route  IntraVENous Administered By  Jaziel Johnston RN              sodium chloride (NS) flush 10 mL       Admin Date  2023 Action  Given Dose  10 mL Route  IntraVENous Administered By  Jaziel Johnston RN                Benlysta 120mg vial (Total dose 960mg)  NDC [id number]  Lot #  [id number]  Exp 2027  ---mixed in---  250ml 0.9% Normal Saline  NDC 3581-0575-28  Lot # [id number]  Exp 2024     START: 4  STOP: 8124     250 ml bag 0.9% Normal Saline @ 25ml/hr  NDC 8023-9381-43  Lot # [id number]  Exp 2024    0.9% Normal Saline 10ml flush  NDC 7723790000  Lot # [id number]  Exp 2025    Lines: 24G RIGHT FA. Blood return noted and IV site assessed before, during, and after treatment. Line flushed with 10-30 ml's 0.9% Normal Saline solution per protocol. Access was removed from Ms. Caballero after completion of infusion/injection.    Ms. Kapoor Jacey tolerated the treatment without complaints and no medication reactions were seen while in presence of this RN. Patient provided with AVS, which included future appointments and written educational material regarding Benlysta. All of the patients questions were answered and then discharged ambulatory from Rheumatology OBIC in stable condition at 1515. Encounter routed to supervising provider for co-signing.      Future Appointments   Date Time Provider Giana Carranza   2/27/2023  8:00 AM INFUSIONINJ_RC AOCR BS AMB   3/27/2023  8:00 AM INFUSIONINJ_RC AOCR BS AMB   3/27/2023  8:20 AM Nel Ren MD AOCR BS AMB   4/24/2023  8:00 AM INFUSIONINJ_RC AOCR BS AMB   7/11/2023  7:30 AM KEREN Stanley BS AMMON Green RN  January 30, 2023

## 2023-01-30 ENCOUNTER — OFFICE VISIT (OUTPATIENT)
Dept: RHEUMATOLOGY | Age: 58
End: 2023-01-30
Payer: MEDICAID

## 2023-01-30 VITALS
DIASTOLIC BLOOD PRESSURE: 84 MMHG | RESPIRATION RATE: 14 BRPM | SYSTOLIC BLOOD PRESSURE: 143 MMHG | TEMPERATURE: 98 F | HEART RATE: 67 BPM | OXYGEN SATURATION: 99 %

## 2023-01-30 DIAGNOSIS — R76.8 POSITIVE DOUBLE STRANDED DNA ANTIBODY TEST: Primary | ICD-10-CM

## 2023-01-30 DIAGNOSIS — M32.19 OTHER SYSTEMIC LUPUS ERYTHEMATOSUS WITH OTHER ORGAN INVOLVEMENT (HCC): ICD-10-CM

## 2023-01-30 PROCEDURE — 96413 CHEMO IV INFUSION 1 HR: CPT

## 2023-01-30 PROCEDURE — 96365 THER/PROPH/DIAG IV INF INIT: CPT

## 2023-01-30 RX ORDER — SODIUM CHLORIDE 9 MG/ML
25 INJECTION, SOLUTION INTRAVENOUS CONTINUOUS
Status: DISCONTINUED | OUTPATIENT
Start: 2023-01-30 | End: 2023-01-30 | Stop reason: HOSPADM

## 2023-01-30 RX ORDER — ONDANSETRON 2 MG/ML
8 INJECTION INTRAMUSCULAR; INTRAVENOUS AS NEEDED
OUTPATIENT
Start: 2023-02-27

## 2023-01-30 RX ORDER — SODIUM CHLORIDE 9 MG/ML
10 INJECTION INTRAVENOUS AS NEEDED
OUTPATIENT
Start: 2023-02-27

## 2023-01-30 RX ORDER — ALBUTEROL SULFATE 0.83 MG/ML
2.5 SOLUTION RESPIRATORY (INHALATION) AS NEEDED
Start: 2023-02-27

## 2023-01-30 RX ORDER — HYDROCORTISONE SODIUM SUCCINATE 100 MG/2ML
100 INJECTION, POWDER, FOR SOLUTION INTRAMUSCULAR; INTRAVENOUS AS NEEDED
OUTPATIENT
Start: 2023-02-27

## 2023-01-30 RX ORDER — HEPARIN 100 UNIT/ML
300-500 SYRINGE INTRAVENOUS AS NEEDED
Start: 2023-02-27

## 2023-01-30 RX ORDER — SODIUM CHLORIDE 9 MG/ML
25 INJECTION, SOLUTION INTRAVENOUS CONTINUOUS
OUTPATIENT
Start: 2023-02-27

## 2023-01-30 RX ORDER — SODIUM CHLORIDE 0.9 % (FLUSH) 0.9 %
10 SYRINGE (ML) INJECTION AS NEEDED
Status: DISCONTINUED | OUTPATIENT
Start: 2023-01-30 | End: 2023-01-30 | Stop reason: HOSPADM

## 2023-01-30 RX ORDER — ACETAMINOPHEN 325 MG/1
650 TABLET ORAL AS NEEDED
Start: 2023-02-27

## 2023-01-30 RX ORDER — SODIUM CHLORIDE 0.9 % (FLUSH) 0.9 %
10 SYRINGE (ML) INJECTION AS NEEDED
OUTPATIENT
Start: 2023-02-27

## 2023-01-30 RX ORDER — DIPHENHYDRAMINE HYDROCHLORIDE 50 MG/ML
50 INJECTION, SOLUTION INTRAMUSCULAR; INTRAVENOUS AS NEEDED
Start: 2023-02-27

## 2023-01-30 RX ORDER — DIPHENHYDRAMINE HYDROCHLORIDE 50 MG/ML
25 INJECTION, SOLUTION INTRAMUSCULAR; INTRAVENOUS AS NEEDED
Start: 2023-02-27

## 2023-01-30 RX ORDER — EPINEPHRINE 1 MG/ML
0.3 INJECTION, SOLUTION, CONCENTRATE INTRAVENOUS AS NEEDED
OUTPATIENT
Start: 2023-02-27

## 2023-01-30 RX ADMIN — Medication 10 ML: at 13:59

## 2023-01-30 RX ADMIN — SODIUM CHLORIDE 25 ML/HR: 9 INJECTION, SOLUTION INTRAVENOUS at 13:59

## 2023-02-23 NOTE — PROGRESS NOTES
[facility] Rheumatology  OBIC Note    Date: 2023  Name: Brendon Silver  MRN: [id number]       : 1965  Diagnosis: Lupus (M32.9)  Treatment: Benlysta 960mg every 4 wks   Referring Provider: Dr. Whit Riley  Supervising Provider: Dr. Whit Riley    Patient arrived to [facility] at 1430. Ms. Charles Standard allergies reviewed and she agreed to receiving today's treatment. A physical assessment was performed initially and post-treatment. Ms. Herb Law vitals were monitored before and after medication administration. Vitals:    23 1444 23 1458 23 1516   BP: (!) 161/76 (!) 163/86 (!) 149/78   Pulse: 72  68   Resp: 14  14   Temp: 98 °F (36.7 °C)     SpO2: 99%       Labs and urine collected and walked to [facility]. Medications given per providers orders:  Administrations This Visit       0.9% sodium chloride infusion       Admin Date  2023 Action  New Bag Dose  25 mL/hr Rate  25 mL/hr Route  IntraVENous Administered By  Yokasta Sosa RN              belimumab (BENLYSTA) 960 mg in 0.9% sodium chloride 250 mL, overfill volume 25 mL infusion       Admin Date  2023 Action  New Bag Dose  960 mg Rate  287 mL/hr Route  IntraVENous Administered By  Yokasta Sosa RN              sodium chloride (NS) flush 10 mL       Admin Date  2023 Action  Given Dose  10 mL Route  IntraVENous Administered By  Yokasta Sosa RN                Benlysta 120mg vial (Total dose 960mg)  NDC [id number]  Lot #  [id number]  Exp 2027  ---mixed in---  250ml 0.9% Normal Saline  NDC 7772-9042-71  Lot # [id number]  Exp 2024     START:   STOP:      0399 ml bag 0.9% Normal Saline @ 25ml/hr  NDC 9357-8543-84  lot # [id number]  Exp 2024    0.9% Normal Saline 10ml flush  NDC 1069305558  Lot # [id number]  Exp 2025    Lines: 24G RIGHT FA. Blood return noted and IV site assessed before, during, and after treatment. Line flushed with 10-30 ml's 0.9% Normal Saline solution per protocol. Access was removed from Ms. Caballero after completion of infusion/injection. Ms. Virgen Roles tolerated the treatment without complaints and no medication reactions were seen while in presence of this RN. Patient provided with AVS, which included future appointments and written educational material regarding Benlysta. All of the patients questions were answered and then discharged ambulatory from Rheumatology OBIC in stable condition at 1600. Encounter routed to supervising provider for co-signing.      Future Appointments   Date Time Provider Giana Carranza   3/27/2023  2:30 PM INFUSIONINJ_RC AOCR BS AMB   3/27/2023  3:00 PM Reinaldo Weller MD AOCR BS AMB   4/24/2023  2:30 PM INFUSIONINJ_RC AOCR BS AMB   5/22/2023  2:30 PM INFUSIONINJ_RC AOCR BS AMB   7/11/2023  7:30 AM KEREN Kaiser BS AMB     Sary Wilson, JAMIL  February 27, 2023

## 2023-02-27 ENCOUNTER — OFFICE VISIT (OUTPATIENT)
Dept: RHEUMATOLOGY | Age: 58
End: 2023-02-27
Payer: MEDICAID

## 2023-02-27 VITALS
DIASTOLIC BLOOD PRESSURE: 82 MMHG | TEMPERATURE: 98 F | OXYGEN SATURATION: 98 % | SYSTOLIC BLOOD PRESSURE: 168 MMHG | RESPIRATION RATE: 16 BRPM | HEART RATE: 69 BPM

## 2023-02-27 DIAGNOSIS — M32.19 OTHER SYSTEMIC LUPUS ERYTHEMATOSUS WITH OTHER ORGAN INVOLVEMENT (HCC): ICD-10-CM

## 2023-02-27 DIAGNOSIS — R76.8 POSITIVE DOUBLE STRANDED DNA ANTIBODY TEST: Primary | ICD-10-CM

## 2023-02-27 PROCEDURE — 96365 THER/PROPH/DIAG IV INF INIT: CPT

## 2023-02-27 PROCEDURE — 96413 CHEMO IV INFUSION 1 HR: CPT

## 2023-02-27 RX ORDER — HEPARIN 100 UNIT/ML
300-500 SYRINGE INTRAVENOUS AS NEEDED
Start: 2023-03-27

## 2023-02-27 RX ORDER — ACETAMINOPHEN 325 MG/1
650 TABLET ORAL AS NEEDED
Start: 2023-03-27

## 2023-02-27 RX ORDER — SODIUM CHLORIDE 9 MG/ML
10 INJECTION INTRAVENOUS AS NEEDED
OUTPATIENT
Start: 2023-03-27

## 2023-02-27 RX ORDER — DIPHENHYDRAMINE HYDROCHLORIDE 50 MG/ML
25 INJECTION, SOLUTION INTRAMUSCULAR; INTRAVENOUS AS NEEDED
Start: 2023-03-27

## 2023-02-27 RX ORDER — SODIUM CHLORIDE 0.9 % (FLUSH) 0.9 %
10 SYRINGE (ML) INJECTION AS NEEDED
Status: DISCONTINUED | OUTPATIENT
Start: 2023-02-27 | End: 2023-02-27 | Stop reason: HOSPADM

## 2023-02-27 RX ORDER — SODIUM CHLORIDE 9 MG/ML
25 INJECTION, SOLUTION INTRAVENOUS CONTINUOUS
OUTPATIENT
Start: 2023-03-27

## 2023-02-27 RX ORDER — EPINEPHRINE 1 MG/ML
0.3 INJECTION, SOLUTION, CONCENTRATE INTRAVENOUS AS NEEDED
OUTPATIENT
Start: 2023-03-27

## 2023-02-27 RX ORDER — ONDANSETRON 2 MG/ML
8 INJECTION INTRAMUSCULAR; INTRAVENOUS AS NEEDED
OUTPATIENT
Start: 2023-03-27

## 2023-02-27 RX ORDER — DIPHENHYDRAMINE HYDROCHLORIDE 50 MG/ML
50 INJECTION, SOLUTION INTRAMUSCULAR; INTRAVENOUS AS NEEDED
Start: 2023-03-27

## 2023-02-27 RX ORDER — SODIUM CHLORIDE 0.9 % (FLUSH) 0.9 %
10 SYRINGE (ML) INJECTION AS NEEDED
OUTPATIENT
Start: 2023-03-27

## 2023-02-27 RX ORDER — SODIUM CHLORIDE 9 MG/ML
25 INJECTION, SOLUTION INTRAVENOUS CONTINUOUS
Status: DISCONTINUED | OUTPATIENT
Start: 2023-02-27 | End: 2023-02-27 | Stop reason: HOSPADM

## 2023-02-27 RX ORDER — ALBUTEROL SULFATE 0.83 MG/ML
2.5 SOLUTION RESPIRATORY (INHALATION) AS NEEDED
Start: 2023-03-27

## 2023-02-27 RX ORDER — HYDROCORTISONE SODIUM SUCCINATE 100 MG/2ML
100 INJECTION, POWDER, FOR SOLUTION INTRAMUSCULAR; INTRAVENOUS AS NEEDED
OUTPATIENT
Start: 2023-03-27

## 2023-02-27 RX ORDER — GUAIFENESIN 100 MG/5ML
81 LIQUID (ML) ORAL DAILY
COMMUNITY
Start: 2023-01-13

## 2023-02-27 RX ADMIN — Medication 10 ML: at 14:50

## 2023-02-27 RX ADMIN — SODIUM CHLORIDE 25 ML/HR: 9 INJECTION, SOLUTION INTRAVENOUS at 14:50

## 2023-02-28 LAB
ALBUMIN SERPL-MCNC: 3.7 G/DL (ref 3.5–5)
ALBUMIN/GLOB SERPL: 1 (ref 1.1–2.2)
ALP SERPL-CCNC: 110 U/L (ref 45–117)
ALT SERPL-CCNC: 26 U/L (ref 12–78)
ANION GAP SERPL CALC-SCNC: 4 MMOL/L (ref 5–15)
APPEARANCE UR: CLEAR
AST SERPL-CCNC: 21 U/L (ref 15–37)
BACTERIA URNS QL MICRO: ABNORMAL /HPF
BASOPHILS # BLD: 0 K/UL (ref 0–0.1)
BASOPHILS NFR BLD: 1 % (ref 0–1)
BILIRUB SERPL-MCNC: 0.1 MG/DL (ref 0.2–1)
BILIRUB UR QL: NEGATIVE
BUN SERPL-MCNC: 13 MG/DL (ref 6–20)
BUN/CREAT SERPL: 15 (ref 12–20)
CALCIUM SERPL-MCNC: 8.8 MG/DL (ref 8.5–10.1)
CHLORIDE SERPL-SCNC: 106 MMOL/L (ref 97–108)
CO2 SERPL-SCNC: 30 MMOL/L (ref 21–32)
COLOR UR: ABNORMAL
CREAT SERPL-MCNC: 0.86 MG/DL (ref 0.55–1.02)
CREAT UR-MCNC: 60.6 MG/DL
CRP SERPL-MCNC: 1.01 MG/DL (ref 0–0.6)
DIFFERENTIAL METHOD BLD: ABNORMAL
EOSINOPHIL # BLD: 0.1 K/UL (ref 0–0.4)
EOSINOPHIL NFR BLD: 2 % (ref 0–7)
EPITH CASTS URNS QL MICRO: ABNORMAL /LPF
ERYTHROCYTE [DISTWIDTH] IN BLOOD BY AUTOMATED COUNT: 14.5 % (ref 11.5–14.5)
ERYTHROCYTE [SEDIMENTATION RATE] IN BLOOD: 109 MM/HR (ref 0–30)
GLOBULIN SER CALC-MCNC: 3.7 G/DL (ref 2–4)
GLUCOSE SERPL-MCNC: 83 MG/DL (ref 65–100)
GLUCOSE UR STRIP.AUTO-MCNC: NEGATIVE MG/DL
HCT VFR BLD AUTO: 35.7 % (ref 35–47)
HGB BLD-MCNC: 10.9 G/DL (ref 11.5–16)
HGB UR QL STRIP: NEGATIVE
IMM GRANULOCYTES # BLD AUTO: 0 K/UL (ref 0–0.04)
IMM GRANULOCYTES NFR BLD AUTO: 0 % (ref 0–0.5)
KETONES UR QL STRIP.AUTO: NEGATIVE MG/DL
LEUKOCYTE ESTERASE UR QL STRIP.AUTO: ABNORMAL
LYMPHOCYTES # BLD: 2.5 K/UL (ref 0.8–3.5)
LYMPHOCYTES NFR BLD: 44 % (ref 12–49)
MCH RBC QN AUTO: 25.3 PG (ref 26–34)
MCHC RBC AUTO-ENTMCNC: 30.5 G/DL (ref 30–36.5)
MCV RBC AUTO: 83 FL (ref 80–99)
MONOCYTES # BLD: 0.5 K/UL (ref 0–1)
MONOCYTES NFR BLD: 9 % (ref 5–13)
NEUTS SEG # BLD: 2.5 K/UL (ref 1.8–8)
NEUTS SEG NFR BLD: 44 % (ref 32–75)
NITRITE UR QL STRIP.AUTO: NEGATIVE
NRBC # BLD: 0 K/UL (ref 0–0.01)
NRBC BLD-RTO: 0 PER 100 WBC
PH UR STRIP: 7 (ref 5–8)
PLATELET # BLD AUTO: 247 K/UL (ref 150–400)
PMV BLD AUTO: 12.2 FL (ref 8.9–12.9)
POTASSIUM SERPL-SCNC: 3.9 MMOL/L (ref 3.5–5.1)
PROT SERPL-MCNC: 7.4 G/DL (ref 6.4–8.2)
PROT UR STRIP-MCNC: NEGATIVE MG/DL
PROT UR-MCNC: 8 MG/DL (ref 0–11.9)
PROT UR-MCNC: 9 MG/DL (ref 0–11.9)
PROT/CREAT UR-RTO: 0.1
RBC # BLD AUTO: 4.3 M/UL (ref 3.8–5.2)
RBC #/AREA URNS HPF: ABNORMAL /HPF (ref 0–5)
SODIUM SERPL-SCNC: 140 MMOL/L (ref 136–145)
SP GR UR REFRACTOMETRY: 1.01 (ref 1–1.03)
UROBILINOGEN UR QL STRIP.AUTO: 0.2 EU/DL (ref 0.2–1)
WBC # BLD AUTO: 5.7 K/UL (ref 3.6–11)
WBC URNS QL MICRO: ABNORMAL /HPF (ref 0–4)

## 2023-03-23 NOTE — PROGRESS NOTES
[facility] Rheumatology  OBIC Note    Date: 2023  Name: Jovi Britton  MRN: [id number]       : 1965  Diagnosis: Lupus (M32.9)  Treatment: Benlysta 960mg every 4 wks   Referring Provider: Dr. Susana Bal  Supervising Provider: Dr. Susana Bal    Patient arrived to [facility] at 1430. Ms. Lisa Rivera allergies reviewed and she agreed to receiving today's treatment. A physical assessment was performed initially and post-treatment. Ms. Kristin Farrell vitals were monitored before and after medication administration. Vitals:    23 1439 23 1540 23 1557   BP: (!) 180/90 (!) 165/80 (!) 167/93   Pulse: 77  75   Resp: 16     Temp: 98 °F (36.7 °C)     SpO2: 98%  99%     Labs done February. Not due today    Medications given per providers orders:  Administrations This Visit       0.9% sodium chloride infusion       Admin Date  2023 Action  New Bag Dose  25 mL/hr Rate  25 mL/hr Route  IntraVENous Administered By  Smith Mcdaniel RN              belimumab (BENLYSTA) 960 mg in 0.9% sodium chloride 250 mL, overfill volume 25 mL infusion       Admin Date  2023 Action  New Bag Dose  960 mg Rate  287 mL/hr Route  IntraVENous Administered By  Smith Mcdaniel RN              sodium chloride (NS) flush 10 mL       Admin Date  2023 Action  Given Dose  10 mL Route  IntraVENous Administered By  Smith Mcdaniel RN                  Benlysta 120mg vial (Total dose 960mg)  NDC [id number]  Lot #  [id number]  Exp 2027  ---mixed in---  250ml 0.9% Normal Saline  NDC 2166-4610-17  Lot # [id number]  Exp 2024     START: 3359  STOP: 6013    249 ml bag 0.9% Normal Saline @ 25ml/hr  NDC 9194-8570-55  lot # [id number]  Exp 2024    0.9% Normal Saline 10ml flush  NDC 5277916748  Lot # [id number]  Exp 2025    Lines: 24G RIGHT FA. Blood return noted and IV site assessed before, during, and after treatment. Line flushed with 10-30 ml's 0.9% Normal Saline solution per protocol. Access was removed from Ms.  Best after completion of infusion/injection. Ms. Shelley Huston tolerated the treatment without complaints and no medication reactions were seen while in presence of this RN. Patient provided with AVS, which included future appointments and written educational material regarding Benlysta. All of the patients questions were answered and then discharged ambulatory from Rheumatology OBIC in stable condition at 1600. Encounter routed to supervising provider for co-signing.      Future Appointments   Date Time Provider Giana Carranza   4/24/2023  2:30 PM INFUSIONINJ_RC AOCR BS AMB   5/22/2023  2:30 PM INFUSIONINJ_RC AOCR BS AMB   7/11/2023  7:30 AM KEREN Nation BS AMB     Tata Sanders, JAMIL  March 27, 2023

## 2023-03-27 ENCOUNTER — OFFICE VISIT (OUTPATIENT)
Dept: RHEUMATOLOGY | Age: 58
End: 2023-03-27

## 2023-03-27 ENCOUNTER — OFFICE VISIT (OUTPATIENT)
Dept: RHEUMATOLOGY | Age: 58
End: 2023-03-27
Payer: MEDICAID

## 2023-03-27 VITALS
SYSTOLIC BLOOD PRESSURE: 167 MMHG | DIASTOLIC BLOOD PRESSURE: 93 MMHG | OXYGEN SATURATION: 99 % | RESPIRATION RATE: 16 BRPM | TEMPERATURE: 98 F | HEART RATE: 75 BPM

## 2023-03-27 DIAGNOSIS — R76.8 POSITIVE DOUBLE STRANDED DNA ANTIBODY TEST: ICD-10-CM

## 2023-03-27 DIAGNOSIS — R76.8 POSITIVE DOUBLE STRANDED DNA ANTIBODY TEST: Primary | ICD-10-CM

## 2023-03-27 DIAGNOSIS — M32.19 OTHER ORGAN OR SYSTEM INVOLVEMENT IN SYSTEMIC LUPUS ERYTHEMATOSUS (HCC): Primary | ICD-10-CM

## 2023-03-27 DIAGNOSIS — M32.19 OTHER SYSTEMIC LUPUS ERYTHEMATOSUS WITH OTHER ORGAN INVOLVEMENT (HCC): ICD-10-CM

## 2023-03-27 DIAGNOSIS — Z79.899 ONGOING USE OF POSSIBLY TOXIC MEDICATION: ICD-10-CM

## 2023-03-27 PROCEDURE — 96413 CHEMO IV INFUSION 1 HR: CPT

## 2023-03-27 PROCEDURE — 96365 THER/PROPH/DIAG IV INF INIT: CPT

## 2023-03-27 PROCEDURE — 99214 OFFICE O/P EST MOD 30 MIN: CPT | Performed by: INTERNAL MEDICINE

## 2023-03-27 RX ORDER — SODIUM CHLORIDE 9 MG/ML
25 INJECTION, SOLUTION INTRAVENOUS CONTINUOUS
Status: DISCONTINUED | OUTPATIENT
Start: 2023-03-27 | End: 2023-03-27 | Stop reason: HOSPADM

## 2023-03-27 RX ORDER — EPINEPHRINE 1 MG/ML
0.3 INJECTION, SOLUTION, CONCENTRATE INTRAVENOUS AS NEEDED
OUTPATIENT
Start: 2023-04-24

## 2023-03-27 RX ORDER — HYDROCORTISONE SODIUM SUCCINATE 100 MG/2ML
100 INJECTION, POWDER, FOR SOLUTION INTRAMUSCULAR; INTRAVENOUS AS NEEDED
OUTPATIENT
Start: 2023-04-24

## 2023-03-27 RX ORDER — SODIUM CHLORIDE 9 MG/ML
10 INJECTION INTRAVENOUS AS NEEDED
OUTPATIENT
Start: 2023-04-24

## 2023-03-27 RX ORDER — SODIUM CHLORIDE 0.9 % (FLUSH) 0.9 %
10 SYRINGE (ML) INJECTION AS NEEDED
OUTPATIENT
Start: 2023-04-24

## 2023-03-27 RX ORDER — DIPHENHYDRAMINE HYDROCHLORIDE 50 MG/ML
50 INJECTION, SOLUTION INTRAMUSCULAR; INTRAVENOUS AS NEEDED
Start: 2023-04-24

## 2023-03-27 RX ORDER — SODIUM CHLORIDE 9 MG/ML
25 INJECTION, SOLUTION INTRAVENOUS CONTINUOUS
OUTPATIENT
Start: 2023-04-24

## 2023-03-27 RX ORDER — ALBUTEROL SULFATE 0.83 MG/ML
2.5 SOLUTION RESPIRATORY (INHALATION) AS NEEDED
Start: 2023-04-24

## 2023-03-27 RX ORDER — HYDROXYCHLOROQUINE SULFATE 200 MG/1
200 TABLET, FILM COATED ORAL DAILY
Qty: 90 TABLET | Refills: 1 | Status: SHIPPED | OUTPATIENT
Start: 2023-03-27

## 2023-03-27 RX ORDER — ACETAMINOPHEN 325 MG/1
650 TABLET ORAL AS NEEDED
Start: 2023-04-24

## 2023-03-27 RX ORDER — DIPHENHYDRAMINE HYDROCHLORIDE 50 MG/ML
25 INJECTION, SOLUTION INTRAMUSCULAR; INTRAVENOUS AS NEEDED
Start: 2023-04-24

## 2023-03-27 RX ORDER — HEPARIN 100 UNIT/ML
300-500 SYRINGE INTRAVENOUS AS NEEDED
Start: 2023-04-24

## 2023-03-27 RX ORDER — ONDANSETRON 2 MG/ML
8 INJECTION INTRAMUSCULAR; INTRAVENOUS AS NEEDED
OUTPATIENT
Start: 2023-04-24

## 2023-03-27 RX ORDER — SODIUM CHLORIDE 0.9 % (FLUSH) 0.9 %
10 SYRINGE (ML) INJECTION AS NEEDED
Status: DISCONTINUED | OUTPATIENT
Start: 2023-03-27 | End: 2023-03-27 | Stop reason: HOSPADM

## 2023-03-27 RX ADMIN — SODIUM CHLORIDE 25 ML/HR: 9 INJECTION, SOLUTION INTRAVENOUS at 14:45

## 2023-03-27 RX ADMIN — Medication 10 ML: at 14:44

## 2023-03-27 NOTE — PROGRESS NOTES
REASON FOR VISIT:    is a 60 y.o. female with history of hypertension and obesity who is returning for in-office followup of systemic lupus characterized by arthritis, alopecia, subacute cutaneous lupus rash, and +NIRAV 1:640 homogenous with +dsDNA. HISTORY OF PRESENT ILLNESS    Pt returns for a follow up. Continues to receive Benlysta monthly, pleased with how she is doing. No sustained joint pains. No chest pains. Breathing has been doing well. No recent URI's. BP still running bit high, sees PCP next month. Established with [facility], saw an ophthalmologist for Plaquenil screening late last year. Mood has been good. Disease History: In 2012 developed increased joint pain and stiffness, marked pain sensitivity, couldn't walk or stand to be touched, get in and out of a car. Thought at first possibly shingles, pain didn't resolve. Able to get expedited evaluation with Rheumatology (Dr. Araceli Pastor with [facility]), diagnosed with lupus. Prednisone and Plaquenil (hydroxychloroquine) were started, and within about a week was feeling better. Around the same time, had developed significant frontotemporal and vertex hair loss. Now prolonged cold exposure is her major trigger of joint pain flares. Initially was having pain flares 2-3x/month while in [place]. Since moved to [place] in 2019 to help her mother with foster children, has had less frequent flares maybe 2x/every 3 months. Flares respond to prednisone tapers. Between flares, she denies consistent joint pain. Some aching in hands or shoulders with activity. Has been able to lose weight from 236 to 209 with use of exercise bike over the last 6 months. Gets perioral papular rashes, not more typical malar rash. With sun exposure in the past, has developed painful annular silver-dollar sized lesions lasting a week or two, not in the last year. Good about sun avoidance.   Usually goes to [email or web address] for eye checks, recently seen. No current ophthalmologist for Plaquenil screening. REVIEW OF SYSTEMS  A comprehensive review of systems was negative except for that written in the HPI. A 10-point review of systems is per the new patient questionnaire, which has been reviewed extensively and scanned into the electronic medical record for future reference. Review of systems is as above and is otherwise negative. ALLERGIES  Lisinopril and Avocado    MEDICATIONS  Current Outpatient Medications   Medication Sig    aspirin 81 mg chewable tablet Take 81 mg by mouth daily. fluticasone propionate (FLONASE) 50 mcg/actuation nasal spray 2 Sprays by Both Nostrils route daily. albuterol (PROVENTIL HFA, VENTOLIN HFA, PROAIR HFA) 90 mcg/actuation inhaler TAKE 1 PUFF EVERY 4 HOURS AS NEEDED    albuterol (PROVENTIL VENTOLIN) 2.5 mg /3 mL (0.083 %) nebu INHALE 1 VIAL EVERY FOUR HOURS, AS NEEDED    meloxicam (MOBIC) 15 mg tablet     Dulera 200-5 mcg/actuation HFA inhaler     cetirizine (ZYRTEC) 10 mg tablet Take 1 Tablet by mouth nightly. Indications: inflammation of the nose due to an allergy    fluticasone propionate (FLONASE) 50 mcg/actuation nasal spray 2 Sprays by Both Nostrils route daily. Indications: inflammation of the nose due to an allergy (Patient not taking: Reported on 12/15/2022)    amLODIPine (NORVASC) 10 mg tablet Take 1 Tablet by mouth daily. chlorthalidone (HYGROTON) 25 mg tablet Take 1 Tablet by mouth daily. hydrOXYchloroQUINE (PLAQUENIL) 200 mg tablet TAKE 2 TABLETS BY MOUTH EVERY DAY    Blood Pressure Test Kit-Large kit Check blood pressure once daily    budesonide-formoteroL (SYMBICORT) 160-4.5 mcg/actuation HFAA Take 1 Puff by inhalation two (2) times a day. montelukast (SINGULAIR) 10 mg tablet Take 1 Tab by mouth every evening. No current facility-administered medications for this visit.      Facility-Administered Medications Ordered in Other Visits   Medication    0.9% sodium chloride infusion    sodium chloride (NS) flush 10 mL       PAST MEDICAL HISTORY  Past Medical History:   Diagnosis Date    Asthma     Fibroid     Headache 03/10/2020    Only due to the concussion. History of concussion 3/10/2020    Lupus ([address]     Menopause        FAMILY HISTORY  2 cousins had lupus. Oldest cousin passed 2/2 lupus, youngest cousin passed 2/2 COVID but had lupus. Mother is alive. SOCIAL HISTORY  She  reports that she quit smoking about 21 years ago. Her smoking use included cigarettes. She started smoking about 40 years ago. She has never used smokeless tobacco. She reports current alcohol use of about 4.0 standard drinks per week. She reports that she does not use drugs. 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 DATA    Vitals taken in infusion center    General:  The patient is pleasant, obese, alert, and in no apparent distress. Eyes: Sclera are anicteric. No conjunctival injection. HEENT:  Oropharynx is clear. No oral ulcers. Adequate salivary pooling. No cervical or supraclavicular lymphadenopathy. Lungs:  Clear to auscultation bilaterally, without wheeze or stridor. Normal respiratory effort. Cor:  Regular rate and rhythm. No murmur rub or gallop. Abdomen: Soft, non-tender, without hepatomegaly or masses. Extremities: No calf tenderness, trace edema of distal 1/3 of shin. Warm and well perfused. Skin:  Unchanged bitemporal and frontal alopecia. No return of perioral rash. Neuro: Nonfocal, gait not witnessed today.   Musculoskeletal:    A comprehensive musculoskeletal exam was performed for all joints of each upper and lower extremity and assessed for swelling, tenderness and range of motion. Results are documented as below:   Stable L shoulder crepitus with painless ROM today  Interval resolution of left MCP1 and CMC tenderness without synovitis. Still no PIP tenderness. No evidence of synovitis in the small joints of the hands, wrists, shoulders, elbows, hips, knees or ankles.        Labs:  2/27/23: WBC 5.7, Hgb 10.9, Plt 247, , UA neg for protein or blood, 1+ bacteria; Cr 0.86, LFTs WNL, CRP 1.01mg/dL, urine pr/cr 0.100  9/7/22: CBC WNL, Cr 1.03, Alk phos 166, ALT 45, AST 31, Tbii 0.2, ESR 46, CRP 2.33 mg/dL, dsDNA Crithidia Luciliae IFA neg, C3 178, C4 34, UA turbid appearance, UA trace protein, UA moderate leukocyte esterase, UA many eptelial cells, UA 2+ bacteria, UA Yeast present, Pr/Cr 0.1  8/12/22: Phosphorous 3.7, Mg 2.1, Cr 0.9, TSH 4.37, ESR 33, CRP >9.5 mg/L, CBC WNL, HGB A1c 6.2, .4, troponin-high sensitivity 5  8/11/22: Troponin 5, prothrombin time 1, Cr 0.88, LFT WNL, CBC WNL,   6/15/22: CBC WNL, Cr 0.86, LFT WNL, ESR 26, CRP 1.2 mg/dL, C3 144, C4 31, dsDNA crithidia lucillae IFA neg  4/19/22: dsDNA Crithidia lucillae Titer 1:120, dsDNA Crithidia   lucillae IFA positive, WBC 4.6, HGB 12, Plt 271, Cr 0.9, LFT WNL, C3 137, C4 29, CRP 11 mg/L, ESR 44  3/22/22: Urine culture-Escherichia coli greater than 100,000 colony forming units per mL, Micro examination-Moderate bacteria, Prot+Cr normal, Urine leukocyte esterase 1+  2/22/22: WBC 4.9, Hgb 12.5, Plt 31; Cr 0.84, LFTs WNL, CRP 0.82mg/dL, ESR 31  11/30/21: WBC 4.6, Hgb 12.9, Plt 290; ESR 47, Cr 0.92, Tbili 0.3, AST 16, ALT 28, AlkP 131, CRP 0.67mg/dL  11/11/21: dsDNA by Crithidia 1:80, QFN gold negative,   9/18/21: WBC 5.0 (ANC 2500, ALC 2000), Hgb 11.8, Pl t 330; ESR 66, UA neg for protein or blood; Cr 1.03, Tbili <0.2, AST 15, ALT 16, AlkP 132, urine pr/cr 0.058, mariah neg, NIRAV 1:640 homogenous, dsDNA 12; SSA, SSB, RNP, chromatin, Scl70, centromere B, ribosomal P, Jo1 negative; CRP 11mg/L, C3 and C4 WNL  9/2/20: RF neg, CCP neg, 14.3.3 eta neg; CRP 10.48 mg/L; NIRAV direct positive (no reflex), ESR 45; WBC 5.1 [ANC 2400, ALC 2100], Hgb 11.9, Plt 286; Cr 0.89, Tbili 0.2, AST 18, ALT 18, AlkP 139, Tprot 7.2, Alb 4.0; HDL 42, ; A1c 6.4, TSH 1.3, Hep C Ab neg;     Imagin23 PA/Lat CXR  Normal PA and lateral chest views. 22 BRAIN MRI WITH AND WITHOUT CONTRAST: Normal brain. CTA CODE NEURO HEAD AND NECK W CONT, CT CODE NEURO PERF W CBF (22):  CTA Head:  1. No evidence of significant stenosis or aneurysm. CTA Neck:  1. No evidence of significant stenosis. 2. Multiple borderline enlarged left axillary lymph nodes are likely reactive. Clinical follow-up is recommended. CT Brain Perfusion:  1. No acute abnormality. 22 CT abd/pelvis with:  IMPRESSION  Leiomyomatous uterus with no acute findings or other findings to  correlate with pelvic cramping or rectal bleeding. 10/1/20 AP CXR:  Portable exam of the chest obtained at 1118 demonstrates normal heart size. There is no acute process in the lung fields. The osseous structures are  Unremarkable. 3/10/20 MR brain without:  IMPRESSION:  No significant abnormalities. 19 CT Cspine:  No acute fracture  Central canal stenosis C5-6 and C6-7 [min 5.6mm anterior to posterior]  Moderate left C6-7 neural foraminal stenosis. ASSESSMENT AND PLAN  Ms. Rosetta Peter is a 60 y.o. female who presents for evaluation of systemic lupus characterized by arthritis, alopecia, subacute cutaneous lupus rash, and +NIRAV 1:640 with +dsDNA. Despite chronic ESR elevation, her lupus remains clinically quiescent, and deep organ function is intact. No changes to her regimen of Benlysta and low-dose Plaquenil. 1. Other organ or system involvement in systemic lupus erythematosus ([facility] [address]  - Cont monthly Benlysta infusions. - hydrOXYchloroQUINE (PlaqueniL) 200 mg tablet; Take 1 Tablet by mouth daily. Dispense: 90 Tablet;  Refill: 1  - C REACTIVE PROTEIN, QT; Standing  - CBC WITH AUTOMATED DIFF; Standing  - METABOLIC PANEL, COMPREHENSIVE; Standing  - SED RATE (ESR); Standing  - DSDNA AB BY IFA, GINATHIDIFRANKLIN LUCILIAE W RFLX TO TITER; Standing  - COMPLEMENT, C3 & C4; Standing  - URINALYSIS W/MICROSCOPIC; Standing  - PROTEIN/CREATININE RATIO, URINE; Standing    2. Positive double stranded DNA antibody test  - hydrOXYchloroQUINE (PlaqueniL) 200 mg tablet; Take 1 Tablet by mouth daily. Dispense: 90 Tablet; Refill: 1  - C REACTIVE PROTEIN, QT; Standing  - CBC WITH AUTOMATED DIFF; Standing  - METABOLIC PANEL, COMPREHENSIVE; Standing  - SED RATE (ESR); Standing  - DSDNA AB BY IFA, GINATHIDIA LUCILIAE, W RFLX TO TITER; Standing  - COMPLEMENT, C3 & C4; Standing  - URINALYSIS W/MICROSCOPIC; Standing  - PROTEIN/CREATININE RATIO, URINE; Standing    3. Ongoing use of possibly toxic medication  - Up to date with annual Plaquenil screenings, due next late 2023  - C REACTIVE PROTEIN, QT; Standing  - CBC WITH AUTOMATED DIFF; Standing  - METABOLIC PANEL, COMPREHENSIVE; Standing  - SED RATE (ESR); Standing  - DSDNA AB BY IFA, DARLENE LUCILIAE, W RFLX TO TITER; Standing  - COMPLEMENT, C3 & C4; Standing  - URINALYSIS W/MICROSCOPIC; Standing  - PROTEIN/CREATININE RATIO, URINE; Standing                                                                                                                 Patient Instructions   Continue Plaquenil 1 pill daily, and monthly Benlysta infusions. We'll keep checking labs with infusions. Call with any problems that arise before July. We can continue Benlysta infusions for now, but you may want to reach out to get a backup appointment with an area Rheumatology practice in case it takes Eliud Jasmine a while to find a replacement. Keep avoiding the sun as much as possible, wear SPF70+ when outside. Try to be seen in ~6 months, by September.     Orders Placed This Encounter    C REACTIVE PROTEIN, QT    CBC WITH AUTOMATED DIFF    METABOLIC PANEL, COMPREHENSIVE SED RATE (ESR)    DSDNA AB BY IFA, DARLENE SUERO, W RFLX TO TITER    COMPLEMENT, C3 & C4    URINALYSIS W/MICROSCOPIC    PROTEIN/CREATININE RATIO, URINE    hydrOXYchloroQUINE (PlaqueniL) 200 mg tablet         Medications: I have discontinued Juan Jose Caballero's hydrOXYchloroQUINE. I am also having her start on hydrOXYchloroQUINE. Additionally, I am having her maintain her montelukast, budesonide-formoteroL, Blood Pressure Test Kit-Large, meloxicam, Dulera, cetirizine, fluticasone propionate, amLODIPine, chlorthalidone, albuterol, albuterol, fluticasone propionate, and aspirin. Follow up: No follow-ups on file.     Face to face time: 15 minutes  Note preparation and records review day of service: 17 minutes  Total provider time day of service: Delta 116, MD    Adult Rheumatology   [address]   Phone [phone]  Fax [phone]

## 2023-03-27 NOTE — LETTER
3/28/2023    Patient: Seth Pina   YOB: 1965   Date of Visit: 3/27/2023     Clover Lyons PA-C  [facility] [address]  [address]    Dear Clover Lyons PA-C,    We recently saw Ms. Seth Pina in the [facility] for evaluation. My notes for this consultation are attached. If you have questions, please do not hesitate to call me. I look forward to following your patient along with you.       Sincerely,  Jerzy Vega MD S  Cell: [phone]

## 2023-03-27 NOTE — PATIENT INSTRUCTIONS
Continue Plaquenil 1 pill daily, and monthly Benlysta infusions. We'll keep checking labs with infusions. Call with any problems that arise before July. We can continue Benlysta infusions for now, but you may want to reach out to get a backup appointment with an area Rheumatology practice in case it takes Dagoberto Borer a while to find a replacement. Keep avoiding the sun as much as possible, wear SPF70+ when outside. Try to be seen in ~6 months, by September.

## 2023-04-18 ENCOUNTER — HOSPITAL ENCOUNTER (OUTPATIENT)
Dept: MAMMOGRAPHY | Age: 58
Discharge: HOME | End: 2023-04-18
Attending: PHYSICIAN ASSISTANT
Payer: MEDICAID

## 2023-04-18 DIAGNOSIS — Z12.31 SCREENING MAMMOGRAM FOR HIGH-RISK PATIENT: ICD-10-CM

## 2023-04-18 PROCEDURE — 77067 SCR MAMMO BI INCL CAD: CPT

## 2023-04-21 DIAGNOSIS — Z79.899 ONGOING USE OF POSSIBLY TOXIC MEDICATION: Primary | ICD-10-CM

## 2023-04-25 ENCOUNTER — OFFICE VISIT (OUTPATIENT)
Dept: RHEUMATOLOGY | Age: 58
End: 2023-04-25
Payer: MEDICAID

## 2023-04-25 VITALS
TEMPERATURE: 98 F | HEART RATE: 63 BPM | DIASTOLIC BLOOD PRESSURE: 88 MMHG | OXYGEN SATURATION: 99 % | SYSTOLIC BLOOD PRESSURE: 175 MMHG | RESPIRATION RATE: 16 BRPM

## 2023-04-25 DIAGNOSIS — M32.19 OTHER SYSTEMIC LUPUS ERYTHEMATOSUS WITH OTHER ORGAN INVOLVEMENT (HCC): ICD-10-CM

## 2023-04-25 DIAGNOSIS — R76.8 POSITIVE DOUBLE STRANDED DNA ANTIBODY TEST: Primary | ICD-10-CM

## 2023-04-25 DIAGNOSIS — Z79.899 ONGOING USE OF POSSIBLY TOXIC MEDICATION: ICD-10-CM

## 2023-04-25 DIAGNOSIS — M32.19 OTHER ORGAN OR SYSTEM INVOLVEMENT IN SYSTEMIC LUPUS ERYTHEMATOSUS (HCC): ICD-10-CM

## 2023-04-25 PROCEDURE — 96365 THER/PROPH/DIAG IV INF INIT: CPT

## 2023-04-25 PROCEDURE — 96413 CHEMO IV INFUSION 1 HR: CPT

## 2023-04-25 RX ORDER — HYDROCORTISONE SODIUM SUCCINATE 100 MG/2ML
100 INJECTION, POWDER, FOR SOLUTION INTRAMUSCULAR; INTRAVENOUS AS NEEDED
OUTPATIENT
Start: 2023-05-23

## 2023-04-25 RX ORDER — HEPARIN 100 UNIT/ML
300-500 SYRINGE INTRAVENOUS AS NEEDED
Start: 2023-05-23

## 2023-04-25 RX ORDER — SODIUM CHLORIDE 0.9 % (FLUSH) 0.9 %
10 SYRINGE (ML) INJECTION AS NEEDED
Status: DISCONTINUED | OUTPATIENT
Start: 2023-04-25 | End: 2023-04-25 | Stop reason: HOSPADM

## 2023-04-25 RX ORDER — EPINEPHRINE 1 MG/ML
0.3 INJECTION, SOLUTION, CONCENTRATE INTRAVENOUS AS NEEDED
OUTPATIENT
Start: 2023-05-23

## 2023-04-25 RX ORDER — DIPHENHYDRAMINE HYDROCHLORIDE 50 MG/ML
25 INJECTION, SOLUTION INTRAMUSCULAR; INTRAVENOUS AS NEEDED
Start: 2023-05-23

## 2023-04-25 RX ORDER — ONDANSETRON 2 MG/ML
8 INJECTION INTRAMUSCULAR; INTRAVENOUS AS NEEDED
OUTPATIENT
Start: 2023-05-23

## 2023-04-25 RX ORDER — ACETAMINOPHEN 325 MG/1
650 TABLET ORAL AS NEEDED
Start: 2023-05-23

## 2023-04-25 RX ORDER — SODIUM CHLORIDE 9 MG/ML
25 INJECTION, SOLUTION INTRAVENOUS CONTINUOUS
Status: DISCONTINUED | OUTPATIENT
Start: 2023-04-25 | End: 2023-04-25 | Stop reason: HOSPADM

## 2023-04-25 RX ORDER — SODIUM CHLORIDE 9 MG/ML
25 INJECTION, SOLUTION INTRAVENOUS CONTINUOUS
OUTPATIENT
Start: 2023-05-23

## 2023-04-25 RX ORDER — ALBUTEROL SULFATE 0.83 MG/ML
2.5 SOLUTION RESPIRATORY (INHALATION) AS NEEDED
Start: 2023-05-23

## 2023-04-25 RX ORDER — DIPHENHYDRAMINE HYDROCHLORIDE 50 MG/ML
50 INJECTION, SOLUTION INTRAMUSCULAR; INTRAVENOUS AS NEEDED
Start: 2023-05-23

## 2023-04-25 RX ORDER — SODIUM CHLORIDE 9 MG/ML
10 INJECTION INTRAVENOUS AS NEEDED
OUTPATIENT
Start: 2023-05-23

## 2023-04-25 RX ORDER — SODIUM CHLORIDE 0.9 % (FLUSH) 0.9 %
10 SYRINGE (ML) INJECTION AS NEEDED
OUTPATIENT
Start: 2023-05-23

## 2023-04-25 RX ADMIN — Medication 10 ML: at 13:13

## 2023-04-25 RX ADMIN — SODIUM CHLORIDE 25 ML/HR: 9 INJECTION, SOLUTION INTRAVENOUS at 13:14

## 2023-04-25 NOTE — PROGRESS NOTES
[facility] Rheumatology  OBIC Note    Date: 2023  Name: Christophe Gerber  MRN: [id number]       : 1965  Diagnosis: Lupus (M32.9)  Treatment: Benlysta 960mg every 4 wks   Referring Provider: Dr. Major Uribe  Supervising Provider: Dr. Major Uribe    Patient arrived to [facility] at 1300. Ms. Emiliano Penny allergies reviewed and she agreed to receiving today's treatment. A physical assessment was performed initially and post-treatment. Ms. Marcie Krause vitals were monitored before and after medication administration. Vitals:    23 1302 23 1427   BP: (!) 204/99 (!) 175/88   Pulse: 79 63   Resp: 16 16   Temp: 98 °F (36.7 °C)    SpO2: 99% 99%     Labs drawn and walked to [facility]. Medications given per providers orders:  Administrations This Visit       0.9% sodium chloride infusion       Admin Date  2023 Action  New Bag Dose  25 mL/hr Rate  25 mL/hr Route  IntraVENous Administered By  Alon Esparza RN              belimumab (BENLYSTA) 960 mg in 0.9% sodium chloride 250 mL, overfill volume 25 mL infusion       Admin Date  2023 Action  New Bag Dose  960 mg Rate  287 mL/hr Route  IntraVENous Administered By  Alon Esparza RN              sodium chloride (NS) flush 10 mL       Admin Date  2023 Action  Given Dose  10 mL Route  IntraVENous Administered By  Alon Esparza RN                Benlysta 120mg vial (Total dose 960mg)  NDC R1762265  Lot #  [id number]  Exp 2027  ---mixed in---  250ml 0.9% Normal Saline  NDC 7000-9822-40  Lot # [id number]  Exp 2024     START: 9508  STOP: 9309    548 ml bag 0.9% Normal Saline @ 25ml/hr  NDC 3853-8306-45  Lot # [id number]  Exp 2024    0.9% Normal Saline 10ml flush  NDC 34477-35605  Lot # [id number]  Exp 2024    Lines: 24G RIGHT FA. Blood return noted and IV site assessed before, during, and after treatment. Line flushed with 10-30 ml's 0.9% Normal Saline solution per protocol. Access was removed from Ms. Federico after completion of infusion/injection.    Ms. Emiliano Penny tolerated the treatment without complaints and no medication reactions were seen while in presence of this RN. Patient provided with AVS, which included future appointments and written educational material regarding Benlysta. All of the patients questions were answered and then discharged ambulatory from Rheumatology OBIC in stable condition at 1430. Encounter routed to supervising provider for co-signing. Future Appointments   Date Time Provider Giana Carranza   5/23/2023  2:00 PM INFUSIONINJ_RC AOCR BS AMB   6/20/2023  3:00 PM INFUSIONINJ_RC AOCR BS AMB   7/11/2023  7:30 AM KEREN Godinez BS AMB   7/18/2023  1:00 PM INFUSIONINJ_RC AOCR BS AMB     Pt made follow up appointment with Dr Elzbieta Zaragoza in the Fall.   September 26th 2023 at [address], RN  April 25, 2023

## 2023-04-26 LAB
ALBUMIN SERPL-MCNC: 3.6 G/DL (ref 3.5–5)
ALBUMIN/GLOB SERPL: 1 (ref 1.1–2.2)
ALP SERPL-CCNC: 112 U/L (ref 45–117)
ALT SERPL-CCNC: 22 U/L (ref 12–78)
ANION GAP SERPL CALC-SCNC: 3 MMOL/L (ref 5–15)
APPEARANCE UR: ABNORMAL
AST SERPL-CCNC: 17 U/L (ref 15–37)
BACTERIA URNS QL MICRO: ABNORMAL /HPF
BASOPHILS # BLD: 0.1 K/UL (ref 0–0.1)
BASOPHILS NFR BLD: 1 % (ref 0–1)
BILIRUB SERPL-MCNC: 0.2 MG/DL (ref 0.2–1)
BILIRUB UR QL: NEGATIVE
BUN SERPL-MCNC: 15 MG/DL (ref 6–20)
BUN/CREAT SERPL: 14 (ref 12–20)
CALCIUM SERPL-MCNC: 9 MG/DL (ref 8.5–10.1)
CHLORIDE SERPL-SCNC: 106 MMOL/L (ref 97–108)
CO2 SERPL-SCNC: 29 MMOL/L (ref 21–32)
COLOR UR: ABNORMAL
CREAT SERPL-MCNC: 1.04 MG/DL (ref 0.55–1.02)
CREAT UR-MCNC: 199 MG/DL
CRP SERPL-MCNC: 0.8 MG/DL (ref 0–0.6)
DIFFERENTIAL METHOD BLD: ABNORMAL
EOSINOPHIL # BLD: 0.1 K/UL (ref 0–0.4)
EOSINOPHIL NFR BLD: 3 % (ref 0–7)
EPITH CASTS URNS QL MICRO: ABNORMAL /LPF
ERYTHROCYTE [DISTWIDTH] IN BLOOD BY AUTOMATED COUNT: 14.2 % (ref 11.5–14.5)
ERYTHROCYTE [SEDIMENTATION RATE] IN BLOOD: 24 MM/HR (ref 0–30)
GLOBULIN SER CALC-MCNC: 3.7 G/DL (ref 2–4)
GLUCOSE SERPL-MCNC: 98 MG/DL (ref 65–100)
GLUCOSE UR STRIP.AUTO-MCNC: NEGATIVE MG/DL
HCT VFR BLD AUTO: 42.2 % (ref 35–47)
HGB BLD-MCNC: 12.2 G/DL (ref 11.5–16)
HGB UR QL STRIP: NEGATIVE
IMM GRANULOCYTES # BLD AUTO: 0 K/UL (ref 0–0.04)
IMM GRANULOCYTES NFR BLD AUTO: 0 % (ref 0–0.5)
KETONES UR QL STRIP.AUTO: NEGATIVE MG/DL
LEUKOCYTE ESTERASE UR QL STRIP.AUTO: ABNORMAL
LYMPHOCYTES # BLD: 2.4 K/UL (ref 0.8–3.5)
LYMPHOCYTES NFR BLD: 49 % (ref 12–49)
MCH RBC QN AUTO: 24.9 PG (ref 26–34)
MCHC RBC AUTO-ENTMCNC: 28.9 G/DL (ref 30–36.5)
MCV RBC AUTO: 86.3 FL (ref 80–99)
MONOCYTES # BLD: 0.4 K/UL (ref 0–1)
MONOCYTES NFR BLD: 9 % (ref 5–13)
NEUTS SEG # BLD: 1.9 K/UL (ref 1.8–8)
NEUTS SEG NFR BLD: 38 % (ref 32–75)
NITRITE UR QL STRIP.AUTO: NEGATIVE
NRBC # BLD: 0 K/UL (ref 0–0.01)
NRBC BLD-RTO: 0 PER 100 WBC
PH UR STRIP: 5.5 (ref 5–8)
PLATELET # BLD AUTO: 306 K/UL (ref 150–400)
PMV BLD AUTO: 11.6 FL (ref 8.9–12.9)
POTASSIUM SERPL-SCNC: 4.2 MMOL/L (ref 3.5–5.1)
PROT SERPL-MCNC: 7.3 G/DL (ref 6.4–8.2)
PROT UR STRIP-MCNC: NEGATIVE MG/DL
PROT UR-MCNC: 12 MG/DL (ref 0–11.9)
PROT/CREAT UR-RTO: 0.1
RBC # BLD AUTO: 4.89 M/UL (ref 3.8–5.2)
RBC #/AREA URNS HPF: ABNORMAL /HPF (ref 0–5)
RBC MORPH BLD: ABNORMAL
SODIUM SERPL-SCNC: 138 MMOL/L (ref 136–145)
SP GR UR REFRACTOMETRY: 1.02 (ref 1–1.03)
UROBILINOGEN UR QL STRIP.AUTO: 0.2 EU/DL (ref 0.2–1)
WBC # BLD AUTO: 4.9 K/UL (ref 3.6–11)
WBC URNS QL MICRO: ABNORMAL /HPF (ref 0–4)

## 2023-04-27 LAB
C3 SERPL-MCNC: 138 MG/DL (ref 82–167)
C4 SERPL-MCNC: 27 MG/DL (ref 12–38)
DSDNA CRITHIDIA LUCILIAE IFA: NEGATIVE

## 2023-05-12 DIAGNOSIS — I10 ESSENTIAL HYPERTENSION: ICD-10-CM

## 2023-05-12 DIAGNOSIS — M32.9 SYSTEMIC LUPUS ERYTHEMATOSUS, UNSPECIFIED SLE TYPE, UNSPECIFIED ORGAN INVOLVEMENT STATUS (HCC): Primary | ICD-10-CM

## 2023-05-12 RX ORDER — ALBUTEROL SULFATE 90 UG/1
4 AEROSOL, METERED RESPIRATORY (INHALATION) PRN
OUTPATIENT
Start: 2023-05-23

## 2023-05-12 RX ORDER — HEPARIN SODIUM (PORCINE) LOCK FLUSH IV SOLN 100 UNIT/ML 100 UNIT/ML
500 SOLUTION INTRAVENOUS PRN
OUTPATIENT
Start: 2023-05-23

## 2023-05-12 RX ORDER — DIPHENHYDRAMINE HYDROCHLORIDE 50 MG/ML
50 INJECTION INTRAMUSCULAR; INTRAVENOUS
OUTPATIENT
Start: 2023-05-23

## 2023-05-12 RX ORDER — SODIUM CHLORIDE 9 MG/ML
5-250 INJECTION, SOLUTION INTRAVENOUS PRN
OUTPATIENT
Start: 2023-05-23

## 2023-05-12 RX ORDER — ACETAMINOPHEN 325 MG/1
650 TABLET ORAL
OUTPATIENT
Start: 2023-05-23

## 2023-05-12 RX ORDER — SODIUM CHLORIDE 9 MG/ML
INJECTION, SOLUTION INTRAVENOUS CONTINUOUS
OUTPATIENT
Start: 2023-05-23

## 2023-05-12 RX ORDER — ONDANSETRON 2 MG/ML
8 INJECTION INTRAMUSCULAR; INTRAVENOUS
OUTPATIENT
Start: 2023-05-23

## 2023-05-12 RX ORDER — SODIUM CHLORIDE 0.9 % (FLUSH) 0.9 %
5-40 SYRINGE (ML) INJECTION PRN
OUTPATIENT
Start: 2023-05-23

## 2023-05-12 RX ORDER — EPINEPHRINE 1 MG/ML
0.3 INJECTION, SOLUTION, CONCENTRATE INTRAVENOUS PRN
OUTPATIENT
Start: 2023-05-23

## 2023-05-23 NOTE — PROGRESS NOTES
[address] [place] [address] Rheumatology  OBIC Note    Date: May 25, 2023  Name: Dirk Hudson  MRN: [id number]       : 1965  Diagnosis: Lupus (M32.9)   Treatment: Benlysta 960  Referring Provider: Dr. Mamie Nichole  Supervising Provider: Dr. Mamie Nichole    Patient arrived to [facility] at 0800. Ms. Agnieszka Sin allergies reviewed and she agreed to receiving today's treatment. A physical assessment was performed initially and post-treatment. Pt. denies new complaints today. Ms. Cristela Navarro vitals were monitored before and after medication administration.    Vitals:    23 0800 23 0930   BP: (!) 166/88 (!) 185/97   Pulse: 68 60   Resp: 16 16   Temp: 98 °F (36.7 °C)    TempSrc: Oral    SpO2: 99%      Recent labs results:  Lab Results   Component Value Date/Time     2023 01:05 PM    K 4.2 2023 01:05 PM     2023 01:05 PM    CO2 29 2023 01:05 PM    BUN 15 2023 01:05 PM    CREATININE 1.04 2023 01:05 PM    GLUCOSE 98 2023 01:05 PM    CALCIUM 9.0 2023 01:05 PM    LABGLOM 64 2022 12:00 AM      Lab Results   Component Value Date    WBC 4.9 2023    HGB 12.2 2023    HCT 42.2 2023    MCV 86.3 2023     2023     Medications given per providers orders:  Administrations This Visit       0.9 % sodium chloride infusion       Admin Date  2023 Action  New Bag Dose  25 mL/hr Rate  25 mL/hr Route  IntraVENous Administered By  Teresa Foley RN              belimumab (BENLYSTA) 960 mg in sodium chloride 0.9 % 287 mL infusion       Admin Date  2023 Action  New Bag Dose  960 mg Rate  287 mL/hr Route  IntraVENous Administered By  Teresa Foley, ARMAND              sodium chloride flush 0.9 % injection 5-40 mL       Admin Date  2023 Action  Given Dose  10 mL Route  IntraVENous Administered By  Teresa Foley, RN                Susy 120mg vial (Total dose 960mg )  NDC 55632-435-55  Lot #  [id number]  Exp 2027   ---mixed in---  250ml 0.9% Normal Saline  NDC

## 2023-05-25 ENCOUNTER — NURSE ONLY (OUTPATIENT)
Age: 58
End: 2023-05-25
Payer: MEDICAID

## 2023-05-25 VITALS
HEART RATE: 60 BPM | DIASTOLIC BLOOD PRESSURE: 97 MMHG | OXYGEN SATURATION: 99 % | SYSTOLIC BLOOD PRESSURE: 185 MMHG | TEMPERATURE: 98 F | RESPIRATION RATE: 16 BRPM

## 2023-05-25 DIAGNOSIS — M32.9 SYSTEMIC LUPUS ERYTHEMATOSUS, UNSPECIFIED SLE TYPE, UNSPECIFIED ORGAN INVOLVEMENT STATUS (HCC): Primary | ICD-10-CM

## 2023-05-25 DIAGNOSIS — I10 ESSENTIAL HYPERTENSION: ICD-10-CM

## 2023-05-25 PROCEDURE — 96365 THER/PROPH/DIAG IV INF INIT: CPT | Performed by: INTERNAL MEDICINE

## 2023-05-25 RX ORDER — SODIUM CHLORIDE 9 MG/ML
5-250 INJECTION, SOLUTION INTRAVENOUS PRN
OUTPATIENT
Start: 2023-06-22

## 2023-05-25 RX ORDER — ALBUTEROL SULFATE 90 UG/1
4 AEROSOL, METERED RESPIRATORY (INHALATION) PRN
OUTPATIENT
Start: 2023-06-22

## 2023-05-25 RX ORDER — ACETAMINOPHEN 325 MG/1
650 TABLET ORAL
OUTPATIENT
Start: 2023-06-22

## 2023-05-25 RX ORDER — BUDESONIDE AND FORMOTEROL FUMARATE DIHYDRATE 160; 4.5 UG/1; UG/1
AEROSOL RESPIRATORY (INHALATION)
COMMUNITY
Start: 2023-05-24

## 2023-05-25 RX ORDER — ONDANSETRON 2 MG/ML
8 INJECTION INTRAMUSCULAR; INTRAVENOUS
OUTPATIENT
Start: 2023-06-22

## 2023-05-25 RX ORDER — SODIUM CHLORIDE 0.9 % (FLUSH) 0.9 %
5-40 SYRINGE (ML) INJECTION PRN
OUTPATIENT
Start: 2023-06-22

## 2023-05-25 RX ORDER — DULOXETIN HYDROCHLORIDE 30 MG/1
30 CAPSULE, DELAYED RELEASE ORAL DAILY
COMMUNITY
Start: 2020-03-12 | End: 2023-05-25 | Stop reason: ALTCHOICE

## 2023-05-25 RX ORDER — DIPHENHYDRAMINE HYDROCHLORIDE 50 MG/ML
50 INJECTION INTRAMUSCULAR; INTRAVENOUS
OUTPATIENT
Start: 2023-06-22

## 2023-05-25 RX ORDER — SODIUM CHLORIDE 0.9 % (FLUSH) 0.9 %
5-40 SYRINGE (ML) INJECTION PRN
Status: DISCONTINUED | OUTPATIENT
Start: 2023-05-25 | End: 2023-05-25 | Stop reason: HOSPADM

## 2023-05-25 RX ORDER — HEPARIN SODIUM (PORCINE) LOCK FLUSH IV SOLN 100 UNIT/ML 100 UNIT/ML
500 SOLUTION INTRAVENOUS PRN
OUTPATIENT
Start: 2023-06-22

## 2023-05-25 RX ORDER — EPINEPHRINE 1 MG/ML
0.3 INJECTION, SOLUTION, CONCENTRATE INTRAVENOUS PRN
OUTPATIENT
Start: 2023-06-22

## 2023-05-25 RX ORDER — SODIUM CHLORIDE 9 MG/ML
INJECTION, SOLUTION INTRAVENOUS CONTINUOUS
OUTPATIENT
Start: 2023-06-22

## 2023-05-25 RX ORDER — SODIUM CHLORIDE 9 MG/ML
5-250 INJECTION, SOLUTION INTRAVENOUS PRN
Status: DISCONTINUED | OUTPATIENT
Start: 2023-05-25 | End: 2023-05-25 | Stop reason: HOSPADM

## 2023-05-25 RX ADMIN — SODIUM CHLORIDE 25 ML/HR: 9 INJECTION, SOLUTION INTRAVENOUS at 08:16

## 2023-05-25 RX ADMIN — Medication 10 ML: at 08:16

## 2023-05-25 RX ADMIN — BELIMUMAB 960 MG: 120 INJECTION, POWDER, LYOPHILIZED, FOR SOLUTION INTRAVENOUS at 08:22

## 2023-05-25 ASSESSMENT — PATIENT HEALTH QUESTIONNAIRE - PHQ9
SUM OF ALL RESPONSES TO PHQ QUESTIONS 1-9: 0
SUM OF ALL RESPONSES TO PHQ QUESTIONS 1-9: 0
SUM OF ALL RESPONSES TO PHQ9 QUESTIONS 1 & 2: 0
1. LITTLE INTEREST OR PLEASURE IN DOING THINGS: 0
SUM OF ALL RESPONSES TO PHQ QUESTIONS 1-9: 0
SUM OF ALL RESPONSES TO PHQ QUESTIONS 1-9: 0
2. FEELING DOWN, DEPRESSED OR HOPELESS: 0

## 2023-06-20 ENCOUNTER — NURSE ONLY (OUTPATIENT)
Age: 58
End: 2023-06-20
Payer: MEDICAID

## 2023-06-20 VITALS
OXYGEN SATURATION: 98 % | TEMPERATURE: 98 F | HEART RATE: 68 BPM | RESPIRATION RATE: 16 BRPM | SYSTOLIC BLOOD PRESSURE: 170 MMHG | DIASTOLIC BLOOD PRESSURE: 85 MMHG

## 2023-06-20 DIAGNOSIS — I10 ESSENTIAL HYPERTENSION: ICD-10-CM

## 2023-06-20 DIAGNOSIS — Z79.899 ONGOING USE OF POSSIBLY TOXIC MEDICATION: Primary | ICD-10-CM

## 2023-06-20 DIAGNOSIS — M32.9 SYSTEMIC LUPUS ERYTHEMATOSUS, UNSPECIFIED SLE TYPE, UNSPECIFIED ORGAN INVOLVEMENT STATUS (HCC): ICD-10-CM

## 2023-06-20 PROCEDURE — PBSHW PBB SHADOW CHARGE: Performed by: INTERNAL MEDICINE

## 2023-06-20 PROCEDURE — 96365 THER/PROPH/DIAG IV INF INIT: CPT | Performed by: INTERNAL MEDICINE

## 2023-06-20 RX ORDER — HEPARIN SODIUM 100 [USP'U]/ML
500 INJECTION, SOLUTION INTRAVENOUS PRN
OUTPATIENT
Start: 2023-07-18

## 2023-06-20 RX ORDER — DIPHENHYDRAMINE HYDROCHLORIDE 50 MG/ML
50 INJECTION INTRAMUSCULAR; INTRAVENOUS
OUTPATIENT
Start: 2023-07-18

## 2023-06-20 RX ORDER — ACETAMINOPHEN 325 MG/1
650 TABLET ORAL
OUTPATIENT
Start: 2023-07-18

## 2023-06-20 RX ORDER — SODIUM CHLORIDE 9 MG/ML
5-250 INJECTION, SOLUTION INTRAVENOUS PRN
OUTPATIENT
Start: 2023-07-18

## 2023-06-20 RX ORDER — ALBUTEROL SULFATE 90 UG/1
4 AEROSOL, METERED RESPIRATORY (INHALATION) PRN
OUTPATIENT
Start: 2023-07-18

## 2023-06-20 RX ORDER — SODIUM CHLORIDE 9 MG/ML
INJECTION, SOLUTION INTRAVENOUS CONTINUOUS
OUTPATIENT
Start: 2023-07-18

## 2023-06-20 RX ORDER — SODIUM CHLORIDE 9 MG/ML
5-250 INJECTION, SOLUTION INTRAVENOUS PRN
Status: DISCONTINUED | OUTPATIENT
Start: 2023-06-20 | End: 2023-06-20 | Stop reason: HOSPADM

## 2023-06-20 RX ORDER — SODIUM CHLORIDE 0.9 % (FLUSH) 0.9 %
5-40 SYRINGE (ML) INJECTION PRN
Status: DISCONTINUED | OUTPATIENT
Start: 2023-06-20 | End: 2023-06-20 | Stop reason: HOSPADM

## 2023-06-20 RX ORDER — EPINEPHRINE 1 MG/ML
0.3 INJECTION, SOLUTION, CONCENTRATE INTRAVENOUS PRN
OUTPATIENT
Start: 2023-07-18

## 2023-06-20 RX ORDER — SODIUM CHLORIDE 0.9 % (FLUSH) 0.9 %
5-40 SYRINGE (ML) INJECTION PRN
OUTPATIENT
Start: 2023-07-18

## 2023-06-20 RX ORDER — ONDANSETRON 2 MG/ML
8 INJECTION INTRAMUSCULAR; INTRAVENOUS
OUTPATIENT
Start: 2023-07-18

## 2023-06-20 RX ADMIN — Medication 10 ML: at 15:27

## 2023-06-20 RX ADMIN — BELIMUMAB 960 MG: 120 INJECTION, POWDER, LYOPHILIZED, FOR SOLUTION INTRAVENOUS at 15:27

## 2023-06-20 RX ADMIN — SODIUM CHLORIDE 25 ML/HR: 9 INJECTION, SOLUTION INTRAVENOUS at 15:27

## 2023-06-20 ASSESSMENT — PAIN SCALES - GENERAL: PAINLEVEL_OUTOF10: 3

## 2023-06-21 ENCOUNTER — APPOINTMENT (OUTPATIENT)
Facility: HOSPITAL | Age: 58
End: 2023-06-21
Payer: MEDICAID

## 2023-06-21 ENCOUNTER — HOSPITAL ENCOUNTER (EMERGENCY)
Facility: HOSPITAL | Age: 58
Discharge: HOME | End: 2023-06-21
Attending: EMERGENCY MEDICINE | Admitting: EMERGENCY MEDICINE
Payer: MEDICAID

## 2023-06-21 VITALS
OXYGEN SATURATION: 96 % | HEART RATE: 74 BPM | SYSTOLIC BLOOD PRESSURE: 186 MMHG | DIASTOLIC BLOOD PRESSURE: 91 MMHG | RESPIRATION RATE: 20 BRPM | TEMPERATURE: 98.3 F

## 2023-06-21 DIAGNOSIS — S52.125A CLOSED NONDISPLACED FRACTURE OF HEAD OF LEFT RADIUS, INITIAL ENCOUNTER: Primary | ICD-10-CM

## 2023-06-21 LAB
ALBUMIN SERPL-MCNC: 3.9 G/DL (ref 3.5–5)
ALBUMIN/GLOB SERPL: 1.1 (ref 1.1–2.2)
ALP SERPL-CCNC: 128 U/L (ref 45–117)
ALT SERPL-CCNC: 26 U/L (ref 12–78)
ANION GAP SERPL CALC-SCNC: 5 MMOL/L (ref 5–15)
AST SERPL-CCNC: 21 U/L (ref 15–37)
BASOPHILS # BLD: 0.1 K/UL (ref 0–0.1)
BASOPHILS NFR BLD: 1 % (ref 0–1)
BILIRUB SERPL-MCNC: 0.3 MG/DL (ref 0.2–1)
BUN SERPL-MCNC: 15 MG/DL (ref 6–20)
BUN/CREAT SERPL: 16 (ref 12–20)
CALCIUM SERPL-MCNC: 9.1 MG/DL (ref 8.5–10.1)
CHLORIDE SERPL-SCNC: 109 MMOL/L (ref 97–108)
CO2 SERPL-SCNC: 29 MMOL/L (ref 21–32)
CREAT SERPL-MCNC: 0.92 MG/DL (ref 0.55–1.02)
CRP SERPL-MCNC: 1.29 MG/DL (ref 0–0.6)
DIFFERENTIAL METHOD BLD: ABNORMAL
EOSINOPHIL # BLD: 0.1 K/UL (ref 0–0.4)
EOSINOPHIL NFR BLD: 1 % (ref 0–7)
ERYTHROCYTE [DISTWIDTH] IN BLOOD BY AUTOMATED COUNT: 13.7 % (ref 11.5–14.5)
ERYTHROCYTE [SEDIMENTATION RATE] IN BLOOD: 37 MM/HR (ref 0–30)
GLOBULIN SER CALC-MCNC: 3.7 G/DL (ref 2–4)
GLUCOSE SERPL-MCNC: 103 MG/DL (ref 65–100)
HCT VFR BLD AUTO: 38.7 % (ref 35–47)
HGB BLD-MCNC: 12 G/DL (ref 11.5–16)
IMM GRANULOCYTES # BLD AUTO: 0 K/UL
IMM GRANULOCYTES NFR BLD AUTO: 0 %
LYMPHOCYTES # BLD: 1.9 K/UL (ref 0.8–3.5)
LYMPHOCYTES NFR BLD: 38 % (ref 12–49)
MCH RBC QN AUTO: 25.2 PG (ref 26–34)
MCHC RBC AUTO-ENTMCNC: 31 G/DL (ref 30–36.5)
MCV RBC AUTO: 81.3 FL (ref 80–99)
MONOCYTES # BLD: 0.4 K/UL (ref 0–1)
MONOCYTES NFR BLD: 7 % (ref 5–13)
NEUTS SEG # BLD: 2.6 K/UL (ref 1.8–8)
NEUTS SEG NFR BLD: 53 % (ref 32–75)
NRBC # BLD: 0 K/UL (ref 0–0.01)
NRBC BLD-RTO: 0 PER 100 WBC
PLATELET # BLD AUTO: 272 K/UL (ref 150–400)
PMV BLD AUTO: 12.1 FL (ref 8.9–12.9)
POTASSIUM SERPL-SCNC: 3.8 MMOL/L (ref 3.5–5.1)
PROT SERPL-MCNC: 7.6 G/DL (ref 6.4–8.2)
RBC # BLD AUTO: 4.76 M/UL (ref 3.8–5.2)
RBC MORPH BLD: ABNORMAL
SODIUM SERPL-SCNC: 143 MMOL/L (ref 136–145)
WBC # BLD AUTO: 5.1 K/UL (ref 3.6–11)

## 2023-06-21 PROCEDURE — 99283 EMERGENCY DEPT VISIT LOW MDM: CPT

## 2023-06-21 PROCEDURE — 6370000000 HC RX 637 (ALT 250 FOR IP)

## 2023-06-21 PROCEDURE — 73030 X-RAY EXAM OF SHOULDER: CPT

## 2023-06-21 PROCEDURE — 73080 X-RAY EXAM OF ELBOW: CPT

## 2023-06-21 PROCEDURE — 73110 X-RAY EXAM OF WRIST: CPT

## 2023-06-21 RX ORDER — KETOROLAC TROMETHAMINE 30 MG/ML
15 INJECTION, SOLUTION INTRAMUSCULAR; INTRAVENOUS ONCE
Status: DISCONTINUED | OUTPATIENT
Start: 2023-06-21 | End: 2023-06-21

## 2023-06-21 RX ORDER — HYDROCODONE BITARTRATE AND ACETAMINOPHEN 5; 325 MG/1; MG/1
1 TABLET ORAL
Status: COMPLETED | OUTPATIENT
Start: 2023-06-21 | End: 2023-06-21

## 2023-06-21 RX ORDER — TRAMADOL HYDROCHLORIDE 50 MG/1
50 TABLET ORAL EVERY 6 HOURS PRN
Qty: 12 TABLET | Refills: 0 | Status: SHIPPED | OUTPATIENT
Start: 2023-06-21 | End: 2023-06-28

## 2023-06-21 RX ORDER — LORATADINE 10 MG
TABLET ORAL
Qty: 90 TABLET | Refills: 3 | Status: SHIPPED | OUTPATIENT
Start: 2023-06-21

## 2023-06-21 RX ADMIN — HYDROCODONE BITARTRATE AND ACETAMINOPHEN 1 TABLET: 5; 325 TABLET ORAL at 19:30

## 2023-06-21 ASSESSMENT — ENCOUNTER SYMPTOMS
NAUSEA: 0
VOMITING: 0
CONSTIPATION: 0
SHORTNESS OF BREATH: 0
DIARRHEA: 0

## 2023-06-21 ASSESSMENT — PAIN DESCRIPTION - ORIENTATION: ORIENTATION: LEFT

## 2023-06-21 ASSESSMENT — PAIN SCALES - GENERAL: PAINLEVEL_OUTOF10: 10

## 2023-06-21 ASSESSMENT — PAIN DESCRIPTION - LOCATION: LOCATION: ARM

## 2023-06-21 NOTE — ED PROVIDER NOTES
[facility] EMERGENCY DEP  EMERGENCY DEPARTMENT ENCOUNTER      Pt Name: Pool Hopson  MRN: [id number]  Lamingfghulam 1965  Date of evaluation: 6/21/2023  Provider: Yaniv Roldan PA-C    CHIEF COMPLAINT       Chief Complaint   Patient presents with    Fall    Arm Pain         HISTORY OF PRESENT ILLNESS   (Location/Symptom, Timing/Onset, Context/Setting, Quality, Duration, Modifying Factors, Severity)  Note limiting factors. HPI    Pool Hopson is a 60 y.o. female with Hx of hypertension who presents ambulatory to [facility] ED with cc of left arm injury. Patient was changing a light bulb when she slipped and had a ground-level fall, landing on her left arm. Reports 10 out of 10 pain in her left arm, worse with movement. The pain is diffuse from her shoulders down to her fingertips. Denies head injury, LOC, blood thinners, pain or injury elsewhere. PMH: Lupus, hypertension, headache, Rucker's neuroma, CVA, menopause, asthma      PCP: Jeri Chacon PA-C    There are no other complaints, changes or physical findings at this time. Review of External Medical Records:     Nursing Notes were reviewed. REVIEW OF SYSTEMS    (2-9 systems for level 4, 10 or more for level 5)     Review of Systems   Constitutional:  Negative for chills and fever. HENT:  Negative for congestion. Respiratory:  Negative for shortness of breath. Cardiovascular:  Negative for chest pain. Gastrointestinal:  Negative for constipation, diarrhea, nausea and vomiting. Genitourinary:  Negative for dysuria and hematuria. Musculoskeletal:  Positive for arthralgias, joint swelling and myalgias. Skin:  Negative for rash. Neurological:  Negative for dizziness, light-headedness and headaches. All other systems reviewed and are negative. Except as noted above the remainder of the review of systems was reviewed and negative.        PAST MEDICAL HISTORY     Past Medical History:   Diagnosis Date    Asthma     Fibroid     Headache

## 2023-06-21 NOTE — ED TRIAGE NOTES
Pt arrives from home with cc of fall at home. She was changing a lightbulb and slipped and fell onto the floor and landed on her left arm. No LOC and head injury. Pt is unable to rotate the arm due to pain.

## 2023-06-21 NOTE — DISCHARGE INSTRUCTIONS
As discussed, you have a fracture at your elbow. Follow up with orthopedics for further management (2 doctors are provided on the referral, you may choose which one has the soonest availability). Return to the ER if you experience severe worsening symptoms. Alternate Tylenol and Motrin for pain. You are being prescribed a narcotic pain medication for severe pain.

## 2023-06-22 LAB
C3 SERPL-MCNC: 142 MG/DL (ref 82–167)
C4 SERPL-MCNC: 28 MG/DL (ref 12–38)

## 2023-06-22 NOTE — ED NOTES
Discharge instructions done by provider, pt in no apparent distress upon discharge     Carmella Caban RN  06/21/23 2007

## 2023-06-24 LAB — DSDNA CRITHIDIA LUCILIAE: NEGATIVE

## 2023-07-07 ENCOUNTER — HOSPITAL ENCOUNTER (OUTPATIENT)
Facility: HOSPITAL | Age: 58
End: 2023-07-07
Attending: ORTHOPAEDIC SURGERY
Payer: MEDICAID

## 2023-07-07 DIAGNOSIS — S52.122A CLOSED DISPLACED FRACTURE OF HEAD OF LEFT RADIUS, INITIAL ENCOUNTER: ICD-10-CM

## 2023-07-07 PROCEDURE — 73200 CT UPPER EXTREMITY W/O DYE: CPT

## 2023-07-12 ENCOUNTER — OFFICE VISIT (OUTPATIENT)
Age: 58
End: 2023-07-12
Payer: MEDICAID

## 2023-07-12 VITALS
HEART RATE: 68 BPM | DIASTOLIC BLOOD PRESSURE: 100 MMHG | WEIGHT: 220 LBS | HEIGHT: 64 IN | SYSTOLIC BLOOD PRESSURE: 180 MMHG | RESPIRATION RATE: 20 BRPM | BODY MASS INDEX: 37.56 KG/M2 | TEMPERATURE: 98 F | OXYGEN SATURATION: 98 %

## 2023-07-12 DIAGNOSIS — M32.9 SYSTEMIC LUPUS ERYTHEMATOSUS, UNSPECIFIED SLE TYPE, UNSPECIFIED ORGAN INVOLVEMENT STATUS (HCC): ICD-10-CM

## 2023-07-12 DIAGNOSIS — L98.9 CHANGING SKIN LESION: ICD-10-CM

## 2023-07-12 DIAGNOSIS — S42.302D CLOSED FRACTURE OF LEFT UPPER EXTREMITY WITH ROUTINE HEALING, SUBSEQUENT ENCOUNTER: ICD-10-CM

## 2023-07-12 DIAGNOSIS — E66.01 SEVERE OBESITY (HCC): ICD-10-CM

## 2023-07-12 DIAGNOSIS — I10 ESSENTIAL HYPERTENSION: Primary | ICD-10-CM

## 2023-07-12 PROCEDURE — 99214 OFFICE O/P EST MOD 30 MIN: CPT | Performed by: PHYSICIAN ASSISTANT

## 2023-07-12 PROCEDURE — 3079F DIAST BP 80-89 MM HG: CPT | Performed by: PHYSICIAN ASSISTANT

## 2023-07-12 PROCEDURE — 3077F SYST BP >= 140 MM HG: CPT | Performed by: PHYSICIAN ASSISTANT

## 2023-07-12 RX ORDER — METOPROLOL SUCCINATE 50 MG/1
50 TABLET, EXTENDED RELEASE ORAL DAILY
Qty: 90 TABLET | Refills: 1 | Status: SHIPPED | OUTPATIENT
Start: 2023-07-12

## 2023-07-12 RX ORDER — CHLORTHALIDONE 50 MG/1
50 TABLET ORAL DAILY
Qty: 90 TABLET | Refills: 1 | Status: SHIPPED | OUTPATIENT
Start: 2023-07-12

## 2023-07-12 ASSESSMENT — ENCOUNTER SYMPTOMS
COUGH: 0
SHORTNESS OF BREATH: 0

## 2023-07-12 NOTE — PROGRESS NOTES
Patient is here for follow up. 1. \"Have you been to the ER, urgent care clinic since your last visit? Hospitalized since your last visit? \" Yes  for arm    2. \"Have you seen or consulted any other health care providers outside of the [address] [place] [address] since your last visit? \" No     3. For patients aged 43-73: Has the patient had a colonoscopy / FIT/ Cologuard? Yes - Care Gap present. Rooming MA/LPN to request most recent results      If the patient is female:    4. For patients aged 43-66: Has the patient had a mammogram within the past 2 years? Yes - no Care Gap present      5. For patients aged 21-65: Has the patient had a pap smear?  Yes - no Care Gap present

## 2023-07-13 ENCOUNTER — ANESTHESIA (OUTPATIENT)
Facility: HOSPITAL | Age: 58
End: 2023-07-13
Payer: MEDICAID

## 2023-07-13 ENCOUNTER — APPOINTMENT (OUTPATIENT)
Facility: HOSPITAL | Age: 58
End: 2023-07-13
Attending: ORTHOPAEDIC SURGERY
Payer: MEDICAID

## 2023-07-13 ENCOUNTER — HOSPITAL ENCOUNTER (OUTPATIENT)
Facility: HOSPITAL | Age: 58
Setting detail: OUTPATIENT SURGERY
Discharge: HOME | End: 2023-07-13
Attending: ORTHOPAEDIC SURGERY | Admitting: ORTHOPAEDIC SURGERY
Payer: MEDICAID

## 2023-07-13 ENCOUNTER — ANESTHESIA EVENT (OUTPATIENT)
Facility: HOSPITAL | Age: 58
End: 2023-07-13
Payer: MEDICAID

## 2023-07-13 VITALS
TEMPERATURE: 97.7 F | OXYGEN SATURATION: 97 % | DIASTOLIC BLOOD PRESSURE: 66 MMHG | RESPIRATION RATE: 14 BRPM | HEIGHT: 64 IN | BODY MASS INDEX: 37.56 KG/M2 | SYSTOLIC BLOOD PRESSURE: 128 MMHG | HEART RATE: 55 BPM | WEIGHT: 220 LBS

## 2023-07-13 DIAGNOSIS — S53.432A SPRAIN OF LATERAL COLLATERAL LIGAMENT OF ELBOW, LEFT, INITIAL ENCOUNTER: Primary | ICD-10-CM

## 2023-07-13 DIAGNOSIS — S52.122G CLOSED DISPLACED FRACTURE OF HEAD OF LEFT RADIUS WITH DELAYED HEALING, SUBSEQUENT ENCOUNTER: ICD-10-CM

## 2023-07-13 PROBLEM — S52.122A CLOSED DISPLACED FRACTURE OF HEAD OF LEFT RADIUS: Status: ACTIVE | Noted: 2023-07-13

## 2023-07-13 PROCEDURE — 7100000001 HC PACU RECOVERY - ADDTL 15 MIN: Performed by: ORTHOPAEDIC SURGERY

## 2023-07-13 PROCEDURE — 6360000002 HC RX W HCPCS: Performed by: ANESTHESIOLOGY

## 2023-07-13 PROCEDURE — 2709999900 HC NON-CHARGEABLE SUPPLY: Performed by: ORTHOPAEDIC SURGERY

## 2023-07-13 PROCEDURE — 2500000003 HC RX 250 WO HCPCS: Performed by: ANESTHESIOLOGY

## 2023-07-13 PROCEDURE — 3600000004 HC SURGERY LEVEL 4 BASE: Performed by: ORTHOPAEDIC SURGERY

## 2023-07-13 PROCEDURE — 3700000001 HC ADD 15 MINUTES (ANESTHESIA): Performed by: ORTHOPAEDIC SURGERY

## 2023-07-13 PROCEDURE — 7100000000 HC PACU RECOVERY - FIRST 15 MIN: Performed by: ORTHOPAEDIC SURGERY

## 2023-07-13 PROCEDURE — 3700000000 HC ANESTHESIA ATTENDED CARE: Performed by: ORTHOPAEDIC SURGERY

## 2023-07-13 PROCEDURE — 64415 NJX AA&/STRD BRCH PLXS IMG: CPT | Performed by: ANESTHESIOLOGY

## 2023-07-13 PROCEDURE — 3600000014 HC SURGERY LEVEL 4 ADDTL 15MIN: Performed by: ORTHOPAEDIC SURGERY

## 2023-07-13 PROCEDURE — 2580000003 HC RX 258: Performed by: ANESTHESIOLOGY

## 2023-07-13 PROCEDURE — C1713 ANCHOR/SCREW BN/BN,TIS/BN: HCPCS | Performed by: ORTHOPAEDIC SURGERY

## 2023-07-13 DEVICE — 14.0MM, MICRO ACUTRAK 2® BONE SCREW
Type: IMPLANTABLE DEVICE | Site: ARM | Status: FUNCTIONAL
Brand: ACUMED

## 2023-07-13 DEVICE — 18.0MM, MICRO ACUTRAK 2® BONE SCREW
Type: IMPLANTABLE DEVICE | Site: ARM | Status: FUNCTIONAL
Brand: ACUMED

## 2023-07-13 DEVICE — MINILOK QUICKANCHOR PLUS (NUMBER 2/0) SUTURE POLY (L-LACTIDE) ABSORBABLE ANCHOR, SIZE 2/0 WHITE ETHIBOND BRAIDED POLYESTER SUTURE (3 METRIC), WITH V-5 TAPERCUT NEEDLES AND 2.0 X 9.7MM DRILL BIT
Type: IMPLANTABLE DEVICE | Site: ARM | Status: FUNCTIONAL
Brand: QUICKANCHOR ETHIBOND TAPERCUT

## 2023-07-13 RX ORDER — MIDAZOLAM HYDROCHLORIDE 2 MG/2ML
2 INJECTION, SOLUTION INTRAMUSCULAR; INTRAVENOUS
Status: DISCONTINUED | OUTPATIENT
Start: 2023-07-13 | End: 2023-07-13 | Stop reason: HOSPADM

## 2023-07-13 RX ORDER — SODIUM CHLORIDE, SODIUM LACTATE, POTASSIUM CHLORIDE, CALCIUM CHLORIDE 600; 310; 30; 20 MG/100ML; MG/100ML; MG/100ML; MG/100ML
INJECTION, SOLUTION INTRAVENOUS CONTINUOUS
Status: DISCONTINUED | OUTPATIENT
Start: 2023-07-13 | End: 2023-07-13 | Stop reason: HOSPADM

## 2023-07-13 RX ORDER — SODIUM CHLORIDE 0.9 % (FLUSH) 0.9 %
5-40 SYRINGE (ML) INJECTION PRN
Status: DISCONTINUED | OUTPATIENT
Start: 2023-07-13 | End: 2023-07-13 | Stop reason: HOSPADM

## 2023-07-13 RX ORDER — SODIUM CHLORIDE 0.9 % (FLUSH) 0.9 %
5-40 SYRINGE (ML) INJECTION PRN
Status: CANCELLED | OUTPATIENT
Start: 2023-07-13

## 2023-07-13 RX ORDER — MIDAZOLAM HYDROCHLORIDE 1 MG/ML
5 INJECTION, SOLUTION INTRAMUSCULAR; INTRAVENOUS ONCE
Status: COMPLETED | OUTPATIENT
Start: 2023-07-13 | End: 2023-07-13

## 2023-07-13 RX ORDER — LIDOCAINE HYDROCHLORIDE 10 MG/ML
1 INJECTION, SOLUTION INFILTRATION; PERINEURAL
Status: DISCONTINUED | OUTPATIENT
Start: 2023-07-13 | End: 2023-07-13 | Stop reason: HOSPADM

## 2023-07-13 RX ORDER — ROPIVACAINE HYDROCHLORIDE 5 MG/ML
INJECTION, SOLUTION EPIDURAL; INFILTRATION; PERINEURAL
Status: COMPLETED | OUTPATIENT
Start: 2023-07-13 | End: 2023-07-13

## 2023-07-13 RX ORDER — DEXMEDETOMIDINE HYDROCHLORIDE 100 UG/ML
INJECTION, SOLUTION INTRAVENOUS PRN
Status: DISCONTINUED | OUTPATIENT
Start: 2023-07-13 | End: 2023-07-13 | Stop reason: SDUPTHER

## 2023-07-13 RX ORDER — OXYCODONE HYDROCHLORIDE 5 MG/1
5 TABLET ORAL
Status: CANCELLED | OUTPATIENT
Start: 2023-07-13 | End: 2023-07-14

## 2023-07-13 RX ORDER — SODIUM CHLORIDE 9 MG/ML
INJECTION, SOLUTION INTRAVENOUS PRN
Status: CANCELLED | OUTPATIENT
Start: 2023-07-13

## 2023-07-13 RX ORDER — FENTANYL CITRATE 50 UG/ML
100 INJECTION, SOLUTION INTRAMUSCULAR; INTRAVENOUS
Status: COMPLETED | OUTPATIENT
Start: 2023-07-13 | End: 2023-07-13

## 2023-07-13 RX ORDER — SODIUM CHLORIDE 0.9 % (FLUSH) 0.9 %
5-40 SYRINGE (ML) INJECTION EVERY 12 HOURS SCHEDULED
Status: DISCONTINUED | OUTPATIENT
Start: 2023-07-13 | End: 2023-07-13 | Stop reason: HOSPADM

## 2023-07-13 RX ORDER — SODIUM CHLORIDE 9 MG/ML
INJECTION, SOLUTION INTRAVENOUS PRN
Status: DISCONTINUED | OUTPATIENT
Start: 2023-07-13 | End: 2023-07-13 | Stop reason: HOSPADM

## 2023-07-13 RX ORDER — ONDANSETRON 2 MG/ML
4 INJECTION INTRAMUSCULAR; INTRAVENOUS
Status: CANCELLED | OUTPATIENT
Start: 2023-07-13 | End: 2023-07-14

## 2023-07-13 RX ORDER — ONDANSETRON 4 MG/1
4 TABLET, ORALLY DISINTEGRATING ORAL EVERY 8 HOURS PRN
Qty: 10 TABLET | Refills: 0 | Status: SHIPPED | OUTPATIENT
Start: 2023-07-13

## 2023-07-13 RX ORDER — SODIUM CHLORIDE, SODIUM LACTATE, POTASSIUM CHLORIDE, CALCIUM CHLORIDE 600; 310; 30; 20 MG/100ML; MG/100ML; MG/100ML; MG/100ML
INJECTION, SOLUTION INTRAVENOUS CONTINUOUS PRN
Status: DISCONTINUED | OUTPATIENT
Start: 2023-07-13 | End: 2023-07-13 | Stop reason: SDUPTHER

## 2023-07-13 RX ORDER — FENTANYL CITRATE 50 UG/ML
25 INJECTION, SOLUTION INTRAMUSCULAR; INTRAVENOUS EVERY 5 MIN PRN
Status: CANCELLED | OUTPATIENT
Start: 2023-07-13

## 2023-07-13 RX ORDER — OXYCODONE AND ACETAMINOPHEN 10; 325 MG/1; MG/1
1 TABLET ORAL EVERY 6 HOURS PRN
Qty: 25 TABLET | Refills: 0 | Status: SHIPPED | OUTPATIENT
Start: 2023-07-13 | End: 2023-07-20

## 2023-07-13 RX ORDER — HYDRALAZINE HYDROCHLORIDE 20 MG/ML
10 INJECTION INTRAMUSCULAR; INTRAVENOUS
Status: CANCELLED | OUTPATIENT
Start: 2023-07-13

## 2023-07-13 RX ORDER — ACETAMINOPHEN 500 MG
1000 TABLET ORAL ONCE
Status: DISCONTINUED | OUTPATIENT
Start: 2023-07-13 | End: 2023-07-13 | Stop reason: HOSPADM

## 2023-07-13 RX ORDER — LIDOCAINE HYDROCHLORIDE 20 MG/ML
INJECTION, SOLUTION EPIDURAL; INFILTRATION; INTRACAUDAL; PERINEURAL PRN
Status: DISCONTINUED | OUTPATIENT
Start: 2023-07-13 | End: 2023-07-13 | Stop reason: SDUPTHER

## 2023-07-13 RX ORDER — SODIUM CHLORIDE 0.9 % (FLUSH) 0.9 %
5-40 SYRINGE (ML) INJECTION EVERY 12 HOURS SCHEDULED
Status: CANCELLED | OUTPATIENT
Start: 2023-07-13

## 2023-07-13 RX ORDER — FENTANYL CITRATE 50 UG/ML
INJECTION, SOLUTION INTRAMUSCULAR; INTRAVENOUS PRN
Status: DISCONTINUED | OUTPATIENT
Start: 2023-07-13 | End: 2023-07-13 | Stop reason: SDUPTHER

## 2023-07-13 RX ORDER — PROCHLORPERAZINE EDISYLATE 5 MG/ML
5 INJECTION INTRAMUSCULAR; INTRAVENOUS
Status: CANCELLED | OUTPATIENT
Start: 2023-07-13 | End: 2023-07-14

## 2023-07-13 RX ORDER — HYDROMORPHONE HYDROCHLORIDE 1 MG/ML
0.5 INJECTION, SOLUTION INTRAMUSCULAR; INTRAVENOUS; SUBCUTANEOUS EVERY 5 MIN PRN
Status: CANCELLED | OUTPATIENT
Start: 2023-07-13

## 2023-07-13 RX ADMIN — DEXMEDETOMIDINE HYDROCHLORIDE 10 MCG: 100 INJECTION, SOLUTION, CONCENTRATE INTRAVENOUS at 08:30

## 2023-07-13 RX ADMIN — DEXMEDETOMIDINE HYDROCHLORIDE 5 MCG: 100 INJECTION, SOLUTION, CONCENTRATE INTRAVENOUS at 08:48

## 2023-07-13 RX ADMIN — FENTANYL CITRATE 50 MCG: 50 INJECTION, SOLUTION INTRAMUSCULAR; INTRAVENOUS at 07:42

## 2023-07-13 RX ADMIN — FENTANYL CITRATE 25 MCG: 50 INJECTION, SOLUTION INTRAMUSCULAR; INTRAVENOUS at 08:30

## 2023-07-13 RX ADMIN — SODIUM CHLORIDE, SODIUM LACTATE, POTASSIUM CHLORIDE, AND CALCIUM CHLORIDE: 600; 310; 30; 20 INJECTION, SOLUTION INTRAVENOUS at 08:24

## 2023-07-13 RX ADMIN — DEXMEDETOMIDINE HYDROCHLORIDE 5 MCG: 100 INJECTION, SOLUTION, CONCENTRATE INTRAVENOUS at 09:18

## 2023-07-13 RX ADMIN — ROPIVACAINE HYDROCHLORIDE 30 ML: 5 INJECTION, SOLUTION EPIDURAL; INFILTRATION; PERINEURAL at 07:48

## 2023-07-13 RX ADMIN — MIDAZOLAM 2 MG: 1 INJECTION INTRAMUSCULAR; INTRAVENOUS at 07:50

## 2023-07-13 RX ADMIN — FENTANYL CITRATE 25 MCG: 50 INJECTION, SOLUTION INTRAMUSCULAR; INTRAVENOUS at 09:12

## 2023-07-13 RX ADMIN — DEXMEDETOMIDINE HYDROCHLORIDE 5 MCG: 100 INJECTION, SOLUTION, CONCENTRATE INTRAVENOUS at 08:35

## 2023-07-13 RX ADMIN — FENTANYL CITRATE 25 MCG: 50 INJECTION, SOLUTION INTRAMUSCULAR; INTRAVENOUS at 08:40

## 2023-07-13 RX ADMIN — PROPOFOL 60 MCG/KG/MIN: 10 INJECTION, EMULSION INTRAVENOUS at 08:30

## 2023-07-13 RX ADMIN — LIDOCAINE HYDROCHLORIDE 40 MG: 20 INJECTION, SOLUTION EPIDURAL; INFILTRATION; INTRACAUDAL; PERINEURAL at 08:30

## 2023-07-13 ASSESSMENT — PAIN - FUNCTIONAL ASSESSMENT: PAIN_FUNCTIONAL_ASSESSMENT: 0-10

## 2023-07-13 ASSESSMENT — PAIN SCALES - GENERAL: PAINLEVEL_OUTOF10: 0

## 2023-07-13 NOTE — BRIEF OP NOTE
Brief Postoperative Note      Patient: Tiffany Hammonds  YOB: 1965  MRN: [id number]    Date of Procedure: 7/13/2023    Pre-Op Diagnosis Codes:     * Closed displaced fracture of head of left radius, initial encounter [S52.122A]  Left elbow lateral collateral ligament tear    Post-Op Diagnosis:     Displaced fracture of left radial head  Left elbow partial lateral collateral ligament tear       Procedure(s):  LEFT RADIAL HEAD OPEN REDUCTION INTERNAL FIXATION, LEFT ELBOW LATERAL COLLATERAL LIGAMENT REPAIR    Surgeon(s):  Chandler Silverman MD    Assistant:  * No surgical staff found *    Anesthesia: General    Estimated Blood Loss (mL): Minimal    Complications: None    Specimens:   * No specimens in log *    Implants:  Implant Name Type Inv. Item Serial No.  Lot No. LRB No. Used Action   SCREW BNE L18MM ST FOR HDLSS COMPR SYS DAREN ACUTRK 2 - SNA  SCREW BNE L18MM ST FOR HDLSS COMPR SYS DAREN ACUTRK 2 NA [employer]- [id number] Left 1 Implanted   SCREW BNE L18MM ST FOR HDLSS COMPR SYS DAREN ACUTRK 2 - SNA  SCREW BNE L18MM ST FOR HDLSS COMPR SYS DAREN ACUTRK 2 NA [employer]-WD [id number] Left 1 Implanted   SCREW BNE L14MM TIP DIA2. 5MM HEX 1.5MM DAREN HND FT FULL THRD - SNA  SCREW BNE L14MM TIP DIA2. 5MM HEX 1.5MM DAREN HND FT FULL THRD NA [employer]-WD [id number] Left 1 Implanted   ANCHOR SUT SZ 2-0 ETHBND V5 BIT QUICKANCHR + MINILOK - SNA  ANCHOR SUT SZ 2-0 ETHBND V5 BIT QUICKANCHR + MINILOK NA [facility] [employer] [facility] [id number] Left 1 Implanted         Drains: * No LDAs found *    Findings: as above, partial LCL tear, radial head fracture      Electronically signed by Chandler Silverman MD on 7/13/2023 at 10:22 AM

## 2023-07-13 NOTE — ANESTHESIA POSTPROCEDURE EVALUATION
Department of Anesthesiology  Postprocedure Note    Patient: Ottoniel Benton  MRN: [id number]  YOB: 1965  Date of evaluation: 7/13/2023      Procedure Summary     Date: 07/13/23 Room / Location: [facility] ASU A4 / [facility] AMBULATORY OR    Anesthesia Start: 0821 Anesthesia Stop: 6443    Procedure: LEFT RADIAL HEAD OPEN REDUCTION INTERNAL FIXATION, LEFT ELBOW LATERAL COLLATERAL LIGAMENT REPAIR (Left) Diagnosis:       Closed displaced fracture of head of left radius, initial encounter      (Closed displaced fracture of head of left radius, initial encounter [S52.122A])    Surgeons: Bebeto Archibald MD Responsible Provider: Teresa Luong DO    Anesthesia Type: MAC, Regional ASA Status: 3          Anesthesia Type: MAC, Regional    Yosi Phase I: Yosi Score: 8    Yosi Phase II:        Anesthesia Post Evaluation    Patient location during evaluation: PACU  Level of consciousness: awake  Airway patency: patent  Nausea & Vomiting: no nausea  Complications: no  Cardiovascular status: hemodynamically stable  Respiratory status: acceptable  Hydration status: stable  Multimodal analgesia pain management approach

## 2023-07-13 NOTE — OP NOTE
Operative Note      Patient: Srikanth Vail  YOB: 1965  MRN: [id number]    Date of Procedure: 7/13/2023    Pre-Op Diagnosis Codes:     * Closed displaced fracture of head of left radius, initial encounter [S52.122A]  Left elbow lateral collateral ligament tear    Post-Op Diagnosis:     Displaced fracture of left radial head  Left elbow partial lateral collateral ligament tear            Procedure(s):  LEFT RADIAL HEAD OPEN REDUCTION INTERNAL FIXATION, LEFT ELBOW LATERAL COLLATERAL LIGAMENT REPAIR    Surgeon(s):  Elva Smiley MD    Assistant:   * No surgical staff found *    Anesthesia: General    Estimated Blood Loss (mL): Minimal    Complications: None    Specimens:   * No specimens in log *    Implants:  Implant Name Type Inv. Item Serial No.  Lot No. LRB No. Used Action   SCREW BNE L18MM ST FOR HDLSS COMPR SYS DAREN ACUTRK 2 - SNA  SCREW BNE L18MM ST FOR HDLSS COMPR SYS DAREN ACUTRK 2 NA [employer]-WD [id number] Left 1 Implanted   SCREW BNE L18MM ST FOR HDLSS COMPR SYS DAREN ACUTRK 2 - SNA  SCREW BNE L18MM ST FOR HDLSS COMPR SYS DAREN ACUTRK 2 NA [employer]-WD [id number] Left 1 Implanted   SCREW BNE L14MM TIP DIA2. 5MM HEX 1.5MM DAREN HND FT FULL THRD - SNA  SCREW BNE L14MM TIP DIA2. 5MM HEX 1.5MM DAREN HND FT FULL THRD NA [employer] [facility] [id number] Left 1 Implanted   ANCHOR SUT SZ 2-0 ETHBND V5 BIT QUICKANCHR + MINILOK - SNA  ANCHOR SUT SZ 2-0 ETHBND V5 BIT QUICKANCHR + MINILOK NA [facility] DEPUY SYNTHES [facility]- [id number] Left 1 Implanted         Drains: * No LDAs found *    Findings: as above        Detailed Description of Procedure:     Patient was identified preoperatively and the operative site was marked. We again reviewed risks, benefits and alternatives and the patient elected proceed with operative intervention. We discussed her CT findings preoperatively with concern for lateral collateral ligament tear    We discussed risks, benefits and alternatives to surgery.   After thorough discussion ultimately the patient

## 2023-07-13 NOTE — ANESTHESIA PRE PROCEDURE
Mallampati: II     Neck ROM: full  Mouth opening: > = 3 FB   Dental: normal exam         Pulmonary:Negative Pulmonary ROS and normal exam  breath sounds clear to auscultation  (+) asthma:                            Cardiovascular:Negative CV ROS  Exercise tolerance: good (>4 METS),   (+) hypertension:,                   Neuro/Psych:   Negative Neuro/Psych ROS  (+) headaches:,             GI/Hepatic/Renal: Neg GI/Hepatic/Renal ROS            Endo/Other: Negative Endo/Other ROS                     ROS comment: SLE Abdominal: normal exam            Vascular: negative vascular ROS. Other Findings:           Anesthesia Plan      MAC and regional     ASA 3       Induction: intravenous. Anesthetic plan and risks discussed with patient.                         [id number] [facility],    7/13/2023

## 2023-07-13 NOTE — ANESTHESIA PROCEDURE NOTES
Peripheral Block    Patient location during procedure: pre-op  Reason for block: at surgeon's request  Start time: 7/13/2023 7:48 AM  Preanesthetic Checklist  Completed: patient identified, IV checked, site marked, risks and benefits discussed, surgical/procedural consents, pre-op evaluation, timeout performed, anesthesia consent given, oxygen available and monitors applied/VS acknowledged  Peripheral Block   Patient position: sitting  Prep: ChloraPrep  Provider prep: sterile gloves  Patient monitoring: cardiac monitor, continuous pulse ox, frequent blood pressure checks, IV access, oxygen and responsive to questions  Block type: Brachial plexus  Laterality: left  Injection technique: single-shot  Guidance: ultrasound guided    Needle   Needle type: insulated echogenic nerve stimulator needle   Needle gauge: 22 G  Needle localization: ultrasound guidance  Needle length: 5 cm  Assessment   Injection assessment: negative aspiration for heme, no paresthesia on injection, local visualized surrounding nerve on ultrasound and no intravascular symptoms  Paresthesia pain: none  Slow fractionated injection: yes  Hemodynamics: stable  Outcomes: uncomplicated and patient tolerated procedure well    Additional Notes  Supraclavicular block   Medications Administered  ropivacaine (NAROPIN) injection 0.5% - Perineural   30 mL - 7/13/2023 7:48:00 AM

## 2023-07-18 ENCOUNTER — NURSE ONLY (OUTPATIENT)
Age: 58
End: 2023-07-18
Payer: MEDICAID

## 2023-07-18 VITALS
DIASTOLIC BLOOD PRESSURE: 95 MMHG | HEART RATE: 64 BPM | RESPIRATION RATE: 18 BRPM | TEMPERATURE: 97.8 F | SYSTOLIC BLOOD PRESSURE: 178 MMHG

## 2023-07-18 DIAGNOSIS — I10 ESSENTIAL HYPERTENSION: ICD-10-CM

## 2023-07-18 DIAGNOSIS — M32.9 SYSTEMIC LUPUS ERYTHEMATOSUS, UNSPECIFIED SLE TYPE, UNSPECIFIED ORGAN INVOLVEMENT STATUS (HCC): Primary | ICD-10-CM

## 2023-07-18 PROCEDURE — PBSHW PBB SHADOW CHARGE: Performed by: PEDIATRICS

## 2023-07-18 PROCEDURE — 96365 THER/PROPH/DIAG IV INF INIT: CPT | Performed by: PEDIATRICS

## 2023-07-18 RX ORDER — ALBUTEROL SULFATE 90 UG/1
4 AEROSOL, METERED RESPIRATORY (INHALATION) PRN
OUTPATIENT
Start: 2023-08-15

## 2023-07-18 RX ORDER — ACETAMINOPHEN 325 MG/1
650 TABLET ORAL
OUTPATIENT
Start: 2023-08-15

## 2023-07-18 RX ORDER — ONDANSETRON 2 MG/ML
8 INJECTION INTRAMUSCULAR; INTRAVENOUS
OUTPATIENT
Start: 2023-08-15

## 2023-07-18 RX ORDER — HEPARIN SODIUM 100 [USP'U]/ML
500 INJECTION, SOLUTION INTRAVENOUS PRN
OUTPATIENT
Start: 2023-08-15

## 2023-07-18 RX ORDER — SODIUM CHLORIDE 9 MG/ML
INJECTION, SOLUTION INTRAVENOUS CONTINUOUS
OUTPATIENT
Start: 2023-08-15

## 2023-07-18 RX ORDER — DIPHENHYDRAMINE HYDROCHLORIDE 50 MG/ML
50 INJECTION INTRAMUSCULAR; INTRAVENOUS
OUTPATIENT
Start: 2023-08-15

## 2023-07-18 RX ORDER — SODIUM CHLORIDE 9 MG/ML
5-250 INJECTION, SOLUTION INTRAVENOUS PRN
OUTPATIENT
Start: 2023-08-15

## 2023-07-18 RX ORDER — SODIUM CHLORIDE 0.9 % (FLUSH) 0.9 %
5-40 SYRINGE (ML) INJECTION PRN
Status: DISCONTINUED | OUTPATIENT
Start: 2023-07-18 | End: 2023-07-18 | Stop reason: HOSPADM

## 2023-07-18 RX ORDER — SODIUM CHLORIDE 9 MG/ML
5-250 INJECTION, SOLUTION INTRAVENOUS PRN
Status: DISCONTINUED | OUTPATIENT
Start: 2023-07-18 | End: 2023-07-18 | Stop reason: HOSPADM

## 2023-07-18 RX ORDER — EPINEPHRINE 1 MG/ML
0.3 INJECTION, SOLUTION, CONCENTRATE INTRAVENOUS PRN
OUTPATIENT
Start: 2023-08-15

## 2023-07-18 RX ORDER — SODIUM CHLORIDE 0.9 % (FLUSH) 0.9 %
5-40 SYRINGE (ML) INJECTION PRN
OUTPATIENT
Start: 2023-08-15

## 2023-07-18 RX ADMIN — BELIMUMAB 960 MG: 120 INJECTION, POWDER, LYOPHILIZED, FOR SOLUTION INTRAVENOUS at 12:54

## 2023-07-18 RX ADMIN — Medication 10 ML: at 12:48

## 2023-07-18 RX ADMIN — SODIUM CHLORIDE 25 ML/HR: 9 INJECTION, SOLUTION INTRAVENOUS at 12:48

## 2023-07-18 ASSESSMENT — PAIN DESCRIPTION - LOCATION: LOCATION: ELBOW

## 2023-07-18 ASSESSMENT — PAIN DESCRIPTION - ORIENTATION: ORIENTATION: LEFT

## 2023-07-18 ASSESSMENT — PAIN SCALES - GENERAL: PAINLEVEL_OUTOF10: 10

## 2023-08-15 ENCOUNTER — NURSE ONLY (OUTPATIENT)
Age: 58
End: 2023-08-15
Payer: MEDICAID

## 2023-08-15 VITALS
RESPIRATION RATE: 16 BRPM | DIASTOLIC BLOOD PRESSURE: 95 MMHG | HEART RATE: 67 BPM | OXYGEN SATURATION: 98 % | BODY MASS INDEX: 37.76 KG/M2 | TEMPERATURE: 98 F | SYSTOLIC BLOOD PRESSURE: 168 MMHG | WEIGHT: 220 LBS

## 2023-08-15 DIAGNOSIS — I10 ESSENTIAL HYPERTENSION: ICD-10-CM

## 2023-08-15 DIAGNOSIS — M32.9 SYSTEMIC LUPUS ERYTHEMATOSUS, UNSPECIFIED SLE TYPE, UNSPECIFIED ORGAN INVOLVEMENT STATUS (HCC): Primary | ICD-10-CM

## 2023-08-15 LAB
ALBUMIN SERPL-MCNC: 3.6 G/DL (ref 3.5–5)
ALBUMIN/GLOB SERPL: 1 (ref 1.1–2.2)
ALP SERPL-CCNC: 115 U/L (ref 45–117)
ALT SERPL-CCNC: 22 U/L (ref 12–78)
ANION GAP SERPL CALC-SCNC: 3 MMOL/L (ref 5–15)
AST SERPL-CCNC: 16 U/L (ref 15–37)
BASOPHILS # BLD: 0 K/UL (ref 0–0.1)
BASOPHILS NFR BLD: 1 % (ref 0–1)
BILIRUB SERPL-MCNC: 0.2 MG/DL (ref 0.2–1)
BUN SERPL-MCNC: 12 MG/DL (ref 6–20)
BUN/CREAT SERPL: 12 (ref 12–20)
CALCIUM SERPL-MCNC: 9.2 MG/DL (ref 8.5–10.1)
CHLORIDE SERPL-SCNC: 109 MMOL/L (ref 97–108)
CO2 SERPL-SCNC: 26 MMOL/L (ref 21–32)
CREAT SERPL-MCNC: 0.98 MG/DL (ref 0.55–1.02)
CRP SERPL-MCNC: 0.75 MG/DL (ref 0–0.6)
DIFFERENTIAL METHOD BLD: ABNORMAL
EOSINOPHIL # BLD: 0.1 K/UL (ref 0–0.4)
EOSINOPHIL NFR BLD: 3 % (ref 0–7)
ERYTHROCYTE [DISTWIDTH] IN BLOOD BY AUTOMATED COUNT: 13.8 % (ref 11.5–14.5)
ERYTHROCYTE [SEDIMENTATION RATE] IN BLOOD: 34 MM/HR (ref 0–30)
GLOBULIN SER CALC-MCNC: 3.5 G/DL (ref 2–4)
GLUCOSE SERPL-MCNC: 121 MG/DL (ref 65–100)
HCT VFR BLD AUTO: 37.2 % (ref 35–47)
HGB BLD-MCNC: 11.2 G/DL (ref 11.5–16)
IMM GRANULOCYTES # BLD AUTO: 0 K/UL (ref 0–0.04)
IMM GRANULOCYTES NFR BLD AUTO: 1 % (ref 0–0.5)
LYMPHOCYTES # BLD: 1.8 K/UL (ref 0.8–3.5)
LYMPHOCYTES NFR BLD: 41 % (ref 12–49)
MCH RBC QN AUTO: 25.1 PG (ref 26–34)
MCHC RBC AUTO-ENTMCNC: 30.1 G/DL (ref 30–36.5)
MCV RBC AUTO: 83.2 FL (ref 80–99)
MONOCYTES # BLD: 0.3 K/UL (ref 0–1)
MONOCYTES NFR BLD: 7 % (ref 5–13)
NEUTS SEG # BLD: 2.1 K/UL (ref 1.8–8)
NEUTS SEG NFR BLD: 47 % (ref 32–75)
NRBC # BLD: 0 K/UL (ref 0–0.01)
NRBC BLD-RTO: 0 PER 100 WBC
PLATELET # BLD AUTO: 248 K/UL (ref 150–400)
PMV BLD AUTO: 12.1 FL (ref 8.9–12.9)
POTASSIUM SERPL-SCNC: 4 MMOL/L (ref 3.5–5.1)
PROT SERPL-MCNC: 7.1 G/DL (ref 6.4–8.2)
RBC # BLD AUTO: 4.47 M/UL (ref 3.8–5.2)
SODIUM SERPL-SCNC: 138 MMOL/L (ref 136–145)
WBC # BLD AUTO: 4.4 K/UL (ref 3.6–11)

## 2023-08-15 PROCEDURE — 96365 THER/PROPH/DIAG IV INF INIT: CPT | Performed by: PEDIATRICS

## 2023-08-15 PROCEDURE — PBSHW PBB SHADOW CHARGE: Performed by: PEDIATRICS

## 2023-08-15 RX ORDER — SODIUM CHLORIDE 9 MG/ML
5-250 INJECTION, SOLUTION INTRAVENOUS PRN
OUTPATIENT
Start: 2023-09-12

## 2023-08-15 RX ORDER — ONDANSETRON 2 MG/ML
8 INJECTION INTRAMUSCULAR; INTRAVENOUS
OUTPATIENT
Start: 2023-09-12

## 2023-08-15 RX ORDER — ACETAMINOPHEN 325 MG/1
650 TABLET ORAL
OUTPATIENT
Start: 2023-09-12

## 2023-08-15 RX ORDER — SODIUM CHLORIDE 0.9 % (FLUSH) 0.9 %
5-40 SYRINGE (ML) INJECTION PRN
OUTPATIENT
Start: 2023-09-12

## 2023-08-15 RX ORDER — SODIUM CHLORIDE 9 MG/ML
5-250 INJECTION, SOLUTION INTRAVENOUS PRN
Status: DISCONTINUED | OUTPATIENT
Start: 2023-08-15 | End: 2023-08-15 | Stop reason: HOSPADM

## 2023-08-15 RX ORDER — EPINEPHRINE 1 MG/ML
0.3 INJECTION, SOLUTION, CONCENTRATE INTRAVENOUS PRN
OUTPATIENT
Start: 2023-09-12

## 2023-08-15 RX ORDER — SODIUM CHLORIDE 9 MG/ML
INJECTION, SOLUTION INTRAVENOUS CONTINUOUS
OUTPATIENT
Start: 2023-09-12

## 2023-08-15 RX ORDER — SODIUM CHLORIDE 0.9 % (FLUSH) 0.9 %
5-40 SYRINGE (ML) INJECTION PRN
Status: DISCONTINUED | OUTPATIENT
Start: 2023-08-15 | End: 2023-08-15 | Stop reason: HOSPADM

## 2023-08-15 RX ORDER — DIPHENHYDRAMINE HYDROCHLORIDE 50 MG/ML
50 INJECTION INTRAMUSCULAR; INTRAVENOUS
OUTPATIENT
Start: 2023-09-12

## 2023-08-15 RX ORDER — HEPARIN 100 UNIT/ML
500 SYRINGE INTRAVENOUS PRN
OUTPATIENT
Start: 2023-09-12

## 2023-08-15 RX ORDER — ALBUTEROL SULFATE 90 UG/1
4 AEROSOL, METERED RESPIRATORY (INHALATION) PRN
OUTPATIENT
Start: 2023-09-12

## 2023-08-15 RX ADMIN — Medication 10 ML: at 08:44

## 2023-08-15 RX ADMIN — BELIMUMAB 960 MG: 120 INJECTION, POWDER, LYOPHILIZED, FOR SOLUTION INTRAVENOUS at 08:43

## 2023-08-15 RX ADMIN — SODIUM CHLORIDE 25 ML/HR: 9 INJECTION, SOLUTION INTRAVENOUS at 08:44

## 2023-08-15 ASSESSMENT — PATIENT HEALTH QUESTIONNAIRE - PHQ9
SUM OF ALL RESPONSES TO PHQ QUESTIONS 1-9: 0
1. LITTLE INTEREST OR PLEASURE IN DOING THINGS: 0
SUM OF ALL RESPONSES TO PHQ9 QUESTIONS 1 & 2: 0
SUM OF ALL RESPONSES TO PHQ QUESTIONS 1-9: 0
SUM OF ALL RESPONSES TO PHQ QUESTIONS 1-9: 0
2. FEELING DOWN, DEPRESSED OR HOPELESS: 0
SUM OF ALL RESPONSES TO PHQ QUESTIONS 1-9: 0

## 2023-08-15 ASSESSMENT — PAIN SCALES - GENERAL
PAINLEVEL_OUTOF10: 0
PAINLEVEL_OUTOF10: 0

## 2023-09-11 ENCOUNTER — NURSE ONLY (OUTPATIENT)
Age: 58
End: 2023-09-11
Payer: MEDICAID

## 2023-09-11 ENCOUNTER — OFFICE VISIT (OUTPATIENT)
Age: 58
End: 2023-09-11
Payer: MEDICAID

## 2023-09-11 VITALS
DIASTOLIC BLOOD PRESSURE: 95 MMHG | RESPIRATION RATE: 16 BRPM | OXYGEN SATURATION: 97 % | TEMPERATURE: 98 F | HEART RATE: 62 BPM | SYSTOLIC BLOOD PRESSURE: 174 MMHG

## 2023-09-11 VITALS
SYSTOLIC BLOOD PRESSURE: 174 MMHG | RESPIRATION RATE: 18 BRPM | TEMPERATURE: 98.5 F | DIASTOLIC BLOOD PRESSURE: 95 MMHG | HEART RATE: 64 BPM | OXYGEN SATURATION: 99 %

## 2023-09-11 DIAGNOSIS — I10 ESSENTIAL HYPERTENSION: ICD-10-CM

## 2023-09-11 DIAGNOSIS — M32.9 SYSTEMIC LUPUS ERYTHEMATOSUS, UNSPECIFIED SLE TYPE, UNSPECIFIED ORGAN INVOLVEMENT STATUS (HCC): Primary | ICD-10-CM

## 2023-09-11 PROCEDURE — 99215 OFFICE O/P EST HI 40 MIN: CPT | Performed by: PEDIATRICS

## 2023-09-11 PROCEDURE — PBSHW PBB SHADOW CHARGE: Performed by: PEDIATRICS

## 2023-09-11 PROCEDURE — 96365 THER/PROPH/DIAG IV INF INIT: CPT | Performed by: PEDIATRICS

## 2023-09-11 PROCEDURE — 3077F SYST BP >= 140 MM HG: CPT | Performed by: PEDIATRICS

## 2023-09-11 PROCEDURE — 3080F DIAST BP >= 90 MM HG: CPT | Performed by: PEDIATRICS

## 2023-09-11 RX ORDER — ACETAMINOPHEN 325 MG/1
650 TABLET ORAL
OUTPATIENT
Start: 2023-10-09

## 2023-09-11 RX ORDER — SODIUM CHLORIDE 9 MG/ML
INJECTION, SOLUTION INTRAVENOUS CONTINUOUS
OUTPATIENT
Start: 2023-10-09

## 2023-09-11 RX ORDER — ONDANSETRON 2 MG/ML
8 INJECTION INTRAMUSCULAR; INTRAVENOUS
OUTPATIENT
Start: 2023-10-09

## 2023-09-11 RX ORDER — HEPARIN 100 UNIT/ML
500 SYRINGE INTRAVENOUS PRN
OUTPATIENT
Start: 2023-10-09

## 2023-09-11 RX ORDER — CETIRIZINE HYDROCHLORIDE 10 MG/1
TABLET ORAL
COMMUNITY
Start: 2023-09-01

## 2023-09-11 RX ORDER — DIPHENHYDRAMINE HYDROCHLORIDE 50 MG/ML
50 INJECTION INTRAMUSCULAR; INTRAVENOUS
OUTPATIENT
Start: 2023-10-09

## 2023-09-11 RX ORDER — HYDROXYCHLOROQUINE SULFATE 200 MG/1
200 TABLET, FILM COATED ORAL DAILY
Qty: 90 TABLET | Refills: 1 | Status: SHIPPED | OUTPATIENT
Start: 2023-09-11

## 2023-09-11 RX ORDER — SODIUM CHLORIDE 9 MG/ML
5-250 INJECTION, SOLUTION INTRAVENOUS PRN
Status: DISCONTINUED | OUTPATIENT
Start: 2023-09-11 | End: 2023-09-11 | Stop reason: HOSPADM

## 2023-09-11 RX ORDER — ALBUTEROL SULFATE 90 UG/1
4 AEROSOL, METERED RESPIRATORY (INHALATION) PRN
OUTPATIENT
Start: 2023-10-09

## 2023-09-11 RX ORDER — SODIUM CHLORIDE 0.9 % (FLUSH) 0.9 %
5-40 SYRINGE (ML) INJECTION PRN
OUTPATIENT
Start: 2023-10-09

## 2023-09-11 RX ORDER — SODIUM CHLORIDE 9 MG/ML
5-250 INJECTION, SOLUTION INTRAVENOUS PRN
OUTPATIENT
Start: 2023-10-09

## 2023-09-11 RX ORDER — SODIUM CHLORIDE 0.9 % (FLUSH) 0.9 %
5-40 SYRINGE (ML) INJECTION PRN
Status: DISCONTINUED | OUTPATIENT
Start: 2023-09-11 | End: 2023-09-11 | Stop reason: HOSPADM

## 2023-09-11 RX ORDER — EPINEPHRINE 1 MG/ML
0.3 INJECTION, SOLUTION, CONCENTRATE INTRAVENOUS PRN
OUTPATIENT
Start: 2023-10-09

## 2023-09-11 RX ADMIN — BELIMUMAB 960 MG: 120 INJECTION, POWDER, LYOPHILIZED, FOR SOLUTION INTRAVENOUS at 09:25

## 2023-09-11 RX ADMIN — Medication 10 ML: at 09:26

## 2023-09-11 RX ADMIN — SODIUM CHLORIDE 25 ML/HR: 0.9 INJECTION, SOLUTION INTRAVENOUS at 09:25

## 2023-09-11 ASSESSMENT — PAIN SCALES - GENERAL: PAINLEVEL_OUTOF10: 0

## 2023-09-11 NOTE — PROGRESS NOTES
Chief Complaint   Patient presents with    Lupus     1. Have you been to the ER, urgent care clinic since your last visit? Hospitalized since your last visit? No    2. Have you seen or consulted any other health care providers outside of the [address] [place] [address] Avenue since your last visit? Include any pap smears or colon screening.  No

## 2023-09-11 NOTE — PROGRESS NOTES
RHEUMATOLOGY PROBLEM LIST AND CHIEF COMPLAINT  1. Systemic lupus - arthritis, alopecia, subacute cutaneous lupus rash, and +ROHIT 1:640 homogenous with +dsDNA    Disease History: In 2012 developed increased joint pain and stiffness, marked pain sensitivity, couldn't walk or stand to be touched, get in and out of a car. Thought at first possibly shingles, pain didn't resolve. Able to get expedited evaluation with Rheumatology (Dr. Debi Harvey with [facility]), diagnosed with lupus. Prednisone and Plaquenil (hydroxychloroquine) were started, and within about a week was feeling better. Around the same time, had developed significant frontotemporal and vertex hair loss. Now prolonged cold exposure is her major trigger of joint pain flares. Initially was having pain flares 2-3x/month while in [place]. Since moved to [facility] in 2019 to help her mother with foster children, has had less frequent flares maybe 2x/every 3 months. Flares respond to prednisone tapers. Between flares, she denies consistent joint pain. Some aching in hands or shoulders with activity. Has been able to lose weight from 236 to 209 with use of exercise bike over the last 6 months. Gets perioral papular rashes, not more typical malar rash. With sun exposure in the past, has developed painful annular silver-dollar sized lesions lasting a week or two, not in the last year. Good about sun avoidance. Usually goes to [facility] for eye checks, recently seen. No current ophthalmologist for Plaquenil screening. INTERVAL HISTORY  This is a 60 y.o.  female. Today, the patient complains of no pain in the joints. Location: none  Severity:  0 on a scale of 0-10  Timing: none   Duration: none  Modifying factors:   Context/Associated signs and symptoms: The patient was previously followed by Dr. Manan Regalado and now switching over to my care. She last saw Dr. Manan Regalado in office on 3/27/23.  At that time they planned to continue her on

## 2023-10-03 NOTE — PROGRESS NOTES
Benlysta 120mg vial (Total dose 960mg)  NDC 16456-335-84    START: 0845  STOP: 0764     250 ml bag 0.9% Normal Saline @ 25ml/hr  NDC 8482-0205-50  Lot # [id number]  Exp 04/2025    0.9% Normal Saline 10ml flush  NDC 6225-279562  Lot # [id number]  Exp 12/2026    Lines: 24G RFA.   +Blood return. Line flushed per protocol. Access was removed from Ms. Ulrich after completion of infusion/injection. Treatment tolerated without complaints or reactions. Patient discharged ambulatory in stable condition at 1000. Encounter routed to supervising provider for co-signing. Future Appointments   Date Time Provider [address]   10/11/2023  8:40 AM Honey Brood, PT TOSM BS AMB   10/13/2023  8:40 AM Vianca Little, PT TOS[facility] BS AMB   10/18/2023  9:00 AM Honey Brood, PT TOS[facility] BS AMB   10/20/2023  8:40 AM Honey Brood, PT TOS[facility] BS AMB   10/23/2023  8:20 AM Jaclyn Jin MD TOSM BS AMB   10/25/2023  8:40 AM Honey Brood, PT TOS[facility] BS AMB   10/27/2023  8:40 AM Honey Brood, PT TOSM BS AMB   11/7/2023  8:00 AM INFUSIONINJ_RC AOCR BS AMB   11/7/2023  8:30 AM Jerri William MD AOCR BS AMB   12/5/2023  8:00 AM INFUSIONINJ_RC AOCR BS AMB     Nataliia Cardona RN    I do hereby attest that this information is true, accurate and complete to the best of my knowledge. I was available for questions and evaluation if needed. Cary MICHELE  [facility], MD  Adult and Pediatric Rheumatology

## 2023-10-10 ENCOUNTER — NURSE ONLY (OUTPATIENT)
Age: 58
End: 2023-10-10
Payer: MEDICAID

## 2023-10-10 VITALS
OXYGEN SATURATION: 99 % | DIASTOLIC BLOOD PRESSURE: 90 MMHG | HEART RATE: 60 BPM | SYSTOLIC BLOOD PRESSURE: 177 MMHG | TEMPERATURE: 97.8 F | RESPIRATION RATE: 16 BRPM

## 2023-10-10 DIAGNOSIS — I10 ESSENTIAL HYPERTENSION: ICD-10-CM

## 2023-10-10 DIAGNOSIS — M32.9 SYSTEMIC LUPUS ERYTHEMATOSUS, UNSPECIFIED SLE TYPE, UNSPECIFIED ORGAN INVOLVEMENT STATUS (HCC): Primary | ICD-10-CM

## 2023-10-10 PROCEDURE — PBSHW PBB SHADOW CHARGE: Performed by: PEDIATRICS

## 2023-10-10 PROCEDURE — 96365 THER/PROPH/DIAG IV INF INIT: CPT | Performed by: PEDIATRICS

## 2023-10-10 RX ORDER — SODIUM CHLORIDE 9 MG/ML
5-250 INJECTION, SOLUTION INTRAVENOUS PRN
OUTPATIENT
Start: 2023-11-05

## 2023-10-10 RX ORDER — DIPHENHYDRAMINE HYDROCHLORIDE 50 MG/ML
50 INJECTION INTRAMUSCULAR; INTRAVENOUS
OUTPATIENT
Start: 2023-11-05

## 2023-10-10 RX ORDER — SODIUM CHLORIDE 0.9 % (FLUSH) 0.9 %
5-40 SYRINGE (ML) INJECTION PRN
OUTPATIENT
Start: 2023-11-05

## 2023-10-10 RX ORDER — SODIUM CHLORIDE 9 MG/ML
5-250 INJECTION, SOLUTION INTRAVENOUS PRN
Status: DISCONTINUED | OUTPATIENT
Start: 2023-10-10 | End: 2023-10-10 | Stop reason: HOSPADM

## 2023-10-10 RX ORDER — SODIUM CHLORIDE 9 MG/ML
INJECTION, SOLUTION INTRAVENOUS CONTINUOUS
OUTPATIENT
Start: 2023-11-05

## 2023-10-10 RX ORDER — CLOBETASOL PROPIONATE 0.5 MG/G
CREAM TOPICAL
Qty: 30 G | Refills: 3 | Status: SHIPPED | OUTPATIENT
Start: 2023-10-10

## 2023-10-10 RX ORDER — ACETAMINOPHEN 325 MG/1
650 TABLET ORAL
OUTPATIENT
Start: 2023-11-05

## 2023-10-10 RX ORDER — ONDANSETRON 2 MG/ML
8 INJECTION INTRAMUSCULAR; INTRAVENOUS
OUTPATIENT
Start: 2023-11-05

## 2023-10-10 RX ORDER — SODIUM CHLORIDE 0.9 % (FLUSH) 0.9 %
5-40 SYRINGE (ML) INJECTION PRN
Status: DISCONTINUED | OUTPATIENT
Start: 2023-10-10 | End: 2023-10-10 | Stop reason: HOSPADM

## 2023-10-10 RX ORDER — EPINEPHRINE 1 MG/ML
0.3 INJECTION, SOLUTION, CONCENTRATE INTRAVENOUS PRN
OUTPATIENT
Start: 2023-11-05

## 2023-10-10 RX ORDER — HEPARIN 100 UNIT/ML
500 SYRINGE INTRAVENOUS PRN
OUTPATIENT
Start: 2023-11-05

## 2023-10-10 RX ORDER — ALBUTEROL SULFATE 90 UG/1
4 AEROSOL, METERED RESPIRATORY (INHALATION) PRN
OUTPATIENT
Start: 2023-11-05

## 2023-10-10 RX ADMIN — Medication 10 ML: at 08:44

## 2023-10-10 RX ADMIN — SODIUM CHLORIDE 25 ML/HR: 0.9 INJECTION, SOLUTION INTRAVENOUS at 08:45

## 2023-10-10 RX ADMIN — BELIMUMAB 960 MG: 120 INJECTION, POWDER, LYOPHILIZED, FOR SOLUTION INTRAVENOUS at 08:45

## 2023-10-10 ASSESSMENT — PAIN SCALES - GENERAL: PAINLEVEL_OUTOF10: 0

## 2023-11-07 ENCOUNTER — OFFICE VISIT (OUTPATIENT)
Age: 58
End: 2023-11-07
Payer: MEDICAID

## 2023-11-07 ENCOUNTER — NURSE ONLY (OUTPATIENT)
Age: 58
End: 2023-11-07
Payer: MEDICAID

## 2023-11-07 VITALS
TEMPERATURE: 97.7 F | SYSTOLIC BLOOD PRESSURE: 160 MMHG | HEART RATE: 72 BPM | RESPIRATION RATE: 16 BRPM | OXYGEN SATURATION: 98 % | DIASTOLIC BLOOD PRESSURE: 71 MMHG | BODY MASS INDEX: 38.28 KG/M2 | WEIGHT: 223 LBS

## 2023-11-07 VITALS
DIASTOLIC BLOOD PRESSURE: 84 MMHG | TEMPERATURE: 97.6 F | OXYGEN SATURATION: 99 % | SYSTOLIC BLOOD PRESSURE: 147 MMHG | RESPIRATION RATE: 16 BRPM | HEART RATE: 65 BPM

## 2023-11-07 DIAGNOSIS — I10 ESSENTIAL HYPERTENSION: ICD-10-CM

## 2023-11-07 DIAGNOSIS — M32.9 SYSTEMIC LUPUS ERYTHEMATOSUS, UNSPECIFIED SLE TYPE, UNSPECIFIED ORGAN INVOLVEMENT STATUS (HCC): Primary | ICD-10-CM

## 2023-11-07 DIAGNOSIS — M32.9 SYSTEMIC LUPUS ERYTHEMATOSUS, UNSPECIFIED SLE TYPE, UNSPECIFIED ORGAN INVOLVEMENT STATUS (HCC): ICD-10-CM

## 2023-11-07 DIAGNOSIS — M76.62 ACHILLES TENDINITIS, LEFT LEG: Primary | ICD-10-CM

## 2023-11-07 LAB
ALBUMIN SERPL-MCNC: 3.5 G/DL (ref 3.5–5)
ALBUMIN/GLOB SERPL: 0.9 (ref 1.1–2.2)
ALP SERPL-CCNC: 112 U/L (ref 45–117)
ALT SERPL-CCNC: 22 U/L (ref 12–78)
ANION GAP SERPL CALC-SCNC: 1 MMOL/L (ref 5–15)
AST SERPL-CCNC: 21 U/L (ref 15–37)
BASOPHILS # BLD: 0 K/UL (ref 0–0.1)
BASOPHILS NFR BLD: 1 % (ref 0–1)
BILIRUB SERPL-MCNC: 0.3 MG/DL (ref 0.2–1)
BUN SERPL-MCNC: 16 MG/DL (ref 6–20)
BUN/CREAT SERPL: 20 (ref 12–20)
CALCIUM SERPL-MCNC: 8.4 MG/DL (ref 8.5–10.1)
CHLORIDE SERPL-SCNC: 108 MMOL/L (ref 97–108)
CO2 SERPL-SCNC: 29 MMOL/L (ref 21–32)
CREAT SERPL-MCNC: 0.81 MG/DL (ref 0.55–1.02)
CRP SERPL-MCNC: 0.94 MG/DL (ref 0–0.6)
DIFFERENTIAL METHOD BLD: ABNORMAL
EOSINOPHIL # BLD: 0.1 K/UL (ref 0–0.4)
EOSINOPHIL NFR BLD: 2 % (ref 0–7)
ERYTHROCYTE [DISTWIDTH] IN BLOOD BY AUTOMATED COUNT: 14.2 % (ref 11.5–14.5)
ERYTHROCYTE [SEDIMENTATION RATE] IN BLOOD: 24 MM/HR (ref 0–30)
GLOBULIN SER CALC-MCNC: 3.7 G/DL (ref 2–4)
GLUCOSE SERPL-MCNC: 108 MG/DL (ref 65–100)
HCT VFR BLD AUTO: 39.9 % (ref 35–47)
HGB BLD-MCNC: 12 G/DL (ref 11.5–16)
IMM GRANULOCYTES # BLD AUTO: 0 K/UL (ref 0–0.04)
IMM GRANULOCYTES NFR BLD AUTO: 0 % (ref 0–0.5)
LYMPHOCYTES # BLD: 2.3 K/UL (ref 0.8–3.5)
LYMPHOCYTES NFR BLD: 50 % (ref 12–49)
MCH RBC QN AUTO: 24.8 PG (ref 26–34)
MCHC RBC AUTO-ENTMCNC: 30.1 G/DL (ref 30–36.5)
MCV RBC AUTO: 82.4 FL (ref 80–99)
MONOCYTES # BLD: 0.4 K/UL (ref 0–1)
MONOCYTES NFR BLD: 9 % (ref 5–13)
NEUTS SEG # BLD: 1.7 K/UL (ref 1.8–8)
NEUTS SEG NFR BLD: 38 % (ref 32–75)
NRBC # BLD: 0 K/UL (ref 0–0.01)
NRBC BLD-RTO: 0 PER 100 WBC
PLATELET # BLD AUTO: 269 K/UL (ref 150–400)
PMV BLD AUTO: 12.2 FL (ref 8.9–12.9)
POTASSIUM SERPL-SCNC: 4.4 MMOL/L (ref 3.5–5.1)
PROT SERPL-MCNC: 7.2 G/DL (ref 6.4–8.2)
RBC # BLD AUTO: 4.84 M/UL (ref 3.8–5.2)
SODIUM SERPL-SCNC: 138 MMOL/L (ref 136–145)
WBC # BLD AUTO: 4.6 K/UL (ref 3.6–11)

## 2023-11-07 PROCEDURE — 3077F SYST BP >= 140 MM HG: CPT | Performed by: PEDIATRICS

## 2023-11-07 PROCEDURE — 99214 OFFICE O/P EST MOD 30 MIN: CPT | Performed by: PEDIATRICS

## 2023-11-07 PROCEDURE — 96365 THER/PROPH/DIAG IV INF INIT: CPT | Performed by: PEDIATRICS

## 2023-11-07 PROCEDURE — PBSHW PBB SHADOW CHARGE: Performed by: PEDIATRICS

## 2023-11-07 PROCEDURE — 3078F DIAST BP <80 MM HG: CPT | Performed by: PEDIATRICS

## 2023-11-07 RX ORDER — SODIUM CHLORIDE 9 MG/ML
5-250 INJECTION, SOLUTION INTRAVENOUS PRN
OUTPATIENT
Start: 2023-12-05

## 2023-11-07 RX ORDER — SODIUM CHLORIDE 9 MG/ML
5-250 INJECTION, SOLUTION INTRAVENOUS PRN
Status: DISCONTINUED | OUTPATIENT
Start: 2023-11-07 | End: 2023-11-07 | Stop reason: HOSPADM

## 2023-11-07 RX ORDER — SODIUM CHLORIDE 9 MG/ML
INJECTION, SOLUTION INTRAVENOUS CONTINUOUS
OUTPATIENT
Start: 2023-12-05

## 2023-11-07 RX ORDER — ONDANSETRON 2 MG/ML
8 INJECTION INTRAMUSCULAR; INTRAVENOUS
OUTPATIENT
Start: 2023-12-05

## 2023-11-07 RX ORDER — EPINEPHRINE 1 MG/ML
0.3 INJECTION, SOLUTION, CONCENTRATE INTRAVENOUS PRN
OUTPATIENT
Start: 2023-12-05

## 2023-11-07 RX ORDER — SODIUM CHLORIDE 0.9 % (FLUSH) 0.9 %
5-40 SYRINGE (ML) INJECTION PRN
OUTPATIENT
Start: 2023-12-05

## 2023-11-07 RX ORDER — DIPHENHYDRAMINE HYDROCHLORIDE 50 MG/ML
50 INJECTION INTRAMUSCULAR; INTRAVENOUS
OUTPATIENT
Start: 2023-12-05

## 2023-11-07 RX ORDER — ACETAMINOPHEN 325 MG/1
650 TABLET ORAL
OUTPATIENT
Start: 2023-12-05

## 2023-11-07 RX ORDER — ALBUTEROL SULFATE 90 UG/1
4 AEROSOL, METERED RESPIRATORY (INHALATION) PRN
OUTPATIENT
Start: 2023-12-05

## 2023-11-07 RX ORDER — SODIUM CHLORIDE 0.9 % (FLUSH) 0.9 %
5-40 SYRINGE (ML) INJECTION PRN
Status: DISCONTINUED | OUTPATIENT
Start: 2023-11-07 | End: 2023-11-07 | Stop reason: HOSPADM

## 2023-11-07 RX ORDER — HEPARIN 100 UNIT/ML
500 SYRINGE INTRAVENOUS PRN
OUTPATIENT
Start: 2023-12-05

## 2023-11-07 RX ADMIN — BELIMUMAB 960 MG: 120 INJECTION, POWDER, LYOPHILIZED, FOR SOLUTION INTRAVENOUS at 08:34

## 2023-11-07 RX ADMIN — SODIUM CHLORIDE 50 ML/HR: 9 INJECTION, SOLUTION INTRAVENOUS at 08:34

## 2023-11-07 RX ADMIN — Medication 10 ML: at 08:33

## 2023-11-07 ASSESSMENT — PATIENT HEALTH QUESTIONNAIRE - PHQ9
SUM OF ALL RESPONSES TO PHQ QUESTIONS 1-9: 0
SUM OF ALL RESPONSES TO PHQ QUESTIONS 1-9: 0
1. LITTLE INTEREST OR PLEASURE IN DOING THINGS: 0
SUM OF ALL RESPONSES TO PHQ QUESTIONS 1-9: 0
SUM OF ALL RESPONSES TO PHQ QUESTIONS 1-9: 0
SUM OF ALL RESPONSES TO PHQ9 QUESTIONS 1 & 2: 0
2. FEELING DOWN, DEPRESSED OR HOPELESS: 0

## 2023-11-07 ASSESSMENT — PAIN SCALES - GENERAL
PAINLEVEL_OUTOF10: 0
PAINLEVEL_OUTOF10: 0

## 2023-11-07 NOTE — PROGRESS NOTES
Chief Complaint   Patient presents with    Lupus     1. Have you been to the ER, urgent care clinic since your last visit? Hospitalized since your last visit? No    2. Have you seen or consulted any other health care providers outside of the [address] [place] [address] Avenue since your last visit? Include any pap smears or colon screening.  No
retinal toxicity if on plaquenil. PLAN  1. Benlysta monthly infusions   2. Plaquenil 200 mg daily  3. Follow up at infusion in 4 months    Cary Reddy MD  Adult and Pediatric Rheumatology     [facility], [place], [address], Phone [phone], Fax [phone]     Visiting  of Pediatrics    Department of Pediatrics, [facility] of [address] [place] [address], [address] [place] [address], Phone [phone], Fax [phone]    There are no Patient Instructions on file for this visit. cc:  Ty Fagan I, Anya Lang MD, personally performed the services described in the documentation as scribed by Kelley Nina in my presence and have reviewed and agree with the note as scribed.

## 2023-11-20 ENCOUNTER — TELEMEDICINE (OUTPATIENT)
Age: 58
End: 2023-11-20
Payer: MEDICAID

## 2023-11-20 DIAGNOSIS — R19.5 POSITIVE COLORECTAL CANCER SCREENING USING DNA-BASED STOOL TEST: Primary | ICD-10-CM

## 2023-11-20 PROCEDURE — 99213 OFFICE O/P EST LOW 20 MIN: CPT | Performed by: PHYSICIAN ASSISTANT

## 2023-11-20 ASSESSMENT — ENCOUNTER SYMPTOMS
BLOOD IN STOOL: 0
COUGH: 0
SHORTNESS OF BREATH: 0

## 2023-11-20 NOTE — PROGRESS NOTES
Cass Joya is a 60 y.o. female who was seen by synchronous (real-time) audio-video technology on 11/20/2023    Consent: Cass Joya, who was seen by synchronous (real-time) audio-video technology, and/or her healthcare decision maker, is aware that this patient-initiated, Telehealth encounter on 11/20/2023 is a billable service, with coverage as determined by her insurance carrier. She is aware that she may receive a bill and has provided verbal consent to proceed: YES  Subjective:   Cass Joya is a 60 y.o. female who was seen for Results    Cologuard positive per insurance company. Referral written last week but pt hasn't yet checked her My Chart messages. She agrees to do colonoscopy. Review of Systems   Constitutional:  Negative for fever. Respiratory:  Negative for cough and shortness of breath. Cardiovascular:  Negative for chest pain and palpitations. Gastrointestinal:  Negative for blood in stool. Neurological:  Negative for headaches. Psychiatric/Behavioral:  Negative for dysphoric mood. Objective:         11/20/2023     7:18 AM   Patient-Reported Vitals   Patient-Reported Weight 218   Patient-Reported Height 5'5   Patient-Reported Systolic 294 mmHg   Patient-Reported Diastolic 72 mmHg   Patient-Reported Temperature 97.6   Patient-Reported SpO2 98      General: alert, cooperative, no distress   Mental  status: normal mood, behavior, speech, dress, motor activity, and thought processes, able to follow commands   HENT: NCAT   Neck: no visualized mass   Resp: no respiratory distress   Neuro: no gross deficits   Skin: no discoloration or lesions of concern on visible areas   Psychiatric: normal affect, consistent with stated mood, no evidence of hallucinations     Assessment & Plan:       ICD-10-CM    1. Positive colorectal cancer screening using DNA-based stool test  R19.5           We discussed the expected course, resolution and complications of the diagnosis(es) in detail.   Medication

## 2023-11-20 NOTE — PROGRESS NOTES
I have reviewed all needed documentation in preparation for visit. Verified patient by name and date of birth  Chief Complaint   Patient presents with    Results       There were no vitals filed for this visit. Health Maintenance Due   Topic Date Due    Hepatitis B vaccine (1 of 3 - 3-dose series) Never done    Pneumococcal 0-64 years Vaccine (1 - PCV) Never done    DTaP/Tdap/Td vaccine (1 - Tdap) Never done    Shingles vaccine (1 of 2) Never done    Colorectal Cancer Screen  01/28/2023    Flu vaccine (1) Never done    A1C test (Diabetic or Prediabetic)  08/12/2023    COVID-19 Vaccine (3 - 2023-24 season) 09/01/2023       1. \"Have you been to the ER, urgent care clinic since your last visit? Hospitalized since your last visit? \" No     2. \"Have you seen or consulted any other health care providers outside of the [address] [place] [address] since your last visit? \" No     3. For patients aged 43-73: Has the patient had a colonoscopy / FIT/ Cologuard? Possibly needs      If the patient is female:    4. For patients aged 43-66: Has the patient had a mammogram within the past 2 years? Needs order      5. For patients aged 21-65: Has the patient had a pap smear?  No needs        Maikel Rios LPN

## 2023-11-28 NOTE — PROGRESS NOTES
[facility] Rheumatology  OBIC Note    Date: 2023  Name: Theresa Weaver  MRN: [id number]       : 1965  Diagnosis: Lupus (M32.9)   Treatment: Benlysta 960mg  Referring Provider: Dr. Jorge Barber  Supervising Provider: Dr. Jorge Barber    Patient arrived to [facility] at 0800. Ms. Satish Delacruz allergies reviewed and she agreed to receiving today's treatment. A physical assessment was performed initially and post-treatment. Ms. Radha Christine vitals were monitored before and after medication administration.    Vitals:    23 0807 23 0815 23 0937   BP: (!) 193/78 (!) 179/75 (!) 177/73   Pulse: 67  63   Resp: 18  16   SpO2: 98%       Recent labs results:  Lab Results   Component Value Date/Time     2023 08:30 AM    K 4.4 2023 08:30 AM     2023 08:30 AM    CO2 29 2023 08:30 AM    BUN 16 2023 08:30 AM    CREATININE 0.81 2023 08:30 AM    GLUCOSE 108 2023 08:30 AM    CALCIUM 8.4 2023 08:30 AM    LABGLOM >60 2023 08:30 AM    LABGLOM 64 2022 12:00 AM      Lab Results   Component Value Date    WBC 4.6 2023    HGB 12.0 2023    HCT 39.9 2023    MCV 82.4 2023     2023   Medications given per providers orders:  Administrations This Visit       0.9 % sodium chloride infusion       Admin Date  2023 Action  New Bag Dose  50 mL/hr Rate  50 mL/hr Route  IntraVENous Administered By  Willy Jackman RN              belimumab (BENLYSTA) 960 mg in sodium chloride 0.9 % 287 mL infusion       Admin Date  2023 Action  New Bag Dose  960 mg Rate  287 mL/hr Route  IntraVENous Administered By  Willy Jackman RN              sodium chloride flush 0.9 % injection 5-40 mL       Admin Date  2023 Action  Given Dose  10 mL Route  IntraVENous Administered By  Willy Jackman RN                Benlysta 120mg vial (Total dose 960mg)  NDC 02553-891-98    START: 856  STOP: 935     250 ml bag 0.9% Normal Saline @ 25ml/hr  NDC

## 2023-11-29 ENCOUNTER — HOSPITAL ENCOUNTER (OUTPATIENT)
Facility: HOSPITAL | Age: 58
Discharge: HOME | End: 2023-12-02
Attending: ORTHOPAEDIC SURGERY
Payer: MEDICAID

## 2023-11-29 DIAGNOSIS — M75.02 ADHESIVE CAPSULITIS OF LEFT SHOULDER: ICD-10-CM

## 2023-11-29 PROCEDURE — A9579 GAD-BASE MR CONTRAST NOS,1ML: HCPCS | Performed by: ORTHOPAEDIC SURGERY

## 2023-11-29 PROCEDURE — 73222 MRI JOINT UPR EXTREM W/DYE: CPT

## 2023-11-29 PROCEDURE — 6360000004 HC RX CONTRAST MEDICATION

## 2023-11-29 PROCEDURE — 2580000003 HC RX 258

## 2023-11-29 PROCEDURE — 73040 CONTRAST X-RAY OF SHOULDER: CPT

## 2023-11-29 PROCEDURE — 6360000004 HC RX CONTRAST MEDICATION: Performed by: ORTHOPAEDIC SURGERY

## 2023-11-29 PROCEDURE — 2500000003 HC RX 250 WO HCPCS

## 2023-11-29 PROCEDURE — 2500000003 HC RX 250 WO HCPCS: Performed by: PHYSICIAN ASSISTANT

## 2023-11-29 PROCEDURE — A4216 STERILE WATER/SALINE, 10 ML: HCPCS

## 2023-11-29 RX ORDER — LIDOCAINE HYDROCHLORIDE 10 MG/ML
10 INJECTION, SOLUTION EPIDURAL; INFILTRATION; INTRACAUDAL; PERINEURAL ONCE
Status: COMPLETED | OUTPATIENT
Start: 2023-11-29 | End: 2023-11-29

## 2023-11-29 RX ORDER — SODIUM BICARBONATE 42 MG/ML
5 INJECTION, SOLUTION INTRAVENOUS ONCE
Status: COMPLETED | OUTPATIENT
Start: 2023-11-29 | End: 2023-11-29

## 2023-11-29 RX ORDER — SODIUM CHLORIDE 9 MG/ML
10 INJECTION INTRAVENOUS ONCE
Status: COMPLETED | OUTPATIENT
Start: 2023-11-29 | End: 2023-11-29

## 2023-11-29 RX ADMIN — SODIUM BICARBONATE 2.5 MEQ: 42 INJECTION, SOLUTION INTRAVENOUS at 11:29

## 2023-11-29 RX ADMIN — GADOTERIDOL 5 ML: 279.3 INJECTION, SOLUTION INTRAVENOUS at 11:26

## 2023-11-29 RX ADMIN — IOHEXOL 20 ML: 300 INJECTION, SOLUTION INTRAVENOUS at 11:25

## 2023-11-29 RX ADMIN — SODIUM CHLORIDE 10 ML: 9 INJECTION INTRAMUSCULAR; INTRAVENOUS; SUBCUTANEOUS at 11:25

## 2023-11-29 RX ADMIN — LIDOCAINE HYDROCHLORIDE 10 ML: 10 INJECTION, SOLUTION EPIDURAL; INFILTRATION; INTRACAUDAL; PERINEURAL at 11:26

## 2023-12-05 ENCOUNTER — NURSE ONLY (OUTPATIENT)
Age: 58
End: 2023-12-05
Payer: MEDICAID

## 2023-12-05 VITALS
SYSTOLIC BLOOD PRESSURE: 177 MMHG | OXYGEN SATURATION: 98 % | HEART RATE: 63 BPM | DIASTOLIC BLOOD PRESSURE: 73 MMHG | RESPIRATION RATE: 16 BRPM

## 2023-12-05 DIAGNOSIS — M32.9 SYSTEMIC LUPUS ERYTHEMATOSUS, UNSPECIFIED SLE TYPE, UNSPECIFIED ORGAN INVOLVEMENT STATUS (HCC): Primary | ICD-10-CM

## 2023-12-05 DIAGNOSIS — I10 ESSENTIAL HYPERTENSION: ICD-10-CM

## 2023-12-05 PROCEDURE — 96365 THER/PROPH/DIAG IV INF INIT: CPT | Performed by: PEDIATRICS

## 2023-12-05 PROCEDURE — PBSHW PBB SHADOW CHARGE: Performed by: PEDIATRICS

## 2023-12-05 RX ORDER — EPINEPHRINE 1 MG/ML
0.3 INJECTION, SOLUTION, CONCENTRATE INTRAVENOUS PRN
OUTPATIENT
Start: 2024-01-02

## 2023-12-05 RX ORDER — SODIUM CHLORIDE 0.9 % (FLUSH) 0.9 %
5-40 SYRINGE (ML) INJECTION PRN
Status: DISCONTINUED | OUTPATIENT
Start: 2023-12-05 | End: 2023-12-05 | Stop reason: HOSPADM

## 2023-12-05 RX ORDER — SODIUM CHLORIDE 0.9 % (FLUSH) 0.9 %
5-40 SYRINGE (ML) INJECTION PRN
OUTPATIENT
Start: 2024-01-02

## 2023-12-05 RX ORDER — ACETAMINOPHEN 325 MG/1
650 TABLET ORAL
OUTPATIENT
Start: 2024-01-02

## 2023-12-05 RX ORDER — ONDANSETRON 2 MG/ML
8 INJECTION INTRAMUSCULAR; INTRAVENOUS
OUTPATIENT
Start: 2024-01-02

## 2023-12-05 RX ORDER — ALBUTEROL SULFATE 90 UG/1
4 AEROSOL, METERED RESPIRATORY (INHALATION) PRN
OUTPATIENT
Start: 2024-01-02

## 2023-12-05 RX ORDER — SODIUM CHLORIDE 9 MG/ML
5-250 INJECTION, SOLUTION INTRAVENOUS PRN
OUTPATIENT
Start: 2024-01-02

## 2023-12-05 RX ORDER — SODIUM CHLORIDE 9 MG/ML
5-250 INJECTION, SOLUTION INTRAVENOUS PRN
Status: DISCONTINUED | OUTPATIENT
Start: 2023-12-05 | End: 2023-12-05 | Stop reason: HOSPADM

## 2023-12-05 RX ORDER — HEPARIN 100 UNIT/ML
500 SYRINGE INTRAVENOUS PRN
OUTPATIENT
Start: 2024-01-02

## 2023-12-05 RX ORDER — DIPHENHYDRAMINE HYDROCHLORIDE 50 MG/ML
50 INJECTION INTRAMUSCULAR; INTRAVENOUS
OUTPATIENT
Start: 2024-01-02

## 2023-12-05 RX ORDER — SODIUM CHLORIDE 9 MG/ML
INJECTION, SOLUTION INTRAVENOUS CONTINUOUS
OUTPATIENT
Start: 2024-01-02

## 2023-12-05 RX ADMIN — Medication 10 ML: at 08:12

## 2023-12-05 RX ADMIN — BELIMUMAB 960 MG: 120 INJECTION, POWDER, LYOPHILIZED, FOR SOLUTION INTRAVENOUS at 08:35

## 2023-12-05 RX ADMIN — SODIUM CHLORIDE 50 ML/HR: 9 INJECTION, SOLUTION INTRAVENOUS at 08:13

## 2023-12-05 ASSESSMENT — PATIENT HEALTH QUESTIONNAIRE - PHQ9
SUM OF ALL RESPONSES TO PHQ QUESTIONS 1-9: 0
1. LITTLE INTEREST OR PLEASURE IN DOING THINGS: 0
2. FEELING DOWN, DEPRESSED OR HOPELESS: 0
SUM OF ALL RESPONSES TO PHQ QUESTIONS 1-9: 0
SUM OF ALL RESPONSES TO PHQ9 QUESTIONS 1 & 2: 0

## 2023-12-05 ASSESSMENT — PAIN SCALES - GENERAL
PAINLEVEL_OUTOF10: 0
PAINLEVEL_OUTOF10: 0

## 2023-12-19 NOTE — PROGRESS NOTES
0925  STOP: 1025     250 ml bag 0.9% Normal Saline @ 25ml/hr  NDC  3357-8238-25  Lot #  [id number]  Exp   09/2024    0.9% Normal Saline 10ml flush  NDC 8290-235287  Lot # [id number]  Exp 12/2026    Lines: 24G RFA.   +Blood return.   Line flushed per protocol.    Access was removed from Ms. Ulrich after completion of infusion/injection.   Treatment tolerated without complaints or reactions. Patient discharged ambulatory in stable condition at 1035.  Encounter routed to supervising provider for co-signing.     Future Appointments   Date Time Provider Department Center   1/2/2024  4:20 PM Akhil Stein, PT [facility] AMB   1/9/2024  4:00 PM Akhil Stein, PT [facility] AMB   1/16/2024  4:00 PM Akhil Stein, PT [facility] AMB   1/22/2024  8:00 AM Brice Simpson MD [facility] AMB   1/23/2024  4:00 PM Akhil Stein, PT [facility] AMB   1/29/2024  3:00 PM INF[facility]ION[facility] BS AMB   1/30/2024  4:00 PM Akhil Stein PT [facility] AMB   2/27/2024  8:00 AM INF[facility]INJ_RC [facility] BS AMB   3/26/2024  8:00 AM INF[facility]INJ_RC [facility] BS AMB   3/26/2024  9:00 AM Cary Moses MD [facility] BS AMB     Neha Momin RN

## 2024-01-02 ENCOUNTER — NURSE ONLY (OUTPATIENT)
Age: 59
End: 2024-01-02
Payer: MEDICAID

## 2024-01-02 VITALS
DIASTOLIC BLOOD PRESSURE: 77 MMHG | SYSTOLIC BLOOD PRESSURE: 183 MMHG | OXYGEN SATURATION: 98 % | HEART RATE: 64 BPM | TEMPERATURE: 97.7 F | RESPIRATION RATE: 14 BRPM

## 2024-01-02 DIAGNOSIS — M32.9 SYSTEMIC LUPUS ERYTHEMATOSUS, UNSPECIFIED SLE TYPE, UNSPECIFIED ORGAN INVOLVEMENT STATUS (HCC): Primary | ICD-10-CM

## 2024-01-02 DIAGNOSIS — I10 ESSENTIAL HYPERTENSION: ICD-10-CM

## 2024-01-02 PROCEDURE — PBSHW PBB SHADOW CHARGE: Performed by: PEDIATRICS

## 2024-01-02 PROCEDURE — 96365 THER/PROPH/DIAG IV INF INIT: CPT | Performed by: PEDIATRICS

## 2024-01-02 RX ORDER — SODIUM CHLORIDE 9 MG/ML
5-250 INJECTION, SOLUTION INTRAVENOUS PRN
OUTPATIENT
Start: 2024-01-30

## 2024-01-02 RX ORDER — SODIUM CHLORIDE 0.9 % (FLUSH) 0.9 %
5-40 SYRINGE (ML) INJECTION PRN
OUTPATIENT
Start: 2024-01-30

## 2024-01-02 RX ORDER — SODIUM CHLORIDE 9 MG/ML
5-250 INJECTION, SOLUTION INTRAVENOUS PRN
Status: DISCONTINUED | OUTPATIENT
Start: 2024-01-02 | End: 2024-01-02 | Stop reason: HOSPADM

## 2024-01-02 RX ORDER — EPINEPHRINE 1 MG/ML
0.3 INJECTION, SOLUTION, CONCENTRATE INTRAVENOUS PRN
OUTPATIENT
Start: 2024-01-30

## 2024-01-02 RX ORDER — SODIUM CHLORIDE 9 MG/ML
INJECTION, SOLUTION INTRAVENOUS CONTINUOUS
OUTPATIENT
Start: 2024-01-30

## 2024-01-02 RX ORDER — SODIUM CHLORIDE 0.9 % (FLUSH) 0.9 %
5-40 SYRINGE (ML) INJECTION PRN
Status: DISCONTINUED | OUTPATIENT
Start: 2024-01-02 | End: 2024-01-02 | Stop reason: HOSPADM

## 2024-01-02 RX ORDER — ACETAMINOPHEN 325 MG/1
650 TABLET ORAL
OUTPATIENT
Start: 2024-01-30

## 2024-01-02 RX ORDER — ALBUTEROL SULFATE 90 UG/1
4 AEROSOL, METERED RESPIRATORY (INHALATION) PRN
OUTPATIENT
Start: 2024-01-30

## 2024-01-02 RX ORDER — HEPARIN 100 UNIT/ML
500 SYRINGE INTRAVENOUS PRN
OUTPATIENT
Start: 2024-01-30

## 2024-01-02 RX ORDER — DIPHENHYDRAMINE HYDROCHLORIDE 50 MG/ML
50 INJECTION INTRAMUSCULAR; INTRAVENOUS
OUTPATIENT
Start: 2024-01-30

## 2024-01-02 RX ORDER — ONDANSETRON 2 MG/ML
8 INJECTION INTRAMUSCULAR; INTRAVENOUS
OUTPATIENT
Start: 2024-01-30

## 2024-01-02 RX ADMIN — BELIMUMAB 960 MG: 120 INJECTION, POWDER, LYOPHILIZED, FOR SOLUTION INTRAVENOUS at 09:25

## 2024-01-02 RX ADMIN — Medication 10 ML: at 09:24

## 2024-01-02 RX ADMIN — SODIUM CHLORIDE 50 ML/HR: 9 INJECTION, SOLUTION INTRAVENOUS at 09:24

## 2024-01-02 ASSESSMENT — PAIN SCALES - GENERAL: PAINLEVEL_OUTOF10: 0

## 2024-01-24 NOTE — PROGRESS NOTES
[facility]  OBIC Note    Date: 2024  Name: Desire Ulrich  MRN: [id number]       : 1965  Diagnosis: Lupus (M32.9)   Treatment: Benlysta 960mg  Referring Provider: Dr. Moses  Supervising Provider: Dr. Moses    Patient arrived to [facility] at 0945.  Ms. Ulrich allergies reviewed and she agreed to receiving today's treatment. A physical assessment was performed initially and post-treatment.   Has colonoscopy yesterday with one large polyp removal.      Ms. Boyle vitals were monitored before and after medication administration.   Vitals:    24 0950 24 1048 24 1102   BP: (!) 173/88 (!) 178/95 (!) 187/91   Pulse: 70  62   Resp: 16  16   Temp: 98 °F (36.7 °C)  98 °F (36.7 °C)   TempSrc: Oral  Oral   SpO2: 98%  99%     Labs drawn and walked to [facility].  Recent labs results:  Lab Results   Component Value Date/Time     2023 08:30 AM    K 4.4 2023 08:30 AM     2023 08:30 AM    CO2 29 2023 08:30 AM    BUN 16 2023 08:30 AM    CREATININE 0.81 2023 08:30 AM    GLUCOSE 108 2023 08:30 AM    CALCIUM 8.4 2023 08:30 AM    LABGLOM >60 2023 08:30 AM    LABGLOM 64 2022 12:00 AM      Lab Results   Component Value Date    WBC 4.6 2023    HGB 12.0 2023    HCT 39.9 2023    MCV 82.4 2023     2023   Medications given per providers orders:  Administrations This Visit       0.9 % sodium chloride infusion       Admin Date  2024 Action  New Bag Dose  25 mL/hr Rate  25 mL/hr Route  IntraVENous Administered By  Neha Momin, RN              belimumab (BENLYSTA) 960 mg in sodium chloride 0.9 % 287 mL infusion       Admin Date  2024 Action  New Bag Dose  960 mg Rate  287 mL/hr Route  IntraVENous Administered By  Neha Momin, RN              sodium chloride flush 0.9 % injection 5-40 mL       Admin Date  2024 Action  Given Dose  10 mL Route  IntraVENous Administered By  Neha Momin, RN

## 2024-01-30 ENCOUNTER — HOSPITAL ENCOUNTER (OUTPATIENT)
Facility: HOSPITAL | Age: 59
Setting detail: OUTPATIENT SURGERY
Discharge: HOME | End: 2024-01-30
Attending: INTERNAL MEDICINE | Admitting: INTERNAL MEDICINE
Payer: MEDICAID

## 2024-01-30 ENCOUNTER — ANESTHESIA (OUTPATIENT)
Facility: HOSPITAL | Age: 59
End: 2024-01-30
Payer: MEDICAID

## 2024-01-30 ENCOUNTER — ANESTHESIA EVENT (OUTPATIENT)
Facility: HOSPITAL | Age: 59
End: 2024-01-30
Payer: MEDICAID

## 2024-01-30 VITALS
HEART RATE: 57 BPM | HEIGHT: 65 IN | WEIGHT: 222 LBS | TEMPERATURE: 98.2 F | SYSTOLIC BLOOD PRESSURE: 146 MMHG | RESPIRATION RATE: 17 BRPM | OXYGEN SATURATION: 97 % | DIASTOLIC BLOOD PRESSURE: 54 MMHG | BODY MASS INDEX: 36.99 KG/M2

## 2024-01-30 PROCEDURE — 7100000011 HC PHASE II RECOVERY - ADDTL 15 MIN: Performed by: INTERNAL MEDICINE

## 2024-01-30 PROCEDURE — 6360000002 HC RX W HCPCS: Performed by: NURSE ANESTHETIST, CERTIFIED REGISTERED

## 2024-01-30 PROCEDURE — 3600007502: Performed by: INTERNAL MEDICINE

## 2024-01-30 PROCEDURE — 2580000003 HC RX 258: Performed by: NURSE ANESTHETIST, CERTIFIED REGISTERED

## 2024-01-30 PROCEDURE — 6370000000 HC RX 637 (ALT 250 FOR IP): Performed by: INTERNAL MEDICINE

## 2024-01-30 PROCEDURE — 3700000001 HC ADD 15 MINUTES (ANESTHESIA): Performed by: INTERNAL MEDICINE

## 2024-01-30 PROCEDURE — 88305 TISSUE EXAM BY PATHOLOGIST: CPT

## 2024-01-30 PROCEDURE — 3700000000 HC ANESTHESIA ATTENDED CARE: Performed by: INTERNAL MEDICINE

## 2024-01-30 PROCEDURE — 2500000003 HC RX 250 WO HCPCS: Performed by: NURSE ANESTHETIST, CERTIFIED REGISTERED

## 2024-01-30 PROCEDURE — C1889 IMPLANT/INSERT DEVICE, NOC: HCPCS | Performed by: INTERNAL MEDICINE

## 2024-01-30 PROCEDURE — 2709999900 HC NON-CHARGEABLE SUPPLY: Performed by: INTERNAL MEDICINE

## 2024-01-30 PROCEDURE — 7100000010 HC PHASE II RECOVERY - FIRST 15 MIN: Performed by: INTERNAL MEDICINE

## 2024-01-30 PROCEDURE — 3600007512: Performed by: INTERNAL MEDICINE

## 2024-01-30 DEVICE — WORKING LENGTH 235CM, WORKING CHANNEL 2.8MM
Type: IMPLANTABLE DEVICE | Site: SIGMOID COLON | Status: FUNCTIONAL
Brand: RESOLUTION 360 CLIP

## 2024-01-30 RX ORDER — SODIUM CHLORIDE 0.9 % (FLUSH) 0.9 %
5-40 SYRINGE (ML) INJECTION PRN
Status: DISCONTINUED | OUTPATIENT
Start: 2024-01-30 | End: 2024-01-30 | Stop reason: HOSPADM

## 2024-01-30 RX ORDER — SODIUM CHLORIDE 9 MG/ML
INJECTION, SOLUTION INTRAVENOUS CONTINUOUS
Status: DISCONTINUED | OUTPATIENT
Start: 2024-01-30 | End: 2024-01-30 | Stop reason: HOSPADM

## 2024-01-30 RX ORDER — SIMETHICONE 20 MG/.3ML
EMULSION ORAL PRN
Status: DISCONTINUED | OUTPATIENT
Start: 2024-01-30 | End: 2024-01-30 | Stop reason: ALTCHOICE

## 2024-01-30 RX ORDER — LIDOCAINE HYDROCHLORIDE 20 MG/ML
INJECTION, SOLUTION EPIDURAL; INFILTRATION; INTRACAUDAL; PERINEURAL PRN
Status: DISCONTINUED | OUTPATIENT
Start: 2024-01-30 | End: 2024-01-30 | Stop reason: SDUPTHER

## 2024-01-30 RX ORDER — SODIUM CHLORIDE 9 MG/ML
INJECTION, SOLUTION INTRAVENOUS CONTINUOUS PRN
Status: DISCONTINUED | OUTPATIENT
Start: 2024-01-30 | End: 2024-01-30 | Stop reason: SDUPTHER

## 2024-01-30 RX ORDER — SODIUM CHLORIDE 9 MG/ML
25 INJECTION, SOLUTION INTRAVENOUS PRN
Status: DISCONTINUED | OUTPATIENT
Start: 2024-01-30 | End: 2024-01-30 | Stop reason: HOSPADM

## 2024-01-30 RX ORDER — SODIUM CHLORIDE 0.9 % (FLUSH) 0.9 %
5-40 SYRINGE (ML) INJECTION EVERY 12 HOURS SCHEDULED
Status: DISCONTINUED | OUTPATIENT
Start: 2024-01-30 | End: 2024-01-30 | Stop reason: HOSPADM

## 2024-01-30 RX ADMIN — PROPOFOL 30 MG: 10 INJECTION, EMULSION INTRAVENOUS at 16:11

## 2024-01-30 RX ADMIN — PROPOFOL 30 MG: 10 INJECTION, EMULSION INTRAVENOUS at 16:12

## 2024-01-30 RX ADMIN — PROPOFOL 70 MG: 10 INJECTION, EMULSION INTRAVENOUS at 16:10

## 2024-01-30 RX ADMIN — SODIUM CHLORIDE: 9 INJECTION, SOLUTION INTRAVENOUS at 16:00

## 2024-01-30 RX ADMIN — PROPOFOL 20 MG: 10 INJECTION, EMULSION INTRAVENOUS at 16:17

## 2024-01-30 RX ADMIN — PROPOFOL 20 MG: 10 INJECTION, EMULSION INTRAVENOUS at 16:14

## 2024-01-30 RX ADMIN — PROPOFOL 20 MG: 10 INJECTION, EMULSION INTRAVENOUS at 16:20

## 2024-01-30 RX ADMIN — PROPOFOL 30 MG: 10 INJECTION, EMULSION INTRAVENOUS at 16:15

## 2024-01-30 RX ADMIN — LIDOCAINE HYDROCHLORIDE 50 MG: 20 INJECTION, SOLUTION EPIDURAL; INFILTRATION; INTRACAUDAL; PERINEURAL at 16:10

## 2024-01-30 ASSESSMENT — PAIN - FUNCTIONAL ASSESSMENT: PAIN_FUNCTIONAL_ASSESSMENT: NONE - DENIES PAIN

## 2024-01-30 NOTE — ANESTHESIA PRE PROCEDURE
Department of Anesthesiology  Preprocedure Note       Name:  Desire Ulrich   Age:  58 y.o.  :  1965                                          MRN:  [id number]         Date:  2024      Surgeon: Surgeon(s):  Yassine Chua MD    Procedure: Procedure(s):  COLONOSCOPY DIAGNOSTIC    Medications prior to admission:   Prior to Admission medications    Medication Sig Start Date End Date Taking? Authorizing Provider   clobetasol prop emollient base 0.05 % CREA Apply to affected area twice a day 10/10/23   Cary Moses MD   cetirizine (ZYRTEC) 10 MG tablet  23   Provider, MD Christy   hydroxychloroquine (PLAQUENIL) 200 MG tablet Take 1 tablet by mouth daily 23   Cary Moses MD   metoprolol succinate (TOPROL XL) 50 MG extended release tablet Take 1 tablet by mouth daily 23   Caitlyn García PA-C   chlorthalidone (HYGROTEN) 50 MG tablet Take 1 tablet by mouth daily 23   Caitlyn García PA-C   [employer] ASPIRIN ADULT LOW DOSE 81 MG chewable tablet TAKE 1 TABLET BY MOUTH EVERY DAY 23   Caitlyn García PA-C   SYMBICORT 160-4.5 MCG/ACT AERO  23   Provider, MD Christy   albuterol sulfate HFA (PROVENTIL;VENTOLIN;PROAIR) 108 (90 Base) MCG/ACT inhaler TAKE 1 PUFF EVERY 4 HOURS AS NEEDED 10/5/22   Automatic Reconciliation, Ar   amLODIPine (NORVASC) 10 MG tablet Take 1 tablet by mouth daily 22   Automatic Reconciliation, Ar   meloxicam (MOBIC) 15 MG tablet 1 tablet as needed ceived the following from [facility]: Outside name: meloxicam (MOBIC) 15 mg tablet 22   Automatic Reconciliation, Ar   montelukast (SINGULAIR) 10 MG tablet Take 1 tablet by mouth every evening 20   Automatic Reconciliation, Ar       Current medications:    No current facility-administered medications for this encounter.       Allergies:    Allergies   Allergen Reactions   • Lisinopril Angioedema   • Avocado Angioedema     22: As per patient's description       Problem List:

## 2024-01-30 NOTE — DISCHARGE INSTRUCTIONS
[facility]  [facility]  KAITLYNN López MD  [phone]      January 30, 2024     Desire Ulrich  YOB: 1965    ENDOSCOPY DISCHARGE INSTRUCTIONS    If there is redness at IV site you should apply warm compress to area.  If redness or soreness persist contact Dr. López or your primary care doctor.    Gaseous discomfort may develop, but walking, belching will help relieve this.  You may not operate a vehicle for 12 hours  You may not operate machinery or dangerous appliances for rest of today  You may not drink alcoholic beverages for 12 hours  Avoid making any critical decisions for 24 hours    DIET:  You may resume your normal diet, but some patients find that heavy or large meals may lead to indigestion or vomiting.  I suggest a light meal as first food intake.    MEDICATIONS:  The use of some over-the-counter pain medication may lead to bleeding after biopsies or other procedures you may have had done.  Tylenol (also called acetaminophen) is safe to take and will not lead to bleeding.  Based on the procedure you had today you may safely take aspirin or aspirin-like products for the next seven (7) days.      ACTIVITY:  You may resume your normal household activities, but it is recommended that you spend the remainder of the day resting -  avoid any strenuous activity.     CALL DR. LÓPEZ'S OFFICE IF:  Increasing pain, nausea, vomiting  Abdominal distension (swelling)  Significant new or increased bleeding (oral or rectal)  Fever/Chills  Chest pain/shortness of breath                   Additional instructions:   Impression:  Sigmoid colon polyp    Recommendations:   - await pathology results  - await pathology results to determine interval for repeat colonoscopy     Please let me or my office staff know if you have any feedback about today's procedure.    KAITLYNN López MD

## 2024-01-30 NOTE — H&P
58 y.o. female presents for open access colonoscopy for screening.  Additional H&P data will be attached on the day of procedure.    Yassine Chua Jr, MD

## 2024-01-30 NOTE — OP NOTE
[facility]  [facility]  KAITLYNN Chua Jr, MD  [phone]      2024    Colonoscopy Procedure Note  Desire Ulrich  :  1965  [facility] Medical Record Number: [id number]    Indications:   Heme positive stools  PCP:  Caitlyn García PA-C  Anesthesia/Sedation: See Anesthesia Record  Endoscopist:  Dr. KAITLYNN Chua Jr  Complications:  None  Estimated Blood Loss:  None    Surgical assistant: Leaulator: Omid Rodriguez RN  Endoscopy Technician: Zachariah Ruano none unless otherwise specified.     Permit:  The indications, risks, benefits and alternatives were reviewed with the patient or their decision maker who was provided an opportunity to ask questions and all questions were answered.  The specific risks of colonoscopy with conscious sedation were reviewed, including but not limited to anesthetic complication, bleeding, adverse drug reaction, missed lesion, infection, IV site reactions, and intestinal perforation which would lead to the need for surgical repair.  Alternatives to colonoscopy including radiographic imaging, observation without testing, or laboratory testing were reviewed including the limitations of those alternatives.  After considering the options and having all their questions answered, the patient or their decision maker provided both verbal and written consent to proceed.        Procedure in Detail:  After obtaining informed consent, positioning of the patient in the left lateral decubitus position, and conduction of a pre-procedure pause or \"time out\" the endoscope was introduced into the anus and advanced to the cecum, which was identified by the ileocecal valve and appendiceal orifice.  The quality of the colonic preparation was good.  A careful inspection was made as the colonoscope was withdrawn, findings and interventions are described below.    Findings:   There a 20 mm

## 2024-01-31 ENCOUNTER — NURSE ONLY (OUTPATIENT)
Age: 59
End: 2024-01-31
Payer: MEDICAID

## 2024-01-31 VITALS
HEART RATE: 62 BPM | OXYGEN SATURATION: 99 % | RESPIRATION RATE: 16 BRPM | SYSTOLIC BLOOD PRESSURE: 187 MMHG | TEMPERATURE: 98 F | DIASTOLIC BLOOD PRESSURE: 91 MMHG

## 2024-01-31 DIAGNOSIS — I10 ESSENTIAL HYPERTENSION: ICD-10-CM

## 2024-01-31 DIAGNOSIS — M32.9 SYSTEMIC LUPUS ERYTHEMATOSUS, UNSPECIFIED SLE TYPE, UNSPECIFIED ORGAN INVOLVEMENT STATUS (HCC): ICD-10-CM

## 2024-01-31 DIAGNOSIS — M32.9 SYSTEMIC LUPUS ERYTHEMATOSUS, UNSPECIFIED SLE TYPE, UNSPECIFIED ORGAN INVOLVEMENT STATUS (HCC): Primary | ICD-10-CM

## 2024-01-31 LAB
ALBUMIN SERPL-MCNC: 3.5 G/DL (ref 3.5–5)
ALBUMIN/GLOB SERPL: 0.9 (ref 1.1–2.2)
ALP SERPL-CCNC: 126 U/L (ref 45–117)
ALT SERPL-CCNC: 24 U/L (ref 12–78)
ANION GAP SERPL CALC-SCNC: 4 MMOL/L (ref 5–15)
AST SERPL-CCNC: 20 U/L (ref 15–37)
BASOPHILS # BLD: 0 K/UL (ref 0–0.1)
BASOPHILS NFR BLD: 1 % (ref 0–1)
BILIRUB SERPL-MCNC: 0.2 MG/DL (ref 0.2–1)
BUN SERPL-MCNC: 13 MG/DL (ref 6–20)
BUN/CREAT SERPL: 15 (ref 12–20)
CALCIUM SERPL-MCNC: 8.5 MG/DL (ref 8.5–10.1)
CHLORIDE SERPL-SCNC: 108 MMOL/L (ref 97–108)
CO2 SERPL-SCNC: 28 MMOL/L (ref 21–32)
CREAT SERPL-MCNC: 0.84 MG/DL (ref 0.55–1.02)
CRP SERPL-MCNC: 1.24 MG/DL (ref 0–0.3)
DIFFERENTIAL METHOD BLD: ABNORMAL
EOSINOPHIL # BLD: 0.1 K/UL (ref 0–0.4)
EOSINOPHIL NFR BLD: 2 % (ref 0–7)
ERYTHROCYTE [DISTWIDTH] IN BLOOD BY AUTOMATED COUNT: 13.2 % (ref 11.5–14.5)
ERYTHROCYTE [SEDIMENTATION RATE] IN BLOOD: 31 MM/HR (ref 0–30)
GLOBULIN SER CALC-MCNC: 3.8 G/DL (ref 2–4)
GLUCOSE SERPL-MCNC: 123 MG/DL (ref 65–100)
HCT VFR BLD AUTO: 39.7 % (ref 35–47)
HGB BLD-MCNC: 12.2 G/DL (ref 11.5–16)
IMM GRANULOCYTES # BLD AUTO: 0 K/UL (ref 0–0.04)
IMM GRANULOCYTES NFR BLD AUTO: 0 % (ref 0–0.5)
LYMPHOCYTES # BLD: 2.2 K/UL (ref 0.8–3.5)
LYMPHOCYTES NFR BLD: 44 % (ref 12–49)
MCH RBC QN AUTO: 25.5 PG (ref 26–34)
MCHC RBC AUTO-ENTMCNC: 30.7 G/DL (ref 30–36.5)
MCV RBC AUTO: 83.1 FL (ref 80–99)
MONOCYTES # BLD: 0.4 K/UL (ref 0–1)
MONOCYTES NFR BLD: 9 % (ref 5–13)
NEUTS SEG # BLD: 2.1 K/UL (ref 1.8–8)
NEUTS SEG NFR BLD: 44 % (ref 32–75)
NRBC # BLD: 0 K/UL (ref 0–0.01)
NRBC BLD-RTO: 0 PER 100 WBC
PLATELET # BLD AUTO: 272 K/UL (ref 150–400)
PMV BLD AUTO: 12.8 FL (ref 8.9–12.9)
POTASSIUM SERPL-SCNC: 3.9 MMOL/L (ref 3.5–5.1)
PROT SERPL-MCNC: 7.3 G/DL (ref 6.4–8.2)
RBC # BLD AUTO: 4.78 M/UL (ref 3.8–5.2)
SODIUM SERPL-SCNC: 140 MMOL/L (ref 136–145)
WBC # BLD AUTO: 4.8 K/UL (ref 3.6–11)

## 2024-01-31 PROCEDURE — PBSHW PBB SHADOW CHARGE: Performed by: PEDIATRICS

## 2024-01-31 PROCEDURE — 96365 THER/PROPH/DIAG IV INF INIT: CPT | Performed by: PEDIATRICS

## 2024-01-31 RX ORDER — EPINEPHRINE 1 MG/ML
0.3 INJECTION, SOLUTION, CONCENTRATE INTRAVENOUS PRN
OUTPATIENT
Start: 2024-02-28

## 2024-01-31 RX ORDER — ALBUTEROL SULFATE 90 UG/1
4 AEROSOL, METERED RESPIRATORY (INHALATION) PRN
OUTPATIENT
Start: 2024-02-28

## 2024-01-31 RX ORDER — SODIUM CHLORIDE 0.9 % (FLUSH) 0.9 %
5-40 SYRINGE (ML) INJECTION PRN
Status: DISCONTINUED | OUTPATIENT
Start: 2024-01-31 | End: 2024-01-31 | Stop reason: HOSPADM

## 2024-01-31 RX ORDER — SODIUM CHLORIDE 9 MG/ML
INJECTION, SOLUTION INTRAVENOUS CONTINUOUS
OUTPATIENT
Start: 2024-02-28

## 2024-01-31 RX ORDER — ACETAMINOPHEN 325 MG/1
650 TABLET ORAL
OUTPATIENT
Start: 2024-02-28

## 2024-01-31 RX ORDER — SODIUM CHLORIDE 9 MG/ML
5-250 INJECTION, SOLUTION INTRAVENOUS PRN
Status: DISCONTINUED | OUTPATIENT
Start: 2024-01-31 | End: 2024-01-31 | Stop reason: HOSPADM

## 2024-01-31 RX ORDER — ONDANSETRON 2 MG/ML
8 INJECTION INTRAMUSCULAR; INTRAVENOUS
OUTPATIENT
Start: 2024-02-28

## 2024-01-31 RX ORDER — SODIUM CHLORIDE 0.9 % (FLUSH) 0.9 %
5-40 SYRINGE (ML) INJECTION PRN
OUTPATIENT
Start: 2024-02-28

## 2024-01-31 RX ORDER — SODIUM CHLORIDE 9 MG/ML
5-250 INJECTION, SOLUTION INTRAVENOUS PRN
OUTPATIENT
Start: 2024-02-28

## 2024-01-31 RX ORDER — HEPARIN 100 UNIT/ML
500 SYRINGE INTRAVENOUS PRN
OUTPATIENT
Start: 2024-02-28

## 2024-01-31 RX ORDER — DIPHENHYDRAMINE HYDROCHLORIDE 50 MG/ML
50 INJECTION INTRAMUSCULAR; INTRAVENOUS
OUTPATIENT
Start: 2024-02-28

## 2024-01-31 RX ADMIN — BELIMUMAB 960 MG: 120 INJECTION, POWDER, LYOPHILIZED, FOR SOLUTION INTRAVENOUS at 10:00

## 2024-01-31 RX ADMIN — Medication 10 ML: at 09:59

## 2024-01-31 RX ADMIN — SODIUM CHLORIDE 25 ML/HR: 9 INJECTION, SOLUTION INTRAVENOUS at 09:59

## 2024-01-31 ASSESSMENT — PAIN SCALES - GENERAL
PAINLEVEL_OUTOF10: 0
PAINLEVEL_OUTOF10: 0

## 2024-02-01 NOTE — ANESTHESIA POSTPROCEDURE EVALUATION
Department of Anesthesiology  Postprocedure Note    Patient: Desire Ulrich  MRN: [id number]  YOB: 1965  Date of evaluation: 2/1/2024    Procedure Summary       Date: 01/30/24 Room / Location: [facility] ENDO 03 / [facility] ENDOSCOPY    Anesthesia Start: 1600 Anesthesia Stop: 1627    Procedure: COLONOSCOPY DIAGNOSTIC, polypectomy (Lower GI Region) Diagnosis:       Screen for colon cancer      (Screen for colon cancer [Z12.11])    Surgeons: Yassine Chua MD Responsible Provider: Raymundo Vang MD    Anesthesia Type: MAC ASA Status: 3            Anesthesia Type: MAC    Yosi Phase I: Yosi Score: 10    Yosi Phase II: Yosi Score: 10    Anesthesia Post Evaluation    Patient location during evaluation: PACU  Patient participation: complete - patient participated  Level of consciousness: responsive to verbal stimuli and sleepy but conscious  Pain score: 2  Airway patency: patent  Cardiovascular status: blood pressure returned to baseline  Respiratory status: acceptable  Hydration status: stable  Comments: +Post-Anesthesia Evaluation and Assessment    Patient: Desire Ulrich MRN: [id number]  SSN: [id number]   YOB: 1965  Age: 58 y.o.  Sex: female          Cardiovascular Function/Vital Signs    BP (!) 146/54   Pulse 57   Temp 98.2 °F (36.8 °C) (Temporal)   Resp 17   Ht 1.651 m (5' 5\")   Wt 100.7 kg (222 lb)   SpO2 97%   BMI 36.94 kg/m²     Patient is status post Procedure(s):  COLONOSCOPY DIAGNOSTIC, polypectomy.    Nausea/Vomiting: Controlled.    Postoperative hydration reviewed and adequate.    Pain:      Managed.    Neurological Status:       At baseline.    Mental Status and Level of Consciousness: Arousable.    Pulmonary Status:       Adequate oxygenation and airway patent.    Complications related to anesthesia: None    Post-anesthesia assessment completed. No concerns.    I have evaluated the patient and the patient is stable and ready to be discharged from PACU .    Signed By: Raymundo Vang

## 2024-02-08 ENCOUNTER — TELEPHONE (OUTPATIENT)
Age: 59
End: 2024-02-08

## 2024-02-19 NOTE — PROGRESS NOTES
[facility]  OBIC Note    Date: 2024  Name: Desire Ulrich  MRN: [id number]       : 1965  Diagnosis: Lupus (M32.9)   Treatment: Benlysta 960mg  Referring Provider: Dr. Moses  Supervising Provider: Dr. Moses    Patient arrived to [facility] at 1040.  Ms. Ulrich allergies reviewed and she agreed to receiving today's treatment. A physical assessment was performed initially and post-treatment.       Ms. Boyle vitals were monitored before and after medication administration.   Vitals:    24 1040 24 1150   BP: (!) 174/84 (!) 167/88   Pulse: 73 67   Resp: 16 16   Temp: 98 °F (36.7 °C) 97.8 °F (36.6 °C)   TempSrc: Oral Oral   SpO2: 99% 100%     Recent labs results:  Lab Results   Component Value Date/Time     2024 09:55 AM    K 3.9 2024 09:55 AM     2024 09:55 AM    CO2 28 2024 09:55 AM    BUN 13 2024 09:55 AM    CREATININE 0.84 2024 09:55 AM    GLUCOSE 123 2024 09:55 AM    CALCIUM 8.5 2024 09:55 AM    LABGLOM >60 2024 09:55 AM    LABGLOM 64 2022 12:00 AM      Lab Results   Component Value Date    WBC 4.8 2024    HGB 12.2 2024    HCT 39.7 2024    MCV 83.1 2024     2024   Medications given per providers orders:  Administrations This Visit       0.9 % sodium chloride infusion       Admin Date  2024 Action  New Bag Dose  20 mL/hr Rate  20 mL/hr Route  IntraVENous Administered By  Neha Momin, ARMAND              belimumab (BENLYSTA) 960 mg in sodium chloride 0.9 % 287 mL infusion       Admin Date  2024 Action  New Bag Dose  960 mg Rate  287 mL/hr Route  IntraVENous Administered By  Neha Momin RN              sodium chloride flush 0.9 % injection 5-40 mL       Admin Date  2024 Action  Given Dose  10 mL Route  IntraVENous Administered By  Neha Momin, ARMAND                Benlysta 120mg vial (Total dose 960mg, 0mg wasted)  NDC 46871-514-21    START: 1049  STOP: 1147

## 2024-02-21 ENCOUNTER — TRANSCRIBE ORDERS (OUTPATIENT)
Facility: HOSPITAL | Age: 59
End: 2024-02-21

## 2024-02-21 DIAGNOSIS — Z12.31 VISIT FOR SCREENING MAMMOGRAM: Primary | ICD-10-CM

## 2024-02-27 ENCOUNTER — NURSE ONLY (OUTPATIENT)
Age: 59
End: 2024-02-27
Payer: MEDICAID

## 2024-02-27 VITALS
OXYGEN SATURATION: 100 % | SYSTOLIC BLOOD PRESSURE: 167 MMHG | DIASTOLIC BLOOD PRESSURE: 88 MMHG | RESPIRATION RATE: 16 BRPM | HEART RATE: 67 BPM | TEMPERATURE: 97.8 F

## 2024-02-27 DIAGNOSIS — I10 ESSENTIAL HYPERTENSION: ICD-10-CM

## 2024-02-27 DIAGNOSIS — M32.9 SYSTEMIC LUPUS ERYTHEMATOSUS, UNSPECIFIED SLE TYPE, UNSPECIFIED ORGAN INVOLVEMENT STATUS (HCC): Primary | ICD-10-CM

## 2024-02-27 PROCEDURE — 96365 THER/PROPH/DIAG IV INF INIT: CPT | Performed by: PEDIATRICS

## 2024-02-27 PROCEDURE — PBSHW PBB SHADOW CHARGE: Performed by: PEDIATRICS

## 2024-02-27 RX ORDER — SODIUM CHLORIDE 9 MG/ML
5-250 INJECTION, SOLUTION INTRAVENOUS PRN
OUTPATIENT
Start: 2024-03-26

## 2024-02-27 RX ORDER — EPINEPHRINE 1 MG/ML
0.3 INJECTION, SOLUTION, CONCENTRATE INTRAVENOUS PRN
OUTPATIENT
Start: 2024-03-26

## 2024-02-27 RX ORDER — ALBUTEROL SULFATE 90 UG/1
4 AEROSOL, METERED RESPIRATORY (INHALATION) PRN
OUTPATIENT
Start: 2024-03-26

## 2024-02-27 RX ORDER — HEPARIN 100 UNIT/ML
500 SYRINGE INTRAVENOUS PRN
OUTPATIENT
Start: 2024-03-26

## 2024-02-27 RX ORDER — SODIUM CHLORIDE 0.9 % (FLUSH) 0.9 %
5-40 SYRINGE (ML) INJECTION PRN
Status: DISCONTINUED | OUTPATIENT
Start: 2024-02-27 | End: 2024-02-27 | Stop reason: HOSPADM

## 2024-02-27 RX ORDER — ACETAMINOPHEN 325 MG/1
650 TABLET ORAL
OUTPATIENT
Start: 2024-03-26

## 2024-02-27 RX ORDER — SODIUM CHLORIDE 0.9 % (FLUSH) 0.9 %
5-40 SYRINGE (ML) INJECTION PRN
OUTPATIENT
Start: 2024-03-26

## 2024-02-27 RX ORDER — DIPHENHYDRAMINE HYDROCHLORIDE 50 MG/ML
50 INJECTION INTRAMUSCULAR; INTRAVENOUS
OUTPATIENT
Start: 2024-03-26

## 2024-02-27 RX ORDER — SODIUM CHLORIDE 9 MG/ML
INJECTION, SOLUTION INTRAVENOUS CONTINUOUS
OUTPATIENT
Start: 2024-03-26

## 2024-02-27 RX ORDER — ONDANSETRON 2 MG/ML
8 INJECTION INTRAMUSCULAR; INTRAVENOUS
OUTPATIENT
Start: 2024-03-26

## 2024-02-27 RX ORDER — SODIUM CHLORIDE 9 MG/ML
5-250 INJECTION, SOLUTION INTRAVENOUS PRN
Status: DISCONTINUED | OUTPATIENT
Start: 2024-02-27 | End: 2024-02-27 | Stop reason: HOSPADM

## 2024-02-27 RX ADMIN — Medication 10 ML: at 10:49

## 2024-02-27 RX ADMIN — BELIMUMAB 960 MG: 120 INJECTION, POWDER, LYOPHILIZED, FOR SOLUTION INTRAVENOUS at 10:49

## 2024-02-27 RX ADMIN — SODIUM CHLORIDE 20 ML/HR: 9 INJECTION, SOLUTION INTRAVENOUS at 10:49

## 2024-02-27 ASSESSMENT — PAIN SCALES - GENERAL
PAINLEVEL_OUTOF10: 0
PAINLEVEL_OUTOF10: 0

## 2024-02-28 DIAGNOSIS — J30.9 ALLERGIC RHINITIS, UNSPECIFIED: ICD-10-CM

## 2024-02-28 RX ORDER — CETIRIZINE HYDROCHLORIDE 10 MG/1
TABLET ORAL
Qty: 30 TABLET | Refills: 11 | OUTPATIENT
Start: 2024-02-28

## 2024-03-04 NOTE — TELEPHONE ENCOUNTER
Last visit 11/07/23  Lab Results   Component Value Date     01/31/2024    K 3.9 01/31/2024     01/31/2024    CO2 28 01/31/2024    BUN 13 01/31/2024    CREATININE 0.84 01/31/2024    GLUCOSE 123 (H) 01/31/2024    CALCIUM 8.5 01/31/2024    PROT 7.3 01/31/2024    LABALBU 3.5 01/31/2024    BILITOT 0.2 01/31/2024    ALKPHOS 126 (H) 01/31/2024    AST 20 01/31/2024    ALT 24 01/31/2024    LABGLOM >60 01/31/2024    GFRAA >60 09/07/2022    AGRATIO 0.9 (L) 01/31/2024    GLOB 3.8 01/31/2024     Lab Results   Component Value Date    WBC 4.8 01/31/2024    HGB 12.2 01/31/2024    HCT 39.7 01/31/2024    MCV 83.1 01/31/2024     01/31/2024

## 2024-03-04 NOTE — TELEPHONE ENCOUNTER
Pt called requesting Prednisone and Meloxicam to be sent to [facility] pharmacy.  Pt states she is having a Lupus flare \"I'm stiff, I hurt, and I had a rash on my face.  My knees and my hands hurt.\"     Pt requesting doctors note as well.  Please call and advise if that is possible.

## 2024-03-05 RX ORDER — PREDNISONE 5 MG/1
5 TABLET ORAL 2 TIMES DAILY
Qty: 60 TABLET | Refills: 1 | Status: SHIPPED | OUTPATIENT
Start: 2024-03-05

## 2024-03-05 RX ORDER — MELOXICAM 15 MG/1
15 TABLET ORAL PRN
Qty: 30 TABLET | Refills: 1 | Status: SHIPPED | OUTPATIENT
Start: 2024-03-05

## 2024-03-25 NOTE — PROGRESS NOTES
Route  IntraVENous Administered By  Neha Momin RN                Benlysta 120mg vial (Total dose 960mg, 0mg wasted)  NDC 82966-721-89    START: 0827  STOP: 0927     250 ml bag 0.9% Normal Saline @ 20ml/hr  NDC  4821-8895-06  Lot #  [id number]  Exp   09/2024    0.9% Normal Saline 10ml flush  NDC 8290-180922  Lot # [id number]   Exp 9/2026    Lines: 24G LFA.   +Blood return.   Line flushed per protocol.    Access was removed from Ms. Ulrich after completion of infusion/injection.   Treatment tolerated without complaints or reactions. Patient discharged ambulatory in stable condition at 0945.  Encounter routed to supervising provider for co-signing.     Future Appointments   Date Time Provider Department Center   4/22/2024  8:00 AM [facility]   4/23/2024 12:00 PM [facility] 4 [facility]   4/23/2024  2:00 PM Apple Burgos MD [facility]   5/21/2024  8:30 AM IN[facility]   6/18/2024  8:30 AM [facility] BS [facility]   7/16/2024  8:00 AM [facility] BS [facility]   7/16/2024  9:00 AM [facility]     Neha Momin RN

## 2024-03-26 ENCOUNTER — NURSE ONLY (OUTPATIENT)
Age: 59
End: 2024-03-26
Payer: MEDICAID

## 2024-03-26 ENCOUNTER — OFFICE VISIT (OUTPATIENT)
Age: 59
End: 2024-03-26
Payer: MEDICAID

## 2024-03-26 VITALS
OXYGEN SATURATION: 98 % | HEART RATE: 66 BPM | TEMPERATURE: 97.5 F | SYSTOLIC BLOOD PRESSURE: 175 MMHG | RESPIRATION RATE: 18 BRPM | DIASTOLIC BLOOD PRESSURE: 93 MMHG

## 2024-03-26 VITALS
DIASTOLIC BLOOD PRESSURE: 93 MMHG | HEART RATE: 66 BPM | RESPIRATION RATE: 16 BRPM | TEMPERATURE: 97.8 F | SYSTOLIC BLOOD PRESSURE: 181 MMHG | OXYGEN SATURATION: 98 %

## 2024-03-26 DIAGNOSIS — I10 ESSENTIAL HYPERTENSION: ICD-10-CM

## 2024-03-26 DIAGNOSIS — M76.62 ACHILLES TENDINITIS, LEFT LEG: ICD-10-CM

## 2024-03-26 DIAGNOSIS — M32.9 SYSTEMIC LUPUS ERYTHEMATOSUS, UNSPECIFIED SLE TYPE, UNSPECIFIED ORGAN INVOLVEMENT STATUS (HCC): Primary | ICD-10-CM

## 2024-03-26 PROCEDURE — 96365 THER/PROPH/DIAG IV INF INIT: CPT | Performed by: PEDIATRICS

## 2024-03-26 PROCEDURE — 3077F SYST BP >= 140 MM HG: CPT | Performed by: PEDIATRICS

## 2024-03-26 PROCEDURE — 99214 OFFICE O/P EST MOD 30 MIN: CPT | Performed by: PEDIATRICS

## 2024-03-26 PROCEDURE — PBSHW PBB SHADOW CHARGE: Performed by: PEDIATRICS

## 2024-03-26 PROCEDURE — 3080F DIAST BP >= 90 MM HG: CPT | Performed by: PEDIATRICS

## 2024-03-26 RX ORDER — SODIUM CHLORIDE 9 MG/ML
5-250 INJECTION, SOLUTION INTRAVENOUS PRN
OUTPATIENT
Start: 2024-04-23

## 2024-03-26 RX ORDER — HEPARIN 100 UNIT/ML
500 SYRINGE INTRAVENOUS PRN
OUTPATIENT
Start: 2024-04-23

## 2024-03-26 RX ORDER — ACETAMINOPHEN 325 MG/1
650 TABLET ORAL
OUTPATIENT
Start: 2024-04-23

## 2024-03-26 RX ORDER — EPINEPHRINE 1 MG/ML
0.3 INJECTION, SOLUTION, CONCENTRATE INTRAVENOUS PRN
OUTPATIENT
Start: 2024-04-23

## 2024-03-26 RX ORDER — ONDANSETRON 2 MG/ML
8 INJECTION INTRAMUSCULAR; INTRAVENOUS
OUTPATIENT
Start: 2024-04-23

## 2024-03-26 RX ORDER — SODIUM CHLORIDE 9 MG/ML
5-250 INJECTION, SOLUTION INTRAVENOUS PRN
Status: DISCONTINUED | OUTPATIENT
Start: 2024-03-26 | End: 2024-03-26 | Stop reason: HOSPADM

## 2024-03-26 RX ORDER — ALBUTEROL SULFATE 90 UG/1
4 AEROSOL, METERED RESPIRATORY (INHALATION) PRN
OUTPATIENT
Start: 2024-04-23

## 2024-03-26 RX ORDER — DIPHENHYDRAMINE HYDROCHLORIDE 50 MG/ML
50 INJECTION INTRAMUSCULAR; INTRAVENOUS
OUTPATIENT
Start: 2024-04-23

## 2024-03-26 RX ORDER — SODIUM CHLORIDE 0.9 % (FLUSH) 0.9 %
5-40 SYRINGE (ML) INJECTION PRN
OUTPATIENT
Start: 2024-04-23

## 2024-03-26 RX ORDER — SODIUM CHLORIDE 0.9 % (FLUSH) 0.9 %
5-40 SYRINGE (ML) INJECTION PRN
Status: DISCONTINUED | OUTPATIENT
Start: 2024-03-26 | End: 2024-03-26 | Stop reason: HOSPADM

## 2024-03-26 RX ORDER — SODIUM CHLORIDE 9 MG/ML
INJECTION, SOLUTION INTRAVENOUS CONTINUOUS
OUTPATIENT
Start: 2024-04-23

## 2024-03-26 RX ADMIN — Medication 10 ML: at 08:27

## 2024-03-26 RX ADMIN — SODIUM CHLORIDE 20 ML/HR: 9 INJECTION, SOLUTION INTRAVENOUS at 08:27

## 2024-03-26 RX ADMIN — BELIMUMAB 960 MG: 120 INJECTION, POWDER, LYOPHILIZED, FOR SOLUTION INTRAVENOUS at 08:27

## 2024-03-26 ASSESSMENT — PAIN SCALES - GENERAL: PAINLEVEL_OUTOF10: 0

## 2024-03-26 NOTE — PROGRESS NOTES
RHEUMATOLOGY PROBLEM LIST AND CHIEF COMPLAINT  1. Systemic lupus - arthritis, alopecia, subacute cutaneous lupus rash, and +ROHIT 1:640 homogenous with +dsDNA    Disease History:  In 2012 developed increased joint pain and stiffness, marked pain sensitivity, couldn't walk or stand to be touched, get in and out of a car. Thought at first possibly shingles, pain didn't resolve. Able to get expedited evaluation with Rheumatology (Dr. Mota with [facility]), diagnosed with lupus.  Prednisone and Plaquenil (hydroxychloroquine) were started, and within about a week was feeling better.  Around the same time, had developed significant frontotemporal and vertex hair loss.  Now prolonged cold exposure is her major trigger of joint pain flares. Initially was having pain flares 2-3x/month while in [place].  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  Flares respond to prednisone tapers.  Between flares, she denies consistent joint pain. Some aching in hands or shoulders with activity.  Has been able to lose weight from 236 to 209 with use of exercise bike over the last 6 months.  Gets perioral papular rashes, not more typical malar rash. With sun exposure in the past, has developed painful annular silver-dollar sized lesions lasting a week or two, not in the last year. Good about sun avoidance.  Usually goes to [facility] for eye checks, recently seen. No current ophthalmologist for Plaquenil screening.    INTERVAL HISTORY  This is a 59 y.o.  female.  Today, the patient complains of no pain in the joints.  Location: none  Severity:  0 on a scale of 0-10  Timing: none   Duration: none  Modifying factors:   Context/Associated signs and symptoms: The patient continues on Benlysta monthly infusions and Plaquenil 200 mg daily. She has been doing well from a lupus standpoint. She notes getting a blistering rash on her hand

## 2024-03-26 NOTE — PROGRESS NOTES
Chief Complaint   Patient presents with    Lupus     1. Have you been to the ER, urgent care clinic since your last visit?  Hospitalized since your last visit?No    2. Have you seen or consulted any other health care providers outside of the [facility] since your last visit?  Include any pap smears or colon screening. No

## 2024-04-03 ENCOUNTER — TELEPHONE (OUTPATIENT)
Age: 59
End: 2024-04-03

## 2024-04-03 NOTE — TELEPHONE ENCOUNTER
Pt called and states her fingers are broken out into blisters again. She went to [facility] and was told she had eczema.  Informed pt she should come in for an appointment to be evaluated for possible drug reaction to Benlysta.  Will notified provider and see if he wants to schedule f/u.

## 2024-04-05 RX ORDER — CETIRIZINE HYDROCHLORIDE 10 MG/1
TABLET ORAL
Qty: 30 TABLET | Refills: 11 | OUTPATIENT
Start: 2024-04-05

## 2024-04-15 NOTE — PROGRESS NOTES
[facility]  OBIC Note    Date: 2024  Name: Desire Ulrich  MRN: [id number]       : 1965  Diagnosis: Lupus (M32.9)   Treatment: Benlysta 960mg  Referring Provider: Dr. Moses  Supervising Provider: Dr. Moses    Patient arrived to [facility] at 0800.  Ms. Ulrich allergies reviewed and she agreed to receiving today's treatment. A physical assessment was performed initially and post-treatment.   Allergic reaction to bilateral fingers tips cracked from touching avocado oil infused hair product few weeks ago.  Appear to be healing well.     Ms. Boyle vitals were monitored before and after medication administration.   Vitals:    24 0804 24 0930   BP: (!) 183/101 (!) 189/94   Pulse: 69 66   Resp: 16 16   Temp: 97.5 °F (36.4 °C) 97.6 °F (36.4 °C)   TempSrc: Oral Oral   SpO2: 99% 99%   Weight: 104.3 kg (230 lb)      Labs drawn and walked to [facility]  Recent labs results:  Lab Results   Component Value Date/Time     2024 09:55 AM    K 3.9 2024 09:55 AM     2024 09:55 AM    CO2 28 2024 09:55 AM    BUN 13 2024 09:55 AM    CREATININE 0.84 2024 09:55 AM    GLUCOSE 123 2024 09:55 AM    CALCIUM 8.5 2024 09:55 AM    LABGLOM >60 2024 09:55 AM    LABGLOM 64 2022 12:00 AM      Lab Results   Component Value Date    WBC 4.8 2024    HGB 12.2 2024    HCT 39.7 2024    MCV 83.1 2024     2024   Medications given per providers orders:  Administrations This Visit       0.9 % sodium chloride infusion       Admin Date  2024 Action  New Bag Dose  20 mL/hr Rate  20 mL/hr Route  IntraVENous Administered By  Neha Momin, ARMAND              belimumab (BENLYSTA) 960 mg in sodium chloride 0.9 % 287 mL infusion       Admin Date  2024 Action  New Bag Dose  960 mg Rate  287 mL/hr Route  IntraVENous Administered By  Neha Momin, RN              sodium chloride flush 0.9 % injection 5-40 mL       Admin

## 2024-04-18 DIAGNOSIS — I10 ESSENTIAL (PRIMARY) HYPERTENSION: ICD-10-CM

## 2024-04-18 NOTE — TELEPHONE ENCOUNTER
Refill request received from [facility] for   Requested Prescriptions     Pending Prescriptions Disp Refills    amLODIPine (NORVASC) 10 MG tablet [Pharmacy Med Name: AMLODIPINE BESYLATE 10 MG TAB] 90 tablet 1     Sig: TAKE 1 TABLET BY MOUTH EVERY DAY     Last office visit: 11/20/2023   Next office visit: Visit date not found     Routed to IMAH Latham for review.

## 2024-04-19 RX ORDER — AMLODIPINE BESYLATE 10 MG/1
10 TABLET ORAL DAILY
Qty: 90 TABLET | Refills: 0 | Status: SHIPPED | OUTPATIENT
Start: 2024-04-19

## 2024-04-20 ASSESSMENT — PATIENT HEALTH QUESTIONNAIRE - PHQ9
1. LITTLE INTEREST OR PLEASURE IN DOING THINGS: NOT AT ALL
SUM OF ALL RESPONSES TO PHQ9 QUESTIONS 1 & 2: 0
SUM OF ALL RESPONSES TO PHQ QUESTIONS 1-9: 0
1. LITTLE INTEREST OR PLEASURE IN DOING THINGS: NOT AT ALL
SUM OF ALL RESPONSES TO PHQ QUESTIONS 1-9: 0
SUM OF ALL RESPONSES TO PHQ QUESTIONS 1-9: 0
2. FEELING DOWN, DEPRESSED OR HOPELESS: NOT AT ALL
2. FEELING DOWN, DEPRESSED OR HOPELESS: NOT AT ALL
SUM OF ALL RESPONSES TO PHQ QUESTIONS 1-9: 0
SUM OF ALL RESPONSES TO PHQ9 QUESTIONS 1 & 2: 0

## 2024-04-22 ENCOUNTER — NURSE ONLY (OUTPATIENT)
Age: 59
End: 2024-04-22
Payer: MEDICAID

## 2024-04-22 VITALS
DIASTOLIC BLOOD PRESSURE: 94 MMHG | OXYGEN SATURATION: 99 % | SYSTOLIC BLOOD PRESSURE: 189 MMHG | TEMPERATURE: 97.6 F | WEIGHT: 230 LBS | HEART RATE: 66 BPM | BODY MASS INDEX: 38.27 KG/M2 | RESPIRATION RATE: 16 BRPM

## 2024-04-22 DIAGNOSIS — I10 ESSENTIAL HYPERTENSION: ICD-10-CM

## 2024-04-22 DIAGNOSIS — M32.9 SYSTEMIC LUPUS ERYTHEMATOSUS, UNSPECIFIED SLE TYPE, UNSPECIFIED ORGAN INVOLVEMENT STATUS (HCC): ICD-10-CM

## 2024-04-22 DIAGNOSIS — M32.9 SYSTEMIC LUPUS ERYTHEMATOSUS, UNSPECIFIED SLE TYPE, UNSPECIFIED ORGAN INVOLVEMENT STATUS (HCC): Primary | ICD-10-CM

## 2024-04-22 PROCEDURE — PBSHW PBB SHADOW CHARGE: Performed by: PEDIATRICS

## 2024-04-22 PROCEDURE — 96365 THER/PROPH/DIAG IV INF INIT: CPT | Performed by: PEDIATRICS

## 2024-04-22 RX ORDER — SODIUM CHLORIDE 9 MG/ML
INJECTION, SOLUTION INTRAVENOUS CONTINUOUS
OUTPATIENT
Start: 2024-04-23

## 2024-04-22 RX ORDER — DIPHENHYDRAMINE HYDROCHLORIDE 50 MG/ML
50 INJECTION INTRAMUSCULAR; INTRAVENOUS
OUTPATIENT
Start: 2024-04-23

## 2024-04-22 RX ORDER — SODIUM CHLORIDE 0.9 % (FLUSH) 0.9 %
5-40 SYRINGE (ML) INJECTION PRN
OUTPATIENT
Start: 2024-04-23

## 2024-04-22 RX ORDER — EPINEPHRINE 1 MG/ML
0.3 INJECTION, SOLUTION, CONCENTRATE INTRAVENOUS PRN
OUTPATIENT
Start: 2024-04-23

## 2024-04-22 RX ORDER — SODIUM CHLORIDE 9 MG/ML
5-250 INJECTION, SOLUTION INTRAVENOUS PRN
Status: DISCONTINUED | OUTPATIENT
Start: 2024-04-22 | End: 2024-04-22 | Stop reason: HOSPADM

## 2024-04-22 RX ORDER — SODIUM CHLORIDE 9 MG/ML
5-250 INJECTION, SOLUTION INTRAVENOUS PRN
OUTPATIENT
Start: 2024-04-23

## 2024-04-22 RX ORDER — HEPARIN 100 UNIT/ML
500 SYRINGE INTRAVENOUS PRN
OUTPATIENT
Start: 2024-04-23

## 2024-04-22 RX ORDER — ALBUTEROL SULFATE 90 UG/1
4 AEROSOL, METERED RESPIRATORY (INHALATION) PRN
OUTPATIENT
Start: 2024-04-23

## 2024-04-22 RX ORDER — SODIUM CHLORIDE 0.9 % (FLUSH) 0.9 %
5-40 SYRINGE (ML) INJECTION PRN
Status: DISCONTINUED | OUTPATIENT
Start: 2024-04-22 | End: 2024-04-22 | Stop reason: HOSPADM

## 2024-04-22 RX ORDER — ONDANSETRON 2 MG/ML
8 INJECTION INTRAMUSCULAR; INTRAVENOUS
OUTPATIENT
Start: 2024-04-23

## 2024-04-22 RX ORDER — ACETAMINOPHEN 325 MG/1
650 TABLET ORAL
OUTPATIENT
Start: 2024-04-23

## 2024-04-22 RX ADMIN — Medication 10 ML: at 08:15

## 2024-04-22 RX ADMIN — SODIUM CHLORIDE 20 ML/HR: 9 INJECTION, SOLUTION INTRAVENOUS at 08:16

## 2024-04-22 RX ADMIN — BELIMUMAB 960 MG: 120 INJECTION, POWDER, LYOPHILIZED, FOR SOLUTION INTRAVENOUS at 08:25

## 2024-04-22 ASSESSMENT — PAIN SCALES - GENERAL
PAINLEVEL_OUTOF10: 0
PAINLEVEL_OUTOF10: 0

## 2024-04-23 ENCOUNTER — HOSPITAL ENCOUNTER (OUTPATIENT)
Facility: HOSPITAL | Age: 59
Discharge: HOME | End: 2024-04-26
Payer: MEDICAID

## 2024-04-23 ENCOUNTER — OFFICE VISIT (OUTPATIENT)
Age: 59
End: 2024-04-23
Payer: MEDICAID

## 2024-04-23 VITALS
SYSTOLIC BLOOD PRESSURE: 184 MMHG | WEIGHT: 230.4 LBS | DIASTOLIC BLOOD PRESSURE: 92 MMHG | HEIGHT: 65 IN | BODY MASS INDEX: 38.39 KG/M2

## 2024-04-23 VITALS — WEIGHT: 230 LBS | HEIGHT: 65 IN | BODY MASS INDEX: 38.32 KG/M2

## 2024-04-23 DIAGNOSIS — Z12.31 VISIT FOR SCREENING MAMMOGRAM: ICD-10-CM

## 2024-04-23 DIAGNOSIS — Z01.419 ENCOUNTER FOR GYNECOLOGICAL EXAMINATION WITHOUT ABNORMAL FINDING: Primary | ICD-10-CM

## 2024-04-23 LAB
ALBUMIN SERPL-MCNC: 3.6 G/DL (ref 3.5–5)
ALBUMIN/GLOB SERPL: 0.9 (ref 1.1–2.2)
ALP SERPL-CCNC: 125 U/L (ref 45–117)
ALT SERPL-CCNC: 23 U/L (ref 12–78)
ANION GAP SERPL CALC-SCNC: 2 MMOL/L (ref 5–15)
AST SERPL-CCNC: 17 U/L (ref 15–37)
BASOPHILS # BLD: 0 K/UL (ref 0–0.1)
BASOPHILS NFR BLD: 1 % (ref 0–1)
BILIRUB SERPL-MCNC: 0.2 MG/DL (ref 0.2–1)
BUN SERPL-MCNC: 13 MG/DL (ref 6–20)
BUN/CREAT SERPL: 14 (ref 12–20)
CALCIUM SERPL-MCNC: 8.3 MG/DL (ref 8.5–10.1)
CHLORIDE SERPL-SCNC: 110 MMOL/L (ref 97–108)
CO2 SERPL-SCNC: 26 MMOL/L (ref 21–32)
CREAT SERPL-MCNC: 0.96 MG/DL (ref 0.55–1.02)
CRP SERPL-MCNC: 1.12 MG/DL (ref 0–0.3)
DIFFERENTIAL METHOD BLD: ABNORMAL
EOSINOPHIL # BLD: 0.1 K/UL (ref 0–0.4)
EOSINOPHIL NFR BLD: 2 % (ref 0–7)
ERYTHROCYTE [DISTWIDTH] IN BLOOD BY AUTOMATED COUNT: 13.9 % (ref 11.5–14.5)
ERYTHROCYTE [SEDIMENTATION RATE] IN BLOOD: 20 MM/HR (ref 0–30)
GLOBULIN SER CALC-MCNC: 4 G/DL (ref 2–4)
GLUCOSE SERPL-MCNC: 108 MG/DL (ref 65–100)
HCT VFR BLD AUTO: 39.2 % (ref 35–47)
HGB BLD-MCNC: 12.3 G/DL (ref 11.5–16)
IMM GRANULOCYTES # BLD AUTO: 0 K/UL (ref 0–0.04)
IMM GRANULOCYTES NFR BLD AUTO: 0 % (ref 0–0.5)
LYMPHOCYTES # BLD: 2.1 K/UL (ref 0.8–3.5)
LYMPHOCYTES NFR BLD: 42 % (ref 12–49)
MCH RBC QN AUTO: 25.8 PG (ref 26–34)
MCHC RBC AUTO-ENTMCNC: 31.4 G/DL (ref 30–36.5)
MCV RBC AUTO: 82.2 FL (ref 80–99)
MONOCYTES # BLD: 0.5 K/UL (ref 0–1)
MONOCYTES NFR BLD: 9 % (ref 5–13)
NEUTS SEG # BLD: 2.3 K/UL (ref 1.8–8)
NEUTS SEG NFR BLD: 46 % (ref 32–75)
NRBC # BLD: 0 K/UL (ref 0–0.01)
NRBC BLD-RTO: 0 PER 100 WBC
PLATELET # BLD AUTO: 271 K/UL (ref 150–400)
PMV BLD AUTO: 12.2 FL (ref 8.9–12.9)
POTASSIUM SERPL-SCNC: 3.9 MMOL/L (ref 3.5–5.1)
PROT SERPL-MCNC: 7.6 G/DL (ref 6.4–8.2)
RBC # BLD AUTO: 4.77 M/UL (ref 3.8–5.2)
SODIUM SERPL-SCNC: 138 MMOL/L (ref 136–145)
WBC # BLD AUTO: 4.9 K/UL (ref 3.6–11)

## 2024-04-23 PROCEDURE — 3080F DIAST BP >= 90 MM HG: CPT | Performed by: OBSTETRICS & GYNECOLOGY

## 2024-04-23 PROCEDURE — 3077F SYST BP >= 140 MM HG: CPT | Performed by: OBSTETRICS & GYNECOLOGY

## 2024-04-23 PROCEDURE — 77067 SCR MAMMO BI INCL CAD: CPT

## 2024-04-23 PROCEDURE — 99396 PREV VISIT EST AGE 40-64: CPT | Performed by: OBSTETRICS & GYNECOLOGY

## 2024-04-23 NOTE — PROGRESS NOTES
Chief Complaint   Patient presents with    Annual Exam       Ob/Gyn Hx:    LMP - No LMP recorded. (Menstrual status: Menopause).  Menses - no   Contraception - Menopause  Hx of STI - No    SA - Yes      Health maintenance:  Last Pap: 2021 NILM, HPV negative  Last Mammogram: 2023 BIRADS 1: Negative. No mammographic evidence of malignancy.  Last colonoscopy:  polyps, repeat in 3 years    1. Have you been to the ER, urgent care clinic, or hospitalized since your last visit?Yes,2023 displaced fracture of head of left radius     2. Have you seen or consulted any other health care providers outside of the [facility] since your last visit? No    She declines  a chaperone during the gynecologic exam today.      Stephanie Jenkins MA  
gynecologic and breast exams.   Healthy habits and lifestyle reviewed.   Pap with HPV performed today.  Patient declines STD screening.  Mammogram order placed.  Up to date on colonoscopy.    Possible yeast infection  Recommend OTC monistat 7    Apple Burgos MD

## 2024-04-29 ENCOUNTER — TELEPHONE (OUTPATIENT)
Age: 59
End: 2024-04-29

## 2024-04-29 LAB
CYTOLOGIST CVX/VAG CYTO: NORMAL
CYTOLOGY CVX/VAG DOC CYTO: NORMAL
CYTOLOGY CVX/VAG DOC THIN PREP: NORMAL
DX ICD CODE: NORMAL
HPV GENOTYPE REFLEX: NORMAL
HPV I/H RISK 4 DNA CVX QL PROBE+SIG AMP: NEGATIVE
Lab: NORMAL
OTHER STN SPEC: NORMAL
STAT OF ADQ CVX/VAG CYTO-IMP: NORMAL

## 2024-04-29 NOTE — TELEPHONE ENCOUNTER
Called patient and lvm. Called patient to inform her that she is due for an Physical/ Follow up appt.

## 2024-05-02 ENCOUNTER — TELEPHONE (OUTPATIENT)
Age: 59
End: 2024-05-02

## 2024-05-02 NOTE — TELEPHONE ENCOUNTER
Patient called in, name and  verified. Patient is calling our office back to go over her most recent lab results. Message from the provider has been relayed and pt has expressed understanding.

## 2024-05-06 ENCOUNTER — HOSPITAL ENCOUNTER (EMERGENCY)
Facility: HOSPITAL | Age: 59
Discharge: HOME | End: 2024-05-06
Attending: EMERGENCY MEDICINE
Payer: MEDICAID

## 2024-05-06 ENCOUNTER — TELEPHONE (OUTPATIENT)
Age: 59
End: 2024-05-06

## 2024-05-06 VITALS
WEIGHT: 232.81 LBS | TEMPERATURE: 98.1 F | SYSTOLIC BLOOD PRESSURE: 197 MMHG | DIASTOLIC BLOOD PRESSURE: 99 MMHG | BODY MASS INDEX: 38.74 KG/M2 | HEART RATE: 68 BPM | OXYGEN SATURATION: 98 % | RESPIRATION RATE: 16 BRPM

## 2024-05-06 DIAGNOSIS — L23.9 ALLERGIC DERMATITIS: Primary | ICD-10-CM

## 2024-05-06 PROCEDURE — 99283 EMERGENCY DEPT VISIT LOW MDM: CPT

## 2024-05-06 RX ORDER — CETIRIZINE HYDROCHLORIDE 10 MG/1
10 TABLET ORAL DAILY
Qty: 30 TABLET | Refills: 0 | Status: SHIPPED | OUTPATIENT
Start: 2024-05-06

## 2024-05-06 RX ORDER — FLUOCINONIDE 0.5 MG/G
OINTMENT TOPICAL
Qty: 15 G | Refills: 1 | Status: SHIPPED | OUTPATIENT
Start: 2024-05-06 | End: 2024-05-13

## 2024-05-06 RX ORDER — PREDNISONE 20 MG/1
20 TABLET ORAL DAILY
Qty: 5 TABLET | Refills: 0 | Status: SHIPPED | OUTPATIENT
Start: 2024-05-06 | End: 2024-05-11

## 2024-05-06 RX ORDER — FAMOTIDINE 20 MG/1
20 TABLET, FILM COATED ORAL 2 TIMES DAILY
Qty: 10 TABLET | Refills: 0 | Status: SHIPPED | OUTPATIENT
Start: 2024-05-06 | End: 2024-05-11

## 2024-05-06 ASSESSMENT — ENCOUNTER SYMPTOMS
VOMITING: 0
TROUBLE SWALLOWING: 0
ABDOMINAL PAIN: 0
COUGH: 0
SHORTNESS OF BREATH: 0
NAUSEA: 0
BACK PAIN: 0

## 2024-05-06 ASSESSMENT — PAIN - FUNCTIONAL ASSESSMENT: PAIN_FUNCTIONAL_ASSESSMENT: NONE - DENIES PAIN

## 2024-05-06 NOTE — TELEPHONE ENCOUNTER
Pt called to state her hands are bright red and swollen and its spreading up her arms.  She states the blisters are not popping up yet.  Informed her she needs to be evaluated by a provider. Pt states she will go to [facility]. Will notify Dr. Moses of possible benlysta reaction?

## 2024-05-06 NOTE — DISCHARGE INSTRUCTIONS
Please use only unscented soaps and lotions on your skin.  Recommend close follow-up with dermatology or allergist if symptoms persist.

## 2024-05-06 NOTE — ED PROVIDER NOTES
[facility]  EMERGENCY DEPARTMENT ENCOUNTER      Pt Name: Desire Ulrich  MRN: [id number]  Birthdate 1965  Date of evaluation: 5/6/2024  Provider: KENNETH Mclaughlin NP    CHIEF COMPLAINT       Chief Complaint   Patient presents with    Allergic Reaction         HISTORY OF PRESENT ILLNESS   (Location/Symptom, Timing/Onset, Context/Setting, Quality, Duration, Modifying Factors, Severity)  Note limiting factors.   HPI  Patient is a 59-year-old female with a significant past medical history please see list below who presents to the ED with an itchy scattered macular red rash to both hands and left wrist area for several days.  She is left-hand dominant.  She states she had a similar rash previously which was improved with topical steroid cream.  She denies any new known exposures.  She does direct patient care. Denies any fever, difficulty breathing, difficulty swallowing, SOB,chest pain or abdominal pain. Denies any nausea, vomiting or diarrhea.   Old charts reviewed.    Review of External Medical Records:     Nursing Notes were reviewed.    REVIEW OF SYSTEMS    (2-9 systems for level 4, 10 or more for level 5)     Review of Systems   Constitutional:  Negative for activity change, appetite change, fever and unexpected weight change.   HENT:  Negative for congestion and trouble swallowing.    Eyes:  Negative for visual disturbance.   Respiratory:  Negative for cough and shortness of breath.    Cardiovascular:  Negative for chest pain, palpitations and leg swelling.   Gastrointestinal:  Negative for abdominal pain, nausea and vomiting.   Musculoskeletal:  Negative for back pain, gait problem and neck pain.   Skin:  Positive for rash.   Neurological:  Negative for dizziness, light-headedness and headaches.   All other systems reviewed and are negative.      Except as noted above the remainder of the review of systems was reviewed and negative.       PAST MEDICAL HISTORY     Past Medical History:   Diagnosis

## 2024-05-06 NOTE — ED TRIAGE NOTES
Pt c/o rash to her hands/arms, has been on medication for 2 weeks , rash got better and now is back , rash to hands and arms only

## 2024-05-21 DIAGNOSIS — M32.9 SYSTEMIC LUPUS ERYTHEMATOSUS, UNSPECIFIED SLE TYPE, UNSPECIFIED ORGAN INVOLVEMENT STATUS (HCC): ICD-10-CM

## 2024-05-21 DIAGNOSIS — I10 ESSENTIAL HYPERTENSION: Primary | ICD-10-CM

## 2024-05-21 RX ORDER — ALBUTEROL SULFATE 90 UG/1
4 AEROSOL, METERED RESPIRATORY (INHALATION) PRN
OUTPATIENT
Start: 2024-05-21

## 2024-05-21 RX ORDER — SODIUM CHLORIDE 0.9 % (FLUSH) 0.9 %
5-40 SYRINGE (ML) INJECTION PRN
OUTPATIENT
Start: 2024-05-21

## 2024-05-21 RX ORDER — ACETAMINOPHEN 325 MG/1
650 TABLET ORAL
OUTPATIENT
Start: 2024-05-21

## 2024-05-21 RX ORDER — SODIUM CHLORIDE 9 MG/ML
5-250 INJECTION, SOLUTION INTRAVENOUS PRN
OUTPATIENT
Start: 2024-05-21

## 2024-05-21 RX ORDER — SODIUM CHLORIDE 9 MG/ML
INJECTION, SOLUTION INTRAVENOUS CONTINUOUS
OUTPATIENT
Start: 2024-05-21

## 2024-05-21 RX ORDER — DIPHENHYDRAMINE HYDROCHLORIDE 50 MG/ML
50 INJECTION INTRAMUSCULAR; INTRAVENOUS
OUTPATIENT
Start: 2024-05-21

## 2024-05-21 RX ORDER — EPINEPHRINE 1 MG/ML
0.3 INJECTION, SOLUTION, CONCENTRATE INTRAVENOUS PRN
OUTPATIENT
Start: 2024-05-21

## 2024-05-21 RX ORDER — ONDANSETRON 2 MG/ML
8 INJECTION INTRAMUSCULAR; INTRAVENOUS
OUTPATIENT
Start: 2024-05-21

## 2024-05-21 RX ORDER — HEPARIN 100 UNIT/ML
500 SYRINGE INTRAVENOUS PRN
OUTPATIENT
Start: 2024-05-21

## 2024-05-21 NOTE — PROGRESS NOTES
Okay to treat   ----- Message -----   From: Nicol Hdez, [facility]   Sent: 2024  11:46 AM EDT   To: Cary Moses MD; Neha Momin RN            Notes from  indicate patient had ED visit and checking with MD on possible reaction to Benlysta? Are we ok to proceed next week in [facility] with treatment or should something change? Please sign  orders.     Thank you.

## 2024-05-22 NOTE — PROGRESS NOTES
[facility]  [facility] Note    Date: May 28, 2024  Name: Desire Ulrich  MRN: [id number]       : 1965  Diagnosis: Lupus (M32.9)   Treatment: Benlysta 960mg  Referring Provider: Dr. Moses  Supervising Provider: Dr. Moses    Patient arrived to [facility] at 0825.  Ms. Ulrich allergies reviewed and she agreed to receiving today's treatment. A physical assessment was performed initially and post-treatment.   Pt states she saw dermatology and was told her fingers blistering and peeling is related to eczema. Has been using hydrocortisone cream and keeping them moist.     Ms. Boyle vitals were monitored before and after medication administration.   Vitals:    24 0826 24 0936   BP: (!) 178/92 (!) 172/92   Pulse: 65 58   Resp: 16 16   Temp: 98 °F (36.7 °C) 97.9 °F (36.6 °C)   TempSrc: Oral Oral   SpO2: 99% 99%   Weight: 106 kg (233 lb 11 oz)      Recent labs results:  Lab Results   Component Value Date/Time     2024 10:49 AM    K 3.9 2024 10:49 AM     2024 10:49 AM    CO2 26 2024 10:49 AM    BUN 13 2024 10:49 AM    CREATININE 0.96 2024 10:49 AM    GLUCOSE 108 2024 10:49 AM    CALCIUM 8.3 2024 10:49 AM    LABGLOM 68 2024 10:49 AM    LABGLOM >60 2023 01:05 PM    LABGLOM 64 2022 12:00 AM      Lab Results   Component Value Date    WBC 4.9 2024    HGB 12.3 2024    HCT 39.2 2024    MCV 82.2 2024     2024   Medications given per providers orders:  Administrations This Visit       0.9 % sodium chloride infusion       Admin Date  2024 Action  New Bag Dose  20 mL/hr Rate  20 mL/hr Route  IntraVENous Administered By  Neha Momin, RN              belimumab (BENLYSTA) 960 mg in sodium chloride 0.9 % 287 mL infusion       Admin Date  2024 Action  New Bag Dose  960 mg Rate  287 mL/hr Route  IntraVENous Administered By  Neha Momin, RN              sodium chloride flush 0.9 % injection 5-40 mL

## 2024-05-28 ENCOUNTER — NURSE ONLY (OUTPATIENT)
Age: 59
End: 2024-05-28
Payer: MEDICAID

## 2024-05-28 VITALS
BODY MASS INDEX: 38.89 KG/M2 | RESPIRATION RATE: 16 BRPM | DIASTOLIC BLOOD PRESSURE: 92 MMHG | WEIGHT: 233.69 LBS | TEMPERATURE: 97.9 F | OXYGEN SATURATION: 99 % | HEART RATE: 58 BPM | SYSTOLIC BLOOD PRESSURE: 172 MMHG

## 2024-05-28 DIAGNOSIS — M32.9 SYSTEMIC LUPUS ERYTHEMATOSUS, UNSPECIFIED SLE TYPE, UNSPECIFIED ORGAN INVOLVEMENT STATUS (HCC): Primary | ICD-10-CM

## 2024-05-28 DIAGNOSIS — I10 ESSENTIAL HYPERTENSION: ICD-10-CM

## 2024-05-28 PROCEDURE — PBSHW PBB SHADOW CHARGE: Performed by: PEDIATRICS

## 2024-05-28 PROCEDURE — 96365 THER/PROPH/DIAG IV INF INIT: CPT | Performed by: PEDIATRICS

## 2024-05-28 RX ORDER — SODIUM CHLORIDE 9 MG/ML
5-250 INJECTION, SOLUTION INTRAVENOUS PRN
OUTPATIENT
Start: 2024-06-18

## 2024-05-28 RX ORDER — ALBUTEROL SULFATE 90 UG/1
4 AEROSOL, METERED RESPIRATORY (INHALATION) PRN
OUTPATIENT
Start: 2024-06-18

## 2024-05-28 RX ORDER — HEPARIN 100 UNIT/ML
500 SYRINGE INTRAVENOUS PRN
OUTPATIENT
Start: 2024-06-18

## 2024-05-28 RX ORDER — SODIUM CHLORIDE 9 MG/ML
INJECTION, SOLUTION INTRAVENOUS CONTINUOUS
OUTPATIENT
Start: 2024-06-18

## 2024-05-28 RX ORDER — EPINEPHRINE 1 MG/ML
0.3 INJECTION, SOLUTION, CONCENTRATE INTRAVENOUS PRN
OUTPATIENT
Start: 2024-06-18

## 2024-05-28 RX ORDER — SODIUM CHLORIDE 0.9 % (FLUSH) 0.9 %
5-40 SYRINGE (ML) INJECTION PRN
Status: DISCONTINUED | OUTPATIENT
Start: 2024-05-28 | End: 2024-05-28 | Stop reason: HOSPADM

## 2024-05-28 RX ORDER — SODIUM CHLORIDE 9 MG/ML
5-250 INJECTION, SOLUTION INTRAVENOUS PRN
Status: DISCONTINUED | OUTPATIENT
Start: 2024-05-28 | End: 2024-05-28 | Stop reason: HOSPADM

## 2024-05-28 RX ORDER — ACETAMINOPHEN 325 MG/1
650 TABLET ORAL
OUTPATIENT
Start: 2024-06-18

## 2024-05-28 RX ORDER — SODIUM CHLORIDE 0.9 % (FLUSH) 0.9 %
5-40 SYRINGE (ML) INJECTION PRN
OUTPATIENT
Start: 2024-06-18

## 2024-05-28 RX ORDER — ONDANSETRON 2 MG/ML
8 INJECTION INTRAMUSCULAR; INTRAVENOUS
OUTPATIENT
Start: 2024-06-18

## 2024-05-28 RX ORDER — DIPHENHYDRAMINE HYDROCHLORIDE 50 MG/ML
50 INJECTION INTRAMUSCULAR; INTRAVENOUS
OUTPATIENT
Start: 2024-06-18

## 2024-05-28 RX ADMIN — SODIUM CHLORIDE 20 ML/HR: 9 INJECTION, SOLUTION INTRAVENOUS at 08:32

## 2024-05-28 RX ADMIN — Medication 10 ML: at 08:31

## 2024-05-28 RX ADMIN — BELIMUMAB 960 MG: 120 INJECTION, POWDER, LYOPHILIZED, FOR SOLUTION INTRAVENOUS at 08:30

## 2024-05-28 ASSESSMENT — PAIN SCALES - GENERAL
PAINLEVEL_OUTOF10: 0
PAINLEVEL_OUTOF10: 0

## 2024-06-18 NOTE — PROGRESS NOTES
[facility]  OBIC Note    Date: 2024  Name: Desire Ulrich  MRN: [id number]       : 1965  Diagnosis: Lupus (M32.9)   Treatment: Benlysta 960mg  Referring Provider: Dr. Moses  Supervising Provider: Dr. Moses    Patient arrived to [facility] at ***.  Ms. Ulrich allergies reviewed and she agreed to receiving today's treatment. A physical assessment was performed initially and post-treatment.     Ms. Moniques vitals were monitored before and after medication administration.   There were no vitals filed for this visit.    Recent labs results:  Lab Results   Component Value Date/Time     2024 10:49 AM    K 3.9 2024 10:49 AM     2024 10:49 AM    CO2 26 2024 10:49 AM    BUN 13 2024 10:49 AM    CREATININE 0.96 2024 10:49 AM    GLUCOSE 108 2024 10:49 AM    CALCIUM 8.3 2024 10:49 AM    LABGLOM 68 2024 10:49 AM    LABGLOM >60 2023 01:05 PM    LABGLOM 64 2022 12:00 AM      Lab Results   Component Value Date    WBC 4.9 2024    HGB 12.3 2024    HCT 39.2 2024    MCV 82.2 2024     2024   Medications given per providers orders:    Benlysta 120mg vial (Total dose 960mg, 0mg wasted)  NDC 80900-561-33  ***  START: ***  STOP: ***     250 ml bag 0.9% Normal Saline @ 20ml/hr  NDC  8698-9843-68  Lot #  [id number]  Exp   2024    0.9% Normal Saline 10ml flush  NDC 8290-236574  Lot # [id number]   Exp 2026    Lines: 24G RFA.   +Blood return.   Line flushed per protocol.    Access was removed from Ms. Ulrich after completion of infusion/injection.   Treatment tolerated without complaints or reactions. Patient discharged ambulatory in stable condition at ***.  Encounter routed to supervising provider for co-signing.     Future Appointments   Date Time Provider Department Center   2024  8:00 AM [facility] BS AMB   2024  8:00 AM [facility] BS AMB   2024  9:00 AM Cary Moses MD [facility] BS AMB

## 2024-06-18 NOTE — PATIENT INSTRUCTIONS
Dear Valued Patients,    Unfortunately, our Rheumatology patients will be referred to Laboratory and Imaging centers located outside of this facility.  We have included a copy of those locations for your awareness.  We apologize for the inconvenience and appreciate your understanding as we navigate this new workflow.    [facility] [place] Administration    Good Help to Those in Need®     Thank you for visiting [facility] [place]!      For future medication refills, we require updated lab results and an upcoming appointment to be in our system prior to refilling prescriptions.  Without these two things, your refill will be DENIED.  If you miss your upcoming appointment, your refill will also be DENIED.      We appreciate your understanding of this critical clinical aspect of our practice. -Dr. Moses & Roma NP

## 2024-06-25 ENCOUNTER — TELEPHONE (OUTPATIENT)
Age: 59
End: 2024-06-25

## 2024-06-25 ENCOUNTER — NURSE ONLY (OUTPATIENT)
Age: 59
End: 2024-06-25

## 2024-06-25 NOTE — PROGRESS NOTES
Made outgoing call to patient. No contact made, lvm stating appointment for today is canceled and would need to be rescheduled.

## 2024-06-25 NOTE — TELEPHONE ENCOUNTER
Called pt per supervisor to cancel appt due to the infusion center not opening today advise pt Neha would call to reschedule appt pt verbally understood. Sdh

## 2024-06-29 DIAGNOSIS — M32.9 SYSTEMIC LUPUS ERYTHEMATOSUS, UNSPECIFIED SLE TYPE, UNSPECIFIED ORGAN INVOLVEMENT STATUS (HCC): Primary | ICD-10-CM

## 2024-07-01 RX ORDER — LORATADINE 10 MG
TABLET ORAL
Qty: 30 TABLET | Refills: 11 | Status: SHIPPED | OUTPATIENT
Start: 2024-07-01

## 2024-07-01 NOTE — TELEPHONE ENCOUNTER
Last visit 3/26/24  Follow up 7/23/24   Lab Results   Component Value Date     04/22/2024    K 3.9 04/22/2024     (H) 04/22/2024    CO2 26 04/22/2024    BUN 13 04/22/2024    CREATININE 0.96 04/22/2024    GLUCOSE 108 (H) 04/22/2024    CALCIUM 8.3 (L) 04/22/2024    BILITOT 0.2 04/22/2024    ALKPHOS 125 (H) 04/22/2024    AST 17 04/22/2024    ALT 23 04/22/2024    LABGLOM 68 04/22/2024    GFRAA >60 09/07/2022    AGRATIO 1.0 (L) 04/25/2023    GLOB 4.0 04/22/2024     Lab Results   Component Value Date    WBC 4.9 04/22/2024    HGB 12.3 04/22/2024    HCT 39.2 04/22/2024    MCV 82.2 04/22/2024     04/22/2024

## 2024-07-01 NOTE — TELEPHONE ENCOUNTER
Refill request received from [facility]    for   Requested Prescriptions     Pending Prescriptions Disp Refills    [facility] ASPIRIN ADULT LOW DOSE 81 MG chewable tablet [Pharmacy Med Name: [facility] ASPIRIN 81 MG CHEWABLE TAB] 30 tablet 11     Sig: TAKE 1 TABLET BY MOUTH EVERY DAY     Last office visit: 11/20/2023   Next office visit: Visit date not found     Routed to MIAH Latham for review.     Edie Sheldon LPN

## 2024-07-02 ENCOUNTER — TELEPHONE (OUTPATIENT)
Age: 59
End: 2024-07-02

## 2024-07-02 NOTE — TELEPHONE ENCOUNTER
PT called to speak with  regarding infusion. Informed that he was out until 7/8/24 and PT is requesting a call back because she's unsure what to do. PT stated that she was supposed to get the infusion on 6/25/24

## 2024-07-03 ENCOUNTER — TELEPHONE (OUTPATIENT)
Age: 59
End: 2024-07-03

## 2024-07-03 RX ORDER — MELOXICAM 15 MG/1
15 TABLET ORAL PRN
Qty: 30 TABLET | Refills: 1 | Status: SHIPPED | OUTPATIENT
Start: 2024-07-03

## 2024-07-03 RX ORDER — PREDNISONE 5 MG/1
TABLET ORAL
Qty: 30 TABLET | Refills: 1 | Status: SHIPPED | OUTPATIENT
Start: 2024-07-03

## 2024-07-03 NOTE — TELEPHONE ENCOUNTER
Patient is calling she states she is waiting a phone call back in reference to her infusion, patient states that she starting to get stiff because she her infusion zhang has had to be canceled because Neha is out of the office sick and will not be back until 7/8/24. Per nurse Kellen she will send  a message to see if he is able to send patient some steroids to her pharmacy until she can get to get her infusions done. Patient verbally understood. Patient uses [facility] on file. Patient can be reached back at [phone]

## 2024-07-08 ENCOUNTER — TELEPHONE (OUTPATIENT)
Age: 59
End: 2024-07-08

## 2024-07-08 NOTE — TELEPHONE ENCOUNTER
PT called and requested a call back from Neha to schedule for infusion. Informed she was currently with a PT and she stated she understood.

## 2024-07-10 ENCOUNTER — NURSE ONLY (OUTPATIENT)
Age: 59
End: 2024-07-10

## 2024-07-10 VITALS
HEART RATE: 68 BPM | DIASTOLIC BLOOD PRESSURE: 98 MMHG | TEMPERATURE: 97.9 F | RESPIRATION RATE: 14 BRPM | SYSTOLIC BLOOD PRESSURE: 185 MMHG

## 2024-07-10 DIAGNOSIS — I10 ESSENTIAL HYPERTENSION: ICD-10-CM

## 2024-07-10 DIAGNOSIS — M32.9 SYSTEMIC LUPUS ERYTHEMATOSUS, UNSPECIFIED SLE TYPE, UNSPECIFIED ORGAN INVOLVEMENT STATUS (HCC): Primary | ICD-10-CM

## 2024-07-10 PROCEDURE — 96365 THER/PROPH/DIAG IV INF INIT: CPT | Performed by: PEDIATRICS

## 2024-07-10 PROCEDURE — PBSHW PBB SHADOW CHARGE: Performed by: PEDIATRICS

## 2024-07-10 RX ORDER — ONDANSETRON 2 MG/ML
8 INJECTION INTRAMUSCULAR; INTRAVENOUS
OUTPATIENT
Start: 2024-07-14

## 2024-07-10 RX ORDER — DIPHENHYDRAMINE HYDROCHLORIDE 50 MG/ML
50 INJECTION INTRAMUSCULAR; INTRAVENOUS
OUTPATIENT
Start: 2024-07-14

## 2024-07-10 RX ORDER — EPINEPHRINE 1 MG/ML
0.3 INJECTION, SOLUTION, CONCENTRATE INTRAVENOUS PRN
OUTPATIENT
Start: 2024-07-14

## 2024-07-10 RX ORDER — SODIUM CHLORIDE 0.9 % (FLUSH) 0.9 %
5-40 SYRINGE (ML) INJECTION PRN
OUTPATIENT
Start: 2024-07-14

## 2024-07-10 RX ORDER — SODIUM CHLORIDE 0.9 % (FLUSH) 0.9 %
5-40 SYRINGE (ML) INJECTION PRN
Status: DISCONTINUED | OUTPATIENT
Start: 2024-07-10 | End: 2024-07-10 | Stop reason: HOSPADM

## 2024-07-10 RX ORDER — FAMOTIDINE 10 MG/ML
20 INJECTION, SOLUTION INTRAVENOUS
OUTPATIENT
Start: 2024-07-14

## 2024-07-10 RX ORDER — SODIUM CHLORIDE 9 MG/ML
INJECTION, SOLUTION INTRAVENOUS CONTINUOUS
OUTPATIENT
Start: 2024-07-14

## 2024-07-10 RX ORDER — CLOBETASOL PROPIONATE 0.5 MG/G
OINTMENT TOPICAL
COMMUNITY
Start: 2024-06-27

## 2024-07-10 RX ORDER — SODIUM CHLORIDE 9 MG/ML
5-250 INJECTION, SOLUTION INTRAVENOUS PRN
OUTPATIENT
Start: 2024-07-14

## 2024-07-10 RX ORDER — SODIUM CHLORIDE 9 MG/ML
5-250 INJECTION, SOLUTION INTRAVENOUS PRN
Status: DISCONTINUED | OUTPATIENT
Start: 2024-07-10 | End: 2024-07-10 | Stop reason: HOSPADM

## 2024-07-10 RX ORDER — HEPARIN SODIUM (PORCINE) LOCK FLUSH IV SOLN 100 UNIT/ML 100 UNIT/ML
500 SOLUTION INTRAVENOUS PRN
OUTPATIENT
Start: 2024-07-14

## 2024-07-10 RX ORDER — ACETAMINOPHEN 325 MG/1
650 TABLET ORAL
OUTPATIENT
Start: 2024-07-14

## 2024-07-10 RX ORDER — ALBUTEROL SULFATE 90 UG/1
4 AEROSOL, METERED RESPIRATORY (INHALATION) PRN
OUTPATIENT
Start: 2024-07-14

## 2024-07-10 RX ADMIN — Medication 10 ML: at 09:54

## 2024-07-10 RX ADMIN — BELIMUMAB 960 MG: 120 INJECTION, POWDER, LYOPHILIZED, FOR SOLUTION INTRAVENOUS at 09:54

## 2024-07-10 RX ADMIN — SODIUM CHLORIDE 20 ML/HR: 9 INJECTION, SOLUTION INTRAVENOUS at 09:53

## 2024-07-10 ASSESSMENT — PAIN DESCRIPTION - FREQUENCY: FREQUENCY: INTERMITTENT

## 2024-07-10 ASSESSMENT — PAIN - FUNCTIONAL ASSESSMENT: PAIN_FUNCTIONAL_ASSESSMENT: PREVENTS OR INTERFERES SOME ACTIVE ACTIVITIES AND ADLS

## 2024-07-10 ASSESSMENT — PAIN DESCRIPTION - PAIN TYPE: TYPE: CHRONIC PAIN

## 2024-07-10 ASSESSMENT — PAIN DESCRIPTION - ORIENTATION: ORIENTATION: LEFT;RIGHT

## 2024-07-10 ASSESSMENT — PAIN SCALES - GENERAL: PAINLEVEL_OUTOF10: 2

## 2024-07-10 ASSESSMENT — PAIN DESCRIPTION - DESCRIPTORS: DESCRIPTORS: THROBBING

## 2024-07-10 ASSESSMENT — PAIN DESCRIPTION - ONSET: ONSET: ON-GOING

## 2024-07-10 ASSESSMENT — PAIN DESCRIPTION - LOCATION: LOCATION: LEG

## 2024-07-10 NOTE — PATIENT INSTRUCTIONS
Thank you for visiting [facility]!      For future medication refills, we require updated lab results and an upcoming appointment to be in our system prior to refilling prescriptions.  Without these two things, your refill will be DENIED.  If you miss your upcoming appointment, your refill will also be DENIED.      We appreciate your understanding of this critical clinical aspect of our practice. -Dr. Moses & FELIPE Brandon     Dear Valued Patient,     Attached is a document that shows several convenient locations where laboratory and imaging services are offered.  Our team is happy to assist in choosing among these options or answer other questions you may have.  Thank you for trusting us with your care.    [facility] Administration    Good Help to Those in Need®

## 2024-07-10 NOTE — PROGRESS NOTES
[facility]  [facility] Note    Date: July 10, 2024  Name: Desire Ulrich  MRN: [id number]       : 1965  Diagnosis: Lupus (M32.9)   Treatment: Benlysta 960mg  Referring Provider: Dr. Moses  Supervising Provider: Dr. Moses    Patient arrived to [facility] at 0825.  Ms. Ulrich allergies reviewed and she agreed to receiving today's treatment. A physical assessment was performed initially and post-treatment.  Pts insurance  unknowingly .  Pt states she renewed back in November when address changed.  With practice managers approval, will still infuse to not waste the drug.  Pt aware of high BP and needing to take meds.  Pt declines symptoms of HA, blurred vision, or dizziness.      Ms. Boyle vitals were monitored before and after medication administration.   Vitals:    07/10/24 0950 07/10/24 1000 07/10/24 1035 07/10/24 1100   BP: (!) 211/112 (!) 219/115 (!) 180/95 (!) 185/98   Pulse: 81   68   Resp: 14   14   Temp: 98 °F (36.7 °C)   97.9 °F (36.6 °C)   TempSrc: Oral   Oral     Recent labs results:  Lab Results   Component Value Date/Time     2024 10:49 AM    K 3.9 2024 10:49 AM     2024 10:49 AM    CO2 26 2024 10:49 AM    BUN 13 2024 10:49 AM    CREATININE 0.96 2024 10:49 AM    GLUCOSE 108 2024 10:49 AM    CALCIUM 8.3 2024 10:49 AM    LABGLOM 68 2024 10:49 AM    LABGLOM >60 2023 01:05 PM    LABGLOM 64 2022 12:00 AM      Lab Results   Component Value Date    WBC 4.9 2024    HGB 12.3 2024    HCT 39.2 2024    MCV 82.2 2024     2024   Medications given per providers orders:  Administrations This Visit       0.9 % sodium chloride infusion       Admin Date  07/10/2024 Action  New Bag Dose  20 mL/hr Rate  20 mL/hr Route  IntraVENous Administered By  Neha Momin, RN              belimumab (BENLYSTA) 960 mg in sodium chloride 0.9 % 287 mL infusion       Admin Date  07/10/2024 Action  New Bag Dose  960

## 2024-07-22 ENCOUNTER — TELEPHONE (OUTPATIENT)
Age: 59
End: 2024-07-22

## 2024-07-22 NOTE — TELEPHONE ENCOUNTER
Called & LVM asking for return call.  Requesting an update on insurance information for upcoming appointments on 8/7

## 2024-07-22 NOTE — TELEPHONE ENCOUNTER
Returned call to [employer] [place] for [place] on  Susy in office infusions.    Call reference number [id number] - no prior authorization is required for in office infusions under the medical benefit.     Time stamp 2:30PM, spoke with representative Keren.

## 2024-07-22 NOTE — TELEPHONE ENCOUNTER
----- Message from Hermila Beasley sent at 7/15/2024  1:41 PM EDT -----  I checked to see if the patient's insurance was possibly re-activated, but as of today, it's not.     I can reach out to Desire to see if she can get financial assistance in the meantime.

## 2024-07-22 NOTE — TELEPHONE ENCOUNTER
Pt states she spoke with insurance and they state her same insurance is active, effective date 7/1/24.  Same card. Will notify Hermila and practice manager.

## 2024-07-22 NOTE — TELEPHONE ENCOUNTER
Ran RTE for patient's [employer] insurance. RTE kicked back as E-Rejected under [employer].    Ran eligibility for straight medicaid, which comes back with an eligible status.     Called [phone] Medicaid DMAS provider help line to confirm patient has eligibility through straight medicaid. Call reference #[id number], time stamp 1:14PM.    Was advised to reach out to [employer] at [phone]  to follow up on prior authorization.     Was unable to speak with a representative, left voicemail to return call.

## 2024-07-30 NOTE — PATIENT INSTRUCTIONS
Thank you for visiting [facility]!      For future medication refills, we require updated lab results and an upcoming appointment to be in our system prior to refilling prescriptions.  Without these two things, your refill will be DENIED.  If you miss your upcoming appointment, your refill will also be DENIED.      We appreciate your understanding of this critical clinical aspect of our practice. -Dr. Moses & Roma, NP

## 2024-08-07 ENCOUNTER — OFFICE VISIT (OUTPATIENT)
Age: 59
End: 2024-08-07
Payer: MEDICAID

## 2024-08-07 ENCOUNTER — NURSE ONLY (OUTPATIENT)
Age: 59
End: 2024-08-07
Payer: MEDICAID

## 2024-08-07 VITALS
SYSTOLIC BLOOD PRESSURE: 179 MMHG | TEMPERATURE: 98 F | DIASTOLIC BLOOD PRESSURE: 96 MMHG | HEART RATE: 71 BPM | OXYGEN SATURATION: 97 % | RESPIRATION RATE: 18 BRPM

## 2024-08-07 VITALS
OXYGEN SATURATION: 97 % | DIASTOLIC BLOOD PRESSURE: 99 MMHG | RESPIRATION RATE: 16 BRPM | TEMPERATURE: 97.9 F | HEART RATE: 66 BPM | SYSTOLIC BLOOD PRESSURE: 175 MMHG

## 2024-08-07 DIAGNOSIS — M32.9 SYSTEMIC LUPUS ERYTHEMATOSUS, UNSPECIFIED SLE TYPE, UNSPECIFIED ORGAN INVOLVEMENT STATUS (HCC): Primary | ICD-10-CM

## 2024-08-07 DIAGNOSIS — I10 ESSENTIAL HYPERTENSION: ICD-10-CM

## 2024-08-07 PROCEDURE — 3080F DIAST BP >= 90 MM HG: CPT | Performed by: PEDIATRICS

## 2024-08-07 PROCEDURE — 99214 OFFICE O/P EST MOD 30 MIN: CPT | Performed by: PEDIATRICS

## 2024-08-07 PROCEDURE — 96365 THER/PROPH/DIAG IV INF INIT: CPT | Performed by: PEDIATRICS

## 2024-08-07 PROCEDURE — PBSHW PBB SHADOW CHARGE: Performed by: PEDIATRICS

## 2024-08-07 PROCEDURE — 3077F SYST BP >= 140 MM HG: CPT | Performed by: PEDIATRICS

## 2024-08-07 RX ORDER — SODIUM CHLORIDE 9 MG/ML
5-250 INJECTION, SOLUTION INTRAVENOUS PRN
OUTPATIENT
Start: 2024-09-04

## 2024-08-07 RX ORDER — SODIUM CHLORIDE 9 MG/ML
5-250 INJECTION, SOLUTION INTRAVENOUS PRN
Status: DISCONTINUED | OUTPATIENT
Start: 2024-08-07 | End: 2024-08-07 | Stop reason: HOSPADM

## 2024-08-07 RX ORDER — ONDANSETRON 2 MG/ML
8 INJECTION INTRAMUSCULAR; INTRAVENOUS
OUTPATIENT
Start: 2024-09-04

## 2024-08-07 RX ORDER — SODIUM CHLORIDE 9 MG/ML
INJECTION, SOLUTION INTRAVENOUS CONTINUOUS
OUTPATIENT
Start: 2024-09-04

## 2024-08-07 RX ORDER — SODIUM CHLORIDE 0.9 % (FLUSH) 0.9 %
5-40 SYRINGE (ML) INJECTION PRN
Status: DISCONTINUED | OUTPATIENT
Start: 2024-08-07 | End: 2024-08-07 | Stop reason: HOSPADM

## 2024-08-07 RX ORDER — DIPHENHYDRAMINE HYDROCHLORIDE 50 MG/ML
50 INJECTION INTRAMUSCULAR; INTRAVENOUS
OUTPATIENT
Start: 2024-09-04

## 2024-08-07 RX ORDER — EPINEPHRINE 1 MG/ML
0.3 INJECTION, SOLUTION, CONCENTRATE INTRAVENOUS PRN
OUTPATIENT
Start: 2024-09-04

## 2024-08-07 RX ORDER — FAMOTIDINE 10 MG/ML
20 INJECTION, SOLUTION INTRAVENOUS
OUTPATIENT
Start: 2024-09-04

## 2024-08-07 RX ORDER — ALBUTEROL SULFATE 90 UG/1
4 AEROSOL, METERED RESPIRATORY (INHALATION) PRN
OUTPATIENT
Start: 2024-09-04

## 2024-08-07 RX ORDER — HEPARIN SODIUM (PORCINE) LOCK FLUSH IV SOLN 100 UNIT/ML 100 UNIT/ML
500 SOLUTION INTRAVENOUS PRN
OUTPATIENT
Start: 2024-09-04

## 2024-08-07 RX ORDER — SODIUM CHLORIDE 0.9 % (FLUSH) 0.9 %
5-40 SYRINGE (ML) INJECTION PRN
OUTPATIENT
Start: 2024-09-04

## 2024-08-07 RX ORDER — ACETAMINOPHEN 325 MG/1
650 TABLET ORAL
OUTPATIENT
Start: 2024-09-04

## 2024-08-07 RX ADMIN — SODIUM CHLORIDE 20 ML/HR: 9 INJECTION, SOLUTION INTRAVENOUS at 08:36

## 2024-08-07 RX ADMIN — BELIMUMAB 960 MG: 120 INJECTION, POWDER, LYOPHILIZED, FOR SOLUTION INTRAVENOUS at 08:37

## 2024-08-07 RX ADMIN — Medication 10 ML: at 08:36

## 2024-08-07 ASSESSMENT — PATIENT HEALTH QUESTIONNAIRE - PHQ9
1. LITTLE INTEREST OR PLEASURE IN DOING THINGS: NOT AT ALL
2. FEELING DOWN, DEPRESSED OR HOPELESS: NOT AT ALL
SUM OF ALL RESPONSES TO PHQ9 QUESTIONS 1 & 2: 0
SUM OF ALL RESPONSES TO PHQ QUESTIONS 1-9: 0
2. FEELING DOWN, DEPRESSED OR HOPELESS: NOT AT ALL
1. LITTLE INTEREST OR PLEASURE IN DOING THINGS: NOT AT ALL
SUM OF ALL RESPONSES TO PHQ9 QUESTIONS 1 & 2: 0
SUM OF ALL RESPONSES TO PHQ QUESTIONS 1-9: 0

## 2024-08-07 ASSESSMENT — PAIN SCALES - GENERAL
PAINLEVEL_OUTOF10: 0
PAINLEVEL_OUTOF10: 0

## 2024-08-07 NOTE — PROGRESS NOTES
RHEUMATOLOGY PROBLEM LIST AND CHIEF COMPLAINT  1. Systemic lupus - arthritis, alopecia, subacute cutaneous lupus rash, and +ROHIT 1:640 homogenous with +dsDNA    Disease History:  In 2012 developed increased joint pain and stiffness, marked pain sensitivity, couldn't walk or stand to be touched, get in and out of a car. Thought at first possibly shingles, pain didn't resolve. Able to get expedited evaluation with Rheumatology (Dr. Mota with [facility]), diagnosed with lupus.  Prednisone and Plaquenil (hydroxychloroquine) were started, and within about a week was feeling better.  Around the same time, had developed significant frontotemporal and vertex hair loss.  Now prolonged cold exposure is her major trigger of joint pain flares. Initially was having pain flares 2-3x/month while in [place].  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  Flares respond to prednisone tapers.  Between flares, she denies consistent joint pain. Some aching in hands or shoulders with activity.  Has been able to lose weight from 236 to 209 with use of exercise bike over the last 6 months.  Gets perioral papular rashes, not more typical malar rash. With sun exposure in the past, has developed painful annular silver-dollar sized lesions lasting a week or two, not in the last year. Good about sun avoidance.  Usually goes to [facility] for eye checks, recently seen. No current ophthalmologist for Plaquenil screening.    INTERVAL HISTORY  This is a 59 y.o.  female.  Today, the patient complains of no pain in the joints.  Location: none  Severity:  0 on a scale of 0-10  Timing: none   Duration: none  Modifying factors:   Context/Associated signs and symptoms: The patient continues on Benlysta monthly infusions and Plaquenil 200 mg daily. She has been doing well overall and denies any persistent joint pain, joint swelling, morning  negative,   21: WBC 5.0 (ANC 2500, ALC 2000), Hgb 11.8, Pl t 330; ESR 66, UA neg for protein or blood; Cr 1.03, Tbili <0.2, AST 15, ALT 16, AlkP 132, urine pr/cr 0.058, debbie neg, ROHIT 1:640 homogenous, dsDNA 12; SSA, SSB, RNP, chromatin, Scl70, centromere B, ribosomal P, Jo1 negative; CRP 11mg/L, C3 and C4 WNL  20: RF neg, CCP neg, 14.3.3 eta neg; CRP 10.48 mg/L; ROHIT direct positive (no reflex), ESR 45; WBC 5.1 [ANC 2400, ALC 2100], Hgb 11.9, Plt 286; Cr 0.89, Tbili 0.2, AST 18, ALT 18, AlkP 139, Tprot 7.2, Alb 4.0; HDL 42, ; A1c 6.4, TSH 1.3, Hep C Ab neg;     Imagin23 PA/Lat CXR  Normal PA and lateral chest views.    22 BRAIN MRI WITH AND WITHOUT CONTRAST: Normal brain.    CTA CODE NEURO HEAD AND NECK W CONT, CT CODE NEURO PERF W CBF (22):  CTA Head:  1. No evidence of significant stenosis or aneurysm.     CTA Neck:  1. No evidence of significant stenosis.  2. Multiple borderline enlarged left axillary lymph nodes are likely reactive.  Clinical follow-up is recommended.     CT Brain Perfusion:  1. No acute abnormality.    22 CT abd/pelvis with:  IMPRESSION  Leiomyomatous uterus with no acute findings or other findings to  correlate with pelvic cramping or rectal bleeding.    10/1/20 AP CXR:  Portable exam of the chest obtained at 1118 demonstrates normal heart size.  There is no acute process in the lung fields. The osseous structures are  Unremarkable.    3/10/20 MR brain without:  IMPRESSION:  No significant abnormalities.    19 CT Cspine:  No acute fracture  Central canal stenosis C5-6 and C6-7 [min 5.6mm anterior to posterior]  Moderate left C6-7 neural foraminal stenosis.    ASSESSMENT  1. Systemic lupus - The patient has been doing well on current regimen. She will continue Benlysta monthly infusions and Plaquenil 200 mg daily. Labs are done with infusions when needed.   2. Drug therapy monitoring for toxicity (DMARD and/or biologic/TREVA inhibitor) - Recommend CBC,

## 2024-08-07 NOTE — PROGRESS NOTES
Chief Complaint   Patient presents with    Lupus     1. Have you been to the ER, urgent care clinic since your last visit?  Hospitalized since your last visit?No    2. Have you seen or consulted any other health care providers outside of the [facility] since your last visit?  Include any pap smears or colon screening. No

## 2024-09-10 ENCOUNTER — NURSE ONLY (OUTPATIENT)
Age: 59
End: 2024-09-10
Payer: MEDICAID

## 2024-09-10 VITALS
TEMPERATURE: 97.7 F | OXYGEN SATURATION: 97 % | RESPIRATION RATE: 16 BRPM | SYSTOLIC BLOOD PRESSURE: 158 MMHG | HEART RATE: 65 BPM | DIASTOLIC BLOOD PRESSURE: 91 MMHG

## 2024-09-10 DIAGNOSIS — I10 ESSENTIAL HYPERTENSION: ICD-10-CM

## 2024-09-10 DIAGNOSIS — M32.9 SYSTEMIC LUPUS ERYTHEMATOSUS, UNSPECIFIED SLE TYPE, UNSPECIFIED ORGAN INVOLVEMENT STATUS (HCC): Primary | ICD-10-CM

## 2024-09-10 PROCEDURE — PBSHW PBB SHADOW CHARGE: Performed by: PEDIATRICS

## 2024-09-10 PROCEDURE — 96365 THER/PROPH/DIAG IV INF INIT: CPT | Performed by: PEDIATRICS

## 2024-09-10 RX ORDER — SODIUM CHLORIDE 9 MG/ML
5-250 INJECTION, SOLUTION INTRAVENOUS PRN
OUTPATIENT
Start: 2024-10-01

## 2024-09-10 RX ORDER — EPINEPHRINE 1 MG/ML
0.3 INJECTION, SOLUTION, CONCENTRATE INTRAVENOUS PRN
OUTPATIENT
Start: 2024-10-01

## 2024-09-10 RX ORDER — ACETAMINOPHEN 325 MG/1
650 TABLET ORAL
OUTPATIENT
Start: 2024-10-01

## 2024-09-10 RX ORDER — DIPHENHYDRAMINE HYDROCHLORIDE 50 MG/ML
50 INJECTION INTRAMUSCULAR; INTRAVENOUS
OUTPATIENT
Start: 2024-10-01

## 2024-09-10 RX ORDER — SODIUM CHLORIDE 9 MG/ML
INJECTION, SOLUTION INTRAVENOUS CONTINUOUS
OUTPATIENT
Start: 2024-10-01

## 2024-09-10 RX ORDER — SODIUM CHLORIDE 0.9 % (FLUSH) 0.9 %
5-40 SYRINGE (ML) INJECTION PRN
OUTPATIENT
Start: 2024-10-01

## 2024-09-10 RX ORDER — FAMOTIDINE 10 MG/ML
20 INJECTION, SOLUTION INTRAVENOUS
OUTPATIENT
Start: 2024-10-01

## 2024-09-10 RX ORDER — ALBUTEROL SULFATE 90 UG/1
4 AEROSOL, METERED RESPIRATORY (INHALATION) PRN
OUTPATIENT
Start: 2024-10-01

## 2024-09-10 RX ORDER — ONDANSETRON 2 MG/ML
8 INJECTION INTRAMUSCULAR; INTRAVENOUS
OUTPATIENT
Start: 2024-10-01

## 2024-09-10 RX ORDER — SODIUM CHLORIDE 9 MG/ML
5-250 INJECTION, SOLUTION INTRAVENOUS PRN
Status: DISCONTINUED | OUTPATIENT
Start: 2024-09-10 | End: 2024-09-10 | Stop reason: HOSPADM

## 2024-09-10 RX ORDER — HEPARIN SODIUM (PORCINE) LOCK FLUSH IV SOLN 100 UNIT/ML 100 UNIT/ML
500 SOLUTION INTRAVENOUS PRN
OUTPATIENT
Start: 2024-10-01

## 2024-09-10 RX ORDER — SODIUM CHLORIDE 0.9 % (FLUSH) 0.9 %
5-40 SYRINGE (ML) INJECTION PRN
Status: DISCONTINUED | OUTPATIENT
Start: 2024-09-10 | End: 2024-09-10 | Stop reason: HOSPADM

## 2024-09-10 RX ADMIN — Medication 10 ML: at 13:18

## 2024-09-10 RX ADMIN — BELIMUMAB 960 MG: 120 INJECTION, POWDER, LYOPHILIZED, FOR SOLUTION INTRAVENOUS at 13:19

## 2024-09-10 RX ADMIN — SODIUM CHLORIDE 20 ML/HR: 9 INJECTION, SOLUTION INTRAVENOUS at 13:19

## 2024-09-10 ASSESSMENT — PAIN SCALES - GENERAL
PAINLEVEL_OUTOF10: 0
PAINLEVEL_OUTOF10: 0

## 2024-10-07 NOTE — PROGRESS NOTES
[facility]  [facility] Note    Date: 2024  Name: Desire Ulrich  MRN: [id number]       : 1965  Diagnosis: Lupus (M32.9)   Treatment: Benlysta 960mg  Referring Provider: Dr. Moses  Supervising Provider: Dr. Moses    Patient arrived to [facility] at 1430.  Ms. Ulrich allergies reviewed and she agreed to receiving today's treatment. A physical assessment was performed initially and post-treatment.      Ms. Boyle vitals were monitored before and after medication administration.   Vitals:    10/09/24 1436 10/09/24 1548   BP: (!) 168/107 (!) 164/89   Pulse: 80 78   Resp: 16 16   Temp: 97.7 °F (36.5 °C) 97.8 °F (36.6 °C)   TempSrc: Oral Oral   SpO2: 98% 97%     Labs drawn and walked to Rheum lab.   Recent labs results:  Lab Results   Component Value Date/Time     2024 10:49 AM    K 3.9 2024 10:49 AM     2024 10:49 AM    CO2 26 2024 10:49 AM    BUN 13 2024 10:49 AM    CREATININE 0.96 2024 10:49 AM    GLUCOSE 108 2024 10:49 AM    CALCIUM 8.3 2024 10:49 AM    LABGLOM 68 2024 10:49 AM    LABGLOM >60 2023 01:05 PM    LABGLOM 64 2022 12:00 AM      Lab Results   Component Value Date    WBC 4.9 2024    HGB 12.3 2024    HCT 39.2 2024    MCV 82.2 2024     2024   Medications given per providers orders:  Administrations This Visit       belimumab (BENLYSTA) 960 mg in sodium chloride 0.9 % 287 mL infusion       Admin Date  10/09/2024 Action  New Bag Dose  960 mg Rate  287 mL/hr Route  IntraVENous Documented By  Neha Momin, RN              sodium chloride flush 0.9 % injection 5-40 mL       Admin Date  10/09/2024 Action  Given Dose  10 mL Route  IntraVENous Documented By  Neha Momin, RN                Benlysta 120mg x8 vial (Total dose 960mg, 0mg wasted)  NDC 68656-239-57    START: 1444  STOP:  1544    0.9% Normal Saline 10ml flush  NDC 8290-179995  Lot # [id number]  Exp 27    Lines: 24G LFA.   +Blood

## 2024-10-09 ENCOUNTER — NURSE ONLY (OUTPATIENT)
Age: 59
End: 2024-10-09
Payer: MEDICAID

## 2024-10-09 VITALS
DIASTOLIC BLOOD PRESSURE: 89 MMHG | TEMPERATURE: 97.8 F | RESPIRATION RATE: 16 BRPM | OXYGEN SATURATION: 97 % | HEART RATE: 78 BPM | SYSTOLIC BLOOD PRESSURE: 164 MMHG

## 2024-10-09 DIAGNOSIS — I10 ESSENTIAL HYPERTENSION: ICD-10-CM

## 2024-10-09 DIAGNOSIS — M32.9 SYSTEMIC LUPUS ERYTHEMATOSUS, UNSPECIFIED SLE TYPE, UNSPECIFIED ORGAN INVOLVEMENT STATUS (HCC): ICD-10-CM

## 2024-10-09 DIAGNOSIS — M32.9 SYSTEMIC LUPUS ERYTHEMATOSUS, UNSPECIFIED SLE TYPE, UNSPECIFIED ORGAN INVOLVEMENT STATUS (HCC): Primary | ICD-10-CM

## 2024-10-09 PROCEDURE — 96365 THER/PROPH/DIAG IV INF INIT: CPT | Performed by: PEDIATRICS

## 2024-10-09 PROCEDURE — PBSHW PBB SHADOW CHARGE: Performed by: PEDIATRICS

## 2024-10-09 RX ORDER — SODIUM CHLORIDE 9 MG/ML
5-250 INJECTION, SOLUTION INTRAVENOUS PRN
OUTPATIENT
Start: 2024-11-06

## 2024-10-09 RX ORDER — FAMOTIDINE 10 MG/ML
20 INJECTION, SOLUTION INTRAVENOUS
OUTPATIENT
Start: 2024-11-06

## 2024-10-09 RX ORDER — ACETAMINOPHEN 325 MG/1
650 TABLET ORAL
OUTPATIENT
Start: 2024-11-06

## 2024-10-09 RX ORDER — HEPARIN SODIUM (PORCINE) LOCK FLUSH IV SOLN 100 UNIT/ML 100 UNIT/ML
500 SOLUTION INTRAVENOUS PRN
OUTPATIENT
Start: 2024-11-06

## 2024-10-09 RX ORDER — SODIUM CHLORIDE 9 MG/ML
INJECTION, SOLUTION INTRAVENOUS CONTINUOUS
OUTPATIENT
Start: 2024-11-06

## 2024-10-09 RX ORDER — ALBUTEROL SULFATE 90 UG/1
4 INHALANT RESPIRATORY (INHALATION) PRN
OUTPATIENT
Start: 2024-11-06

## 2024-10-09 RX ORDER — EPINEPHRINE 1 MG/ML
0.3 INJECTION, SOLUTION, CONCENTRATE INTRAVENOUS PRN
OUTPATIENT
Start: 2024-11-06

## 2024-10-09 RX ORDER — SODIUM CHLORIDE 0.9 % (FLUSH) 0.9 %
5-40 SYRINGE (ML) INJECTION PRN
OUTPATIENT
Start: 2024-11-06

## 2024-10-09 RX ORDER — ONDANSETRON 2 MG/ML
8 INJECTION INTRAMUSCULAR; INTRAVENOUS
OUTPATIENT
Start: 2024-11-06

## 2024-10-09 RX ORDER — DIPHENHYDRAMINE HYDROCHLORIDE 50 MG/ML
50 INJECTION INTRAMUSCULAR; INTRAVENOUS
OUTPATIENT
Start: 2024-11-06

## 2024-10-09 RX ORDER — SODIUM CHLORIDE 0.9 % (FLUSH) 0.9 %
5-40 SYRINGE (ML) INJECTION PRN
Status: DISCONTINUED | OUTPATIENT
Start: 2024-10-09 | End: 2024-10-09 | Stop reason: HOSPADM

## 2024-10-09 RX ADMIN — Medication 10 ML: at 14:44

## 2024-10-09 RX ADMIN — BELIMUMAB 960 MG: 120 INJECTION, POWDER, LYOPHILIZED, FOR SOLUTION INTRAVENOUS at 14:44

## 2024-10-09 ASSESSMENT — PATIENT HEALTH QUESTIONNAIRE - PHQ9
SUM OF ALL RESPONSES TO PHQ QUESTIONS 1-9: 0
SUM OF ALL RESPONSES TO PHQ QUESTIONS 1-9: 0
2. FEELING DOWN, DEPRESSED OR HOPELESS: NOT AT ALL
SUM OF ALL RESPONSES TO PHQ QUESTIONS 1-9: 0
1. LITTLE INTEREST OR PLEASURE IN DOING THINGS: NOT AT ALL
SUM OF ALL RESPONSES TO PHQ QUESTIONS 1-9: 0
SUM OF ALL RESPONSES TO PHQ9 QUESTIONS 1 & 2: 0

## 2024-10-09 ASSESSMENT — PAIN SCALES - GENERAL
PAINLEVEL_OUTOF10: 0
PAINLEVEL_OUTOF10: 0

## 2024-10-09 NOTE — PATIENT INSTRUCTIONS
Thank you for visiting [facility]!      For future medication refills, we require updated lab results and an upcoming appointment to be in our system prior to refilling prescriptions.  Without these two things, your refill will be DENIED.  If you miss your upcoming appointment, your refill will also be DENIED.      We appreciate your understanding of this critical clinical aspect of our practice. -Dr. Moses & Roma, NP

## 2024-10-10 LAB
ALBUMIN SERPL-MCNC: 3.9 G/DL (ref 3.5–5)
ALBUMIN/GLOB SERPL: 1 (ref 1.1–2.2)
ALP SERPL-CCNC: 132 U/L (ref 45–117)
ALT SERPL-CCNC: 22 U/L (ref 12–78)
ANION GAP SERPL CALC-SCNC: 7 MMOL/L (ref 2–12)
AST SERPL-CCNC: 20 U/L (ref 15–37)
BASOPHILS # BLD: 0 K/UL (ref 0–0.1)
BASOPHILS NFR BLD: 1 % (ref 0–1)
BILIRUB SERPL-MCNC: 0.4 MG/DL (ref 0.2–1)
BUN SERPL-MCNC: 13 MG/DL (ref 6–20)
BUN/CREAT SERPL: 14 (ref 12–20)
CALCIUM SERPL-MCNC: 9.6 MG/DL (ref 8.5–10.1)
CHLORIDE SERPL-SCNC: 107 MMOL/L (ref 97–108)
CO2 SERPL-SCNC: 27 MMOL/L (ref 21–32)
CREAT SERPL-MCNC: 0.93 MG/DL (ref 0.55–1.02)
CRP SERPL-MCNC: 1.62 MG/DL (ref 0–0.3)
DIFFERENTIAL METHOD BLD: ABNORMAL
EOSINOPHIL # BLD: 0.1 K/UL (ref 0–0.4)
EOSINOPHIL NFR BLD: 2 % (ref 0–7)
ERYTHROCYTE [DISTWIDTH] IN BLOOD BY AUTOMATED COUNT: 13.9 % (ref 11.5–14.5)
ERYTHROCYTE [SEDIMENTATION RATE] IN BLOOD: 28 MM/HR (ref 0–30)
GLOBULIN SER CALC-MCNC: 3.9 G/DL (ref 2–4)
GLUCOSE SERPL-MCNC: 104 MG/DL (ref 65–100)
HCT VFR BLD AUTO: 39.7 % (ref 35–47)
HGB BLD-MCNC: 12.2 G/DL (ref 11.5–16)
IMM GRANULOCYTES # BLD AUTO: 0 K/UL (ref 0–0.04)
IMM GRANULOCYTES NFR BLD AUTO: 0 % (ref 0–0.5)
LYMPHOCYTES # BLD: 2.2 K/UL (ref 0.8–3.5)
LYMPHOCYTES NFR BLD: 45 % (ref 12–49)
MCH RBC QN AUTO: 25.3 PG (ref 26–34)
MCHC RBC AUTO-ENTMCNC: 30.7 G/DL (ref 30–36.5)
MCV RBC AUTO: 82.2 FL (ref 80–99)
MONOCYTES # BLD: 0.4 K/UL (ref 0–1)
MONOCYTES NFR BLD: 9 % (ref 5–13)
NEUTS SEG # BLD: 2 K/UL (ref 1.8–8)
NEUTS SEG NFR BLD: 43 % (ref 32–75)
NRBC # BLD: 0 K/UL (ref 0–0.01)
NRBC BLD-RTO: 0 PER 100 WBC
PLATELET # BLD AUTO: 302 K/UL (ref 150–400)
PMV BLD AUTO: 11.8 FL (ref 8.9–12.9)
POTASSIUM SERPL-SCNC: 3.9 MMOL/L (ref 3.5–5.1)
PROT SERPL-MCNC: 7.8 G/DL (ref 6.4–8.2)
RBC # BLD AUTO: 4.83 M/UL (ref 3.8–5.2)
SODIUM SERPL-SCNC: 141 MMOL/L (ref 136–145)
WBC # BLD AUTO: 4.7 K/UL (ref 3.6–11)

## 2024-10-30 NOTE — PROGRESS NOTES
Line flushed per protocol.    Access was removed from Ms. Ulrich after completion of infusion/injection.   Treatment tolerated without complaints or reactions. Patient discharged ambulatory in stable condition at 1315.  Note routed to supervising provider for co-signing.     Future Appointments   Date Time Provider Department Center   12/3/2024 11:30 AM Cary Moses MD [facility] BS AMB   12/3/2024 11:30 AM INFUSIONINJ_RC [facility] BS KAUSHAL Momin RN

## 2024-11-04 ENCOUNTER — NURSE ONLY (OUTPATIENT)
Age: 59
End: 2024-11-04
Payer: MEDICAID

## 2024-11-04 VITALS
SYSTOLIC BLOOD PRESSURE: 197 MMHG | OXYGEN SATURATION: 98 % | TEMPERATURE: 98 F | DIASTOLIC BLOOD PRESSURE: 94 MMHG | HEART RATE: 83 BPM | RESPIRATION RATE: 14 BRPM

## 2024-11-04 DIAGNOSIS — I10 ESSENTIAL HYPERTENSION: ICD-10-CM

## 2024-11-04 DIAGNOSIS — M32.9 SYSTEMIC LUPUS ERYTHEMATOSUS, UNSPECIFIED SLE TYPE, UNSPECIFIED ORGAN INVOLVEMENT STATUS (HCC): Primary | ICD-10-CM

## 2024-11-04 PROCEDURE — 96365 THER/PROPH/DIAG IV INF INIT: CPT | Performed by: PEDIATRICS

## 2024-11-04 PROCEDURE — PBSHW PBB SHADOW CHARGE: Performed by: PEDIATRICS

## 2024-11-04 RX ORDER — SODIUM CHLORIDE 0.9 % (FLUSH) 0.9 %
5-40 SYRINGE (ML) INJECTION PRN
OUTPATIENT
Start: 2024-12-02

## 2024-11-04 RX ORDER — EPINEPHRINE 1 MG/ML
0.3 INJECTION, SOLUTION, CONCENTRATE INTRAVENOUS PRN
OUTPATIENT
Start: 2024-12-02

## 2024-11-04 RX ORDER — ACETAMINOPHEN 325 MG/1
650 TABLET ORAL
OUTPATIENT
Start: 2024-12-02

## 2024-11-04 RX ORDER — HEPARIN SODIUM (PORCINE) LOCK FLUSH IV SOLN 100 UNIT/ML 100 UNIT/ML
500 SOLUTION INTRAVENOUS PRN
OUTPATIENT
Start: 2024-12-02

## 2024-11-04 RX ORDER — SODIUM CHLORIDE 9 MG/ML
INJECTION, SOLUTION INTRAVENOUS CONTINUOUS
OUTPATIENT
Start: 2024-12-02

## 2024-11-04 RX ORDER — SODIUM CHLORIDE 9 MG/ML
5-250 INJECTION, SOLUTION INTRAVENOUS PRN
OUTPATIENT
Start: 2024-12-02

## 2024-11-04 RX ORDER — FAMOTIDINE 10 MG/ML
20 INJECTION, SOLUTION INTRAVENOUS
OUTPATIENT
Start: 2024-12-02

## 2024-11-04 RX ORDER — ALBUTEROL SULFATE 90 UG/1
4 INHALANT RESPIRATORY (INHALATION) PRN
OUTPATIENT
Start: 2024-12-02

## 2024-11-04 RX ORDER — ONDANSETRON 2 MG/ML
8 INJECTION INTRAMUSCULAR; INTRAVENOUS
OUTPATIENT
Start: 2024-12-02

## 2024-11-04 RX ORDER — DIPHENHYDRAMINE HYDROCHLORIDE 50 MG/ML
50 INJECTION INTRAMUSCULAR; INTRAVENOUS
OUTPATIENT
Start: 2024-12-02

## 2024-11-04 RX ORDER — SODIUM CHLORIDE 0.9 % (FLUSH) 0.9 %
5-40 SYRINGE (ML) INJECTION PRN
Status: DISCONTINUED | OUTPATIENT
Start: 2024-11-04 | End: 2024-11-04 | Stop reason: HOSPADM

## 2024-11-04 RX ADMIN — BELIMUMAB 960 MG: 120 INJECTION, POWDER, LYOPHILIZED, FOR SOLUTION INTRAVENOUS at 12:08

## 2024-11-04 RX ADMIN — Medication 10 ML: at 12:09

## 2024-11-04 ASSESSMENT — PAIN DESCRIPTION - LOCATION: LOCATION: LEG;HIP

## 2024-11-04 ASSESSMENT — PAIN SCALES - GENERAL
PAINLEVEL_OUTOF10: 1
PAINLEVEL_OUTOF10: 0

## 2024-11-04 ASSESSMENT — PAIN DESCRIPTION - FREQUENCY: FREQUENCY: INTERMITTENT

## 2024-11-04 ASSESSMENT — PAIN DESCRIPTION - ORIENTATION: ORIENTATION: RIGHT;LEFT

## 2024-11-04 ASSESSMENT — PAIN - FUNCTIONAL ASSESSMENT: PAIN_FUNCTIONAL_ASSESSMENT: ACTIVITIES ARE NOT PREVENTED

## 2024-11-04 ASSESSMENT — PAIN DESCRIPTION - PAIN TYPE: TYPE: CHRONIC PAIN

## 2024-11-04 ASSESSMENT — PAIN DESCRIPTION - DESCRIPTORS: DESCRIPTORS: THROBBING

## 2024-11-04 ASSESSMENT — PAIN DESCRIPTION - ONSET: ONSET: GRADUAL

## 2024-11-04 NOTE — PATIENT INSTRUCTIONS
Unfortunately, Dr. Moses will be leaving the organization at the end of the year and will not be seeing patients in [place] anymore. He recommends the following offices for adult rheumatology patients:    [facility] [phone]    [facility] [phone]     [facility] [phone]    [facility] [phone]    If your labs are up to date, we can refill our medications for 6 months to give you time to get in with a new provider. We are here for you if you have questions.     -Rheumatology Staff

## 2024-11-25 NOTE — PROGRESS NOTES
[facility]  [facility] Note    Date: December 3, 2024  Name: Desire Ulrich  MRN: [id number]       : 1965  Diagnosis: Lupus (M32.9)   Treatment: Benlysta 960mg  Referring Provider: Dr. Cary Moses  Supervising Provider: Dr. Cary Moses    Patient arrived to [facility] at 1050.  Ms. Ulrich allergies reviewed and she agreed to receiving today's treatment. A physical assessment was performed initially and post-treatment.  Pt will be transitioning to [facility], appt set 2025.     Ms. Boyle vitals were monitored before and after medication administration.   Vitals:    24 1050 24 1058 24 1200   BP: (!) 191/106 (!) 166/84 (!) 170/83   Pulse: 73  64   Resp: 16  18   Temp: 97.7 °F (36.5 °C)  97.6 °F (36.4 °C)   TempSrc: Oral  Oral   SpO2:   99%     Recent labs results:  Lab Results   Component Value Date/Time     10/09/2024 01:13 PM    K 3.9 10/09/2024 01:13 PM     10/09/2024 01:13 PM    CO2 27 10/09/2024 01:13 PM    BUN 13 10/09/2024 01:13 PM    CREATININE 0.93 10/09/2024 01:13 PM    GLUCOSE 104 10/09/2024 01:13 PM    CALCIUM 9.6 10/09/2024 01:13 PM    LABGLOM 70 10/09/2024 01:13 PM    LABGLOM 68 2024 10:49 AM    LABGLOM >60 2023 01:05 PM    LABGLOM 64 2022 12:00 AM      Lab Results   Component Value Date    WBC 4.7 10/09/2024    HGB 12.2 10/09/2024    HCT 39.7 10/09/2024    MCV 82.2 10/09/2024     10/09/2024     Medications given per providers orders:  Administrations This Visit       belimumab (BENLYSTA) 960 mg in sodium chloride 0.9 % 287 mL infusion       Admin Date  2024 Action  New Bag Dose  960 mg Rate  287 mL/hr Route  IntraVENous Documented By  Neha Momin, RN              sodium chloride flush 0.9 % injection 5-40 mL       Admin Date  2024 Action  Given Dose  10 mL Route  IntraVENous Documented By  Neha Momin, RN                Benlysta 120mg x8 vial (Total dose 960mg, 0mg wasted)  NDC 33468-672-99    START: 1055  STOP:

## 2024-12-03 ENCOUNTER — NURSE ONLY (OUTPATIENT)
Age: 59
End: 2024-12-03
Payer: MEDICAID

## 2024-12-03 ENCOUNTER — OFFICE VISIT (OUTPATIENT)
Age: 59
End: 2024-12-03
Payer: MEDICAID

## 2024-12-03 VITALS
DIASTOLIC BLOOD PRESSURE: 84 MMHG | TEMPERATURE: 97.4 F | OXYGEN SATURATION: 98 % | HEART RATE: 73 BPM | SYSTOLIC BLOOD PRESSURE: 166 MMHG | RESPIRATION RATE: 18 BRPM

## 2024-12-03 VITALS
SYSTOLIC BLOOD PRESSURE: 170 MMHG | HEART RATE: 64 BPM | TEMPERATURE: 97.6 F | RESPIRATION RATE: 18 BRPM | DIASTOLIC BLOOD PRESSURE: 83 MMHG | OXYGEN SATURATION: 99 %

## 2024-12-03 DIAGNOSIS — M32.9 SYSTEMIC LUPUS ERYTHEMATOSUS, UNSPECIFIED SLE TYPE, UNSPECIFIED ORGAN INVOLVEMENT STATUS (HCC): Primary | ICD-10-CM

## 2024-12-03 DIAGNOSIS — I10 ESSENTIAL HYPERTENSION: ICD-10-CM

## 2024-12-03 DIAGNOSIS — M32.9 SYSTEMIC LUPUS ERYTHEMATOSUS, UNSPECIFIED SLE TYPE, UNSPECIFIED ORGAN INVOLVEMENT STATUS (HCC): ICD-10-CM

## 2024-12-03 PROCEDURE — 96365 THER/PROPH/DIAG IV INF INIT: CPT | Performed by: PEDIATRICS

## 2024-12-03 PROCEDURE — 3079F DIAST BP 80-89 MM HG: CPT | Performed by: PEDIATRICS

## 2024-12-03 PROCEDURE — PBSHW PBB SHADOW CHARGE: Performed by: PEDIATRICS

## 2024-12-03 PROCEDURE — 99214 OFFICE O/P EST MOD 30 MIN: CPT | Performed by: PEDIATRICS

## 2024-12-03 PROCEDURE — 3077F SYST BP >= 140 MM HG: CPT | Performed by: PEDIATRICS

## 2024-12-03 RX ORDER — EPINEPHRINE 1 MG/ML
0.3 INJECTION, SOLUTION, CONCENTRATE INTRAVENOUS PRN
OUTPATIENT
Start: 2024-12-29

## 2024-12-03 RX ORDER — SODIUM CHLORIDE 9 MG/ML
5-250 INJECTION, SOLUTION INTRAVENOUS PRN
OUTPATIENT
Start: 2024-12-29

## 2024-12-03 RX ORDER — PREDNISONE 5 MG/1
TABLET ORAL
Qty: 30 TABLET | Refills: 1 | Status: SHIPPED | OUTPATIENT
Start: 2024-12-03

## 2024-12-03 RX ORDER — SODIUM CHLORIDE 9 MG/ML
INJECTION, SOLUTION INTRAVENOUS CONTINUOUS
OUTPATIENT
Start: 2024-12-29

## 2024-12-03 RX ORDER — HEPARIN SODIUM (PORCINE) LOCK FLUSH IV SOLN 100 UNIT/ML 100 UNIT/ML
500 SOLUTION INTRAVENOUS PRN
OUTPATIENT
Start: 2024-12-29

## 2024-12-03 RX ORDER — SODIUM CHLORIDE 0.9 % (FLUSH) 0.9 %
5-40 SYRINGE (ML) INJECTION PRN
OUTPATIENT
Start: 2024-12-29

## 2024-12-03 RX ORDER — HYDROCORTISONE SODIUM SUCCINATE 100 MG/2ML
100 INJECTION INTRAMUSCULAR; INTRAVENOUS
OUTPATIENT
Start: 2024-12-29

## 2024-12-03 RX ORDER — HYDROXYCHLOROQUINE SULFATE 200 MG/1
200 TABLET, FILM COATED ORAL DAILY
Qty: 180 TABLET | Refills: 1 | Status: SHIPPED | OUTPATIENT
Start: 2024-12-03

## 2024-12-03 RX ORDER — MELOXICAM 15 MG/1
15 TABLET ORAL PRN
Qty: 30 TABLET | Refills: 1 | Status: SHIPPED | OUTPATIENT
Start: 2024-12-03

## 2024-12-03 RX ORDER — DIPHENHYDRAMINE HYDROCHLORIDE 50 MG/ML
50 INJECTION INTRAMUSCULAR; INTRAVENOUS
OUTPATIENT
Start: 2024-12-29

## 2024-12-03 RX ORDER — ACETAMINOPHEN 325 MG/1
650 TABLET ORAL
OUTPATIENT
Start: 2024-12-29

## 2024-12-03 RX ORDER — ONDANSETRON 2 MG/ML
8 INJECTION INTRAMUSCULAR; INTRAVENOUS
OUTPATIENT
Start: 2024-12-29

## 2024-12-03 RX ORDER — FAMOTIDINE 10 MG/ML
20 INJECTION, SOLUTION INTRAVENOUS
OUTPATIENT
Start: 2024-12-29

## 2024-12-03 RX ORDER — ALBUTEROL SULFATE 90 UG/1
4 INHALANT RESPIRATORY (INHALATION) PRN
OUTPATIENT
Start: 2024-12-29

## 2024-12-03 RX ORDER — SODIUM CHLORIDE 0.9 % (FLUSH) 0.9 %
5-40 SYRINGE (ML) INJECTION PRN
Status: DISCONTINUED | OUTPATIENT
Start: 2024-12-03 | End: 2024-12-03 | Stop reason: HOSPADM

## 2024-12-03 RX ADMIN — Medication 10 ML: at 10:55

## 2024-12-03 RX ADMIN — BELIMUMAB 960 MG: 120 INJECTION, POWDER, LYOPHILIZED, FOR SOLUTION INTRAVENOUS at 10:55

## 2024-12-03 ASSESSMENT — PAIN SCALES - GENERAL: PAINLEVEL_OUTOF10: 0

## 2024-12-03 NOTE — PROGRESS NOTES
Chief Complaint   Patient presents with    Joint Pain     1. Have you been to the ER, urgent care clinic since your last visit?  Hospitalized since your last visit?No    2. Have you seen or consulted any other health care providers outside of the [facility] since your last visit?  Include any pap smears or colon screening. No    
negative,   21: WBC 5.0 (ANC 2500, ALC 2000), Hgb 11.8, Pl t 330; ESR 66, UA neg for protein or blood; Cr 1.03, Tbili <0.2, AST 15, ALT 16, AlkP 132, urine pr/cr 0.058, debbie neg, ROHIT 1:640 homogenous, dsDNA 12; SSA, SSB, RNP, chromatin, Scl70, centromere B, ribosomal P, Jo1 negative; CRP 11mg/L, C3 and C4 WNL  20: RF neg, CCP neg, 14.3.3 eta neg; CRP 10.48 mg/L; ROHIT direct positive (no reflex), ESR 45; WBC 5.1 [ANC 2400, ALC 2100], Hgb 11.9, Plt 286; Cr 0.89, Tbili 0.2, AST 18, ALT 18, AlkP 139, Tprot 7.2, Alb 4.0; HDL 42, ; A1c 6.4, TSH 1.3, Hep C Ab neg;     Imagin23 PA/Lat CXR  Normal PA and lateral chest views.    22 BRAIN MRI WITH AND WITHOUT CONTRAST: Normal brain.    CTA CODE NEURO HEAD AND NECK W CONT, CT CODE NEURO PERF W CBF (22):  CTA Head:  1. No evidence of significant stenosis or aneurysm.     CTA Neck:  1. No evidence of significant stenosis.  2. Multiple borderline enlarged left axillary lymph nodes are likely reactive.  Clinical follow-up is recommended.     CT Brain Perfusion:  1. No acute abnormality.    22 CT abd/pelvis with:  IMPRESSION  Leiomyomatous uterus with no acute findings or other findings to  correlate with pelvic cramping or rectal bleeding.    10/1/20 AP CXR:  Portable exam of the chest obtained at 1118 demonstrates normal heart size.  There is no acute process in the lung fields. The osseous structures are  Unremarkable.    3/10/20 MR brain without:  IMPRESSION:  No significant abnormalities.    19 CT Cspine:  No acute fracture  Central canal stenosis C5-6 and C6-7 [min 5.6mm anterior to posterior]  Moderate left C6-7 neural foraminal stenosis.    ASSESSMENT  1. Systemic lupus - The patient has been doing well on current regimen. She will continue Benlysta monthly infusions and Plaquenil 200 mg daily. Labs are done with infusions when needed.   2. Drug therapy monitoring for toxicity (DMARD and/or biologic/TREVA inhibitor) - Recommend CBC,

## 2024-12-08 DIAGNOSIS — I10 ESSENTIAL (PRIMARY) HYPERTENSION: ICD-10-CM

## 2024-12-09 RX ORDER — AMLODIPINE BESYLATE 10 MG/1
10 TABLET ORAL DAILY
Qty: 30 TABLET | Refills: 2 | OUTPATIENT
Start: 2024-12-09

## 2025-01-07 ENCOUNTER — NURSE ONLY (OUTPATIENT)
Age: 60
End: 2025-01-07
Payer: MEDICAID

## 2025-01-07 VITALS
HEART RATE: 70 BPM | DIASTOLIC BLOOD PRESSURE: 82 MMHG | OXYGEN SATURATION: 99 % | TEMPERATURE: 98 F | RESPIRATION RATE: 16 BRPM | SYSTOLIC BLOOD PRESSURE: 154 MMHG

## 2025-01-07 DIAGNOSIS — M32.9 SYSTEMIC LUPUS ERYTHEMATOSUS, UNSPECIFIED SLE TYPE, UNSPECIFIED ORGAN INVOLVEMENT STATUS (HCC): Primary | ICD-10-CM

## 2025-01-07 DIAGNOSIS — I10 ESSENTIAL HYPERTENSION: ICD-10-CM

## 2025-01-07 DIAGNOSIS — M32.9 SYSTEMIC LUPUS ERYTHEMATOSUS, UNSPECIFIED SLE TYPE, UNSPECIFIED ORGAN INVOLVEMENT STATUS (HCC): ICD-10-CM

## 2025-01-07 PROCEDURE — PBSHW PBB SHADOW CHARGE: Performed by: NURSE PRACTITIONER

## 2025-01-07 PROCEDURE — 96365 THER/PROPH/DIAG IV INF INIT: CPT | Performed by: NURSE PRACTITIONER

## 2025-01-07 RX ORDER — SODIUM CHLORIDE 9 MG/ML
INJECTION, SOLUTION INTRAVENOUS CONTINUOUS
OUTPATIENT
Start: 2025-01-28

## 2025-01-07 RX ORDER — SODIUM CHLORIDE 0.9 % (FLUSH) 0.9 %
5-40 SYRINGE (ML) INJECTION PRN
Status: DISCONTINUED | OUTPATIENT
Start: 2025-01-07 | End: 2025-01-07 | Stop reason: HOSPADM

## 2025-01-07 RX ORDER — SODIUM CHLORIDE 9 MG/ML
5-250 INJECTION, SOLUTION INTRAVENOUS PRN
OUTPATIENT
Start: 2025-01-28

## 2025-01-07 RX ORDER — SODIUM CHLORIDE 0.9 % (FLUSH) 0.9 %
5-40 SYRINGE (ML) INJECTION PRN
OUTPATIENT
Start: 2025-01-28

## 2025-01-07 RX ORDER — FAMOTIDINE 10 MG/ML
20 INJECTION, SOLUTION INTRAVENOUS
OUTPATIENT
Start: 2025-01-28

## 2025-01-07 RX ORDER — ACETAMINOPHEN 325 MG/1
650 TABLET ORAL
OUTPATIENT
Start: 2025-01-28

## 2025-01-07 RX ORDER — EPINEPHRINE 1 MG/ML
0.3 INJECTION, SOLUTION, CONCENTRATE INTRAVENOUS PRN
OUTPATIENT
Start: 2025-01-28

## 2025-01-07 RX ORDER — HEPARIN SODIUM (PORCINE) LOCK FLUSH IV SOLN 100 UNIT/ML 100 UNIT/ML
500 SOLUTION INTRAVENOUS PRN
OUTPATIENT
Start: 2025-01-28

## 2025-01-07 RX ORDER — ONDANSETRON 2 MG/ML
8 INJECTION INTRAMUSCULAR; INTRAVENOUS
OUTPATIENT
Start: 2025-01-28

## 2025-01-07 RX ORDER — HYDROCORTISONE SODIUM SUCCINATE 100 MG/2ML
100 INJECTION INTRAMUSCULAR; INTRAVENOUS
OUTPATIENT
Start: 2025-01-28

## 2025-01-07 RX ORDER — DIPHENHYDRAMINE HYDROCHLORIDE 50 MG/ML
50 INJECTION INTRAMUSCULAR; INTRAVENOUS
OUTPATIENT
Start: 2025-01-28

## 2025-01-07 RX ORDER — ALBUTEROL SULFATE 90 UG/1
4 INHALANT RESPIRATORY (INHALATION) PRN
OUTPATIENT
Start: 2025-01-28

## 2025-01-07 RX ADMIN — BELIMUMAB 960 MG: 120 INJECTION, POWDER, LYOPHILIZED, FOR SOLUTION INTRAVENOUS at 13:03

## 2025-01-07 RX ADMIN — Medication 10 ML: at 13:03

## 2025-01-07 ASSESSMENT — PAIN SCALES - GENERAL
PAINLEVEL_OUTOF10: 0
PAINLEVEL_OUTOF10: 0

## 2025-01-07 NOTE — PROGRESS NOTES
[facility]  [facility] Note    Date: 2025  Name: Desire Ulrich  MRN: [id number]       : 1965  Diagnosis: Lupus (M32.9)   Treatment: Benlysta 960mg  Referring Provider: Dr. Cary Moses  Supervising Provider: Roma Farrell NP    Patient arrived to [facility] at 1250.  Ms. Ulrich allergies reviewed and she agreed to receiving today's treatment. A physical assessment was performed initially and post-treatment.  Pt will be transitioning to [facility], appt set 2025.     Ms. Boyle vitals were monitored before and after medication administration.   Vitals:    25 1254 25 1402   BP: (!) 181/80 (!) 154/82   Pulse: 75 70   Resp: 16 16   Temp: 97.6 °F (36.4 °C) 98 °F (36.7 °C)   TempSrc: Oral Oral   SpO2:  99%     Labs drawn and walked to [facility] lab  Recent labs results:  Lab Results   Component Value Date/Time     10/09/2024 01:13 PM    K 3.9 10/09/2024 01:13 PM     10/09/2024 01:13 PM    CO2 27 10/09/2024 01:13 PM    BUN 13 10/09/2024 01:13 PM    CREATININE 0.93 10/09/2024 01:13 PM    GLUCOSE 104 10/09/2024 01:13 PM    CALCIUM 9.6 10/09/2024 01:13 PM    LABGLOM 70 10/09/2024 01:13 PM    LABGLOM 68 2024 10:49 AM    LABGLOM >60 2023 01:05 PM    LABGLOM 64 2022 12:00 AM      Lab Results   Component Value Date    WBC 4.7 10/09/2024    HGB 12.2 10/09/2024    HCT 39.7 10/09/2024    MCV 82.2 10/09/2024     10/09/2024     Medications given per providers orders:  Administrations This Visit       belimumab (BENLYSTA) 960 mg in sodium chloride 0.9 % 287 mL infusion       Admin Date  2025 Action  New Bag Dose  960 mg Rate  287 mL/hr Route  IntraVENous Documented By  Neha Momin, RN              sodium chloride flush 0.9 % injection 5-40 mL       Admin Date  2025 Action  Given Dose  10 mL Route  IntraVENous Documented By  Neha Momin, RN                Benlysta 120mg x8 vial (Total dose 960mg, 0mg wasted)  NDC 01607-285-91    START:

## 2025-01-08 LAB
ALBUMIN SERPL-MCNC: 3.8 G/DL (ref 3.5–5)
ALBUMIN/GLOB SERPL: 1 (ref 1.1–2.2)
ALP SERPL-CCNC: 122 U/L (ref 45–117)
ALT SERPL-CCNC: 20 U/L (ref 12–78)
ANION GAP SERPL CALC-SCNC: 5 MMOL/L (ref 2–12)
AST SERPL-CCNC: 17 U/L (ref 15–37)
BASOPHILS # BLD: 0.04 K/UL (ref 0–0.1)
BASOPHILS NFR BLD: 0.6 % (ref 0–1)
BILIRUB SERPL-MCNC: 0.2 MG/DL (ref 0.2–1)
BUN SERPL-MCNC: 14 MG/DL (ref 6–20)
BUN/CREAT SERPL: 16 (ref 12–20)
CALCIUM SERPL-MCNC: 9.3 MG/DL (ref 8.5–10.1)
CHLORIDE SERPL-SCNC: 107 MMOL/L (ref 97–108)
CO2 SERPL-SCNC: 27 MMOL/L (ref 21–32)
CREAT SERPL-MCNC: 0.87 MG/DL (ref 0.55–1.02)
CRP SERPL-MCNC: 1.05 MG/DL (ref 0–0.3)
DIFFERENTIAL METHOD BLD: ABNORMAL
EOSINOPHIL # BLD: 0.07 K/UL (ref 0–0.4)
EOSINOPHIL NFR BLD: 1.1 % (ref 0–7)
ERYTHROCYTE [DISTWIDTH] IN BLOOD BY AUTOMATED COUNT: 13.8 % (ref 11.5–14.5)
ERYTHROCYTE [SEDIMENTATION RATE] IN BLOOD: 35 MM/HR (ref 0–30)
GLOBULIN SER CALC-MCNC: 3.7 G/DL (ref 2–4)
GLUCOSE SERPL-MCNC: 104 MG/DL (ref 65–100)
HCT VFR BLD AUTO: 40.5 % (ref 35–47)
HGB BLD-MCNC: 12.5 G/DL (ref 11.5–16)
IMM GRANULOCYTES # BLD AUTO: 0.01 K/UL (ref 0–0.04)
IMM GRANULOCYTES NFR BLD AUTO: 0.2 % (ref 0–0.5)
LYMPHOCYTES # BLD: 2.56 K/UL (ref 0.8–3.5)
LYMPHOCYTES NFR BLD: 40.4 % (ref 12–49)
MCH RBC QN AUTO: 25.5 PG (ref 26–34)
MCHC RBC AUTO-ENTMCNC: 30.9 G/DL (ref 30–36.5)
MCV RBC AUTO: 82.7 FL (ref 80–99)
MONOCYTES # BLD: 0.49 K/UL (ref 0–1)
MONOCYTES NFR BLD: 7.7 % (ref 5–13)
NEUTS SEG # BLD: 3.17 K/UL (ref 1.8–8)
NEUTS SEG NFR BLD: 50 % (ref 32–75)
NRBC # BLD: 0 K/UL (ref 0–0.01)
NRBC BLD-RTO: 0 PER 100 WBC
PLATELET # BLD AUTO: 266 K/UL (ref 150–400)
PMV BLD AUTO: 12.2 FL (ref 8.9–12.9)
POTASSIUM SERPL-SCNC: 4 MMOL/L (ref 3.5–5.1)
PROT SERPL-MCNC: 7.5 G/DL (ref 6.4–8.2)
RBC # BLD AUTO: 4.9 M/UL (ref 3.8–5.2)
SODIUM SERPL-SCNC: 139 MMOL/L (ref 136–145)
WBC # BLD AUTO: 6.3 K/UL (ref 3.6–11)

## 2025-01-20 DIAGNOSIS — I10 ESSENTIAL HYPERTENSION: Primary | ICD-10-CM

## 2025-01-20 DIAGNOSIS — M32.9 SYSTEMIC LUPUS ERYTHEMATOSUS, UNSPECIFIED SLE TYPE, UNSPECIFIED ORGAN INVOLVEMENT STATUS (HCC): ICD-10-CM

## 2025-01-20 RX ORDER — SODIUM CHLORIDE 9 MG/ML
5-250 INJECTION, SOLUTION INTRAVENOUS PRN
OUTPATIENT
Start: 2025-02-04

## 2025-01-20 RX ORDER — DIPHENHYDRAMINE HYDROCHLORIDE 50 MG/ML
50 INJECTION INTRAMUSCULAR; INTRAVENOUS
OUTPATIENT
Start: 2025-02-04

## 2025-01-20 RX ORDER — ONDANSETRON 2 MG/ML
8 INJECTION INTRAMUSCULAR; INTRAVENOUS
OUTPATIENT
Start: 2025-02-04

## 2025-01-20 RX ORDER — HYDROCORTISONE SODIUM SUCCINATE 100 MG/2ML
100 INJECTION INTRAMUSCULAR; INTRAVENOUS
OUTPATIENT
Start: 2025-02-04

## 2025-01-20 RX ORDER — SODIUM CHLORIDE 9 MG/ML
INJECTION, SOLUTION INTRAVENOUS CONTINUOUS
OUTPATIENT
Start: 2025-02-04

## 2025-01-20 RX ORDER — EPINEPHRINE 1 MG/ML
0.3 INJECTION, SOLUTION, CONCENTRATE INTRAVENOUS PRN
OUTPATIENT
Start: 2025-02-04

## 2025-01-20 RX ORDER — ALBUTEROL SULFATE 90 UG/1
4 INHALANT RESPIRATORY (INHALATION) PRN
OUTPATIENT
Start: 2025-02-04

## 2025-01-20 RX ORDER — FAMOTIDINE 10 MG/ML
20 INJECTION, SOLUTION INTRAVENOUS
OUTPATIENT
Start: 2025-02-04

## 2025-01-20 RX ORDER — HEPARIN SODIUM (PORCINE) LOCK FLUSH IV SOLN 100 UNIT/ML 100 UNIT/ML
500 SOLUTION INTRAVENOUS PRN
OUTPATIENT
Start: 2025-02-04

## 2025-01-20 RX ORDER — ACETAMINOPHEN 325 MG/1
650 TABLET ORAL
OUTPATIENT
Start: 2025-02-04

## 2025-01-20 RX ORDER — SODIUM CHLORIDE 0.9 % (FLUSH) 0.9 %
5-40 SYRINGE (ML) INJECTION PRN
OUTPATIENT
Start: 2025-02-04

## 2025-02-03 NOTE — PROGRESS NOTES
[facility]  [facility] Note    Date: 2025  Name: Desire Ulrich  MRN: [id number]       : 1965  Diagnosis: Lupus (M32.9)   Treatment: Benlysta 960mg  Referring Provider: Roma Farrell NP  Supervising Provider: Roma Farrell NP    Patient arrived to [facility] at 1030.  Ms. Ulrich allergies reviewed and she agreed to receiving today's treatment. A physical assessment was performed initially and post-treatment.  Recently got back from [place] from brothers .  Pt has not taken BP meds today either. Encouraged to take as soon as she gets home.  Pt wishes to transfer infusions to [facility] at [facility].  Will send message to Roma and nurse.      Ms. Boyle vitals were monitored before and after medication administration.   Vitals:    25 1035 25 1104 25 1143   BP: (!) 192/102 (!) 184/105 (!) 169/88   Pulse: 73  68   Resp: 18  16   Temp: 97.7 °F (36.5 °C)  97.7 °F (36.5 °C)   TempSrc: Oral  Oral   SpO2: 97%  98%     Recent labs results:  Lab Results   Component Value Date/Time     2025 01:00 PM    K 4.0 2025 01:00 PM     2025 01:00 PM    CO2 27 2025 01:00 PM    BUN 14 2025 01:00 PM    CREATININE 0.87 2025 01:00 PM    GLUCOSE 104 2025 01:00 PM    CALCIUM 9.3 2025 01:00 PM    LABGLOM 76 2025 01:00 PM    LABGLOM 68 2024 10:49 AM    LABGLOM >60 2023 01:05 PM    LABGLOM 64 2022 12:00 AM      Lab Results   Component Value Date    WBC 6.3 2025    HGB 12.5 2025    HCT 40.5 2025    MCV 82.7 2025     2025     Medications given per providers orders:  Administrations This Visit       belimumab (BENLYSTA) 960 mg in sodium chloride 0.9 % 287 mL infusion       Admin Date  2025 Action  New Bag Dose  960 mg Rate  287 mL/hr Route  IntraVENous Documented By  Neha Momin, RN              sodium chloride flush 0.9 % injection 5-40 mL       Admin Date  2025 Action  Given Dose  10

## 2025-02-05 ENCOUNTER — NURSE ONLY (OUTPATIENT)
Age: 60
End: 2025-02-05
Payer: MEDICAID

## 2025-02-05 VITALS
OXYGEN SATURATION: 98 % | DIASTOLIC BLOOD PRESSURE: 88 MMHG | SYSTOLIC BLOOD PRESSURE: 169 MMHG | HEART RATE: 68 BPM | RESPIRATION RATE: 16 BRPM | TEMPERATURE: 97.7 F

## 2025-02-05 DIAGNOSIS — M32.9 SYSTEMIC LUPUS ERYTHEMATOSUS, UNSPECIFIED SLE TYPE, UNSPECIFIED ORGAN INVOLVEMENT STATUS (HCC): Primary | ICD-10-CM

## 2025-02-05 PROCEDURE — 96365 THER/PROPH/DIAG IV INF INIT: CPT | Performed by: NURSE PRACTITIONER

## 2025-02-05 PROCEDURE — PBSHW PBB SHADOW CHARGE: Performed by: NURSE PRACTITIONER

## 2025-02-05 RX ORDER — SODIUM CHLORIDE 0.9 % (FLUSH) 0.9 %
5-40 SYRINGE (ML) INJECTION PRN
Status: DISCONTINUED | OUTPATIENT
Start: 2025-02-05 | End: 2025-02-05 | Stop reason: HOSPADM

## 2025-02-05 RX ORDER — SODIUM CHLORIDE 9 MG/ML
INJECTION, SOLUTION INTRAVENOUS CONTINUOUS
Status: CANCELLED | OUTPATIENT
Start: 2025-03-05

## 2025-02-05 RX ORDER — DIPHENHYDRAMINE HYDROCHLORIDE 50 MG/ML
50 INJECTION INTRAMUSCULAR; INTRAVENOUS
Status: CANCELLED | OUTPATIENT
Start: 2025-03-05

## 2025-02-05 RX ORDER — HYDROCORTISONE SODIUM SUCCINATE 100 MG/2ML
100 INJECTION INTRAMUSCULAR; INTRAVENOUS
Status: CANCELLED | OUTPATIENT
Start: 2025-03-05

## 2025-02-05 RX ORDER — EPINEPHRINE 1 MG/ML
0.3 INJECTION, SOLUTION, CONCENTRATE INTRAVENOUS PRN
Status: CANCELLED | OUTPATIENT
Start: 2025-03-05

## 2025-02-05 RX ORDER — FAMOTIDINE 10 MG/ML
20 INJECTION, SOLUTION INTRAVENOUS
Status: CANCELLED | OUTPATIENT
Start: 2025-03-05

## 2025-02-05 RX ORDER — SODIUM CHLORIDE 9 MG/ML
5-250 INJECTION, SOLUTION INTRAVENOUS PRN
Status: CANCELLED | OUTPATIENT
Start: 2025-03-05

## 2025-02-05 RX ORDER — ALBUTEROL SULFATE 90 UG/1
4 INHALANT RESPIRATORY (INHALATION) PRN
Status: CANCELLED | OUTPATIENT
Start: 2025-03-05

## 2025-02-05 RX ORDER — ACETAMINOPHEN 325 MG/1
650 TABLET ORAL
Status: CANCELLED | OUTPATIENT
Start: 2025-03-05

## 2025-02-05 RX ORDER — ONDANSETRON 2 MG/ML
8 INJECTION INTRAMUSCULAR; INTRAVENOUS
Status: CANCELLED | OUTPATIENT
Start: 2025-03-05

## 2025-02-05 RX ORDER — SODIUM CHLORIDE 0.9 % (FLUSH) 0.9 %
5-40 SYRINGE (ML) INJECTION PRN
Status: CANCELLED | OUTPATIENT
Start: 2025-03-05

## 2025-02-05 RX ORDER — HEPARIN SODIUM (PORCINE) LOCK FLUSH IV SOLN 100 UNIT/ML 100 UNIT/ML
500 SOLUTION INTRAVENOUS PRN
Status: CANCELLED | OUTPATIENT
Start: 2025-03-05

## 2025-02-05 RX ADMIN — Medication 10 ML: at 10:37

## 2025-02-05 RX ADMIN — BELIMUMAB 960 MG: 120 INJECTION, POWDER, LYOPHILIZED, FOR SOLUTION INTRAVENOUS at 10:35

## 2025-02-05 ASSESSMENT — PAIN SCALES - GENERAL
PAINLEVEL_OUTOF10: 0
PAINLEVEL_OUTOF10: 0

## 2025-02-05 NOTE — PATIENT INSTRUCTIONS
Thank you for visiting [facility]!      For future medication refills, we require updated lab results and an upcoming appointment to be in our system prior to refilling prescriptions.  Without these two things, your refill will be DENIED.  If you miss your upcoming appointment, your refill will also be DENIED.      We appreciate your understanding of this critical clinical aspect of our practice. -Roma Farrell NP

## 2025-02-06 DIAGNOSIS — M32.9 SYSTEMIC LUPUS ERYTHEMATOSUS, UNSPECIFIED SLE TYPE, UNSPECIFIED ORGAN INVOLVEMENT STATUS (HCC): ICD-10-CM

## 2025-02-06 DIAGNOSIS — I10 ESSENTIAL HYPERTENSION: Primary | ICD-10-CM

## 2025-02-06 RX ORDER — ACETAMINOPHEN 325 MG/1
650 TABLET ORAL
OUTPATIENT
Start: 2025-02-06

## 2025-02-06 RX ORDER — FAMOTIDINE 10 MG/ML
20 INJECTION, SOLUTION INTRAVENOUS
OUTPATIENT
Start: 2025-02-06

## 2025-02-06 RX ORDER — SODIUM CHLORIDE 9 MG/ML
5-250 INJECTION, SOLUTION INTRAVENOUS PRN
OUTPATIENT
Start: 2025-02-06

## 2025-02-06 RX ORDER — DIPHENHYDRAMINE HCL 25 MG
25 TABLET ORAL ONCE
OUTPATIENT
Start: 2025-02-06

## 2025-02-06 RX ORDER — ONDANSETRON 2 MG/ML
8 INJECTION INTRAMUSCULAR; INTRAVENOUS
OUTPATIENT
Start: 2025-02-06

## 2025-02-06 RX ORDER — SODIUM CHLORIDE 0.9 % (FLUSH) 0.9 %
5-40 SYRINGE (ML) INJECTION PRN
OUTPATIENT
Start: 2025-02-06

## 2025-02-06 RX ORDER — SODIUM CHLORIDE 9 MG/ML
INJECTION, SOLUTION INTRAVENOUS CONTINUOUS
OUTPATIENT
Start: 2025-02-06

## 2025-02-06 RX ORDER — HEPARIN SODIUM (PORCINE) LOCK FLUSH IV SOLN 100 UNIT/ML 100 UNIT/ML
500 SOLUTION INTRAVENOUS PRN
OUTPATIENT
Start: 2025-02-06

## 2025-02-06 RX ORDER — ACETAMINOPHEN 325 MG/1
650 TABLET ORAL ONCE
OUTPATIENT
Start: 2025-02-06

## 2025-02-06 RX ORDER — ALBUTEROL SULFATE 90 UG/1
4 INHALANT RESPIRATORY (INHALATION) PRN
OUTPATIENT
Start: 2025-02-06

## 2025-02-06 RX ORDER — DIPHENHYDRAMINE HYDROCHLORIDE 50 MG/ML
50 INJECTION INTRAMUSCULAR; INTRAVENOUS
OUTPATIENT
Start: 2025-02-06

## 2025-02-06 RX ORDER — EPINEPHRINE 1 MG/ML
0.3 INJECTION, SOLUTION, CONCENTRATE INTRAVENOUS PRN
OUTPATIENT
Start: 2025-02-06

## 2025-02-06 RX ORDER — HYDROCORTISONE SODIUM SUCCINATE 100 MG/2ML
100 INJECTION INTRAMUSCULAR; INTRAVENOUS
OUTPATIENT
Start: 2025-02-06

## 2025-02-11 ENCOUNTER — TELEPHONE (OUTPATIENT)
Age: 60
End: 2025-02-11

## 2025-02-11 NOTE — TELEPHONE ENCOUNTER
----- Message from Hermila NAVA sent at 2/11/2025  9:13 AM EST -----  Regarding: RE: [facility] referral  Referral placed  ----- Message -----  From: Neha Momin RN  Sent: 2/10/2025   1:16 PM EST  To: Hermila Beasley  Subject: FW: [facility] referral                                Can you check to see if this was completed?  ----- Message -----  From: Neha Momin RN  Sent: 2/5/2025  11:47 AM EST  To: KENNETH Daniels NP; #  Subject: [facility] referral                                    Please send Benlysta infusions referral to [facility] at [facility] as patients preferred location. Thank you! Pt has appt with Roma 4/1

## 2025-03-04 ENCOUNTER — HOSPITAL ENCOUNTER (OUTPATIENT)
Facility: HOSPITAL | Age: 60
Setting detail: INFUSION SERIES
Discharge: HOME | End: 2025-03-04
Payer: MEDICAID

## 2025-03-04 VITALS
DIASTOLIC BLOOD PRESSURE: 85 MMHG | TEMPERATURE: 98.5 F | RESPIRATION RATE: 18 BRPM | SYSTOLIC BLOOD PRESSURE: 147 MMHG | WEIGHT: 235.89 LBS | HEART RATE: 71 BPM | BODY MASS INDEX: 39.25 KG/M2 | OXYGEN SATURATION: 98 %

## 2025-03-04 DIAGNOSIS — M32.9 SYSTEMIC LUPUS ERYTHEMATOSUS, UNSPECIFIED SLE TYPE, UNSPECIFIED ORGAN INVOLVEMENT STATUS (HCC): ICD-10-CM

## 2025-03-04 DIAGNOSIS — I10 ESSENTIAL HYPERTENSION: Primary | ICD-10-CM

## 2025-03-04 LAB
ALBUMIN SERPL-MCNC: 3.6 G/DL (ref 3.5–5)
ALBUMIN SERPL-MCNC: 3.7 G/DL (ref 3.5–5)
ALBUMIN/GLOB SERPL: 0.7 (ref 1.1–2.2)
ALBUMIN/GLOB SERPL: 0.9 (ref 1.1–2.2)
ALP SERPL-CCNC: 128 U/L (ref 45–117)
ALP SERPL-CCNC: 129 U/L (ref 45–117)
ALT SERPL-CCNC: 24 U/L (ref 12–78)
ALT SERPL-CCNC: 25 U/L (ref 12–78)
ANION GAP SERPL CALC-SCNC: 4 MMOL/L (ref 2–12)
ANION GAP SERPL CALC-SCNC: 4 MMOL/L (ref 2–12)
AST SERPL-CCNC: 29 U/L (ref 15–37)
AST SERPL-CCNC: 38 U/L (ref 15–37)
BASOPHILS # BLD: 0.06 K/UL (ref 0–0.1)
BASOPHILS NFR BLD: 1 % (ref 0–1)
BILIRUB SERPL-MCNC: 0.4 MG/DL (ref 0.2–1)
BILIRUB SERPL-MCNC: 0.4 MG/DL (ref 0.2–1)
BUN SERPL-MCNC: 12 MG/DL (ref 6–20)
BUN SERPL-MCNC: 13 MG/DL (ref 6–20)
BUN/CREAT SERPL: 12 (ref 12–20)
BUN/CREAT SERPL: 15 (ref 12–20)
CALCIUM SERPL-MCNC: 9.1 MG/DL (ref 8.5–10.1)
CALCIUM SERPL-MCNC: 9.2 MG/DL (ref 8.5–10.1)
CHLORIDE SERPL-SCNC: 106 MMOL/L (ref 97–108)
CHLORIDE SERPL-SCNC: 106 MMOL/L (ref 97–108)
CO2 SERPL-SCNC: 26 MMOL/L (ref 21–32)
CO2 SERPL-SCNC: 27 MMOL/L (ref 21–32)
CREAT SERPL-MCNC: 0.88 MG/DL (ref 0.55–1.02)
CREAT SERPL-MCNC: 0.98 MG/DL (ref 0.55–1.02)
CRP SERPL-MCNC: 1.34 MG/DL (ref 0–0.3)
DIFFERENTIAL METHOD BLD: ABNORMAL
EOSINOPHIL # BLD: 0.06 K/UL (ref 0–0.4)
EOSINOPHIL NFR BLD: 1 % (ref 0–7)
ERYTHROCYTE [DISTWIDTH] IN BLOOD BY AUTOMATED COUNT: 13.8 % (ref 11.5–14.5)
ERYTHROCYTE [SEDIMENTATION RATE] IN BLOOD: 35 MM/HR (ref 0–30)
GLOBULIN SER CALC-MCNC: 3.9 G/DL (ref 2–4)
GLOBULIN SER CALC-MCNC: 4.9 G/DL (ref 2–4)
GLUCOSE SERPL-MCNC: 89 MG/DL (ref 65–100)
GLUCOSE SERPL-MCNC: 91 MG/DL (ref 65–100)
HCT VFR BLD AUTO: 41.3 % (ref 35–47)
HGB BLD-MCNC: 13 G/DL (ref 11.5–16)
IMM GRANULOCYTES # BLD AUTO: 0 K/UL
IMM GRANULOCYTES NFR BLD AUTO: 0 %
LYMPHOCYTES # BLD: 2.12 K/UL (ref 0.8–3.5)
LYMPHOCYTES NFR BLD: 36 % (ref 12–49)
MCH RBC QN AUTO: 25.3 PG (ref 26–34)
MCHC RBC AUTO-ENTMCNC: 31.5 G/DL (ref 30–36.5)
MCV RBC AUTO: 80.5 FL (ref 80–99)
MONOCYTES # BLD: 0.3 K/UL (ref 0–1)
MONOCYTES NFR BLD: 5 % (ref 5–13)
NEUTS SEG # BLD: 3.36 K/UL (ref 1.8–8)
NEUTS SEG NFR BLD: 57 % (ref 32–75)
NRBC # BLD: 0 K/UL (ref 0–0.01)
NRBC BLD-RTO: 0 PER 100 WBC
PLATELET # BLD AUTO: 207 K/UL (ref 150–400)
PMV BLD AUTO: 12.5 FL (ref 8.9–12.9)
POTASSIUM SERPL-SCNC: 4.7 MMOL/L (ref 3.5–5.1)
POTASSIUM SERPL-SCNC: 4.9 MMOL/L (ref 3.5–5.1)
PROT SERPL-MCNC: 7.6 G/DL (ref 6.4–8.2)
PROT SERPL-MCNC: 8.5 G/DL (ref 6.4–8.2)
RBC # BLD AUTO: 5.13 M/UL (ref 3.8–5.2)
RBC MORPH BLD: ABNORMAL
SODIUM SERPL-SCNC: 136 MMOL/L (ref 136–145)
SODIUM SERPL-SCNC: 137 MMOL/L (ref 136–145)
WBC # BLD AUTO: 5.9 K/UL (ref 3.6–11)
WBC MORPH BLD: ABNORMAL

## 2025-03-04 PROCEDURE — 85652 RBC SED RATE AUTOMATED: CPT

## 2025-03-04 PROCEDURE — 96413 CHEMO IV INFUSION 1 HR: CPT

## 2025-03-04 PROCEDURE — 2580000003 HC RX 258: Performed by: NURSE PRACTITIONER

## 2025-03-04 PROCEDURE — 86140 C-REACTIVE PROTEIN: CPT

## 2025-03-04 PROCEDURE — 80053 COMPREHEN METABOLIC PANEL: CPT

## 2025-03-04 PROCEDURE — 6360000002 HC RX W HCPCS: Performed by: NURSE PRACTITIONER

## 2025-03-04 PROCEDURE — 85025 COMPLETE CBC W/AUTO DIFF WBC: CPT

## 2025-03-04 RX ORDER — ONDANSETRON 2 MG/ML
8 INJECTION INTRAMUSCULAR; INTRAVENOUS
OUTPATIENT
Start: 2025-03-30

## 2025-03-04 RX ORDER — ACETAMINOPHEN 325 MG/1
650 TABLET ORAL ONCE
OUTPATIENT
Start: 2025-03-30

## 2025-03-04 RX ORDER — SODIUM CHLORIDE 9 MG/ML
5-250 INJECTION, SOLUTION INTRAVENOUS PRN
OUTPATIENT
Start: 2025-03-30

## 2025-03-04 RX ORDER — SODIUM CHLORIDE 9 MG/ML
INJECTION, SOLUTION INTRAVENOUS CONTINUOUS
OUTPATIENT
Start: 2025-03-30

## 2025-03-04 RX ORDER — SODIUM CHLORIDE 0.9 % (FLUSH) 0.9 %
5-40 SYRINGE (ML) INJECTION PRN
OUTPATIENT
Start: 2025-03-30

## 2025-03-04 RX ORDER — DIPHENHYDRAMINE HYDROCHLORIDE 50 MG/ML
50 INJECTION INTRAMUSCULAR; INTRAVENOUS
OUTPATIENT
Start: 2025-03-30

## 2025-03-04 RX ORDER — DIPHENHYDRAMINE HCL 25 MG
25 CAPSULE ORAL ONCE
OUTPATIENT
Start: 2025-03-30

## 2025-03-04 RX ORDER — EPINEPHRINE 1 MG/ML
0.3 INJECTION, SOLUTION INTRAMUSCULAR; SUBCUTANEOUS PRN
OUTPATIENT
Start: 2025-03-30

## 2025-03-04 RX ORDER — ACETAMINOPHEN 325 MG/1
650 TABLET ORAL ONCE
Status: DISCONTINUED | OUTPATIENT
Start: 2025-03-04 | End: 2025-03-05 | Stop reason: HOSPADM

## 2025-03-04 RX ORDER — ALBUTEROL SULFATE 90 UG/1
4 INHALANT RESPIRATORY (INHALATION) PRN
OUTPATIENT
Start: 2025-03-30

## 2025-03-04 RX ORDER — SODIUM CHLORIDE 9 MG/ML
5-250 INJECTION, SOLUTION INTRAVENOUS PRN
Status: DISCONTINUED | OUTPATIENT
Start: 2025-03-04 | End: 2025-03-05 | Stop reason: HOSPADM

## 2025-03-04 RX ORDER — HEPARIN 100 UNIT/ML
500 SYRINGE INTRAVENOUS PRN
OUTPATIENT
Start: 2025-03-30

## 2025-03-04 RX ORDER — HYDROCORTISONE SODIUM SUCCINATE 100 MG/2ML
100 INJECTION INTRAMUSCULAR; INTRAVENOUS
OUTPATIENT
Start: 2025-03-30

## 2025-03-04 RX ORDER — DIPHENHYDRAMINE HCL 25 MG
25 CAPSULE ORAL ONCE
Status: DISCONTINUED | OUTPATIENT
Start: 2025-03-04 | End: 2025-03-05 | Stop reason: HOSPADM

## 2025-03-04 RX ORDER — ACETAMINOPHEN 325 MG/1
650 TABLET ORAL
OUTPATIENT
Start: 2025-03-30

## 2025-03-04 RX ADMIN — BELIMUMAB 1072 MG: 400 INJECTION, POWDER, LYOPHILIZED, FOR SOLUTION INTRAVENOUS at 14:43

## 2025-03-04 RX ADMIN — SODIUM CHLORIDE 50 ML/HR: 9 INJECTION, SOLUTION INTRAVENOUS at 14:14

## 2025-03-04 ASSESSMENT — PAIN SCALES - GENERAL: PAINLEVEL_OUTOF10: 0

## 2025-03-04 NOTE — PROGRESS NOTES
\[facility]\"" Peds/Adult Note                       Date: 2025    Name: Desire Ulrich    MRN: [id number]         : 1965    1400 Patient arrives for Benlysta q4wks without acute problems. Please see EPIC for complete assessment and education provided.    Vital signs stable throughout and prior to discharge. Patient tolerated procedure well and was discharged without incident.  Patient is aware of next \[facility]\"" appointment on 25. Appointment card give to the Patient.      Ms. Ulrich's vitals were reviewed prior to and after treatment.   Patient Vitals for the past 12 hrs:   Temp Pulse Resp BP SpO2   25 1548 98.5 °F (36.9 °C) 71 18 (!) 147/85 --   25 1345 97.1 °F (36.2 °C) 74 18 (!) 127/92 98 %         Lab results were obtained and reviewed.  Recent Results (from the past 12 hour(s))   CBC With Auto Differential    Collection Time: 25  2:10 PM   Result Value Ref Range    WBC 5.9 3.6 - 11.0 K/uL    RBC 5.13 3.80 - 5.20 M/uL    Hemoglobin 13.0 11.5 - 16.0 g/dL    Hematocrit 41.3 35.0 - 47.0 %    MCV 80.5 80.0 - 99.0 FL    MCH 25.3 (L) 26.0 - 34.0 PG    MCHC 31.5 30.0 - 36.5 g/dL    RDW 13.8 11.5 - 14.5 %    Platelets 207 150 - 400 K/uL    MPV 12.5 8.9 - 12.9 FL    Nucleated RBCs 0.0 0  WBC    nRBC 0.00 0.00 - 0.01 K/uL    Neutrophils % PENDING %    Lymphocytes % PENDING %    Monocytes % PENDING %    Eosinophils % PENDING %    Basophils % PENDING %    Immature Granulocytes % PENDING %    Neutrophils Absolute PENDING K/UL    Lymphocytes Absolute PENDING K/UL    Monocytes Absolute PENDING K/UL    Eosinophils Absolute PENDING K/UL    Basophils Absolute PENDING K/UL    Immature Granulocytes Absolute PENDING K/UL    Differential Type PENDING        Medications given:   Medications Administered         0.9 % sodium chloride infusion Admin Date  2025 Action  New Bag Dose  50 mL/hr Rate  50 mL/hr Route  IntraVENous Documented By  Maribell Benitez, RN        belimumab (BENLYSTA) 1,072 mg in sodium

## 2025-03-26 ENCOUNTER — TRANSCRIBE ORDERS (OUTPATIENT)
Facility: HOSPITAL | Age: 60
End: 2025-03-26

## 2025-03-26 DIAGNOSIS — Z12.31 VISIT FOR SCREENING MAMMOGRAM: Primary | ICD-10-CM

## 2025-03-31 ASSESSMENT — RHEUMATOLOGY NEW PATIENT QUESTIONNAIRE
SKIN TIGHTNESS: N
EYE REDNESS: N
ANXIETY: N
NUMBNESS OR TINGLING IN HANDS OR FEET: N
RASH OR ULCERS: N
UNEXPLAINED HEARING LOSS: N
SEIZURES: N
LOSS OF CONSCIOUSNESS: N
DIFFICULTY SWALLOWING: N
EYE PAIN: N
UNUSUAL BLEEDING: N
VOMITING OF BLOOD OR COFFEE GROUND CONSISTENCY MATERIAL: N
SKIN REDNESS: N
SWOLLEN LEGS OR FEET: N
CHEST PAIN: N
FAINTING: N
SORES IN MOUTH OR NOSE: N
RASH: N
COUGH: N
STOMACH PAIN: N
LOSS OF VISION: N
JOINT PAIN: N
EYE DRYNESS: Y
DEPRESSION: N
FEVER: N
UNUSUALLY RAPID OR SLOWED HEART RATE: N
BEHAVIORAL CHANGES: N
HEADACHES: N
COLOR CHANGES OF HANDS OR FEET IN THE COLD: N
NODULES/BUMPS: N
ABNORMAL URINE: N
PERSISTENT DIARRHEA: N
EASY BRUISING: Y
DIFFICULTY BREATHING LYING DOWN: N
JAUNDICE: N
DIFFICULTY STAYING ASLEEP: N
UNEXPLAINED WEIGHT CHANGE: N
SUN SENSITIVE (SUN ALLERGY): Y
UNUSUAL FATIGUE: N
VAGINAL DRYNESS: N
AGITATION: N
MUSCLE WEAKNESS: N
ANEMIA: N
HEARTBURN OR REFLUX: N
DIFFICULTY FALLING ASLEEP: N
NIGHT SWEATS: N
INCREASED SUSCEPTIBILITY TO INFECTION: N
SWOLLEN OR TENDER GLANDS: N
MEMORY LOSS: N
DRYNESS OF MOUTH: Y
MORNING STIFFNESS: N
EASILY LOSING TEMPER: N
BLACK STOOLS: N
PAIN OR BURNING ON URINATION: N
HOARSE VOICE: N
DOUBLE OR BLURRED VISION: N
SHORTNESS OF BREATH: N
BLOOD IN STOOLS: N
JOINT SWELLING: N
NAUSEA: N
EXCESSIVE HAIR LOSS (MORE THAN YOUR NORM): N

## 2025-03-31 ASSESSMENT — ENCOUNTER SYMPTOMS
VOMITING: 0
SHORTNESS OF BREATH: 0
COUGH: 0
EYE PAIN: 0
DIARRHEA: 0
CONSTIPATION: 0
NAUSEA: 0
EYE REDNESS: 0
BLOOD IN STOOL: 0
ABDOMINAL PAIN: 0
TROUBLE SWALLOWING: 0
SORE THROAT: 0

## 2025-04-01 ENCOUNTER — HOSPITAL ENCOUNTER (OUTPATIENT)
Facility: HOSPITAL | Age: 60
Setting detail: INFUSION SERIES
Discharge: HOME | End: 2025-04-01
Payer: MEDICAID

## 2025-04-01 ENCOUNTER — OFFICE VISIT (OUTPATIENT)
Age: 60
End: 2025-04-01
Payer: MEDICAID

## 2025-04-01 VITALS
HEART RATE: 69 BPM | BODY MASS INDEX: 39.77 KG/M2 | WEIGHT: 239 LBS | SYSTOLIC BLOOD PRESSURE: 121 MMHG | RESPIRATION RATE: 18 BRPM | OXYGEN SATURATION: 98 % | DIASTOLIC BLOOD PRESSURE: 79 MMHG | TEMPERATURE: 97.6 F

## 2025-04-01 VITALS
HEART RATE: 69 BPM | TEMPERATURE: 97.6 F | RESPIRATION RATE: 18 BRPM | SYSTOLIC BLOOD PRESSURE: 121 MMHG | BODY MASS INDEX: 39.91 KG/M2 | OXYGEN SATURATION: 98 % | WEIGHT: 239.82 LBS | DIASTOLIC BLOOD PRESSURE: 79 MMHG

## 2025-04-01 DIAGNOSIS — Z51.81 MEDICATION MONITORING ENCOUNTER: ICD-10-CM

## 2025-04-01 DIAGNOSIS — M32.9 SYSTEMIC LUPUS ERYTHEMATOSUS, UNSPECIFIED SLE TYPE, UNSPECIFIED ORGAN INVOLVEMENT STATUS (HCC): Primary | ICD-10-CM

## 2025-04-01 DIAGNOSIS — M32.9 SYSTEMIC LUPUS ERYTHEMATOSUS, UNSPECIFIED SLE TYPE, UNSPECIFIED ORGAN INVOLVEMENT STATUS (HCC): ICD-10-CM

## 2025-04-01 DIAGNOSIS — I10 ESSENTIAL HYPERTENSION: Primary | ICD-10-CM

## 2025-04-01 PROCEDURE — 99214 OFFICE O/P EST MOD 30 MIN: CPT | Performed by: NURSE PRACTITIONER

## 2025-04-01 PROCEDURE — 2580000003 HC RX 258: Performed by: NURSE PRACTITIONER

## 2025-04-01 PROCEDURE — 3078F DIAST BP <80 MM HG: CPT | Performed by: NURSE PRACTITIONER

## 2025-04-01 PROCEDURE — 3074F SYST BP LT 130 MM HG: CPT | Performed by: NURSE PRACTITIONER

## 2025-04-01 PROCEDURE — 96413 CHEMO IV INFUSION 1 HR: CPT

## 2025-04-01 PROCEDURE — 6360000002 HC RX W HCPCS: Performed by: NURSE PRACTITIONER

## 2025-04-01 RX ORDER — DIPHENHYDRAMINE HCL 25 MG
25 CAPSULE ORAL ONCE
OUTPATIENT
Start: 2025-04-29

## 2025-04-01 RX ORDER — HYDROCORTISONE SODIUM SUCCINATE 100 MG/2ML
100 INJECTION INTRAMUSCULAR; INTRAVENOUS
OUTPATIENT
Start: 2025-04-29

## 2025-04-01 RX ORDER — SODIUM CHLORIDE 9 MG/ML
5-250 INJECTION, SOLUTION INTRAVENOUS PRN
OUTPATIENT
Start: 2025-04-29

## 2025-04-01 RX ORDER — MELOXICAM 15 MG/1
15 TABLET ORAL PRN
Qty: 30 TABLET | Refills: 1 | Status: SHIPPED | OUTPATIENT
Start: 2025-04-01

## 2025-04-01 RX ORDER — ONDANSETRON 2 MG/ML
8 INJECTION INTRAMUSCULAR; INTRAVENOUS
OUTPATIENT
Start: 2025-04-29

## 2025-04-01 RX ORDER — SODIUM CHLORIDE 0.9 % (FLUSH) 0.9 %
5-40 SYRINGE (ML) INJECTION PRN
OUTPATIENT
Start: 2025-04-29

## 2025-04-01 RX ORDER — DIPHENHYDRAMINE HYDROCHLORIDE 50 MG/ML
50 INJECTION, SOLUTION INTRAMUSCULAR; INTRAVENOUS
OUTPATIENT
Start: 2025-04-29

## 2025-04-01 RX ORDER — ACETAMINOPHEN 325 MG/1
650 TABLET ORAL
OUTPATIENT
Start: 2025-04-29

## 2025-04-01 RX ORDER — ACETAMINOPHEN 325 MG/1
650 TABLET ORAL ONCE
Status: DISCONTINUED | OUTPATIENT
Start: 2025-04-01 | End: 2025-04-02 | Stop reason: HOSPADM

## 2025-04-01 RX ORDER — SODIUM CHLORIDE 9 MG/ML
INJECTION, SOLUTION INTRAVENOUS CONTINUOUS
OUTPATIENT
Start: 2025-04-29

## 2025-04-01 RX ORDER — ACETAMINOPHEN 325 MG/1
650 TABLET ORAL ONCE
OUTPATIENT
Start: 2025-04-29

## 2025-04-01 RX ORDER — DIPHENHYDRAMINE HCL 25 MG
25 CAPSULE ORAL ONCE
Status: DISCONTINUED | OUTPATIENT
Start: 2025-04-01 | End: 2025-04-02 | Stop reason: HOSPADM

## 2025-04-01 RX ORDER — HEPARIN 100 UNIT/ML
500 SYRINGE INTRAVENOUS PRN
OUTPATIENT
Start: 2025-04-29

## 2025-04-01 RX ORDER — PREDNISONE 5 MG/1
TABLET ORAL
Qty: 30 TABLET | Refills: 1 | Status: SHIPPED | OUTPATIENT
Start: 2025-04-01

## 2025-04-01 RX ORDER — ALBUTEROL SULFATE 90 UG/1
4 INHALANT RESPIRATORY (INHALATION) PRN
OUTPATIENT
Start: 2025-04-29

## 2025-04-01 RX ORDER — EPINEPHRINE 1 MG/ML
0.3 INJECTION, SOLUTION INTRAMUSCULAR; SUBCUTANEOUS PRN
OUTPATIENT
Start: 2025-04-29

## 2025-04-01 RX ORDER — SODIUM CHLORIDE 9 MG/ML
5-250 INJECTION, SOLUTION INTRAVENOUS PRN
Status: DISCONTINUED | OUTPATIENT
Start: 2025-04-01 | End: 2025-04-02 | Stop reason: HOSPADM

## 2025-04-01 RX ORDER — HYDROXYCHLOROQUINE SULFATE 200 MG/1
200 TABLET, FILM COATED ORAL 2 TIMES DAILY
Qty: 180 TABLET | Refills: 1 | Status: SHIPPED | OUTPATIENT
Start: 2025-04-01

## 2025-04-01 RX ADMIN — BELIMUMAB 1040 MG: 400 INJECTION, POWDER, LYOPHILIZED, FOR SOLUTION INTRAVENOUS at 11:08

## 2025-04-01 RX ADMIN — SODIUM CHLORIDE 50 ML/HR: 9 INJECTION, SOLUTION INTRAVENOUS at 10:12

## 2025-04-01 ASSESSMENT — PATIENT HEALTH QUESTIONNAIRE - PHQ9
SUM OF ALL RESPONSES TO PHQ QUESTIONS 1-9: 0
SUM OF ALL RESPONSES TO PHQ QUESTIONS 1-9: 0
1. LITTLE INTEREST OR PLEASURE IN DOING THINGS: NOT AT ALL
SUM OF ALL RESPONSES TO PHQ QUESTIONS 1-9: 0
2. FEELING DOWN, DEPRESSED OR HOPELESS: NOT AT ALL
SUM OF ALL RESPONSES TO PHQ QUESTIONS 1-9: 0

## 2025-04-01 ASSESSMENT — PAIN SCALES - GENERAL: PAINLEVEL_OUTOF10: 0

## 2025-04-01 NOTE — PLAN OF CARE
Patient completed infusion safely and without difficulty.  Problem: Pain  Goal: Verbalizes/displays adequate comfort level or baseline comfort level  4/1/2025 1020 by Quynh Lucero, RN  Outcome: Progressing  4/1/2025 1020 by Quynh Lucero, RN  Outcome: Progressing     Problem: Safety - Adult  Goal: Free from fall injury  Outcome: Progressing

## 2025-04-01 NOTE — PROGRESS NOTES
Desire Ulrich (:  1965) is a 60 y.o. female    2021 ROHIT 1:640 Homogenous, RNP 0.2, <0.2, SSA <0.2, SSB <0.2, Smith <0.2, Scl 70 <0.2, Chromatin 0.8, Haleigh 1 <0.2, Centromere <0.2, Ribosomal P <0.2,       Subjective   HPI  The patient is a 60 y.o. female here for a follow up visit for Lupus. Her last visit was 12/3/2024 with Dr. Moses. We reviewed her 3/4/2025 labs. She is currently taking Benlysta 960mg every 4 weeks at [place] and Plaquenil 200mg daily. She denies joint pain and swelling. She denies morning stiffness. She reports more stiffness and joints in cold weather. She is wearing sunscreen every day. Her last Plaquenil eye exam was 2024. She denies infections or antibiotics since her last visit.   She requests refills for her medications. She states she seldom uses her Prednisone or Meloxicam, but wants it on hand.     Review of Systems   Constitutional:  Negative for chills and fever.   HENT:  Negative for mouth sores, sore throat and trouble swallowing.         Denies nasals sores  Denies dry mouth   Eyes:  Negative for pain and redness.        Reports dry eyes and uses lubricating eye drops prn   Respiratory:  Negative for cough and shortness of breath.    Cardiovascular:  Negative for chest pain.   Gastrointestinal:  Negative for abdominal pain, blood in stool, constipation, diarrhea, nausea and vomiting.   Genitourinary:  Negative for dysuria and hematuria.   Musculoskeletal:  Negative for arthralgias and joint swelling.   Skin:  Positive for rash (sees dermatology and uses clobetasol cream as needed).     Current Outpatient Medications   Medication Sig Dispense Refill    hydroxychloroquine (PLAQUENIL) 200 MG tablet Take 1 tablet by mouth daily 180 tablet 1    predniSONE (DELTASONE) 5 MG tablet 20mg x 3 days, 15mg x 3 days, 10mg x 3 days, 5mg x 3 days 30 tablet 1    meloxicam (MOBIC) 15 MG tablet Take 1 tablet by mouth as needed for Pain 30 tablet 1    clobetasol (TEMOVATE) 0.05 % 
Chief Complaint   Patient presents with    Joint Pain     1. Have you been to the ER, urgent care clinic since your last visit?  Hospitalized since your last visit?No    2. Have you seen or consulted any other health care providers outside of the [facility] since your last visit?  Include any pap smears or colon screening. No    
abdominal pain

## 2025-04-01 NOTE — PATIENT INSTRUCTIONS
Thank you for visiting [facility]!      For future medication refills, we require updated lab results and an upcoming appointment to be in our system to refill prescriptions.  Without these two things, your refill will be DENIED.  If you miss your upcoming appointment, your refill will also be DENIED.      We appreciate your understanding of this critical clinical aspect of our practice. - FELIPE Brandon       Increase your Plaquenil 200mg to twice a day.     Return to the clinic in 4 months.

## 2025-04-01 NOTE — PROGRESS NOTES
[facility] Peds/Adult Note                       Date: 2025    Name: Desire Ulrich    MRN: [id number]         : 1965    1000 Patient arrives for benlysta q4 weeks without acute problems. Please see Epic for complete assessment and education provided.      Vital signs stable throughout and prior to discharge. Patient tolerated procedure well and was discharged without incident.  Desire is aware of next [facility] appointment on 25. Appointment card given.      Ms. Ulrich's vitals were reviewed prior to and after treatment.   Patient Vitals for the past 12 hrs:   Temp Pulse Resp BP SpO2   25 1210 -- 69 18 121/79 --   25 1000 97.6 °F (36.4 °C) 71 22 (!) 166/50 98 %         Lab results were obtained and reviewed.  No results found for this or any previous visit (from the past 12 hours).    Medications given:   Medications Administered         0.9 % sodium chloride infusion Admin Date  2025 Action  New Bag Dose  50 mL/hr Rate  50 mL/hr Route  IntraVENous Documented By  Quynh Redding RN        belimumab (BENLYSTA) 1,040 mg in sodium chloride 0.9 % 275 mL infusion Admin Date  2025 Action  New Bag Dose  1,040 mg Rate  275 mL/hr Route  IntraVENous Documented By  Quynh Redding, ARMAND          Pt had another appointment following infusion and decline post infusion observation.    Ms. Ulrich tolerated the infusion, and had no complaints.    [facility].     Future Appointments   Date Time Provider Department Center   2025  1:00 PM Roma Farrell APRN - NP AOCR BS AMB   2025 10:15 AM [facility] ANA 1 [facility]   2025 10:30 AM PEDS INF CHAIR 2 [facility]       Quynh Redding RN  2025  12:56 PM

## 2025-04-02 LAB
-: NORMAL
HAV IGM SER QL: NONREACTIVE
HBV CORE IGM SER QL: NONREACTIVE
HBV SURFACE AG SER QL: <0.1 INDEX
HBV SURFACE AG SER QL: NEGATIVE
HCV AB SER IA-ACNC: 0.09 INDEX
HCV AB SERPL QL IA: NONREACTIVE

## 2025-04-07 ENCOUNTER — OFFICE VISIT (OUTPATIENT)
Age: 60
End: 2025-04-07
Payer: MEDICAID

## 2025-04-07 VITALS
WEIGHT: 239 LBS | TEMPERATURE: 97.5 F | HEART RATE: 75 BPM | OXYGEN SATURATION: 100 % | HEIGHT: 65 IN | SYSTOLIC BLOOD PRESSURE: 194 MMHG | DIASTOLIC BLOOD PRESSURE: 81 MMHG | RESPIRATION RATE: 18 BRPM | BODY MASS INDEX: 39.82 KG/M2

## 2025-04-07 DIAGNOSIS — J30.9 ALLERGIC RHINITIS, UNSPECIFIED SEASONALITY, UNSPECIFIED TRIGGER: Primary | ICD-10-CM

## 2025-04-07 DIAGNOSIS — H65.05 RECURRENT ACUTE SEROUS OTITIS MEDIA OF LEFT EAR: ICD-10-CM

## 2025-04-07 DIAGNOSIS — I10 ESSENTIAL HYPERTENSION: ICD-10-CM

## 2025-04-07 LAB
M TB IFN-G BLD-IMP: NEGATIVE
M TB IFN-G CD4+ T-CELLS BLD-ACNC: 0.21 IU/ML
M TBIFN-G CD4+ CD8+T-CELLS BLD-ACNC: 0.19 IU/ML
QUANTIFERON CRITERIA: NORMAL
QUANTIFERON MITOGEN VALUE: >10 IU/ML
QUANTIFERON NIL VALUE: 0.32 IU/ML

## 2025-04-07 PROCEDURE — 3079F DIAST BP 80-89 MM HG: CPT | Performed by: PHYSICIAN ASSISTANT

## 2025-04-07 PROCEDURE — 99214 OFFICE O/P EST MOD 30 MIN: CPT | Performed by: PHYSICIAN ASSISTANT

## 2025-04-07 PROCEDURE — 3077F SYST BP >= 140 MM HG: CPT | Performed by: PHYSICIAN ASSISTANT

## 2025-04-07 PROCEDURE — G2211 COMPLEX E/M VISIT ADD ON: HCPCS | Performed by: PHYSICIAN ASSISTANT

## 2025-04-07 RX ORDER — OXYMETAZOLINE HYDROCHLORIDE 0.05 G/100ML
2 SPRAY NASAL 2 TIMES DAILY PRN
Qty: 15 ML | Refills: 1 | Status: SHIPPED | OUTPATIENT
Start: 2025-04-07 | End: 2025-06-21

## 2025-04-07 RX ORDER — CETIRIZINE HYDROCHLORIDE 10 MG/1
10 TABLET ORAL DAILY
Qty: 30 TABLET | Refills: 11 | Status: SHIPPED | OUTPATIENT
Start: 2025-04-07

## 2025-04-07 RX ORDER — METOPROLOL SUCCINATE 50 MG/1
50 TABLET, EXTENDED RELEASE ORAL DAILY
Qty: 90 TABLET | Refills: 0 | Status: SHIPPED | OUTPATIENT
Start: 2025-04-07

## 2025-04-07 RX ORDER — AMLODIPINE BESYLATE 10 MG/1
10 TABLET ORAL DAILY
Qty: 90 TABLET | Refills: 0 | Status: SHIPPED | OUTPATIENT
Start: 2025-04-07

## 2025-04-07 SDOH — ECONOMIC STABILITY: FOOD INSECURITY: WITHIN THE PAST 12 MONTHS, THE FOOD YOU BOUGHT JUST DIDN'T LAST AND YOU DIDN'T HAVE MONEY TO GET MORE.: NEVER TRUE

## 2025-04-07 SDOH — ECONOMIC STABILITY: FOOD INSECURITY: WITHIN THE PAST 12 MONTHS, YOU WORRIED THAT YOUR FOOD WOULD RUN OUT BEFORE YOU GOT MONEY TO BUY MORE.: NEVER TRUE

## 2025-04-07 ASSESSMENT — ENCOUNTER SYMPTOMS
SHORTNESS OF BREATH: 0
COUGH: 0

## 2025-04-07 NOTE — PROGRESS NOTES
I have reviewed all needed documentation in preparation for visit. Verified patient by name and date of birth  Chief Complaint   Patient presents with    Ear Pain     Stared 1 week ago- pressure in ear       Vitals:    04/07/25 1149   BP: (!) 194/81   BP Site: Left Upper Arm   Patient Position: Sitting   BP Cuff Size: Medium Adult   Pulse: 75   Resp: 18   Temp: 97.5 °F (36.4 °C)   TempSrc: Temporal   SpO2: 100%   Weight: 108.4 kg (239 lb)   Height: 1.651 m (5' 5\")       Health Maintenance Due   Topic Date Due    DTaP/Tdap/Td vaccine (1 - Tdap) Never done    Shingles vaccine (1 of 2) Never done    Pneumococcal 50+ years Vaccine (1 of 1 - PCV) Never done    A1C test (Diabetic or Prediabetic)  08/12/2023    Respiratory Syncytial Virus (RSV) Pregnant or age 60 yrs+ (1 - Risk 60-74 years 1-dose series) Never done    COVID-19 Vaccine (3 - 2024-25 season) 09/01/2024     \"Have you been to the ER, urgent care clinic since your last visit?  Hospitalized since your last visit?\"    NO    “Have you seen or consulted any other health care providers outside of [facility] since your last visit?”    NO            Click Here for Release of Records Request         Ava daniel CMA  
 Cervical: No cervical adenopathy.   Skin:     General: Skin is warm and dry.      Findings: No rash.   Neurological:      General: No focal deficit present.      Mental Status: She is alert and oriented to person, place, and time.   Psychiatric:         Mood and Affect: Mood normal.         Behavior: Behavior normal.         Thought Content: Thought content normal.         Judgment: Judgment normal.       ASSESSMENT/PLAN  Allergic rhinitis, unspecified seasonality, unspecified trigger  -     cetirizine (ZYRTEC) 10 MG tablet; Take 1 tablet by mouth daily, Disp-30 tablet, R-11Normal  -     oxymetazoline (12 HOUR NASAL SPRAY) 0.05 % nasal spray; 2 sprays by Nasal route 2 times daily as needed (ear pain) Do not use for more than 3 days in a row., Disp-15 mL, R-1Normal  Essential hypertension  -     amLODIPine (NORVASC) 10 MG tablet; Take 1 tablet by mouth daily, Disp-90 tablet, R-0Normal  -     metoprolol succinate (TOPROL XL) 50 MG extended release tablet; Take 1 tablet by mouth daily, Disp-90 tablet, R-0Normal  Recurrent acute serous otitis media of left ear  -     cetirizine (ZYRTEC) 10 MG tablet; Take 1 tablet by mouth daily, Disp-30 tablet, R-11Normal  -     oxymetazoline (12 HOUR NASAL SPRAY) 0.05 % nasal spray; 2 sprays by Nasal route 2 times daily as needed (ear pain) Do not use for more than 3 days in a row., Disp-15 mL, R-1Normal     Refills Sent.

## 2025-04-25 ENCOUNTER — HOSPITAL ENCOUNTER (OUTPATIENT)
Facility: HOSPITAL | Age: 60
Discharge: HOME | End: 2025-04-28
Payer: MEDICAID

## 2025-04-25 VITALS — HEIGHT: 65 IN | WEIGHT: 239 LBS | BODY MASS INDEX: 39.82 KG/M2

## 2025-04-25 DIAGNOSIS — Z12.31 VISIT FOR SCREENING MAMMOGRAM: ICD-10-CM

## 2025-04-25 PROCEDURE — 77063 BREAST TOMOSYNTHESIS BI: CPT

## 2025-04-29 ENCOUNTER — HOSPITAL ENCOUNTER (OUTPATIENT)
Facility: HOSPITAL | Age: 60
Setting detail: INFUSION SERIES
Discharge: HOME | End: 2025-04-29
Payer: MEDICAID

## 2025-04-29 VITALS
DIASTOLIC BLOOD PRESSURE: 75 MMHG | RESPIRATION RATE: 18 BRPM | WEIGHT: 235 LBS | TEMPERATURE: 97.6 F | SYSTOLIC BLOOD PRESSURE: 159 MMHG | OXYGEN SATURATION: 98 % | BODY MASS INDEX: 39.11 KG/M2 | HEART RATE: 78 BPM

## 2025-04-29 DIAGNOSIS — I10 ESSENTIAL HYPERTENSION: Primary | ICD-10-CM

## 2025-04-29 DIAGNOSIS — M32.9 SYSTEMIC LUPUS ERYTHEMATOSUS, UNSPECIFIED SLE TYPE, UNSPECIFIED ORGAN INVOLVEMENT STATUS (HCC): ICD-10-CM

## 2025-04-29 PROCEDURE — 96413 CHEMO IV INFUSION 1 HR: CPT

## 2025-04-29 PROCEDURE — 2580000003 HC RX 258: Performed by: NURSE PRACTITIONER

## 2025-04-29 PROCEDURE — 6360000002 HC RX W HCPCS: Performed by: NURSE PRACTITIONER

## 2025-04-29 RX ORDER — DIPHENHYDRAMINE HYDROCHLORIDE 50 MG/ML
50 INJECTION, SOLUTION INTRAMUSCULAR; INTRAVENOUS
OUTPATIENT
Start: 2025-05-27

## 2025-04-29 RX ORDER — ACETAMINOPHEN 325 MG/1
650 TABLET ORAL
OUTPATIENT
Start: 2025-05-27

## 2025-04-29 RX ORDER — HYDROCORTISONE SODIUM SUCCINATE 100 MG/2ML
100 INJECTION INTRAMUSCULAR; INTRAVENOUS
OUTPATIENT
Start: 2025-05-27

## 2025-04-29 RX ORDER — SODIUM CHLORIDE 9 MG/ML
5-250 INJECTION, SOLUTION INTRAVENOUS PRN
OUTPATIENT
Start: 2025-05-27

## 2025-04-29 RX ORDER — ACETAMINOPHEN 325 MG/1
650 TABLET ORAL ONCE
Status: DISCONTINUED | OUTPATIENT
Start: 2025-04-29 | End: 2025-04-30 | Stop reason: HOSPADM

## 2025-04-29 RX ORDER — DIPHENHYDRAMINE HCL 25 MG
25 CAPSULE ORAL ONCE
OUTPATIENT
Start: 2025-05-27

## 2025-04-29 RX ORDER — HEPARIN 100 UNIT/ML
500 SYRINGE INTRAVENOUS PRN
Status: DISCONTINUED | OUTPATIENT
Start: 2025-04-29 | End: 2025-04-30 | Stop reason: HOSPADM

## 2025-04-29 RX ORDER — ONDANSETRON 2 MG/ML
8 INJECTION INTRAMUSCULAR; INTRAVENOUS
OUTPATIENT
Start: 2025-05-27

## 2025-04-29 RX ORDER — SODIUM CHLORIDE 0.9 % (FLUSH) 0.9 %
5-40 SYRINGE (ML) INJECTION PRN
Status: DISCONTINUED | OUTPATIENT
Start: 2025-04-29 | End: 2025-04-30 | Stop reason: HOSPADM

## 2025-04-29 RX ORDER — DIPHENHYDRAMINE HCL 25 MG
25 CAPSULE ORAL ONCE
Status: DISCONTINUED | OUTPATIENT
Start: 2025-04-29 | End: 2025-04-30 | Stop reason: HOSPADM

## 2025-04-29 RX ORDER — SODIUM CHLORIDE 9 MG/ML
INJECTION, SOLUTION INTRAVENOUS CONTINUOUS
OUTPATIENT
Start: 2025-05-27

## 2025-04-29 RX ORDER — SODIUM CHLORIDE 9 MG/ML
5-250 INJECTION, SOLUTION INTRAVENOUS PRN
Status: DISCONTINUED | OUTPATIENT
Start: 2025-04-29 | End: 2025-04-30 | Stop reason: HOSPADM

## 2025-04-29 RX ORDER — HEPARIN 100 UNIT/ML
500 SYRINGE INTRAVENOUS PRN
OUTPATIENT
Start: 2025-05-27

## 2025-04-29 RX ORDER — ACETAMINOPHEN 325 MG/1
650 TABLET ORAL ONCE
OUTPATIENT
Start: 2025-05-27

## 2025-04-29 RX ORDER — EPINEPHRINE 1 MG/ML
0.3 INJECTION, SOLUTION INTRAMUSCULAR; SUBCUTANEOUS PRN
OUTPATIENT
Start: 2025-05-27

## 2025-04-29 RX ORDER — ALBUTEROL SULFATE 90 UG/1
4 INHALANT RESPIRATORY (INHALATION) PRN
OUTPATIENT
Start: 2025-05-27

## 2025-04-29 RX ORDER — SODIUM CHLORIDE 0.9 % (FLUSH) 0.9 %
5-40 SYRINGE (ML) INJECTION PRN
OUTPATIENT
Start: 2025-05-27

## 2025-04-29 RX ADMIN — BELIMUMAB 1080 MG: 400 INJECTION, POWDER, LYOPHILIZED, FOR SOLUTION INTRAVENOUS at 11:37

## 2025-04-29 RX ADMIN — SODIUM CHLORIDE 40 ML/HR: 0.9 INJECTION, SOLUTION INTRAVENOUS at 10:52

## 2025-04-29 ASSESSMENT — PAIN SCALES - GENERAL: PAINLEVEL_OUTOF10: 0

## 2025-04-29 NOTE — PLAN OF CARE
Patient completed infusion safely and without difficulty.        Problem: Pain  Goal: Verbalizes/displays adequate comfort level or baseline comfort level  Outcome: Progressing     Problem: Safety - Adult  Goal: Free from fall injury  Outcome: Progressing

## 2025-04-29 NOTE — PROGRESS NOTES
\[facility]\"" Peds/Adult Note                   Date: 2025    Name: Desire Ulrich    MRN: [id number]         : 1965    1030 Patient arrives for Benlysta without acute problems. Please see Epic for complete assessment and education provided.    Vital signs stable throughout and prior to discharge. Patient tolerated procedure well and was discharged without incident.  Patient is aware of next \[facility]\"" appointment on 2025.  Appointment card give to the Patient.       Ms. Ulrich's vitals were reviewed prior to and after treatment.   Patient Vitals for the past 12 hrs:   Temp Pulse Resp BP SpO2   25 1245 -- 78 18 -- --   25 1030 97.6 °F (36.4 °C) 76 18 (!) 159/75 98 %       Medications given: via #24 PIV R AC  Medications Administered         0.9 % sodium chloride infusion Admin Date  2025 Action  New Bag Dose  40 mL/hr Rate  40 mL/hr Route  IntraVENous Documented By  Amanda Don RN        belimumab (BENLYSTA) 1,080 mg in sodium chloride 0.9 % 275 mL infusion Admin Date  2025 Action  New Bag Dose  1,080 mg Rate  275 mL/hr Route  IntraVENous Documented By  Amanda Don RN              Ms. Ulrich tolerated the infusion, and had no complaints.    [facility] Ul[facility].     Future Appointments   Date Time Provider Department Center   2025  1:15 PM PEDS INF CHAIR 2 [facility]   2025 11:00 AM PEDS INF CHAIR 4 [facility]   2025  3:00 PM Apple Burgos MD ROG BS AMB   2025 11:30 AM Roma Farrell APRN - NP AOCR BS AMB   2025  1:15 PM PEDS INF CHAIR 5 [facility]   2025  1:15 PM PEDS INF CHAIR 5 [facility]   20[facility]  [facility]       AMANDA DON RN  2025  1:10 PM

## 2025-05-30 ENCOUNTER — HOSPITAL ENCOUNTER (OUTPATIENT)
Facility: HOSPITAL | Age: 60
Setting detail: INFUSION SERIES
Discharge: HOME | End: 2025-05-30
Payer: MEDICAID

## 2025-05-30 VITALS
DIASTOLIC BLOOD PRESSURE: 76 MMHG | TEMPERATURE: 97.6 F | OXYGEN SATURATION: 97 % | WEIGHT: 233.69 LBS | BODY MASS INDEX: 38.89 KG/M2 | HEART RATE: 63 BPM | SYSTOLIC BLOOD PRESSURE: 188 MMHG | RESPIRATION RATE: 18 BRPM

## 2025-05-30 DIAGNOSIS — M32.9 SYSTEMIC LUPUS ERYTHEMATOSUS, UNSPECIFIED SLE TYPE, UNSPECIFIED ORGAN INVOLVEMENT STATUS (HCC): ICD-10-CM

## 2025-05-30 DIAGNOSIS — I10 ESSENTIAL HYPERTENSION: Primary | ICD-10-CM

## 2025-05-30 LAB
ALBUMIN SERPL-MCNC: 3.6 G/DL (ref 3.5–5)
ALBUMIN/GLOB SERPL: 0.9 (ref 1.1–2.2)
ALP SERPL-CCNC: 125 U/L (ref 45–117)
ALT SERPL-CCNC: 27 U/L (ref 12–78)
ANION GAP SERPL CALC-SCNC: 2 MMOL/L (ref 2–12)
AST SERPL-CCNC: 17 U/L (ref 15–37)
BASOPHILS # BLD: 0.03 K/UL (ref 0–0.1)
BASOPHILS NFR BLD: 0.7 % (ref 0–1)
BILIRUB SERPL-MCNC: 0.2 MG/DL (ref 0.2–1)
BUN SERPL-MCNC: 10 MG/DL (ref 6–20)
BUN/CREAT SERPL: 11 (ref 12–20)
CALCIUM SERPL-MCNC: 8.9 MG/DL (ref 8.5–10.1)
CHLORIDE SERPL-SCNC: 106 MMOL/L (ref 97–108)
CO2 SERPL-SCNC: 28 MMOL/L (ref 21–32)
CREAT SERPL-MCNC: 0.88 MG/DL (ref 0.55–1.02)
CRP SERPL-MCNC: 0.84 MG/DL (ref 0–0.3)
DIFFERENTIAL METHOD BLD: NORMAL
EOSINOPHIL # BLD: 0.09 K/UL (ref 0–0.4)
EOSINOPHIL NFR BLD: 2 % (ref 0–7)
ERYTHROCYTE [DISTWIDTH] IN BLOOD BY AUTOMATED COUNT: 13.4 % (ref 11.5–14.5)
GLOBULIN SER CALC-MCNC: 4.2 G/DL (ref 2–4)
GLUCOSE SERPL-MCNC: 93 MG/DL (ref 65–100)
HCT VFR BLD AUTO: 39.8 % (ref 35–47)
HGB BLD-MCNC: 12.4 G/DL (ref 11.5–16)
IMM GRANULOCYTES # BLD AUTO: 0.01 K/UL (ref 0–0.04)
IMM GRANULOCYTES NFR BLD AUTO: 0.2 % (ref 0–0.5)
LYMPHOCYTES # BLD: 2.03 K/UL (ref 0.8–3.5)
LYMPHOCYTES NFR BLD: 44.4 % (ref 12–49)
MCH RBC QN AUTO: 26 PG (ref 26–34)
MCHC RBC AUTO-ENTMCNC: 31.2 G/DL (ref 30–36.5)
MCV RBC AUTO: 83.4 FL (ref 80–99)
MONOCYTES # BLD: 0.45 K/UL (ref 0–1)
MONOCYTES NFR BLD: 9.8 % (ref 5–13)
NEUTS SEG # BLD: 1.96 K/UL (ref 1.8–8)
NEUTS SEG NFR BLD: 42.9 % (ref 32–75)
NRBC # BLD: 0 K/UL (ref 0–0.01)
NRBC BLD-RTO: 0 PER 100 WBC
PLATELET # BLD AUTO: 266 K/UL (ref 150–400)
PMV BLD AUTO: 11.7 FL (ref 8.9–12.9)
POTASSIUM SERPL-SCNC: 3.8 MMOL/L (ref 3.5–5.1)
PROT SERPL-MCNC: 7.8 G/DL (ref 6.4–8.2)
RBC # BLD AUTO: 4.77 M/UL (ref 3.8–5.2)
SODIUM SERPL-SCNC: 136 MMOL/L (ref 136–145)
WBC # BLD AUTO: 4.6 K/UL (ref 3.6–11)

## 2025-05-30 PROCEDURE — 86140 C-REACTIVE PROTEIN: CPT

## 2025-05-30 PROCEDURE — 85652 RBC SED RATE AUTOMATED: CPT

## 2025-05-30 PROCEDURE — 2580000003 HC RX 258: Performed by: NURSE PRACTITIONER

## 2025-05-30 PROCEDURE — 6360000002 HC RX W HCPCS: Performed by: NURSE PRACTITIONER

## 2025-05-30 PROCEDURE — 85025 COMPLETE CBC W/AUTO DIFF WBC: CPT

## 2025-05-30 PROCEDURE — 80053 COMPREHEN METABOLIC PANEL: CPT

## 2025-05-30 PROCEDURE — 96413 CHEMO IV INFUSION 1 HR: CPT

## 2025-05-30 RX ORDER — DIPHENHYDRAMINE HYDROCHLORIDE 50 MG/ML
50 INJECTION, SOLUTION INTRAMUSCULAR; INTRAVENOUS
OUTPATIENT
Start: 2025-06-27

## 2025-05-30 RX ORDER — DIPHENHYDRAMINE HCL 25 MG
25 CAPSULE ORAL ONCE
OUTPATIENT
Start: 2025-06-27

## 2025-05-30 RX ORDER — ALBUTEROL SULFATE 90 UG/1
4 INHALANT RESPIRATORY (INHALATION) PRN
OUTPATIENT
Start: 2025-06-27

## 2025-05-30 RX ORDER — EPINEPHRINE 1 MG/ML
0.3 INJECTION, SOLUTION INTRAMUSCULAR; SUBCUTANEOUS PRN
OUTPATIENT
Start: 2025-06-27

## 2025-05-30 RX ORDER — ACETAMINOPHEN 325 MG/1
650 TABLET ORAL ONCE
OUTPATIENT
Start: 2025-06-27

## 2025-05-30 RX ORDER — SODIUM CHLORIDE 9 MG/ML
5-250 INJECTION, SOLUTION INTRAVENOUS PRN
Status: DISCONTINUED | OUTPATIENT
Start: 2025-05-30 | End: 2025-05-31 | Stop reason: HOSPADM

## 2025-05-30 RX ORDER — ACETAMINOPHEN 325 MG/1
650 TABLET ORAL
OUTPATIENT
Start: 2025-06-27

## 2025-05-30 RX ORDER — ACETAMINOPHEN 325 MG/1
650 TABLET ORAL ONCE
Status: DISCONTINUED | OUTPATIENT
Start: 2025-05-30 | End: 2025-05-31 | Stop reason: HOSPADM

## 2025-05-30 RX ORDER — ONDANSETRON 2 MG/ML
8 INJECTION INTRAMUSCULAR; INTRAVENOUS
OUTPATIENT
Start: 2025-06-27

## 2025-05-30 RX ORDER — SODIUM CHLORIDE 9 MG/ML
INJECTION, SOLUTION INTRAVENOUS CONTINUOUS
OUTPATIENT
Start: 2025-06-27

## 2025-05-30 RX ORDER — DIPHENHYDRAMINE HCL 25 MG
25 CAPSULE ORAL ONCE
Status: DISCONTINUED | OUTPATIENT
Start: 2025-05-30 | End: 2025-05-31 | Stop reason: HOSPADM

## 2025-05-30 RX ORDER — HEPARIN 100 UNIT/ML
500 SYRINGE INTRAVENOUS PRN
OUTPATIENT
Start: 2025-06-27

## 2025-05-30 RX ORDER — SODIUM CHLORIDE 0.9 % (FLUSH) 0.9 %
5-40 SYRINGE (ML) INJECTION PRN
OUTPATIENT
Start: 2025-06-27

## 2025-05-30 RX ORDER — SODIUM CHLORIDE 0.9 % (FLUSH) 0.9 %
5-40 SYRINGE (ML) INJECTION PRN
Status: DISCONTINUED | OUTPATIENT
Start: 2025-05-30 | End: 2025-05-31 | Stop reason: HOSPADM

## 2025-05-30 RX ORDER — SODIUM CHLORIDE 9 MG/ML
5-250 INJECTION, SOLUTION INTRAVENOUS PRN
OUTPATIENT
Start: 2025-06-27

## 2025-05-30 RX ORDER — HYDROCORTISONE SODIUM SUCCINATE 100 MG/2ML
100 INJECTION INTRAMUSCULAR; INTRAVENOUS
OUTPATIENT
Start: 2025-06-27

## 2025-05-30 RX ADMIN — SODIUM CHLORIDE 50 ML/HR: 0.9 INJECTION, SOLUTION INTRAVENOUS at 10:48

## 2025-05-30 RX ADMIN — BELIMUMAB 1064 MG: 400 INJECTION, POWDER, LYOPHILIZED, FOR SOLUTION INTRAVENOUS at 11:42

## 2025-05-30 ASSESSMENT — PAIN SCALES - GENERAL: PAINLEVEL_OUTOF10: 0

## 2025-05-30 NOTE — PROGRESS NOTES
[facility] Peds/Adult Note                   Date: May 30, 2025    Name: Desire Ulrich    MRN: [id number]         : 1965    1030 Patient arrives for [facility] without acute problems. Please see Epic for complete assessment and education provided.    Vital signs stable throughout and prior to discharge. Patient tolerated procedure well and was discharged without incident.  Patient is aware of next [facility] appointment on 2025. Appointment card give to the Patient.       Ms. Ulrich's vitals were reviewed prior to and after treatment.   Patient Vitals for the past 12 hrs:   Temp Pulse Resp BP SpO2   25 1256 -- 63 18 (!) 188/76 --   25 1030 97.6 °F (36.4 °C) 70 18 (!) 177/81 97 %         Lab results were obtained and reviewed.  Recent Results (from the past 12 hours)   C-Reactive Protein    Collection Time: 25 10:40 AM   Result Value Ref Range    CRP 0.84 (H) 0.00 - 0.30 mg/dL   CBC With Auto Differential    Collection Time: 25 10:40 AM   Result Value Ref Range    WBC 4.6 3.6 - 11.0 K/uL    RBC 4.77 3.80 - 5.20 M/uL    Hemoglobin 12.4 11.5 - 16.0 g/dL    Hematocrit 39.8 35.0 - 47.0 %    MCV 83.4 80.0 - 99.0 FL    MCH 26.0 26.0 - 34.0 PG    MCHC 31.2 30.0 - 36.5 g/dL    RDW 13.4 11.5 - 14.5 %    Platelets 266 150 - 400 K/uL    MPV 11.7 8.9 - 12.9 FL    Nucleated RBCs 0.0 0  WBC    nRBC 0.00 0.00 - 0.01 K/uL    Neutrophils % 42.9 32.0 - 75.0 %    Lymphocytes % 44.4 12.0 - 49.0 %    Monocytes % 9.8 5.0 - 13.0 %    Eosinophils % 2.0 0.0 - 7.0 %    Basophils % 0.7 0.0 - 1.0 %    Immature Granulocytes % 0.2 0.0 - 0.5 %    Neutrophils Absolute 1.96 1.80 - 8.00 K/UL    Lymphocytes Absolute 2.03 0.80 - 3.50 K/UL    Monocytes Absolute 0.45 0.00 - 1.00 K/UL    Eosinophils Absolute 0.09 0.00 - 0.40 K/UL    Basophils Absolute 0.03 0.00 - 0.10 K/UL    Immature Granulocytes Absolute 0.01 0.00 - 0.04 K/UL    Differential Type AUTOMATED     Comprehensive metabolic panel    Collection Time: 25 10:40 AM  Detail Level: Detailed

## 2025-05-30 NOTE — PLAN OF CARE
Patient completed infusion safely and without difficulty.     Problem: Pain  Goal: Verbalizes/displays adequate comfort level or baseline comfort level  Outcome: Progressing

## 2025-06-30 ENCOUNTER — HOSPITAL ENCOUNTER (OUTPATIENT)
Facility: HOSPITAL | Age: 60
Setting detail: INFUSION SERIES
Discharge: HOME | End: 2025-06-30
Payer: MEDICAID

## 2025-06-30 VITALS
RESPIRATION RATE: 18 BRPM | DIASTOLIC BLOOD PRESSURE: 89 MMHG | TEMPERATURE: 98.1 F | SYSTOLIC BLOOD PRESSURE: 170 MMHG | HEART RATE: 72 BPM

## 2025-06-30 DIAGNOSIS — I10 ESSENTIAL HYPERTENSION: Primary | ICD-10-CM

## 2025-06-30 DIAGNOSIS — M32.9 SYSTEMIC LUPUS ERYTHEMATOSUS, UNSPECIFIED SLE TYPE, UNSPECIFIED ORGAN INVOLVEMENT STATUS (HCC): ICD-10-CM

## 2025-06-30 PROCEDURE — 2580000003 HC RX 258: Performed by: NURSE PRACTITIONER

## 2025-06-30 PROCEDURE — 96365 THER/PROPH/DIAG IV INF INIT: CPT

## 2025-06-30 PROCEDURE — 6360000002 HC RX W HCPCS: Performed by: NURSE PRACTITIONER

## 2025-06-30 RX ORDER — SODIUM CHLORIDE 0.9 % (FLUSH) 0.9 %
5-40 SYRINGE (ML) INJECTION PRN
Status: DISCONTINUED | OUTPATIENT
Start: 2025-06-30 | End: 2025-07-01 | Stop reason: HOSPADM

## 2025-06-30 RX ORDER — HYDROCORTISONE SODIUM SUCCINATE 100 MG/2ML
100 INJECTION INTRAMUSCULAR; INTRAVENOUS
OUTPATIENT
Start: 2025-07-21

## 2025-06-30 RX ORDER — SODIUM CHLORIDE 9 MG/ML
5-250 INJECTION, SOLUTION INTRAVENOUS PRN
Status: DISCONTINUED | OUTPATIENT
Start: 2025-06-30 | End: 2025-07-01 | Stop reason: HOSPADM

## 2025-06-30 RX ORDER — SODIUM CHLORIDE 9 MG/ML
5-250 INJECTION, SOLUTION INTRAVENOUS PRN
OUTPATIENT
Start: 2025-07-21

## 2025-06-30 RX ORDER — ACETAMINOPHEN 325 MG/1
650 TABLET ORAL
OUTPATIENT
Start: 2025-07-21

## 2025-06-30 RX ORDER — DIPHENHYDRAMINE HCL 25 MG
25 CAPSULE ORAL ONCE
OUTPATIENT
Start: 2025-07-21

## 2025-06-30 RX ORDER — DIPHENHYDRAMINE HCL 25 MG
25 CAPSULE ORAL ONCE
Status: DISCONTINUED | OUTPATIENT
Start: 2025-06-30 | End: 2025-07-01 | Stop reason: HOSPADM

## 2025-06-30 RX ORDER — SODIUM CHLORIDE 9 MG/ML
INJECTION, SOLUTION INTRAVENOUS CONTINUOUS
OUTPATIENT
Start: 2025-07-21

## 2025-06-30 RX ORDER — ALBUTEROL SULFATE 90 UG/1
4 INHALANT RESPIRATORY (INHALATION) PRN
OUTPATIENT
Start: 2025-07-21

## 2025-06-30 RX ORDER — DIPHENHYDRAMINE HYDROCHLORIDE 50 MG/ML
50 INJECTION, SOLUTION INTRAMUSCULAR; INTRAVENOUS
OUTPATIENT
Start: 2025-07-21

## 2025-06-30 RX ORDER — EPINEPHRINE 1 MG/ML
0.3 INJECTION, SOLUTION INTRAMUSCULAR; SUBCUTANEOUS PRN
OUTPATIENT
Start: 2025-07-21

## 2025-06-30 RX ORDER — ONDANSETRON 2 MG/ML
8 INJECTION INTRAMUSCULAR; INTRAVENOUS
OUTPATIENT
Start: 2025-07-21

## 2025-06-30 RX ORDER — SODIUM CHLORIDE 0.9 % (FLUSH) 0.9 %
5-40 SYRINGE (ML) INJECTION PRN
OUTPATIENT
Start: 2025-07-21

## 2025-06-30 RX ORDER — ACETAMINOPHEN 325 MG/1
650 TABLET ORAL ONCE
OUTPATIENT
Start: 2025-07-21

## 2025-06-30 RX ORDER — ACETAMINOPHEN 325 MG/1
650 TABLET ORAL ONCE
Status: DISCONTINUED | OUTPATIENT
Start: 2025-06-30 | End: 2025-07-01 | Stop reason: HOSPADM

## 2025-06-30 RX ORDER — HEPARIN 100 UNIT/ML
500 SYRINGE INTRAVENOUS PRN
OUTPATIENT
Start: 2025-07-21

## 2025-06-30 RX ADMIN — SODIUM CHLORIDE 25 ML/HR: 0.9 INJECTION, SOLUTION INTRAVENOUS at 13:31

## 2025-06-30 RX ADMIN — BELIMUMAB 1064 MG: 120 INJECTION, POWDER, LYOPHILIZED, FOR SOLUTION INTRAVENOUS at 14:52

## 2025-06-30 ASSESSMENT — PAIN SCALES - GENERAL: PAINLEVEL_OUTOF10: 0

## 2025-06-30 NOTE — PROGRESS NOTES
[facility] Progress Note      Date: 2025    Name: Desire Ulrich    MRN: [id number]         : 1965    1300 - Patient arrives for Benlysta without acute problems. Please see Epic for complete assessment and education provided.    Vital signs stable throughout and prior to discharge. Patient tolerated procedure well and was discharged without incident. Patient is aware of next [facility] appointment on 2025. Appointment card give to the Patient.      Ms. Ulrich's vitals were reviewed prior to and after treatment.   Patient Vitals for the past 12 hrs:   Temp Pulse Resp BP   25 1615 -- 72 18 (!) 170/89   25 1300 98.1 °F (36.7 °C) 63 18 (!) 183/72       Lab results were reviewed.  No results found for this or any previous visit (from the past 12 hours).    Medications given:   Medications Administered         0.9 % sodium chloride infusion Admin Date  2025 Action  New Bag Dose  25 mL/hr Rate  25 mL/hr Route  IntraVENous Documented By  Oneyda Cordero RN        belimumab (BENLYSTA) 1,064 mg in sodium chloride 0.9 % 275 mL infusion Admin Date  2025 Action  New Bag Dose  1,064 mg Rate  275 mL/hr Route  IntraVENous Documented By  Oneyda Cordero RN            Ms. Ulrich tolerated the infusion, and had no complaints.    [facility]. Discharge Instructions provided to patient, patient verbalized understanding but denied the request for a copy of after visit summary.     Future Appointments   Date Time Provider Department Center   2025  3:00 PM Apple Burgos MD [facility] BS [facility]   2025  1:00 PM PEDS INF CHAIR 5 [facility]   2025 11:30 AM Roma Farrell APRN - FEL[facility] BS AMB   2025  1:00 PM PEDS INF CHAIR 5 [facility]   2025  1:00 PM PEDS INF CHAIR 5 [facility]   10/20/2025  1:00 PM PEDS INF CHAIR 5 [facility]       Oneyda Cordero RN  2025  4:21 PM

## 2025-07-02 ENCOUNTER — APPOINTMENT (OUTPATIENT)
Facility: HOSPITAL | Age: 60
End: 2025-07-02
Payer: MEDICAID

## 2025-07-21 DIAGNOSIS — I10 ESSENTIAL HYPERTENSION: ICD-10-CM

## 2025-07-21 RX ORDER — AMLODIPINE BESYLATE 10 MG/1
10 TABLET ORAL DAILY
Qty: 30 TABLET | Refills: 1 | Status: SHIPPED | OUTPATIENT
Start: 2025-07-21

## 2025-07-21 RX ORDER — ASPIRIN 81 MG
81 TABLET,CHEWABLE ORAL DAILY
Qty: 90 TABLET | Refills: 1 | Status: SHIPPED | OUTPATIENT
Start: 2025-07-21

## 2025-07-29 ENCOUNTER — HOSPITAL ENCOUNTER (OUTPATIENT)
Facility: HOSPITAL | Age: 60
Setting detail: INFUSION SERIES
Discharge: HOME | End: 2025-07-29
Payer: MEDICAID

## 2025-07-29 VITALS
DIASTOLIC BLOOD PRESSURE: 77 MMHG | TEMPERATURE: 98.1 F | BODY MASS INDEX: 38.74 KG/M2 | OXYGEN SATURATION: 99 % | SYSTOLIC BLOOD PRESSURE: 158 MMHG | WEIGHT: 232.81 LBS | HEART RATE: 65 BPM | RESPIRATION RATE: 18 BRPM

## 2025-07-29 DIAGNOSIS — M32.9 SYSTEMIC LUPUS ERYTHEMATOSUS, UNSPECIFIED SLE TYPE, UNSPECIFIED ORGAN INVOLVEMENT STATUS (HCC): ICD-10-CM

## 2025-07-29 DIAGNOSIS — I10 ESSENTIAL HYPERTENSION: Primary | ICD-10-CM

## 2025-07-29 PROCEDURE — 2580000003 HC RX 258: Performed by: NURSE PRACTITIONER

## 2025-07-29 PROCEDURE — 2500000003 HC RX 250 WO HCPCS: Performed by: NURSE PRACTITIONER

## 2025-07-29 PROCEDURE — 96413 CHEMO IV INFUSION 1 HR: CPT

## 2025-07-29 PROCEDURE — 6360000002 HC RX W HCPCS: Performed by: NURSE PRACTITIONER

## 2025-07-29 RX ORDER — DIPHENHYDRAMINE HYDROCHLORIDE 50 MG/ML
50 INJECTION, SOLUTION INTRAMUSCULAR; INTRAVENOUS
OUTPATIENT
Start: 2025-08-17

## 2025-07-29 RX ORDER — ACETAMINOPHEN 325 MG/1
650 TABLET ORAL ONCE
OUTPATIENT
Start: 2025-08-17

## 2025-07-29 RX ORDER — SODIUM CHLORIDE 9 MG/ML
5-250 INJECTION, SOLUTION INTRAVENOUS PRN
Status: DISCONTINUED | OUTPATIENT
Start: 2025-07-29 | End: 2025-07-30 | Stop reason: HOSPADM

## 2025-07-29 RX ORDER — SODIUM CHLORIDE 9 MG/ML
5-250 INJECTION, SOLUTION INTRAVENOUS PRN
OUTPATIENT
Start: 2025-08-17

## 2025-07-29 RX ORDER — SODIUM CHLORIDE 9 MG/ML
INJECTION, SOLUTION INTRAVENOUS CONTINUOUS
OUTPATIENT
Start: 2025-08-17

## 2025-07-29 RX ORDER — HYDROCORTISONE SODIUM SUCCINATE 100 MG/2ML
100 INJECTION INTRAMUSCULAR; INTRAVENOUS
OUTPATIENT
Start: 2025-08-17

## 2025-07-29 RX ORDER — DIPHENHYDRAMINE HCL 25 MG
25 CAPSULE ORAL ONCE
OUTPATIENT
Start: 2025-08-17

## 2025-07-29 RX ORDER — ACETAMINOPHEN 325 MG/1
650 TABLET ORAL
OUTPATIENT
Start: 2025-08-17

## 2025-07-29 RX ORDER — SODIUM CHLORIDE 0.9 % (FLUSH) 0.9 %
5-40 SYRINGE (ML) INJECTION PRN
Status: DISCONTINUED | OUTPATIENT
Start: 2025-07-29 | End: 2025-07-30 | Stop reason: HOSPADM

## 2025-07-29 RX ORDER — ACETAMINOPHEN 325 MG/1
650 TABLET ORAL ONCE
Status: DISCONTINUED | OUTPATIENT
Start: 2025-07-29 | End: 2025-07-30 | Stop reason: HOSPADM

## 2025-07-29 RX ORDER — ALBUTEROL SULFATE 90 UG/1
4 INHALANT RESPIRATORY (INHALATION) PRN
OUTPATIENT
Start: 2025-08-17

## 2025-07-29 RX ORDER — HEPARIN 100 UNIT/ML
500 SYRINGE INTRAVENOUS PRN
OUTPATIENT
Start: 2025-08-17

## 2025-07-29 RX ORDER — EPINEPHRINE 1 MG/ML
0.3 INJECTION, SOLUTION INTRAMUSCULAR; SUBCUTANEOUS PRN
OUTPATIENT
Start: 2025-08-17

## 2025-07-29 RX ORDER — ONDANSETRON 2 MG/ML
8 INJECTION INTRAMUSCULAR; INTRAVENOUS
OUTPATIENT
Start: 2025-08-17

## 2025-07-29 RX ORDER — SODIUM CHLORIDE 0.9 % (FLUSH) 0.9 %
5-40 SYRINGE (ML) INJECTION PRN
OUTPATIENT
Start: 2025-08-17

## 2025-07-29 RX ORDER — DIPHENHYDRAMINE HCL 25 MG
25 CAPSULE ORAL ONCE
Status: DISCONTINUED | OUTPATIENT
Start: 2025-07-29 | End: 2025-07-30 | Stop reason: HOSPADM

## 2025-07-29 RX ADMIN — BELIMUMAB 1064 MG: 120 INJECTION, POWDER, LYOPHILIZED, FOR SOLUTION INTRAVENOUS at 12:20

## 2025-07-29 RX ADMIN — SODIUM CHLORIDE, PRESERVATIVE FREE 10 ML: 5 INJECTION INTRAVENOUS at 11:00

## 2025-07-29 RX ADMIN — SODIUM CHLORIDE 50 ML/HR: 0.9 INJECTION, SOLUTION INTRAVENOUS at 11:06

## 2025-07-29 ASSESSMENT — PAIN SCALES - GENERAL
PAINLEVEL_OUTOF10: 0
PAINLEVEL_OUTOF10: 0

## 2025-07-29 NOTE — PROGRESS NOTES
[facility] Peds/Adult Note                       Date: 2025    Name: Desire Ulrich    MRN: [id number]         : 1965    1050 Patient arrives for Benlysta Q 4 weeks  without acute problems. Please see Epic for complete assessment and education provided.    Vital signs stable throughout and prior to discharge. Patient tolerated procedure well and was discharged without incident.  Patient is aware of next [facility] appointment on 2025.  Appointment card give to the Patient.       Ms. Ulrich's vitals were reviewed prior to and after treatment.   Patient Vitals for the past 12 hrs:   Temp Pulse Resp BP SpO2   25 1325 -- 65 18 (!) 158/77 --   25 1050 98.1 °F (36.7 °C) 62 18 (!) 147/81 99 %       Medications given:   Medications Administered         0.9 % sodium chloride infusion Admin Date  2025 Action  New Bag Dose  50 mL/hr Rate  50 mL/hr Route  IntraVENous Documented By  Maribell Molina RN        belimumab (BENLYSTA) 1,064 mg in sodium chloride 0.9 % 288.3 mL infusion Admin Date  2025 Action  New Bag Dose  1,064 mg Rate  288.3 mL/hr Route  IntraVENous Documented By  Maribell Molina RN        sodium chloride flush 0.9 % injection 5-40 mL Admin Date  2025 Action  Given Dose  10 mL Rate   Route  IntraVENous Documented By  Maribell Molina RN          Ms. Ulrich tolerated the infusion, and had no complaints.    [facility] Ul[facility] condition.     Future Appointments   Date Time Provider Department Center   2025 11:30 AM Roma Farrell APRN - NP [facility] BS AMB   2025  1:00 PM PEDS INF CHAIR 5 [facility]   2025  3:00 PM Apple Burgos MD [facility] BS AMB   2025  1:00 PM PEDS INF CHAIR 1 [facility]   10/20/2025  1:00 PM PEDS INF CHAIR 5 [facility]       MARIBELL MOLINA RN  2025  1:45 PM

## 2025-07-31 ASSESSMENT — ENCOUNTER SYMPTOMS
DIARRHEA: 0
BLOOD IN STOOL: 0
EYE PAIN: 0
EYE REDNESS: 0
COUGH: 0
VOMITING: 0
CONSTIPATION: 0
SORE THROAT: 0
TROUBLE SWALLOWING: 0
SHORTNESS OF BREATH: 0
ABDOMINAL PAIN: 0
NAUSEA: 0

## 2025-07-31 NOTE — PROGRESS NOTES
Desire Ulrich (:  1965) is a 60 y.o. female    2025 Quantiferon gold neg, Hep panel nl  2021 ROHIT 1:640 Homogenous, RNP 0.2, <0.2, SSA <0.2, SSB <0.2, Smith <0.2, Scl 70 <0.2, Chromatin 0.8, Haleigh 1 <0.2, Centromere <0.2, Ribosomal P <0.2,       Subjective   HPI  The patient is a 60 y.o. female here for a follow up visit for Lupus. She is currently taking Benlysta 960mg every 4 weeks at [place] and Plaquenil 200mg BID. We reviewed her 2025 labs and she will get them again during her August Benlysta appointment. She denies joint pain and swelling. She denies morning stiffness. She is wearing sunscreen every day. Her last Plaquenil eye exam was 3/2025 at [facility] [address]. She denies infections or antibiotics since her last visit.       Review of Systems   Constitutional:  Negative for chills and fever.   HENT:  Negative for mouth sores, sore throat and trouble swallowing.         Denies nasals sores  Denies dry mouth   Eyes:  Negative for pain and redness.        Denies denies eyes   Respiratory:  Negative for cough and shortness of breath.    Cardiovascular:  Negative for chest pain.   Gastrointestinal:  Negative for abdominal pain, blood in stool, constipation, diarrhea, nausea and vomiting.   Genitourinary:  Negative for dysuria and hematuria.   Musculoskeletal:  Negative for arthralgias and joint swelling.   Skin:  Negative for rash.     Current Outpatient Medications   Medication Sig Dispense Refill    ASPIRIN LOW DOSE 81 MG chewable tablet TAKE 1 TABLET BY MOUTH EVERY DAY 90 tablet 1    amLODIPine (NORVASC) 10 MG tablet TAKE 1 TABLET BY MOUTH EVERY DAY 30 tablet 1    cetirizine (ZYRTEC) 10 MG tablet Take 1 tablet by mouth daily 30 tablet 11    metoprolol succinate (TOPROL XL) 50 MG extended release tablet Take 1 tablet by mouth daily 90 tablet 0    meloxicam (MOBIC) 15 MG tablet Take 1 tablet by mouth as needed for Pain 30 tablet 1    hydroxychloroquine (PLAQUENIL) 200 MG tablet Take 1

## 2025-08-01 ENCOUNTER — OFFICE VISIT (OUTPATIENT)
Age: 60
End: 2025-08-01
Payer: MEDICAID

## 2025-08-01 VITALS
TEMPERATURE: 98.1 F | OXYGEN SATURATION: 98 % | DIASTOLIC BLOOD PRESSURE: 80 MMHG | WEIGHT: 232 LBS | HEART RATE: 63 BPM | SYSTOLIC BLOOD PRESSURE: 143 MMHG | RESPIRATION RATE: 16 BRPM | BODY MASS INDEX: 38.61 KG/M2

## 2025-08-01 DIAGNOSIS — M32.9 SYSTEMIC LUPUS ERYTHEMATOSUS, UNSPECIFIED SLE TYPE, UNSPECIFIED ORGAN INVOLVEMENT STATUS (HCC): Primary | ICD-10-CM

## 2025-08-01 PROCEDURE — 99213 OFFICE O/P EST LOW 20 MIN: CPT | Performed by: NURSE PRACTITIONER

## 2025-08-01 PROCEDURE — 3077F SYST BP >= 140 MM HG: CPT | Performed by: NURSE PRACTITIONER

## 2025-08-01 PROCEDURE — 3079F DIAST BP 80-89 MM HG: CPT | Performed by: NURSE PRACTITIONER

## 2025-08-01 ASSESSMENT — PATIENT HEALTH QUESTIONNAIRE - PHQ9
SUM OF ALL RESPONSES TO PHQ QUESTIONS 1-9: 0
2. FEELING DOWN, DEPRESSED OR HOPELESS: NOT AT ALL
1. LITTLE INTEREST OR PLEASURE IN DOING THINGS: NOT AT ALL
SUM OF ALL RESPONSES TO PHQ QUESTIONS 1-9: 0

## 2025-08-01 NOTE — PATIENT INSTRUCTIONS
Thank you for visiting [facility]!      For future medication refills, we require updated lab results and an upcoming appointment to be in our system to refill prescriptions.  Without these two things, your refill will be DENIED.  If you miss your upcoming appointment, your refill will also be DENIED.      We appreciate your understanding of this critical clinical aspect of our practice. - FELIPE rBandon

## 2025-08-01 NOTE — PROGRESS NOTES
Chief Complaint   Patient presents with    Follow-up     1. Have you been to the ER, urgent care clinic since your last visit?  Hospitalized since your last visit?No    2. Have you seen or consulted any other health care providers outside of the [facility] since your last visit?  Include any pap smears or colon screening. No

## 2025-08-20 DIAGNOSIS — I10 ESSENTIAL HYPERTENSION: ICD-10-CM

## 2025-08-21 RX ORDER — METOPROLOL SUCCINATE 50 MG/1
50 TABLET, EXTENDED RELEASE ORAL DAILY
Qty: 30 TABLET | Refills: 2 | Status: SHIPPED | OUTPATIENT
Start: 2025-08-21

## 2025-08-25 ENCOUNTER — HOSPITAL ENCOUNTER (OUTPATIENT)
Facility: HOSPITAL | Age: 60
Setting detail: INFUSION SERIES
End: 2025-08-25

## 2025-08-26 ENCOUNTER — CLINICAL SUPPORT (OUTPATIENT)
Age: 60
End: 2025-08-26
Payer: MEDICAID

## 2025-08-26 VITALS
SYSTOLIC BLOOD PRESSURE: 142 MMHG | HEART RATE: 62 BPM | BODY MASS INDEX: 38.89 KG/M2 | TEMPERATURE: 98.3 F | DIASTOLIC BLOOD PRESSURE: 70 MMHG | WEIGHT: 233.4 LBS | RESPIRATION RATE: 16 BRPM | OXYGEN SATURATION: 100 % | HEIGHT: 65 IN

## 2025-08-26 DIAGNOSIS — M32.9 SYSTEMIC LUPUS ERYTHEMATOSUS, UNSPECIFIED SLE TYPE, UNSPECIFIED ORGAN INVOLVEMENT STATUS (HCC): Primary | ICD-10-CM

## 2025-08-26 PROCEDURE — 96413 CHEMO IV INFUSION 1 HR: CPT | Performed by: NURSE PRACTITIONER

## 2025-08-26 PROCEDURE — PBSHW PBB SHADOW CHARGE: Performed by: NURSE PRACTITIONER

## 2025-08-26 RX ORDER — SODIUM CHLORIDE 9 MG/ML
INJECTION, SOLUTION INTRAVENOUS CONTINUOUS
OUTPATIENT
Start: 2025-09-23

## 2025-08-26 RX ORDER — SODIUM CHLORIDE 0.9 % (FLUSH) 0.9 %
5-40 SYRINGE (ML) INJECTION PRN
OUTPATIENT
Start: 2025-09-23

## 2025-08-26 RX ORDER — DIPHENHYDRAMINE HYDROCHLORIDE 50 MG/ML
50 INJECTION, SOLUTION INTRAMUSCULAR; INTRAVENOUS
OUTPATIENT
Start: 2025-09-23

## 2025-08-26 RX ORDER — EPINEPHRINE 1 MG/ML
0.3 INJECTION, SOLUTION, CONCENTRATE INTRAVENOUS PRN
OUTPATIENT
Start: 2025-09-23

## 2025-08-26 RX ORDER — HEPARIN SODIUM (PORCINE) LOCK FLUSH IV SOLN 100 UNIT/ML 100 UNIT/ML
500 SOLUTION INTRAVENOUS PRN
OUTPATIENT
Start: 2025-09-23

## 2025-08-26 RX ORDER — SODIUM CHLORIDE 9 MG/ML
5-250 INJECTION, SOLUTION INTRAVENOUS PRN
Status: DISCONTINUED | OUTPATIENT
Start: 2025-08-26 | End: 2025-08-26 | Stop reason: HOSPADM

## 2025-08-26 RX ORDER — SODIUM CHLORIDE 9 MG/ML
5-250 INJECTION, SOLUTION INTRAVENOUS PRN
OUTPATIENT
Start: 2025-09-23

## 2025-08-26 RX ORDER — ACETAMINOPHEN 325 MG/1
650 TABLET ORAL ONCE
OUTPATIENT
Start: 2025-09-23

## 2025-08-26 RX ORDER — ONDANSETRON 2 MG/ML
8 INJECTION INTRAMUSCULAR; INTRAVENOUS
OUTPATIENT
Start: 2025-09-23

## 2025-08-26 RX ORDER — ACETAMINOPHEN 325 MG/1
650 TABLET ORAL
OUTPATIENT
Start: 2025-09-23

## 2025-08-26 RX ORDER — ALBUTEROL SULFATE 90 UG/1
4 INHALANT RESPIRATORY (INHALATION) PRN
OUTPATIENT
Start: 2025-09-23

## 2025-08-26 RX ORDER — HYDROCORTISONE SODIUM SUCCINATE 100 MG/2ML
100 INJECTION INTRAMUSCULAR; INTRAVENOUS
OUTPATIENT
Start: 2025-09-23

## 2025-08-26 RX ORDER — FAMOTIDINE 10 MG/ML
20 INJECTION, SOLUTION INTRAVENOUS
OUTPATIENT
Start: 2025-09-23

## 2025-08-26 RX ORDER — DIPHENHYDRAMINE HCL 25 MG
25 TABLET ORAL ONCE
OUTPATIENT
Start: 2025-09-23

## 2025-08-26 RX ORDER — SODIUM CHLORIDE 0.9 % (FLUSH) 0.9 %
5-40 SYRINGE (ML) INJECTION PRN
Status: DISCONTINUED | OUTPATIENT
Start: 2025-08-26 | End: 2025-08-26 | Stop reason: HOSPADM

## 2025-08-26 RX ADMIN — SODIUM CHLORIDE 20 ML/HR: 900 INJECTION, SOLUTION INTRAVENOUS at 13:31

## 2025-08-26 RX ADMIN — BELIMUMAB 1064 MG: 120 INJECTION, POWDER, LYOPHILIZED, FOR SOLUTION INTRAVENOUS at 13:30

## 2025-08-26 RX ADMIN — Medication 10 ML: at 13:20

## 2025-08-26 ASSESSMENT — PATIENT HEALTH QUESTIONNAIRE - PHQ9
2. FEELING DOWN, DEPRESSED OR HOPELESS: NOT AT ALL
SUM OF ALL RESPONSES TO PHQ QUESTIONS 1-9: 0
1. LITTLE INTEREST OR PLEASURE IN DOING THINGS: NOT AT ALL
SUM OF ALL RESPONSES TO PHQ QUESTIONS 1-9: 0

## 2025-08-26 ASSESSMENT — PAIN SCALES - GENERAL
PAINLEVEL_OUTOF10: 0
PAINLEVEL_OUTOF10: 0

## 2025-08-27 LAB
ALBUMIN SERPL-MCNC: 4 G/DL (ref 3.5–5.2)
ALBUMIN/GLOB SERPL: 1.1 (ref 1.1–2.2)
ALP SERPL-CCNC: 126 U/L (ref 35–104)
ALT SERPL-CCNC: 22 U/L (ref 10–35)
ANION GAP SERPL CALC-SCNC: 14 MMOL/L (ref 2–14)
AST SERPL-CCNC: 23 U/L (ref 10–35)
BASOPHILS # BLD: 0.03 K/UL (ref 0–0.1)
BASOPHILS NFR BLD: 0.5 % (ref 0–1)
BILIRUB SERPL-MCNC: 0.2 MG/DL (ref 0–1.2)
BUN SERPL-MCNC: 12 MG/DL (ref 8–23)
BUN/CREAT SERPL: 13 (ref 12–20)
CALCIUM SERPL-MCNC: 9.5 MG/DL (ref 8.8–10.2)
CHLORIDE SERPL-SCNC: 102 MMOL/L (ref 98–107)
CO2 SERPL-SCNC: 24 MMOL/L (ref 20–29)
CREAT SERPL-MCNC: 0.95 MG/DL (ref 0.6–1)
CRP SERPL-MCNC: 1.3 MG/DL (ref 0–0.5)
DIFFERENTIAL METHOD BLD: ABNORMAL
EOSINOPHIL # BLD: 0.06 K/UL (ref 0–0.4)
EOSINOPHIL NFR BLD: 1.1 % (ref 0–7)
ERYTHROCYTE [DISTWIDTH] IN BLOOD BY AUTOMATED COUNT: 14 % (ref 11.5–14.5)
ERYTHROCYTE [SEDIMENTATION RATE] IN BLOOD: 23 MM/HR (ref 0–30)
GLOBULIN SER CALC-MCNC: 3.5 G/DL (ref 2–4)
GLUCOSE SERPL-MCNC: 107 MG/DL (ref 65–100)
HCT VFR BLD AUTO: 43.7 % (ref 35–47)
HGB BLD-MCNC: 13.7 G/DL (ref 11.5–16)
IMM GRANULOCYTES # BLD AUTO: 0.01 K/UL (ref 0–0.04)
IMM GRANULOCYTES NFR BLD AUTO: 0.2 % (ref 0–0.5)
LYMPHOCYTES # BLD: 1.74 K/UL (ref 0.8–3.5)
LYMPHOCYTES NFR BLD: 30.7 % (ref 12–49)
MCH RBC QN AUTO: 25.6 PG (ref 26–34)
MCHC RBC AUTO-ENTMCNC: 31.4 G/DL (ref 30–36.5)
MCV RBC AUTO: 81.5 FL (ref 80–99)
MONOCYTES # BLD: 0.37 K/UL (ref 0–1)
MONOCYTES NFR BLD: 6.5 % (ref 5–13)
NEUTS SEG # BLD: 3.45 K/UL (ref 1.8–8)
NEUTS SEG NFR BLD: 61 % (ref 32–75)
NRBC # BLD: 0 K/UL (ref 0–0.01)
NRBC BLD-RTO: 0 PER 100 WBC
PLATELET # BLD AUTO: 287 K/UL (ref 150–400)
PMV BLD AUTO: 11 FL (ref 8.9–12.9)
POTASSIUM SERPL-SCNC: 3.9 MMOL/L (ref 3.5–5.1)
PROT SERPL-MCNC: 7.5 G/DL (ref 6.4–8.3)
RBC # BLD AUTO: 5.36 M/UL (ref 3.8–5.2)
SODIUM SERPL-SCNC: 140 MMOL/L (ref 136–145)
WBC # BLD AUTO: 5.7 K/UL (ref 3.6–11)

## 2025-09-16 ENCOUNTER — APPOINTMENT (OUTPATIENT)
Facility: HOSPITAL | Age: 60
End: 2025-09-16
Payer: MEDICAID

## (undated) DEVICE — TOWEL SURG W17XL27IN STD BLU COT NONFENESTRATED PREWASHED

## (undated) DEVICE — 16.0MM, MICRO ACUTRAK 2® BONE SCREW
Type: IMPLANTABLE DEVICE | Site: ARM | Status: NON-FUNCTIONAL
Brand: ACUMED
Removed: 2023-07-13

## (undated) DEVICE — GOWN,SIRUS,NONRNF,SETINSLV,XL,20/CS: Brand: MEDLINE

## (undated) DEVICE — DRAPE,HAND,STERILE: Brand: MEDLINE

## (undated) DEVICE — SNARE ENDOSCP M L240CM W27MM SHTH DIA2.4MM CHN 2.8MM OVL

## (undated) DEVICE — BANDAGE,ELASTIC,ESMARK,STERILE,4"X9',LF: Brand: MEDLINE

## (undated) DEVICE — APPLICATOR MEDICATED 26 CC SOLUTION HI LT ORNG CHLORAPREP

## (undated) DEVICE — PADDING CAST 4 INX5 YD STRL

## (undated) DEVICE — SUTURE STRATAFIX SPRL MCRYL + SZ 4-0 L12IN ABSRB UD PS-2 SXMP1B117

## (undated) DEVICE — INTENT OT USE PROVIDES A STERILE INTERFACE BETWEEN THE OPERATING ROOM SURGICAL LAMPS (NON-STERILE) AND THE SURGEON OR STAFF WORKING IN THE STERILE FIELD.: Brand: ASPEN® ALC PLUS LIGHT HANDLE COVER

## (undated) DEVICE — CORD ES L12FT BPLR FRCP

## (undated) DEVICE — BLADE OPHTH GRN ROUNDED TIP 1 SIDE SHRP GRINDLESS MINI-BLDE

## (undated) DEVICE — ELECTRODE PT RET AD L9FT HI MOIST COND ADH HYDRGEL CORDED

## (undated) DEVICE — MINOR BSIN PK

## (undated) DEVICE — SOLUTION IRRIG 1000ML 0.9% SOD CHL USP POUR PLAS BTL

## (undated) DEVICE — DRAPE,REIN 53X77,STERILE: Brand: MEDLINE

## (undated) DEVICE — SUTURE ETHLN SZ 4-0 L18IN NONABSORBABLE BLK L19MM PS-2 3/8 1667H

## (undated) DEVICE — SUTURE VCRL SZ 2-0 L27IN ABSRB UD L26MM SH 1/2 CIR J417H

## (undated) DEVICE — DRESSING,GAUZE,XEROFORM,CURAD,1"X8",ST: Brand: CURAD

## (undated) DEVICE — TRAP SURG QUAD PARABOLA SLOT DSGN SFTY SCRN TRAPEASE

## (undated) DEVICE — GLOVE SURG SZ 85 L12IN FNGR THK79MIL GRN LTX FREE

## (undated) DEVICE — DRAPE CARM MINI FOR IMAG SYS INSIGHT FLROSCN

## (undated) DEVICE — SUTURE VCRL SZ 0 L27IN ABSRB UD L26MM CT-2 1/2 CIR J270H

## (undated) DEVICE — PACK,BASIC,SIRUS,V: Brand: MEDLINE

## (undated) DEVICE — ADHESIVE SKIN CLSR 0.7ML TOP DERMBND ADV

## (undated) DEVICE — GARMENT,MEDLINE,DVT,INT,CALF,MED, GEN2: Brand: MEDLINE

## (undated) DEVICE — SPONGE GZ W4XL4IN COT 12 PLY TYP VII WVN C FLD DSGN STERILE